# Patient Record
Sex: FEMALE | Race: WHITE | Employment: UNEMPLOYED | URBAN - METROPOLITAN AREA
[De-identification: names, ages, dates, MRNs, and addresses within clinical notes are randomized per-mention and may not be internally consistent; named-entity substitution may affect disease eponyms.]

---

## 2018-11-14 ENCOUNTER — APPOINTMENT (OUTPATIENT)
Dept: CT IMAGING | Age: 58
End: 2018-11-14

## 2018-11-14 ENCOUNTER — HOSPITAL ENCOUNTER (EMERGENCY)
Age: 58
Discharge: HOME | End: 2018-11-14
Attending: EMERGENCY MEDICINE

## 2018-11-14 ENCOUNTER — APPOINTMENT (OUTPATIENT)
Dept: GENERAL RADIOLOGY | Age: 58
End: 2018-11-14

## 2018-11-14 VITALS
RESPIRATION RATE: 16 BRPM | TEMPERATURE: 97.9 F | BODY MASS INDEX: 21.34 KG/M2 | SYSTOLIC BLOOD PRESSURE: 138 MMHG | DIASTOLIC BLOOD PRESSURE: 80 MMHG | HEIGHT: 64 IN | WEIGHT: 125 LBS | HEART RATE: 90 BPM | OXYGEN SATURATION: 96 %

## 2018-11-14 DIAGNOSIS — J44.1 COPD EXACERBATION (HCC): Primary | ICD-10-CM

## 2018-11-14 LAB
ABSOLUTE EOS #: 0.04 K/UL (ref 0–0.44)
ABSOLUTE IMMATURE GRANULOCYTE: 0.06 K/UL (ref 0–0.3)
ABSOLUTE LYMPH #: 1.93 K/UL (ref 1.1–3.7)
ABSOLUTE MONO #: 1.36 K/UL (ref 0.1–1.2)
ALBUMIN SERPL-MCNC: 4.6 G/DL (ref 3.5–5.2)
ALBUMIN/GLOBULIN RATIO: 1.4 (ref 1–2.5)
ALP BLD-CCNC: 71 U/L (ref 35–104)
ALT SERPL-CCNC: 22 U/L (ref 5–33)
ANION GAP SERPL CALCULATED.3IONS-SCNC: 12 MMOL/L (ref 9–17)
AST SERPL-CCNC: 26 U/L
BASOPHILS # BLD: 0 % (ref 0–2)
BASOPHILS ABSOLUTE: 0.05 K/UL (ref 0–0.2)
BILIRUB SERPL-MCNC: 0.43 MG/DL (ref 0.3–1.2)
BILIRUBIN DIRECT: 0.13 MG/DL
BILIRUBIN URINE: NEGATIVE
BILIRUBIN, INDIRECT: 0.3 MG/DL (ref 0–1)
BNP INTERPRETATION: NORMAL
BUN BLDV-MCNC: 4 MG/DL (ref 6–20)
BUN/CREAT BLD: ABNORMAL (ref 9–20)
CALCIUM SERPL-MCNC: 10.3 MG/DL (ref 8.6–10.4)
CHLORIDE BLD-SCNC: 87 MMOL/L (ref 98–107)
CO2: 26 MMOL/L (ref 20–31)
COLOR: YELLOW
COMMENT UA: NORMAL
CREAT SERPL-MCNC: 0.39 MG/DL (ref 0.5–0.9)
DIFFERENTIAL TYPE: ABNORMAL
EKG ATRIAL RATE: 100 BPM
EKG P AXIS: 83 DEGREES
EKG P-R INTERVAL: 158 MS
EKG Q-T INTERVAL: 362 MS
EKG QRS DURATION: 82 MS
EKG QTC CALCULATION (BAZETT): 466 MS
EKG R AXIS: 72 DEGREES
EKG T AXIS: 54 DEGREES
EKG VENTRICULAR RATE: 100 BPM
EOSINOPHILS RELATIVE PERCENT: 0 % (ref 1–4)
GFR AFRICAN AMERICAN: >60 ML/MIN
GFR NON-AFRICAN AMERICAN: >60 ML/MIN
GFR SERPL CREATININE-BSD FRML MDRD: ABNORMAL ML/MIN/{1.73_M2}
GFR SERPL CREATININE-BSD FRML MDRD: ABNORMAL ML/MIN/{1.73_M2}
GLOBULIN: NORMAL G/DL (ref 1.5–3.8)
GLUCOSE BLD-MCNC: 119 MG/DL (ref 70–99)
GLUCOSE URINE: NEGATIVE
HCT VFR BLD CALC: 46 % (ref 36.3–47.1)
HEMOGLOBIN: 15.7 G/DL (ref 11.9–15.1)
IMMATURE GRANULOCYTES: 0 %
INR BLD: 0.9
KETONES, URINE: NEGATIVE
LACTIC ACID, WHOLE BLOOD: 1.4 MMOL/L (ref 0.7–2.1)
LEUKOCYTE ESTERASE, URINE: NEGATIVE
LYMPHOCYTES # BLD: 13 % (ref 24–43)
MCH RBC QN AUTO: 32.4 PG (ref 25.2–33.5)
MCHC RBC AUTO-ENTMCNC: 34.1 G/DL (ref 28.4–34.8)
MCV RBC AUTO: 94.8 FL (ref 82.6–102.9)
MONOCYTES # BLD: 9 % (ref 3–12)
NITRITE, URINE: NEGATIVE
NRBC AUTOMATED: 0 PER 100 WBC
PARTIAL THROMBOPLASTIN TIME: 26.2 SEC (ref 20.5–30.5)
PDW BLD-RTO: 13.1 % (ref 11.8–14.4)
PH UA: 6.5 (ref 5–8)
PLATELET # BLD: 324 K/UL (ref 138–453)
PLATELET ESTIMATE: ABNORMAL
PMV BLD AUTO: 9.2 FL (ref 8.1–13.5)
POC TROPONIN I: 0 NG/ML (ref 0–0.1)
POC TROPONIN I: 0 NG/ML (ref 0–0.1)
POC TROPONIN INTERP: NORMAL
POC TROPONIN INTERP: NORMAL
POTASSIUM SERPL-SCNC: 3.7 MMOL/L (ref 3.7–5.3)
PRO-BNP: 208 PG/ML
PROTEIN UA: NEGATIVE
PROTHROMBIN TIME: 9.6 SEC (ref 9–12)
RBC # BLD: 4.85 M/UL (ref 3.95–5.11)
RBC # BLD: ABNORMAL 10*6/UL
SEG NEUTROPHILS: 78 % (ref 36–65)
SEGMENTED NEUTROPHILS ABSOLUTE COUNT: 11.09 K/UL (ref 1.5–8.1)
SODIUM BLD-SCNC: 125 MMOL/L (ref 135–144)
SPECIFIC GRAVITY UA: 1.01 (ref 1–1.03)
TOTAL PROTEIN: 7.9 G/DL (ref 6.4–8.3)
TURBIDITY: CLEAR
URINE HGB: NEGATIVE
UROBILINOGEN, URINE: NORMAL
WBC # BLD: 14.5 K/UL (ref 3.5–11.3)
WBC # BLD: ABNORMAL 10*3/UL

## 2018-11-14 PROCEDURE — 71046 X-RAY EXAM CHEST 2 VIEWS: CPT

## 2018-11-14 PROCEDURE — 84484 ASSAY OF TROPONIN QUANT: CPT

## 2018-11-14 PROCEDURE — 94640 AIRWAY INHALATION TREATMENT: CPT

## 2018-11-14 PROCEDURE — 99285 EMERGENCY DEPT VISIT HI MDM: CPT

## 2018-11-14 PROCEDURE — 80048 BASIC METABOLIC PNL TOTAL CA: CPT

## 2018-11-14 PROCEDURE — 80076 HEPATIC FUNCTION PANEL: CPT

## 2018-11-14 PROCEDURE — 85610 PROTHROMBIN TIME: CPT

## 2018-11-14 PROCEDURE — 94762 N-INVAS EAR/PLS OXIMTRY CONT: CPT

## 2018-11-14 PROCEDURE — 83880 ASSAY OF NATRIURETIC PEPTIDE: CPT

## 2018-11-14 PROCEDURE — 6360000002 HC RX W HCPCS: Performed by: EMERGENCY MEDICINE

## 2018-11-14 PROCEDURE — 83605 ASSAY OF LACTIC ACID: CPT

## 2018-11-14 PROCEDURE — 71260 CT THORAX DX C+: CPT

## 2018-11-14 PROCEDURE — 96374 THER/PROPH/DIAG INJ IV PUSH: CPT

## 2018-11-14 PROCEDURE — 6360000002 HC RX W HCPCS

## 2018-11-14 PROCEDURE — 87086 URINE CULTURE/COLONY COUNT: CPT

## 2018-11-14 PROCEDURE — 85730 THROMBOPLASTIN TIME PARTIAL: CPT

## 2018-11-14 PROCEDURE — 2580000003 HC RX 258: Performed by: EMERGENCY MEDICINE

## 2018-11-14 PROCEDURE — 94664 DEMO&/EVAL PT USE INHALER: CPT

## 2018-11-14 PROCEDURE — 93005 ELECTROCARDIOGRAM TRACING: CPT

## 2018-11-14 PROCEDURE — 6360000004 HC RX CONTRAST MEDICATION: Performed by: EMERGENCY MEDICINE

## 2018-11-14 PROCEDURE — 87040 BLOOD CULTURE FOR BACTERIA: CPT

## 2018-11-14 PROCEDURE — 85025 COMPLETE CBC W/AUTO DIFF WBC: CPT

## 2018-11-14 PROCEDURE — 96375 TX/PRO/DX INJ NEW DRUG ADDON: CPT

## 2018-11-14 PROCEDURE — 81003 URINALYSIS AUTO W/O SCOPE: CPT

## 2018-11-14 RX ORDER — FENTANYL CITRATE 50 UG/ML
INJECTION, SOLUTION INTRAMUSCULAR; INTRAVENOUS
Status: COMPLETED
Start: 2018-11-14 | End: 2018-11-14

## 2018-11-14 RX ORDER — ALBUTEROL SULFATE 1.25 MG/3ML
1 SOLUTION RESPIRATORY (INHALATION) EVERY 6 HOURS PRN
COMMUNITY
End: 2019-01-29 | Stop reason: SDUPTHER

## 2018-11-14 RX ORDER — AZITHROMYCIN 250 MG/1
250 TABLET, FILM COATED ORAL DAILY
Qty: 4 TABLET | Refills: 0 | Status: SHIPPED | OUTPATIENT
Start: 2018-11-14 | End: 2019-01-29 | Stop reason: ALTCHOICE

## 2018-11-14 RX ORDER — METHYLPREDNISOLONE SODIUM SUCCINATE 125 MG/2ML
125 INJECTION, POWDER, LYOPHILIZED, FOR SOLUTION INTRAMUSCULAR; INTRAVENOUS ONCE
Status: COMPLETED | OUTPATIENT
Start: 2018-11-14 | End: 2018-11-14

## 2018-11-14 RX ORDER — IPRATROPIUM BROMIDE AND ALBUTEROL SULFATE 2.5; .5 MG/3ML; MG/3ML
1 SOLUTION RESPIRATORY (INHALATION)
Status: DISCONTINUED | OUTPATIENT
Start: 2018-11-14 | End: 2018-11-14

## 2018-11-14 RX ORDER — ALBUTEROL SULFATE 2.5 MG/3ML
5 SOLUTION RESPIRATORY (INHALATION)
Status: DISCONTINUED | OUTPATIENT
Start: 2018-11-14 | End: 2018-11-14 | Stop reason: HOSPADM

## 2018-11-14 RX ORDER — FENTANYL CITRATE 50 UG/ML
50 INJECTION, SOLUTION INTRAMUSCULAR; INTRAVENOUS ONCE
Status: COMPLETED | OUTPATIENT
Start: 2018-11-14 | End: 2018-11-14

## 2018-11-14 RX ORDER — MORPHINE SULFATE 4 MG/ML
4 INJECTION, SOLUTION INTRAMUSCULAR; INTRAVENOUS ONCE
Status: DISCONTINUED | OUTPATIENT
Start: 2018-11-14 | End: 2018-11-14

## 2018-11-14 RX ORDER — ALBUTEROL SULFATE 90 UG/1
2 AEROSOL, METERED RESPIRATORY (INHALATION)
Status: DISCONTINUED | OUTPATIENT
Start: 2018-11-14 | End: 2018-11-14

## 2018-11-14 RX ORDER — AZITHROMYCIN 250 MG/1
500 TABLET, FILM COATED ORAL ONCE
Status: DISCONTINUED | OUTPATIENT
Start: 2018-11-14 | End: 2018-11-14 | Stop reason: HOSPADM

## 2018-11-14 RX ORDER — 0.9 % SODIUM CHLORIDE 0.9 %
1000 INTRAVENOUS SOLUTION INTRAVENOUS ONCE
Status: COMPLETED | OUTPATIENT
Start: 2018-11-14 | End: 2018-11-14

## 2018-11-14 RX ORDER — ALBUTEROL SULFATE 90 UG/1
2 AEROSOL, METERED RESPIRATORY (INHALATION)
Status: DISCONTINUED | OUTPATIENT
Start: 2018-11-14 | End: 2018-11-14 | Stop reason: HOSPADM

## 2018-11-14 RX ORDER — PREDNISONE 50 MG/1
50 TABLET ORAL DAILY
Qty: 4 TABLET | Refills: 0 | Status: SHIPPED | OUTPATIENT
Start: 2018-11-14 | End: 2019-01-29 | Stop reason: ALTCHOICE

## 2018-11-14 RX ADMIN — IPRATROPIUM BROMIDE 0.5 MG: 0.5 SOLUTION RESPIRATORY (INHALATION) at 08:50

## 2018-11-14 RX ADMIN — ALBUTEROL SULFATE 5 MG: 2.5 SOLUTION RESPIRATORY (INHALATION) at 08:50

## 2018-11-14 RX ADMIN — METHYLPREDNISOLONE SODIUM SUCCINATE 125 MG: 125 INJECTION, POWDER, FOR SOLUTION INTRAMUSCULAR; INTRAVENOUS at 08:48

## 2018-11-14 RX ADMIN — IOPAMIDOL 100 ML: 755 INJECTION, SOLUTION INTRAVENOUS at 11:15

## 2018-11-14 RX ADMIN — FENTANYL CITRATE 50 MCG: 50 INJECTION, SOLUTION INTRAMUSCULAR; INTRAVENOUS at 09:30

## 2018-11-14 RX ADMIN — SODIUM CHLORIDE 1000 ML: 9 INJECTION, SOLUTION INTRAVENOUS at 10:30

## 2018-11-14 ASSESSMENT — ENCOUNTER SYMPTOMS
ABDOMINAL PAIN: 0
EYE DISCHARGE: 0
VOMITING: 0
EYE PAIN: 0
SHORTNESS OF BREATH: 1
SORE THROAT: 0
COUGH: 0
RHINORRHEA: 0
BACK PAIN: 1
DIARRHEA: 0
NAUSEA: 0
COLOR CHANGE: 0

## 2018-11-14 ASSESSMENT — PAIN DESCRIPTION - PROGRESSION: CLINICAL_PROGRESSION: GRADUALLY IMPROVING

## 2018-11-14 ASSESSMENT — PAIN DESCRIPTION - ORIENTATION
ORIENTATION: RIGHT;LEFT;ANTERIOR
ORIENTATION: RIGHT;LEFT

## 2018-11-14 ASSESSMENT — PAIN DESCRIPTION - LOCATION
LOCATION: CHEST
LOCATION: CHEST

## 2018-11-14 ASSESSMENT — PAIN DESCRIPTION - PAIN TYPE
TYPE: ACUTE PAIN
TYPE: ACUTE PAIN

## 2018-11-14 ASSESSMENT — PAIN DESCRIPTION - DESCRIPTORS
DESCRIPTORS: ACHING
DESCRIPTORS: ACHING

## 2018-11-14 ASSESSMENT — PAIN DESCRIPTION - ONSET
ONSET: ON-GOING
ONSET: SUDDEN

## 2018-11-14 ASSESSMENT — PAIN SCALES - GENERAL
PAINLEVEL_OUTOF10: 5
PAINLEVEL_OUTOF10: 10
PAINLEVEL_OUTOF10: 10

## 2018-11-14 ASSESSMENT — PAIN DESCRIPTION - FREQUENCY
FREQUENCY: CONTINUOUS
FREQUENCY: CONTINUOUS

## 2018-11-14 NOTE — ED NOTES
Bed: 30  Expected date:   Expected time:   Means of arrival:   Comments:  Laura CHAVARRIA [address], RN  11/14/18 [id number]

## 2018-11-15 LAB
CULTURE: NORMAL
Lab: NORMAL
SPECIMEN DESCRIPTION: NORMAL
STATUS: NORMAL

## 2018-11-20 LAB
CULTURE: NORMAL
CULTURE: NORMAL
Lab: NORMAL
Lab: NORMAL
SPECIMEN DESCRIPTION: NORMAL
SPECIMEN DESCRIPTION: NORMAL
STATUS: NORMAL
STATUS: NORMAL

## 2019-01-29 ENCOUNTER — OFFICE VISIT (OUTPATIENT)
Dept: FAMILY MEDICINE CLINIC | Age: 59
End: 2019-01-29

## 2019-01-29 VITALS
BODY MASS INDEX: 20.8 KG/M2 | WEIGHT: 117.4 LBS | TEMPERATURE: 98.4 F | HEART RATE: 86 BPM | SYSTOLIC BLOOD PRESSURE: 138 MMHG | HEIGHT: 63 IN | OXYGEN SATURATION: 92 % | DIASTOLIC BLOOD PRESSURE: 98 MMHG

## 2019-01-29 DIAGNOSIS — R73.01 ELEVATED FASTING BLOOD SUGAR: ICD-10-CM

## 2019-01-29 DIAGNOSIS — J44.1 CHRONIC OBSTRUCTIVE PULMONARY DISEASE WITH ACUTE EXACERBATION (HCC): Primary | ICD-10-CM

## 2019-01-29 DIAGNOSIS — I10 ESSENTIAL HYPERTENSION: ICD-10-CM

## 2019-01-29 DIAGNOSIS — Z13.0 SCREENING FOR DEFICIENCY ANEMIA: ICD-10-CM

## 2019-01-29 DIAGNOSIS — Z85.3 HISTORY OF RIGHT BREAST CANCER: ICD-10-CM

## 2019-01-29 PROBLEM — K52.9 CHRONIC DIARRHEA: Status: ACTIVE | Noted: 2019-01-29

## 2019-01-29 PROBLEM — J44.9 CHRONIC OBSTRUCTIVE LUNG DISEASE (HCC): Status: ACTIVE | Noted: 2019-01-29

## 2019-01-29 PROCEDURE — 99203 OFFICE O/P NEW LOW 30 MIN: CPT | Performed by: INTERNAL MEDICINE

## 2019-01-29 RX ORDER — ALBUTEROL SULFATE 1.25 MG/3ML
1 SOLUTION RESPIRATORY (INHALATION) EVERY 6 HOURS PRN
Qty: 360 ML | Refills: 3 | Status: ON HOLD | OUTPATIENT
Start: 2019-01-29 | End: 2019-12-27 | Stop reason: SDUPTHER

## 2019-01-29 RX ORDER — PREDNISONE 10 MG/1
TABLET ORAL
Qty: 30 TABLET | Refills: 0 | Status: SHIPPED | OUTPATIENT
Start: 2019-01-29 | End: 2019-03-06 | Stop reason: ALTCHOICE

## 2019-01-29 RX ORDER — AMLODIPINE BESYLATE 5 MG/1
5 TABLET ORAL DAILY
Qty: 30 TABLET | Refills: 3 | Status: SHIPPED | OUTPATIENT
Start: 2019-01-29 | End: 2019-06-03 | Stop reason: SDUPTHER

## 2019-01-29 RX ORDER — IPRATROPIUM BROMIDE AND ALBUTEROL SULFATE 2.5; .5 MG/3ML; MG/3ML
1 SOLUTION RESPIRATORY (INHALATION) EVERY 6 HOURS
Qty: 360 ML | Refills: 3 | Status: ON HOLD | OUTPATIENT
Start: 2019-01-29 | End: 2019-12-27 | Stop reason: SDUPTHER

## 2019-01-29 ASSESSMENT — PATIENT HEALTH QUESTIONNAIRE - PHQ9
SUM OF ALL RESPONSES TO PHQ9 QUESTIONS 1 & 2: 0
2. FEELING DOWN, DEPRESSED OR HOPELESS: 0
SUM OF ALL RESPONSES TO PHQ QUESTIONS 1-9: 0
1. LITTLE INTEREST OR PLEASURE IN DOING THINGS: 0
SUM OF ALL RESPONSES TO PHQ QUESTIONS 1-9: 0

## 2019-03-06 ENCOUNTER — OFFICE VISIT (OUTPATIENT)
Dept: FAMILY MEDICINE CLINIC | Age: 59
End: 2019-03-06

## 2019-03-06 VITALS
BODY MASS INDEX: 20.83 KG/M2 | HEART RATE: 99 BPM | SYSTOLIC BLOOD PRESSURE: 116 MMHG | TEMPERATURE: 98.7 F | WEIGHT: 117.6 LBS | DIASTOLIC BLOOD PRESSURE: 82 MMHG | OXYGEN SATURATION: 94 % | RESPIRATION RATE: 16 BRPM

## 2019-03-06 DIAGNOSIS — J44.1 CHRONIC OBSTRUCTIVE PULMONARY DISEASE WITH ACUTE EXACERBATION (HCC): Primary | ICD-10-CM
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
## 2019-03-06 DIAGNOSIS — I10 ESSENTIAL HYPERTENSION: ICD-10-CM

## 2019-03-06 DIAGNOSIS — Z85.3 HISTORY OF RIGHT BREAST CANCER: ICD-10-CM

## 2019-03-06 PROCEDURE — 99214 OFFICE O/P EST MOD 30 MIN: CPT | Performed by: INTERNAL MEDICINE

## 2019-06-03 ENCOUNTER — TELEPHONE (OUTPATIENT)
Dept: FAMILY MEDICINE CLINIC | Age: 59
End: 2019-06-03

## 2019-06-03 DIAGNOSIS — I10 ESSENTIAL HYPERTENSION: ICD-10-CM

## 2019-06-04 RX ORDER — AMLODIPINE BESYLATE 5 MG/1
5 TABLET ORAL DAILY
Qty: 90 TABLET | Refills: 1 | Status: SHIPPED | OUTPATIENT
Start: 2019-06-04 | End: 2019-12-23 | Stop reason: SDUPTHER

## 2019-06-04 RX ORDER — AMLODIPINE BESYLATE 5 MG/1
5 TABLET ORAL DAILY
Qty: 90 TABLET | Refills: 1 | Status: SHIPPED | OUTPATIENT
Start: 2019-06-04 | End: 2019-06-04 | Stop reason: SDUPTHER

## 2019-12-18 ENCOUNTER — HOSPITAL ENCOUNTER (INPATIENT)
Age: 59
LOS: 2 days | Discharge: HOME | DRG: 180 | End: 2019-12-20
Attending: EMERGENCY MEDICINE | Admitting: INTERNAL MEDICINE

## 2019-12-18 ENCOUNTER — APPOINTMENT (OUTPATIENT)
Dept: GENERAL RADIOLOGY | Age: 59
DRG: 180 | End: 2019-12-18

## 2019-12-18 ENCOUNTER — APPOINTMENT (OUTPATIENT)
Dept: CT IMAGING | Age: 59
DRG: 180 | End: 2019-12-18

## 2019-12-18 DIAGNOSIS — R06.02 SHORTNESS OF BREATH: Primary | ICD-10-CM

## 2019-12-18 PROBLEM — J90 PLEURAL EFFUSION: Status: ACTIVE | Noted: 2019-12-18

## 2019-12-18 LAB
ANION GAP SERPL CALCULATED.3IONS-SCNC: 13 MMOL/L (ref 9–17)
BNP INTERPRETATION: NORMAL
BUN BLDV-MCNC: 6 MG/DL (ref 6–20)
BUN/CREAT BLD: ABNORMAL (ref 9–20)
CALCIUM SERPL-MCNC: 9.8 MG/DL (ref 8.6–10.4)
CHLORIDE BLD-SCNC: 91 MMOL/L (ref 98–107)
CO2: 27 MMOL/L (ref 20–31)
CREAT SERPL-MCNC: 0.44 MG/DL (ref 0.5–0.9)
GFR AFRICAN AMERICAN: >60 ML/MIN
GFR NON-AFRICAN AMERICAN: >60 ML/MIN
GFR SERPL CREATININE-BSD FRML MDRD: ABNORMAL ML/MIN/{1.73_M2}
GFR SERPL CREATININE-BSD FRML MDRD: ABNORMAL ML/MIN/{1.73_M2}
GLUCOSE BLD-MCNC: 117 MG/DL (ref 70–99)
HCT VFR BLD CALC: 41.3 % (ref 36.3–47.1)
HEMOGLOBIN: 14.1 G/DL (ref 11.9–15.1)
MCH RBC QN AUTO: 33.1 PG (ref 25.2–33.5)
MCHC RBC AUTO-ENTMCNC: 34.1 G/DL (ref 28.4–34.8)
MCV RBC AUTO: 96.9 FL (ref 82.6–102.9)
NRBC AUTOMATED: 0 PER 100 WBC
PDW BLD-RTO: 13.6 % (ref 11.8–14.4)
PLATELET # BLD: 499 K/UL (ref 138–453)
PMV BLD AUTO: 8.8 FL (ref 8.1–13.5)
POTASSIUM SERPL-SCNC: 4.1 MMOL/L (ref 3.7–5.3)
PRO-BNP: 243 PG/ML
RBC # BLD: 4.26 M/UL (ref 3.95–5.11)
SODIUM BLD-SCNC: 131 MMOL/L (ref 135–144)
WBC # BLD: 12.1 K/UL (ref 3.5–11.3)

## 2019-12-18 PROCEDURE — 85027 COMPLETE CBC AUTOMATED: CPT

## 2019-12-18 PROCEDURE — 6360000004 HC RX CONTRAST MEDICATION: Performed by: STUDENT IN AN ORGANIZED HEALTH CARE EDUCATION/TRAINING PROGRAM

## 2019-12-18 PROCEDURE — 99285 EMERGENCY DEPT VISIT HI MDM: CPT

## 2019-12-18 PROCEDURE — 1200000000 HC SEMI PRIVATE

## 2019-12-18 PROCEDURE — 71046 X-RAY EXAM CHEST 2 VIEWS: CPT

## 2019-12-18 PROCEDURE — 71260 CT THORAX DX C+: CPT

## 2019-12-18 PROCEDURE — 99222 1ST HOSP IP/OBS MODERATE 55: CPT | Performed by: NURSE PRACTITIONER

## 2019-12-18 PROCEDURE — 80048 BASIC METABOLIC PNL TOTAL CA: CPT

## 2019-12-18 PROCEDURE — 93005 ELECTROCARDIOGRAM TRACING: CPT | Performed by: INTERNAL MEDICINE

## 2019-12-18 PROCEDURE — 83880 ASSAY OF NATRIURETIC PEPTIDE: CPT

## 2019-12-18 RX ADMIN — IOHEXOL 75 ML: 350 INJECTION, SOLUTION INTRAVENOUS at 18:50

## 2019-12-19 ENCOUNTER — APPOINTMENT (OUTPATIENT)
Dept: ULTRASOUND IMAGING | Age: 59
DRG: 180 | End: 2019-12-19

## 2019-12-19 ENCOUNTER — APPOINTMENT (OUTPATIENT)
Dept: GENERAL RADIOLOGY | Age: 59
DRG: 180 | End: 2019-12-19

## 2019-12-19 PROBLEM — R73.9 HYPERGLYCEMIA: Status: ACTIVE | Noted: 2019-12-19

## 2019-12-19 PROBLEM — Z72.0 TOBACCO ABUSE DISORDER: Status: ACTIVE | Noted: 2019-12-19

## 2019-12-19 PROBLEM — F10.10 ALCOHOL ABUSE: Status: ACTIVE | Noted: 2019-12-19

## 2019-12-19 PROBLEM — I25.10 CORONARY ARTERY DISEASE INVOLVING NATIVE CORONARY ARTERY OF NATIVE HEART WITHOUT ANGINA PECTORIS: Status: ACTIVE | Noted: 2019-12-19

## 2019-12-19 PROBLEM — D72.829 LEUKOCYTOSIS: Status: ACTIVE | Noted: 2019-12-19

## 2019-12-19 PROBLEM — J44.0 CHRONIC OBSTRUCTIVE PULMONARY DISEASE WITH ACUTE LOWER RESPIRATORY INFECTION (HCC): Status: ACTIVE | Noted: 2019-01-29

## 2019-12-19 PROBLEM — E87.1 HYPONATREMIA: Status: ACTIVE | Noted: 2019-12-19

## 2019-12-19 PROBLEM — K52.9 CHRONIC DIARRHEA: Status: RESOLVED | Noted: 2019-01-29 | Resolved: 2019-12-19

## 2019-12-19 LAB
ALBUMIN SERPL-MCNC: 3.8 G/DL (ref 3.5–5.2)
ALBUMIN/GLOBULIN RATIO: 1 (ref 1–2.5)
ALP BLD-CCNC: 93 U/L (ref 35–104)
ALT SERPL-CCNC: 16 U/L (ref 5–33)
ANION GAP SERPL CALCULATED.3IONS-SCNC: 14 MMOL/L (ref 9–17)
AST SERPL-CCNC: 21 U/L
BILIRUB SERPL-MCNC: 0.43 MG/DL (ref 0.3–1.2)
BILIRUBIN DIRECT: 0.12 MG/DL
BILIRUBIN, INDIRECT: 0.31 MG/DL (ref 0–1)
BNP INTERPRETATION: NORMAL
BUN BLDV-MCNC: 4 MG/DL (ref 6–20)
BUN/CREAT BLD: ABNORMAL (ref 9–20)
CALCIUM SERPL-MCNC: 9.7 MG/DL (ref 8.6–10.4)
CASE NUMBER:: NORMAL
CHLORIDE BLD-SCNC: 99 MMOL/L (ref 98–107)
CO2: 22 MMOL/L (ref 20–31)
CREAT SERPL-MCNC: 0.27 MG/DL (ref 0.5–0.9)
DIRECT EXAM: NORMAL
EKG ATRIAL RATE: 91 BPM
EKG P AXIS: 66 DEGREES
EKG P-R INTERVAL: 156 MS
EKG Q-T INTERVAL: 350 MS
EKG QRS DURATION: 74 MS
EKG QTC CALCULATION (BAZETT): 430 MS
EKG R AXIS: 65 DEGREES
EKG T AXIS: 42 DEGREES
EKG VENTRICULAR RATE: 91 BPM
ESTIMATED AVERAGE GLUCOSE: 114 MG/DL
GFR AFRICAN AMERICAN: >60 ML/MIN
GFR NON-AFRICAN AMERICAN: >60 ML/MIN
GFR SERPL CREATININE-BSD FRML MDRD: ABNORMAL ML/MIN/{1.73_M2}
GFR SERPL CREATININE-BSD FRML MDRD: ABNORMAL ML/MIN/{1.73_M2}
GLOBULIN: NORMAL G/DL (ref 1.5–3.8)
GLUCOSE BLD-MCNC: 131 MG/DL (ref 70–99)
GLUCOSE, FLUID: 48 MG/DL
HBA1C MFR BLD: 5.6 % (ref 4–6)
HCT VFR BLD CALC: 41.2 % (ref 36.3–47.1)
HCT VFR BLD CALC: 42.4 % (ref 36.3–47.1)
HEMOGLOBIN: 13.8 G/DL (ref 11.9–15.1)
HEMOGLOBIN: 14.4 G/DL (ref 11.9–15.1)
INR BLD: 0.9
INR BLD: 0.9
LACTATE DEHYDROGENASE, FLUID: 464 U/L
LACTATE DEHYDROGENASE: 180 U/L (ref 135–214)
LACTIC ACID, SEPSIS WHOLE BLOOD: 1.1 MMOL/L (ref 0.5–1.9)
LACTIC ACID, SEPSIS: NORMAL MMOL/L (ref 0.5–1.9)
Lab: NORMAL
MCH RBC QN AUTO: 33.1 PG (ref 25.2–33.5)
MCH RBC QN AUTO: 33.4 PG (ref 25.2–33.5)
MCHC RBC AUTO-ENTMCNC: 33.5 G/DL (ref 28.4–34.8)
MCHC RBC AUTO-ENTMCNC: 34 G/DL (ref 28.4–34.8)
MCV RBC AUTO: 97.5 FL (ref 82.6–102.9)
MCV RBC AUTO: 99.8 FL (ref 82.6–102.9)
MYOGLOBIN: 25 NG/ML (ref 25–58)
MYOGLOBIN: 35 NG/ML (ref 25–58)
MYOGLOBIN: <21 NG/ML (ref 25–58)
NRBC AUTOMATED: 0 PER 100 WBC
NRBC AUTOMATED: 0 PER 100 WBC
PARTIAL THROMBOPLASTIN TIME: 25.9 SEC (ref 20.5–30.5)
PDW BLD-RTO: 13.3 % (ref 11.8–14.4)
PDW BLD-RTO: 13.3 % (ref 11.8–14.4)
PH FLUID: 7.5
PLATELET # BLD: 438 K/UL (ref 138–453)
PLATELET # BLD: 459 K/UL (ref 138–453)
PMV BLD AUTO: 8.7 FL (ref 8.1–13.5)
PMV BLD AUTO: 9 FL (ref 8.1–13.5)
POTASSIUM SERPL-SCNC: 4 MMOL/L (ref 3.7–5.3)
PRO-BNP: 219 PG/ML
PROTHROMBIN TIME: 10 SEC (ref 9–12)
PROTHROMBIN TIME: 9.8 SEC (ref 9–12)
RBC # BLD: 4.13 M/UL (ref 3.95–5.11)
RBC # BLD: 4.35 M/UL (ref 3.95–5.11)
SODIUM BLD-SCNC: 135 MMOL/L (ref 135–144)
SPECIMEN DESCRIPTION: NORMAL
SPECIMEN TYPE: NORMAL
TOTAL PROTEIN, BODY FLUID: 5.2 G/DL
TOTAL PROTEIN: 7.4 G/DL (ref 6.4–8.3)
TOTAL PROTEIN: 7.6 G/DL (ref 6.4–8.3)
TROPONIN INTERP: ABNORMAL
TROPONIN INTERP: NORMAL
TROPONIN INTERP: NORMAL
TROPONIN T: ABNORMAL NG/ML
TROPONIN T: NORMAL NG/ML
TROPONIN T: NORMAL NG/ML
TROPONIN, HIGH SENSITIVITY: 7 NG/L (ref 0–14)
TROPONIN, HIGH SENSITIVITY: <6 NG/L (ref 0–14)
TROPONIN, HIGH SENSITIVITY: <6 NG/L (ref 0–14)
WBC # BLD: 10.1 K/UL (ref 3.5–11.3)
WBC # BLD: 11.8 K/UL (ref 3.5–11.3)

## 2019-12-19 PROCEDURE — 87206 SMEAR FLUORESCENT/ACID STAI: CPT

## 2019-12-19 PROCEDURE — 1200000000 HC SEMI PRIVATE

## 2019-12-19 PROCEDURE — 84484 ASSAY OF TROPONIN QUANT: CPT

## 2019-12-19 PROCEDURE — 99233 SBSQ HOSP IP/OBS HIGH 50: CPT | Performed by: INTERNAL MEDICINE

## 2019-12-19 PROCEDURE — 85610 PROTHROMBIN TIME: CPT

## 2019-12-19 PROCEDURE — 87040 BLOOD CULTURE FOR BACTERIA: CPT

## 2019-12-19 PROCEDURE — 88341 IMHCHEM/IMCYTCHM EA ADD ANTB: CPT

## 2019-12-19 PROCEDURE — 87449 NOS EACH ORGANISM AG IA: CPT

## 2019-12-19 PROCEDURE — 2580000003 HC RX 258: Performed by: STUDENT IN AN ORGANIZED HEALTH CARE EDUCATION/TRAINING PROGRAM

## 2019-12-19 PROCEDURE — 87075 CULTR BACTERIA EXCEPT BLOOD: CPT

## 2019-12-19 PROCEDURE — 83874 ASSAY OF MYOGLOBIN: CPT

## 2019-12-19 PROCEDURE — 94761 N-INVAS EAR/PLS OXIMETRY MLT: CPT

## 2019-12-19 PROCEDURE — 87116 MYCOBACTERIA CULTURE: CPT

## 2019-12-19 PROCEDURE — 86738 MYCOPLASMA ANTIBODY: CPT

## 2019-12-19 PROCEDURE — 87070 CULTURE OTHR SPECIMN AEROBIC: CPT

## 2019-12-19 PROCEDURE — 83036 HEMOGLOBIN GLYCOSYLATED A1C: CPT

## 2019-12-19 PROCEDURE — 6370000000 HC RX 637 (ALT 250 FOR IP): Performed by: NURSE PRACTITIONER

## 2019-12-19 PROCEDURE — 86038 ANTINUCLEAR ANTIBODIES: CPT

## 2019-12-19 PROCEDURE — 83605 ASSAY OF LACTIC ACID: CPT

## 2019-12-19 PROCEDURE — 6360000002 HC RX W HCPCS: Performed by: NURSE PRACTITIONER

## 2019-12-19 PROCEDURE — 83615 LACTATE (LD) (LDH) ENZYME: CPT

## 2019-12-19 PROCEDURE — 85730 THROMBOPLASTIN TIME PARTIAL: CPT

## 2019-12-19 PROCEDURE — 84157 ASSAY OF PROTEIN OTHER: CPT

## 2019-12-19 PROCEDURE — 88342 IMHCHEM/IMCYTCHM 1ST ANTB: CPT

## 2019-12-19 PROCEDURE — 87899 AGENT NOS ASSAY W/OPTIC: CPT

## 2019-12-19 PROCEDURE — 94664 DEMO&/EVAL PT USE INHALER: CPT

## 2019-12-19 PROCEDURE — 80048 BASIC METABOLIC PNL TOTAL CA: CPT

## 2019-12-19 PROCEDURE — 80076 HEPATIC FUNCTION PANEL: CPT

## 2019-12-19 PROCEDURE — 71045 X-RAY EXAM CHEST 1 VIEW: CPT

## 2019-12-19 PROCEDURE — 93010 ELECTROCARDIOGRAM REPORT: CPT | Performed by: INTERNAL MEDICINE

## 2019-12-19 PROCEDURE — 36415 COLL VENOUS BLD VENIPUNCTURE: CPT

## 2019-12-19 PROCEDURE — 83986 ASSAY PH BODY FLUID NOS: CPT

## 2019-12-19 PROCEDURE — 6370000000 HC RX 637 (ALT 250 FOR IP): Performed by: INTERNAL MEDICINE

## 2019-12-19 PROCEDURE — 87102 FUNGUS ISOLATION CULTURE: CPT

## 2019-12-19 PROCEDURE — 2580000003 HC RX 258: Performed by: INTERNAL MEDICINE

## 2019-12-19 PROCEDURE — 2700000000 HC OXYGEN THERAPY PER DAY

## 2019-12-19 PROCEDURE — 85027 COMPLETE CBC AUTOMATED: CPT

## 2019-12-19 PROCEDURE — 2709999900 US THORACENTESIS

## 2019-12-19 PROCEDURE — 88112 CYTOPATH CELL ENHANCE TECH: CPT

## 2019-12-19 PROCEDURE — 82945 GLUCOSE OTHER FLUID: CPT

## 2019-12-19 PROCEDURE — 84155 ASSAY OF PROTEIN SERUM: CPT

## 2019-12-19 PROCEDURE — 88305 TISSUE EXAM BY PATHOLOGIST: CPT

## 2019-12-19 PROCEDURE — 89051 BODY FLUID CELL COUNT: CPT

## 2019-12-19 PROCEDURE — 87205 SMEAR GRAM STAIN: CPT

## 2019-12-19 PROCEDURE — 94640 AIRWAY INHALATION TREATMENT: CPT

## 2019-12-19 PROCEDURE — 87015 SPECIMEN INFECT AGNT CONCNTJ: CPT

## 2019-12-19 PROCEDURE — 93005 ELECTROCARDIOGRAM TRACING: CPT | Performed by: INTERNAL MEDICINE

## 2019-12-19 PROCEDURE — 83880 ASSAY OF NATRIURETIC PEPTIDE: CPT

## 2019-12-19 PROCEDURE — 6360000002 HC RX W HCPCS: Performed by: STUDENT IN AN ORGANIZED HEALTH CARE EDUCATION/TRAINING PROGRAM

## 2019-12-19 PROCEDURE — 94669 MECHANICAL CHEST WALL OSCILL: CPT

## 2019-12-19 PROCEDURE — 0W9B3ZZ DRAINAGE OF LEFT PLEURAL CAVITY, PERCUTANEOUS APPROACH: ICD-10-PCS | Performed by: INTERNAL MEDICINE

## 2019-12-19 RX ORDER — LORAZEPAM 2 MG/ML
2 INJECTION INTRAMUSCULAR
Status: DISCONTINUED | OUTPATIENT
Start: 2019-12-19 | End: 2019-12-21 | Stop reason: HOSPADM

## 2019-12-19 RX ORDER — PREDNISONE 20 MG/1
40 TABLET ORAL DAILY
Status: DISCONTINUED | OUTPATIENT
Start: 2019-12-21 | End: 2019-12-21 | Stop reason: HOSPADM

## 2019-12-19 RX ORDER — 0.9 % SODIUM CHLORIDE 0.9 %
1000 INTRAVENOUS SOLUTION INTRAVENOUS ONCE
Status: DISCONTINUED | OUTPATIENT
Start: 2019-12-19 | End: 2019-12-21 | Stop reason: HOSPADM

## 2019-12-19 RX ORDER — METHYLPREDNISOLONE SODIUM SUCCINATE 40 MG/ML
40 INJECTION, POWDER, LYOPHILIZED, FOR SOLUTION INTRAMUSCULAR; INTRAVENOUS EVERY 6 HOURS
Status: DISCONTINUED | OUTPATIENT
Start: 2019-12-19 | End: 2019-12-21 | Stop reason: HOSPADM

## 2019-12-19 RX ORDER — SODIUM CHLORIDE 0.9 % (FLUSH) 0.9 %
10 SYRINGE (ML) INJECTION EVERY 12 HOURS SCHEDULED
Status: DISCONTINUED | OUTPATIENT
Start: 2019-12-19 | End: 2019-12-21 | Stop reason: HOSPADM

## 2019-12-19 RX ORDER — IPRATROPIUM BROMIDE AND ALBUTEROL SULFATE 2.5; .5 MG/3ML; MG/3ML
1 SOLUTION RESPIRATORY (INHALATION)
Status: DISCONTINUED | OUTPATIENT
Start: 2019-12-19 | End: 2019-12-19

## 2019-12-19 RX ORDER — SODIUM CHLORIDE 0.9 % (FLUSH) 0.9 %
10 SYRINGE (ML) INJECTION PRN
Status: DISCONTINUED | OUTPATIENT
Start: 2019-12-19 | End: 2019-12-21 | Stop reason: HOSPADM

## 2019-12-19 RX ORDER — FAMOTIDINE 20 MG/1
20 TABLET, FILM COATED ORAL 2 TIMES DAILY
Status: DISCONTINUED | OUTPATIENT
Start: 2019-12-19 | End: 2019-12-21 | Stop reason: HOSPADM

## 2019-12-19 RX ORDER — LORAZEPAM 1 MG/1
1 TABLET ORAL
Status: DISCONTINUED | OUTPATIENT
Start: 2019-12-19 | End: 2019-12-21 | Stop reason: HOSPADM

## 2019-12-19 RX ORDER — ONDANSETRON 2 MG/ML
4 INJECTION INTRAMUSCULAR; INTRAVENOUS EVERY 6 HOURS PRN
Status: DISCONTINUED | OUTPATIENT
Start: 2019-12-19 | End: 2019-12-21 | Stop reason: HOSPADM

## 2019-12-19 RX ORDER — LORAZEPAM 2 MG/1
2 TABLET ORAL
Status: DISCONTINUED | OUTPATIENT
Start: 2019-12-19 | End: 2019-12-21 | Stop reason: HOSPADM

## 2019-12-19 RX ORDER — IPRATROPIUM BROMIDE AND ALBUTEROL SULFATE 2.5; .5 MG/3ML; MG/3ML
1 SOLUTION RESPIRATORY (INHALATION) EVERY 4 HOURS PRN
Status: DISCONTINUED | OUTPATIENT
Start: 2019-12-19 | End: 2019-12-21 | Stop reason: HOSPADM

## 2019-12-19 RX ORDER — LORAZEPAM 2 MG/1
4 TABLET ORAL
Status: DISCONTINUED | OUTPATIENT
Start: 2019-12-19 | End: 2019-12-21 | Stop reason: HOSPADM

## 2019-12-19 RX ORDER — LORAZEPAM 2 MG/ML
3 INJECTION INTRAMUSCULAR
Status: DISCONTINUED | OUTPATIENT
Start: 2019-12-19 | End: 2019-12-21 | Stop reason: HOSPADM

## 2019-12-19 RX ORDER — LORAZEPAM 2 MG/ML
1 INJECTION INTRAMUSCULAR
Status: DISCONTINUED | OUTPATIENT
Start: 2019-12-19 | End: 2019-12-21 | Stop reason: HOSPADM

## 2019-12-19 RX ORDER — NICOTINE 21 MG/24HR
1 PATCH, TRANSDERMAL 24 HOURS TRANSDERMAL DAILY PRN
Status: DISCONTINUED | OUTPATIENT
Start: 2019-12-19 | End: 2019-12-21 | Stop reason: HOSPADM

## 2019-12-19 RX ORDER — FUROSEMIDE 10 MG/ML
40 INJECTION INTRAMUSCULAR; INTRAVENOUS ONCE
Status: COMPLETED | OUTPATIENT
Start: 2019-12-19 | End: 2019-12-19

## 2019-12-19 RX ORDER — ALBUTEROL SULFATE 90 UG/1
2 AEROSOL, METERED RESPIRATORY (INHALATION) EVERY 6 HOURS PRN
Status: DISCONTINUED | OUTPATIENT
Start: 2019-12-19 | End: 2019-12-21 | Stop reason: HOSPADM

## 2019-12-19 RX ORDER — ACETAMINOPHEN 325 MG/1
650 TABLET ORAL EVERY 4 HOURS PRN
Status: DISCONTINUED | OUTPATIENT
Start: 2019-12-19 | End: 2019-12-21 | Stop reason: HOSPADM

## 2019-12-19 RX ORDER — ALBUTEROL SULFATE 2.5 MG/3ML
2.5 SOLUTION RESPIRATORY (INHALATION)
Status: DISCONTINUED | OUTPATIENT
Start: 2019-12-19 | End: 2019-12-19

## 2019-12-19 RX ORDER — LORAZEPAM 2 MG/ML
4 INJECTION INTRAMUSCULAR
Status: DISCONTINUED | OUTPATIENT
Start: 2019-12-19 | End: 2019-12-21 | Stop reason: HOSPADM

## 2019-12-19 RX ORDER — AMLODIPINE BESYLATE 5 MG/1
5 TABLET ORAL DAILY
Status: DISCONTINUED | OUTPATIENT
Start: 2019-12-19 | End: 2019-12-21 | Stop reason: HOSPADM

## 2019-12-19 RX ORDER — UREA 10 %
3 LOTION (ML) TOPICAL NIGHTLY PRN
Status: DISCONTINUED | OUTPATIENT
Start: 2019-12-19 | End: 2019-12-21 | Stop reason: HOSPADM

## 2019-12-19 RX ADMIN — METHYLPREDNISOLONE SODIUM SUCCINATE 40 MG: 40 INJECTION, POWDER, FOR SOLUTION INTRAMUSCULAR; INTRAVENOUS at 20:43

## 2019-12-19 RX ADMIN — MEROPENEM 1 G: 1 INJECTION, POWDER, FOR SOLUTION INTRAVENOUS at 06:40

## 2019-12-19 RX ADMIN — IPRATROPIUM BROMIDE AND ALBUTEROL SULFATE 1 AMPULE: .5; 3 SOLUTION RESPIRATORY (INHALATION) at 08:53

## 2019-12-19 RX ADMIN — METHYLPREDNISOLONE SODIUM SUCCINATE 40 MG: 40 INJECTION, POWDER, FOR SOLUTION INTRAMUSCULAR; INTRAVENOUS at 14:26

## 2019-12-19 RX ADMIN — Medication 10 ML: at 09:49

## 2019-12-19 RX ADMIN — METHYLPREDNISOLONE SODIUM SUCCINATE 40 MG: 40 INJECTION, POWDER, FOR SOLUTION INTRAMUSCULAR; INTRAVENOUS at 09:40

## 2019-12-19 RX ADMIN — FAMOTIDINE 20 MG: 20 TABLET, FILM COATED ORAL at 20:43

## 2019-12-19 RX ADMIN — FUROSEMIDE 40 MG: 10 INJECTION, SOLUTION INTRAMUSCULAR; INTRAVENOUS at 05:09

## 2019-12-19 RX ADMIN — Medication 10 ML: at 13:00

## 2019-12-19 RX ADMIN — FAMOTIDINE 20 MG: 20 TABLET, FILM COATED ORAL at 02:41

## 2019-12-19 RX ADMIN — VANCOMYCIN HYDROCHLORIDE 750 MG: 10 INJECTION, POWDER, LYOPHILIZED, FOR SOLUTION INTRAVENOUS at 02:43

## 2019-12-19 RX ADMIN — METHYLPREDNISOLONE SODIUM SUCCINATE 40 MG: 40 INJECTION, POWDER, FOR SOLUTION INTRAMUSCULAR; INTRAVENOUS at 02:42

## 2019-12-19 SDOH — HEALTH STABILITY: MENTAL HEALTH: HOW OFTEN DO YOU HAVE A DRINK CONTAINING ALCOHOL?: 4 OR MORE TIMES A WEEK

## 2019-12-19 SDOH — HEALTH STABILITY: MENTAL HEALTH: HOW MANY STANDARD DRINKS CONTAINING ALCOHOL DO YOU HAVE ON A TYPICAL DAY?: 5 OR 6

## 2019-12-19 ASSESSMENT — ENCOUNTER SYMPTOMS
DIARRHEA: 0
VOMITING: 0
WHEEZING: 0
CONSTIPATION: 0
STRIDOR: 0
NAUSEA: 0
SHORTNESS OF BREATH: 1
BLOOD IN STOOL: 0
COUGH: 1
GASTROINTESTINAL NEGATIVE: 1
ABDOMINAL PAIN: 0

## 2019-12-20 ENCOUNTER — TELEPHONE (OUTPATIENT)
Dept: FAMILY MEDICINE CLINIC | Age: 59
End: 2019-12-20

## 2019-12-20 VITALS
HEART RATE: 91 BPM | SYSTOLIC BLOOD PRESSURE: 128 MMHG | OXYGEN SATURATION: 94 % | WEIGHT: 117.9 LBS | BODY MASS INDEX: 21.7 KG/M2 | HEIGHT: 62 IN | DIASTOLIC BLOOD PRESSURE: 83 MMHG | RESPIRATION RATE: 23 BRPM | TEMPERATURE: 98 F

## 2019-12-20 PROBLEM — C34.92 ADENOCARCINOMA, LUNG, LEFT (HCC): Status: ACTIVE | Noted: 2019-12-20

## 2019-12-20 PROBLEM — R73.9 HYPERGLYCEMIA: Status: RESOLVED | Noted: 2019-12-19 | Resolved: 2019-12-20

## 2019-12-20 PROBLEM — E87.1 HYPONATREMIA: Status: RESOLVED | Noted: 2019-12-19 | Resolved: 2019-12-20

## 2019-12-20 LAB
ABSOLUTE EOS #: <0.03 K/UL (ref 0–0.44)
ABSOLUTE IMMATURE GRANULOCYTE: 0.15 K/UL (ref 0–0.3)
ABSOLUTE LYMPH #: 0.8 K/UL (ref 1.1–3.7)
ABSOLUTE MONO #: 0.66 K/UL (ref 0.1–1.2)
ALBUMIN SERPL-MCNC: 3.3 G/DL (ref 3.5–5.2)
ALBUMIN/GLOBULIN RATIO: 1 (ref 1–2.5)
ALP BLD-CCNC: 75 U/L (ref 35–104)
ALT SERPL-CCNC: 14 U/L (ref 5–33)
ANION GAP SERPL CALCULATED.3IONS-SCNC: 11 MMOL/L (ref 9–17)
ANTI-NUCLEAR ANTIBODY (ANA): NEGATIVE
APPEARANCE FLUID: NORMAL
AST SERPL-CCNC: 16 U/L
BASO FLUID: NORMAL %
BASOPHILS # BLD: 0 % (ref 0–2)
BASOPHILS ABSOLUTE: <0.03 K/UL (ref 0–0.2)
BILIRUB SERPL-MCNC: 0.17 MG/DL (ref 0.3–1.2)
BUN BLDV-MCNC: 8 MG/DL (ref 6–20)
BUN/CREAT BLD: ABNORMAL (ref 9–20)
CALCIUM SERPL-MCNC: 9.8 MG/DL (ref 8.6–10.4)
CHLORIDE BLD-SCNC: 99 MMOL/L (ref 98–107)
CO2: 26 MMOL/L (ref 20–31)
COLOR FLUID: NORMAL
CREAT SERPL-MCNC: 0.31 MG/DL (ref 0.5–0.9)
DIFFERENTIAL TYPE: ABNORMAL
DIRECT EXAM: NORMAL
EOSINOPHIL FLUID: NORMAL %
EOSINOPHILS RELATIVE PERCENT: 0 % (ref 1–4)
FLUID DIFF COMMENT: NORMAL
GFR AFRICAN AMERICAN: >60 ML/MIN
GFR NON-AFRICAN AMERICAN: >60 ML/MIN
GFR SERPL CREATININE-BSD FRML MDRD: ABNORMAL ML/MIN/{1.73_M2}
GFR SERPL CREATININE-BSD FRML MDRD: ABNORMAL ML/MIN/{1.73_M2}
GLUCOSE BLD-MCNC: 179 MG/DL (ref 70–99)
HCT VFR BLD CALC: 40.6 % (ref 36.3–47.1)
HEMOGLOBIN: 13.7 G/DL (ref 11.9–15.1)
IMMATURE GRANULOCYTES: 1 %
LYMPHOCYTES # BLD: 4 % (ref 24–43)
LYMPHOCYTES, BODY FLUID: 47 %
Lab: NORMAL
MCH RBC QN AUTO: 32.6 PG (ref 25.2–33.5)
MCHC RBC AUTO-ENTMCNC: 33.7 G/DL (ref 28.4–34.8)
MCV RBC AUTO: 96.7 FL (ref 82.6–102.9)
MONOCYTE, FLUID: NORMAL %
MONOCYTES # BLD: 4 % (ref 3–12)
MYCOPLASMA PNEUMONIAE IGM: 0.39
NEUTROPHIL, FLUID: 21 %
NRBC AUTOMATED: 0 PER 100 WBC
OTHER CELLS FLUID: NORMAL %
PDW BLD-RTO: 13.2 % (ref 11.8–14.4)
PLATELET # BLD: 457 K/UL (ref 138–453)
PLATELET ESTIMATE: ABNORMAL
PMV BLD AUTO: 8.7 FL (ref 8.1–13.5)
POTASSIUM SERPL-SCNC: 3.8 MMOL/L (ref 3.7–5.3)
RBC # BLD: 4.2 M/UL (ref 3.95–5.11)
RBC # BLD: ABNORMAL 10*6/UL
RBC FLUID: NORMAL /MM3
SEG NEUTROPHILS: 91 % (ref 36–65)
SEGMENTED NEUTROPHILS ABSOLUTE COUNT: 17.22 K/UL (ref 1.5–8.1)
SODIUM BLD-SCNC: 136 MMOL/L (ref 135–144)
SPECIMEN DESCRIPTION: NORMAL
SPECIMEN TYPE: NORMAL
SURGICAL PATHOLOGY REPORT: NORMAL
TOTAL PROTEIN: 6.5 G/DL (ref 6.4–8.3)
WBC # BLD: 18.9 K/UL (ref 3.5–11.3)
WBC # BLD: ABNORMAL 10*3/UL
WBC FLUID: 3004 /MM3

## 2019-12-20 PROCEDURE — 2580000003 HC RX 258: Performed by: INTERNAL MEDICINE

## 2019-12-20 PROCEDURE — 80053 COMPREHEN METABOLIC PANEL: CPT

## 2019-12-20 PROCEDURE — 99254 IP/OBS CNSLTJ NEW/EST MOD 60: CPT | Performed by: INTERNAL MEDICINE

## 2019-12-20 PROCEDURE — 6370000000 HC RX 637 (ALT 250 FOR IP): Performed by: NURSE PRACTITIONER

## 2019-12-20 PROCEDURE — 99232 SBSQ HOSP IP/OBS MODERATE 35: CPT | Performed by: INTERNAL MEDICINE

## 2019-12-20 PROCEDURE — 36415 COLL VENOUS BLD VENIPUNCTURE: CPT

## 2019-12-20 PROCEDURE — 6360000002 HC RX W HCPCS: Performed by: NURSE PRACTITIONER

## 2019-12-20 PROCEDURE — 85025 COMPLETE CBC W/AUTO DIFF WBC: CPT

## 2019-12-20 PROCEDURE — 99255 IP/OBS CONSLTJ NEW/EST HI 80: CPT | Performed by: INTERNAL MEDICINE

## 2019-12-20 RX ADMIN — Medication 10 ML: at 09:38

## 2019-12-20 RX ADMIN — AMLODIPINE BESYLATE 5 MG: 5 TABLET ORAL at 09:37

## 2019-12-20 RX ADMIN — METHYLPREDNISOLONE SODIUM SUCCINATE 40 MG: 40 INJECTION, POWDER, FOR SOLUTION INTRAMUSCULAR; INTRAVENOUS at 09:38

## 2019-12-20 RX ADMIN — Medication 10 ML: at 15:02

## 2019-12-20 RX ADMIN — METHYLPREDNISOLONE SODIUM SUCCINATE 40 MG: 40 INJECTION, POWDER, FOR SOLUTION INTRAMUSCULAR; INTRAVENOUS at 01:07

## 2019-12-20 RX ADMIN — FAMOTIDINE 20 MG: 20 TABLET, FILM COATED ORAL at 09:37

## 2019-12-20 RX ADMIN — ACETAMINOPHEN 650 MG: 325 TABLET ORAL at 09:36

## 2019-12-20 RX ADMIN — METHYLPREDNISOLONE SODIUM SUCCINATE 40 MG: 40 INJECTION, POWDER, FOR SOLUTION INTRAMUSCULAR; INTRAVENOUS at 15:01

## 2019-12-20 RX ADMIN — ACETAMINOPHEN 650 MG: 325 TABLET ORAL at 19:02

## 2019-12-20 RX ADMIN — Medication 3 MG: at 00:01

## 2019-12-20 ASSESSMENT — PAIN DESCRIPTION - LOCATION: LOCATION: HEAD

## 2019-12-20 ASSESSMENT — PAIN SCALES - GENERAL
PAINLEVEL_OUTOF10: 6
PAINLEVEL_OUTOF10: 4

## 2019-12-21 DIAGNOSIS — C34.92 ADENOCARCINOMA OF LEFT LUNG (HCC): Primary | ICD-10-CM

## 2019-12-21 LAB
EKG ATRIAL RATE: 96 BPM
EKG P AXIS: 61 DEGREES
EKG P-R INTERVAL: 152 MS
EKG Q-T INTERVAL: 346 MS
EKG QRS DURATION: 82 MS
EKG QTC CALCULATION (BAZETT): 437 MS
EKG R AXIS: 61 DEGREES
EKG T AXIS: 41 DEGREES
EKG VENTRICULAR RATE: 96 BPM

## 2019-12-21 PROCEDURE — 93010 ELECTROCARDIOGRAM REPORT: CPT | Performed by: INTERNAL MEDICINE

## 2019-12-23 ENCOUNTER — TELEPHONE (OUTPATIENT)
Dept: ONCOLOGY | Age: 59
End: 2019-12-23

## 2019-12-23 ENCOUNTER — TELEPHONE (OUTPATIENT)
Dept: CASE MANAGEMENT | Age: 59
End: 2019-12-23

## 2019-12-23 ENCOUNTER — OFFICE VISIT (OUTPATIENT)
Dept: FAMILY MEDICINE CLINIC | Age: 59
End: 2019-12-23

## 2019-12-23 VITALS
WEIGHT: 116.6 LBS | HEIGHT: 62 IN | DIASTOLIC BLOOD PRESSURE: 63 MMHG | SYSTOLIC BLOOD PRESSURE: 102 MMHG | BODY MASS INDEX: 21.46 KG/M2 | HEART RATE: 97 BPM | OXYGEN SATURATION: 96 %

## 2019-12-23 DIAGNOSIS — J44.0 CHRONIC OBSTRUCTIVE PULMONARY DISEASE WITH ACUTE LOWER RESPIRATORY INFECTION (HCC): ICD-10-CM

## 2019-12-23 DIAGNOSIS — I10 ESSENTIAL HYPERTENSION: ICD-10-CM

## 2019-12-23 DIAGNOSIS — C34.92 ADENOCARCINOMA, LUNG, LEFT (HCC): Primary | ICD-10-CM

## 2019-12-23 DIAGNOSIS — Z72.0 TOBACCO ABUSE DISORDER: ICD-10-CM

## 2019-12-23 PROCEDURE — 99496 TRANSJ CARE MGMT HIGH F2F 7D: CPT | Performed by: INTERNAL MEDICINE

## 2019-12-23 PROCEDURE — 1111F DSCHRG MED/CURRENT MED MERGE: CPT | Performed by: INTERNAL MEDICINE

## 2019-12-23 RX ORDER — AMLODIPINE BESYLATE 5 MG/1
5 TABLET ORAL DAILY
Qty: 90 TABLET | Refills: 1 | Status: SHIPPED | OUTPATIENT
Start: 2019-12-23 | End: 2020-05-04 | Stop reason: ALTCHOICE

## 2019-12-25 LAB
CULTURE: ABNORMAL
CULTURE: NORMAL
DIRECT EXAM: ABNORMAL
Lab: ABNORMAL
Lab: NORMAL
SPECIMEN DESCRIPTION: ABNORMAL
SPECIMEN DESCRIPTION: NORMAL

## 2019-12-26 ENCOUNTER — TELEPHONE (OUTPATIENT)
Dept: ONCOLOGY | Age: 59
End: 2019-12-26

## 2019-12-26 ENCOUNTER — APPOINTMENT (OUTPATIENT)
Dept: CT IMAGING | Age: 59
End: 2019-12-26

## 2019-12-26 ENCOUNTER — APPOINTMENT (OUTPATIENT)
Dept: INTERVENTIONAL RADIOLOGY/VASCULAR | Age: 59
End: 2019-12-26

## 2019-12-26 ENCOUNTER — APPOINTMENT (OUTPATIENT)
Dept: GENERAL RADIOLOGY | Age: 59
End: 2019-12-26

## 2019-12-26 ENCOUNTER — TELEPHONE (OUTPATIENT)
Dept: INFUSION THERAPY | Facility: MEDICAL CENTER | Age: 59
End: 2019-12-26

## 2019-12-26 ENCOUNTER — HOSPITAL ENCOUNTER (OUTPATIENT)
Age: 59
Setting detail: OBSERVATION
Discharge: HOME | End: 2019-12-27
Attending: EMERGENCY MEDICINE | Admitting: INTERNAL MEDICINE

## 2019-12-26 DIAGNOSIS — C34.92 ADENOCARCINOMA, LUNG, LEFT (HCC): Primary | ICD-10-CM

## 2019-12-26 DIAGNOSIS — I10 ESSENTIAL HYPERTENSION: Chronic | ICD-10-CM

## 2019-12-26 DIAGNOSIS — I25.10 CORONARY ARTERY DISEASE INVOLVING NATIVE CORONARY ARTERY OF NATIVE HEART WITHOUT ANGINA PECTORIS: ICD-10-CM

## 2019-12-26 DIAGNOSIS — R06.02 SHORTNESS OF BREATH: ICD-10-CM

## 2019-12-26 DIAGNOSIS — C34.92 ADENOCARCINOMA, LUNG, LEFT (HCC): ICD-10-CM

## 2019-12-26 DIAGNOSIS — J90 PLEURAL EFFUSION DUE TO ANOTHER DISORDER: ICD-10-CM

## 2019-12-26 DIAGNOSIS — J44.1 CHRONIC OBSTRUCTIVE PULMONARY DISEASE WITH ACUTE EXACERBATION (HCC): ICD-10-CM

## 2019-12-26 DIAGNOSIS — E87.1 HYPONATREMIA: Primary | ICD-10-CM

## 2019-12-26 DIAGNOSIS — J90 PLEURAL EFFUSION: ICD-10-CM

## 2019-12-26 PROBLEM — Z72.0 TOBACCO ABUSE DISORDER: Chronic | Status: ACTIVE | Noted: 2019-12-19

## 2019-12-26 PROBLEM — F10.10 ALCOHOL ABUSE: Chronic | Status: ACTIVE | Noted: 2019-12-19

## 2019-12-26 LAB
ABSOLUTE EOS #: 0.05 K/UL (ref 0–0.44)
ABSOLUTE IMMATURE GRANULOCYTE: 0.08 K/UL (ref 0–0.3)
ABSOLUTE LYMPH #: 1.35 K/UL (ref 1.1–3.7)
ABSOLUTE MONO #: 0.64 K/UL (ref 0.1–1.2)
ANION GAP SERPL CALCULATED.3IONS-SCNC: 16 MMOL/L (ref 9–17)
BASOPHILS # BLD: 0 % (ref 0–2)
BASOPHILS ABSOLUTE: 0.03 K/UL (ref 0–0.2)
BUN BLDV-MCNC: 3 MG/DL (ref 6–20)
BUN/CREAT BLD: ABNORMAL (ref 9–20)
CALCIUM SERPL-MCNC: 8.9 MG/DL (ref 8.6–10.4)
CHLORIDE BLD-SCNC: 86 MMOL/L (ref 98–107)
CO2: 23 MMOL/L (ref 20–31)
CREAT SERPL-MCNC: <0.4 MG/DL (ref 0.5–0.9)
DIFFERENTIAL TYPE: ABNORMAL
EKG ATRIAL RATE: 89 BPM
EKG P AXIS: 77 DEGREES
EKG P-R INTERVAL: 148 MS
EKG Q-T INTERVAL: 356 MS
EKG QRS DURATION: 76 MS
EKG QTC CALCULATION (BAZETT): 433 MS
EKG R AXIS: 61 DEGREES
EKG T AXIS: 58 DEGREES
EKG VENTRICULAR RATE: 89 BPM
EOSINOPHILS RELATIVE PERCENT: 1 % (ref 1–4)
GFR AFRICAN AMERICAN: ABNORMAL ML/MIN
GFR NON-AFRICAN AMERICAN: ABNORMAL ML/MIN
GFR SERPL CREATININE-BSD FRML MDRD: ABNORMAL ML/MIN/{1.73_M2}
GFR SERPL CREATININE-BSD FRML MDRD: ABNORMAL ML/MIN/{1.73_M2}
GLUCOSE BLD-MCNC: 96 MG/DL (ref 70–99)
HCT VFR BLD CALC: 40 % (ref 36.3–47.1)
HEMOGLOBIN: 13.4 G/DL (ref 11.9–15.1)
IMMATURE GRANULOCYTES: 1 %
INR BLD: 0.9
LV EF: 65 %
LVEF MODALITY: NORMAL
LYMPHOCYTES # BLD: 20 % (ref 24–43)
MCH RBC QN AUTO: 32.1 PG (ref 25.2–33.5)
MCHC RBC AUTO-ENTMCNC: 33.5 G/DL (ref 28.4–34.8)
MCV RBC AUTO: 95.7 FL (ref 82.6–102.9)
MONOCYTES # BLD: 9 % (ref 3–12)
NRBC AUTOMATED: 0 PER 100 WBC
PARTIAL THROMBOPLASTIN TIME: 29.9 SEC (ref 23–31)
PDW BLD-RTO: 13.2 % (ref 11.8–14.4)
PLATELET # BLD: 404 K/UL (ref 138–453)
PLATELET ESTIMATE: ABNORMAL
PMV BLD AUTO: 8.4 FL (ref 8.1–13.5)
POTASSIUM SERPL-SCNC: 3.6 MMOL/L (ref 3.7–5.3)
PROTHROMBIN TIME: 9.7 SEC (ref 9.7–11.6)
RBC # BLD: 4.18 M/UL (ref 3.95–5.11)
RBC # BLD: ABNORMAL 10*6/UL
SEG NEUTROPHILS: 69 % (ref 36–65)
SEGMENTED NEUTROPHILS ABSOLUTE COUNT: 4.73 K/UL (ref 1.5–8.1)
SODIUM BLD-SCNC: 125 MMOL/L (ref 135–144)
TROPONIN INTERP: NORMAL
TROPONIN INTERP: NORMAL
TROPONIN T: NORMAL NG/ML
TROPONIN T: NORMAL NG/ML
TROPONIN, HIGH SENSITIVITY: 8 NG/L (ref 0–14)
TROPONIN, HIGH SENSITIVITY: 8 NG/L (ref 0–14)
WBC # BLD: 6.9 K/UL (ref 3.5–11.3)
WBC # BLD: ABNORMAL 10*3/UL

## 2019-12-26 PROCEDURE — 96374 THER/PROPH/DIAG INJ IV PUSH: CPT

## 2019-12-26 PROCEDURE — 94761 N-INVAS EAR/PLS OXIMETRY MLT: CPT

## 2019-12-26 PROCEDURE — 36415 COLL VENOUS BLD VENIPUNCTURE: CPT

## 2019-12-26 PROCEDURE — 96372 THER/PROPH/DIAG INJ SC/IM: CPT

## 2019-12-26 PROCEDURE — 93010 ELECTROCARDIOGRAM REPORT: CPT | Performed by: INTERNAL MEDICINE

## 2019-12-26 PROCEDURE — 71045 X-RAY EXAM CHEST 1 VIEW: CPT

## 2019-12-26 PROCEDURE — 6370000000 HC RX 637 (ALT 250 FOR IP): Performed by: INTERNAL MEDICINE

## 2019-12-26 PROCEDURE — 93005 ELECTROCARDIOGRAM TRACING: CPT | Performed by: EMERGENCY MEDICINE

## 2019-12-26 PROCEDURE — 85025 COMPLETE CBC W/AUTO DIFF WBC: CPT

## 2019-12-26 PROCEDURE — 2700000000 HC OXYGEN THERAPY PER DAY

## 2019-12-26 PROCEDURE — G0378 HOSPITAL OBSERVATION PER HR: HCPCS

## 2019-12-26 PROCEDURE — 93306 TTE W/DOPPLER COMPLETE: CPT

## 2019-12-26 PROCEDURE — C1729 CATH, DRAINAGE: HCPCS

## 2019-12-26 PROCEDURE — 99285 EMERGENCY DEPT VISIT HI MDM: CPT

## 2019-12-26 PROCEDURE — 6360000004 HC RX CONTRAST MEDICATION: Performed by: INTERNAL MEDICINE

## 2019-12-26 PROCEDURE — 6360000002 HC RX W HCPCS: Performed by: EMERGENCY MEDICINE

## 2019-12-26 PROCEDURE — 99244 OFF/OP CNSLTJ NEW/EST MOD 40: CPT | Performed by: INTERNAL MEDICINE

## 2019-12-26 PROCEDURE — 2580000003 HC RX 258: Performed by: NURSE PRACTITIONER

## 2019-12-26 PROCEDURE — 94640 AIRWAY INHALATION TREATMENT: CPT

## 2019-12-26 PROCEDURE — 32555 ASPIRATE PLEURA W/ IMAGING: CPT | Performed by: RADIOLOGY

## 2019-12-26 PROCEDURE — 6370000000 HC RX 637 (ALT 250 FOR IP): Performed by: NURSE PRACTITIONER

## 2019-12-26 PROCEDURE — 85610 PROTHROMBIN TIME: CPT

## 2019-12-26 PROCEDURE — 87205 SMEAR GRAM STAIN: CPT

## 2019-12-26 PROCEDURE — 6360000002 HC RX W HCPCS: Performed by: NURSE PRACTITIONER

## 2019-12-26 PROCEDURE — 87070 CULTURE OTHR SPECIMN AEROBIC: CPT

## 2019-12-26 PROCEDURE — 99220 PR INITIAL OBSERVATION CARE/DAY 70 MINUTES: CPT | Performed by: INTERNAL MEDICINE

## 2019-12-26 PROCEDURE — 84484 ASSAY OF TROPONIN QUANT: CPT

## 2019-12-26 PROCEDURE — 96376 TX/PRO/DX INJ SAME DRUG ADON: CPT

## 2019-12-26 PROCEDURE — 71260 CT THORAX DX C+: CPT

## 2019-12-26 PROCEDURE — 85730 THROMBOPLASTIN TIME PARTIAL: CPT

## 2019-12-26 PROCEDURE — 6370000000 HC RX 637 (ALT 250 FOR IP): Performed by: EMERGENCY MEDICINE

## 2019-12-26 PROCEDURE — 96375 TX/PRO/DX INJ NEW DRUG ADDON: CPT

## 2019-12-26 PROCEDURE — 2580000003 HC RX 258: Performed by: INTERNAL MEDICINE

## 2019-12-26 PROCEDURE — 99255 IP/OBS CONSLTJ NEW/EST HI 80: CPT | Performed by: INTERNAL MEDICINE

## 2019-12-26 PROCEDURE — 80048 BASIC METABOLIC PNL TOTAL CA: CPT

## 2019-12-26 RX ORDER — ONDANSETRON 4 MG/1
4 TABLET, ORALLY DISINTEGRATING ORAL EVERY 6 HOURS PRN
Status: DISCONTINUED | OUTPATIENT
Start: 2019-12-26 | End: 2019-12-27 | Stop reason: HOSPADM

## 2019-12-26 RX ORDER — PREDNISONE 20 MG/1
40 TABLET ORAL DAILY
Status: DISCONTINUED | OUTPATIENT
Start: 2019-12-28 | End: 2019-12-26

## 2019-12-26 RX ORDER — SODIUM CHLORIDE 1000 MG
1 TABLET, SOLUBLE MISCELLANEOUS
Status: DISCONTINUED | OUTPATIENT
Start: 2019-12-26 | End: 2019-12-27 | Stop reason: HOSPADM

## 2019-12-26 RX ORDER — IPRATROPIUM BROMIDE AND ALBUTEROL SULFATE 2.5; .5 MG/3ML; MG/3ML
1 SOLUTION RESPIRATORY (INHALATION) 4 TIMES DAILY
Status: DISCONTINUED | OUTPATIENT
Start: 2019-12-26 | End: 2019-12-27 | Stop reason: HOSPADM

## 2019-12-26 RX ORDER — ALBUTEROL SULFATE 90 UG/1
2 AEROSOL, METERED RESPIRATORY (INHALATION)
Status: DISCONTINUED | OUTPATIENT
Start: 2019-12-26 | End: 2019-12-26

## 2019-12-26 RX ORDER — PREDNISONE 20 MG/1
20 TABLET ORAL DAILY
Status: DISCONTINUED | OUTPATIENT
Start: 2019-12-26 | End: 2019-12-27 | Stop reason: HOSPADM

## 2019-12-26 RX ORDER — ALBUTEROL SULFATE 2.5 MG/3ML
2.5 SOLUTION RESPIRATORY (INHALATION)
Status: DISCONTINUED | OUTPATIENT
Start: 2019-12-26 | End: 2019-12-26 | Stop reason: SDUPTHER

## 2019-12-26 RX ORDER — ONDANSETRON 2 MG/ML
4 INJECTION INTRAMUSCULAR; INTRAVENOUS EVERY 6 HOURS PRN
Status: DISCONTINUED | OUTPATIENT
Start: 2019-12-26 | End: 2019-12-26 | Stop reason: SDUPTHER

## 2019-12-26 RX ORDER — IPRATROPIUM BROMIDE AND ALBUTEROL SULFATE 2.5; .5 MG/3ML; MG/3ML
1 SOLUTION RESPIRATORY (INHALATION)
Status: DISCONTINUED | OUTPATIENT
Start: 2019-12-26 | End: 2019-12-26

## 2019-12-26 RX ORDER — ACETAMINOPHEN 325 MG/1
650 TABLET ORAL EVERY 4 HOURS PRN
Status: DISCONTINUED | OUTPATIENT
Start: 2019-12-26 | End: 2019-12-27 | Stop reason: HOSPADM

## 2019-12-26 RX ORDER — NICOTINE 21 MG/24HR
1 PATCH, TRANSDERMAL 24 HOURS TRANSDERMAL DAILY PRN
Status: DISCONTINUED | OUTPATIENT
Start: 2019-12-26 | End: 2019-12-27 | Stop reason: HOSPADM

## 2019-12-26 RX ORDER — SODIUM CHLORIDE 0.9 % (FLUSH) 0.9 %
10 SYRINGE (ML) INJECTION PRN
Status: DISCONTINUED | OUTPATIENT
Start: 2019-12-26 | End: 2019-12-27 | Stop reason: HOSPADM

## 2019-12-26 RX ORDER — 0.9 % SODIUM CHLORIDE 0.9 %
80 INTRAVENOUS SOLUTION INTRAVENOUS ONCE
Status: COMPLETED | OUTPATIENT
Start: 2019-12-26 | End: 2019-12-26

## 2019-12-26 RX ORDER — HYDROCODONE BITARTRATE AND ACETAMINOPHEN 5; 325 MG/1; MG/1
2 TABLET ORAL EVERY 4 HOURS PRN
Status: DISCONTINUED | OUTPATIENT
Start: 2019-12-26 | End: 2019-12-27 | Stop reason: HOSPADM

## 2019-12-26 RX ORDER — METHYLPREDNISOLONE SODIUM SUCCINATE 125 MG/2ML
125 INJECTION, POWDER, LYOPHILIZED, FOR SOLUTION INTRAMUSCULAR; INTRAVENOUS ONCE
Status: COMPLETED | OUTPATIENT
Start: 2019-12-26 | End: 2019-12-26

## 2019-12-26 RX ORDER — ALBUTEROL SULFATE 2.5 MG/3ML
2.5 SOLUTION RESPIRATORY (INHALATION)
Status: DISCONTINUED | OUTPATIENT
Start: 2019-12-26 | End: 2019-12-27 | Stop reason: HOSPADM

## 2019-12-26 RX ORDER — SODIUM CHLORIDE 0.9 % (FLUSH) 0.9 %
10 SYRINGE (ML) INJECTION EVERY 12 HOURS SCHEDULED
Status: DISCONTINUED | OUTPATIENT
Start: 2019-12-26 | End: 2019-12-27 | Stop reason: HOSPADM

## 2019-12-26 RX ORDER — HYDROCODONE BITARTRATE AND ACETAMINOPHEN 5; 325 MG/1; MG/1
1 TABLET ORAL EVERY 4 HOURS PRN
Status: DISCONTINUED | OUTPATIENT
Start: 2019-12-26 | End: 2019-12-27 | Stop reason: HOSPADM

## 2019-12-26 RX ORDER — ONDANSETRON 2 MG/ML
4 INJECTION INTRAMUSCULAR; INTRAVENOUS EVERY 6 HOURS PRN
Status: DISCONTINUED | OUTPATIENT
Start: 2019-12-26 | End: 2019-12-27 | Stop reason: HOSPADM

## 2019-12-26 RX ORDER — SODIUM CHLORIDE 9 MG/ML
INJECTION, SOLUTION INTRAVENOUS CONTINUOUS
Status: DISCONTINUED | OUTPATIENT
Start: 2019-12-26 | End: 2019-12-26

## 2019-12-26 RX ORDER — FAMOTIDINE 20 MG/1
20 TABLET, FILM COATED ORAL 2 TIMES DAILY
Status: DISCONTINUED | OUTPATIENT
Start: 2019-12-26 | End: 2019-12-27 | Stop reason: HOSPADM

## 2019-12-26 RX ORDER — HYDROMORPHONE HYDROCHLORIDE 1 MG/ML
1 INJECTION, SOLUTION INTRAMUSCULAR; INTRAVENOUS; SUBCUTANEOUS ONCE
Status: COMPLETED | OUTPATIENT
Start: 2019-12-26 | End: 2019-12-26

## 2019-12-26 RX ORDER — AMLODIPINE BESYLATE 5 MG/1
5 TABLET ORAL DAILY
Status: DISCONTINUED | OUTPATIENT
Start: 2019-12-26 | End: 2019-12-27 | Stop reason: HOSPADM

## 2019-12-26 RX ORDER — ALBUTEROL SULFATE 2.5 MG/3ML
5 SOLUTION RESPIRATORY (INHALATION)
Status: DISCONTINUED | OUTPATIENT
Start: 2019-12-26 | End: 2019-12-26

## 2019-12-26 RX ORDER — ALBUTEROL SULFATE 2.5 MG/3ML
2.5 SOLUTION RESPIRATORY (INHALATION)
Status: DISCONTINUED | OUTPATIENT
Start: 2019-12-26 | End: 2019-12-26

## 2019-12-26 RX ORDER — METHYLPREDNISOLONE SODIUM SUCCINATE 40 MG/ML
40 INJECTION, POWDER, LYOPHILIZED, FOR SOLUTION INTRAMUSCULAR; INTRAVENOUS EVERY 6 HOURS
Status: DISCONTINUED | OUTPATIENT
Start: 2019-12-26 | End: 2019-12-26

## 2019-12-26 RX ADMIN — Medication 1 G: at 16:20

## 2019-12-26 RX ADMIN — HYDROMORPHONE HYDROCHLORIDE 1 MG: 1 INJECTION, SOLUTION INTRAMUSCULAR; INTRAVENOUS; SUBCUTANEOUS at 04:34

## 2019-12-26 RX ADMIN — Medication 10 ML: at 20:05

## 2019-12-26 RX ADMIN — ENOXAPARIN SODIUM 40 MG: 40 INJECTION SUBCUTANEOUS at 09:38

## 2019-12-26 RX ADMIN — IPRATROPIUM BROMIDE AND ALBUTEROL SULFATE 1 AMPULE: .5; 3 SOLUTION RESPIRATORY (INHALATION) at 18:07

## 2019-12-26 RX ADMIN — SODIUM CHLORIDE: 9 INJECTION, SOLUTION INTRAVENOUS at 09:38

## 2019-12-26 RX ADMIN — IPRATROPIUM BROMIDE AND ALBUTEROL SULFATE 1 AMPULE: .5; 3 SOLUTION RESPIRATORY (INHALATION) at 06:15

## 2019-12-26 RX ADMIN — MOMETASONE FUROATE AND FORMOTEROL FUMARATE DIHYDRATE 2 PUFF: 200; 5 AEROSOL RESPIRATORY (INHALATION) at 18:07

## 2019-12-26 RX ADMIN — FAMOTIDINE 20 MG: 20 TABLET ORAL at 09:38

## 2019-12-26 RX ADMIN — HYDROCODONE BITARTRATE AND ACETAMINOPHEN 2 TABLET: 5; 325 TABLET ORAL at 17:57

## 2019-12-26 RX ADMIN — AMLODIPINE BESYLATE 5 MG: 5 TABLET ORAL at 09:38

## 2019-12-26 RX ADMIN — ALBUTEROL SULFATE 2.5 MG: 2.5 SOLUTION RESPIRATORY (INHALATION) at 23:31

## 2019-12-26 RX ADMIN — IPRATROPIUM BROMIDE AND ALBUTEROL SULFATE 1 AMPULE: .5; 3 SOLUTION RESPIRATORY (INHALATION) at 10:38

## 2019-12-26 RX ADMIN — IOPAMIDOL 75 ML: 755 INJECTION, SOLUTION INTRAVENOUS at 16:32

## 2019-12-26 RX ADMIN — SODIUM CHLORIDE 80 ML: 9 INJECTION, SOLUTION INTRAVENOUS at 16:33

## 2019-12-26 RX ADMIN — IPRATROPIUM BROMIDE AND ALBUTEROL SULFATE 1 AMPULE: .5; 3 SOLUTION RESPIRATORY (INHALATION) at 14:11

## 2019-12-26 RX ADMIN — SODIUM CHLORIDE: 9 INJECTION, SOLUTION INTRAVENOUS at 06:58

## 2019-12-26 RX ADMIN — METHYLPREDNISOLONE SODIUM SUCCINATE 125 MG: 125 INJECTION, POWDER, FOR SOLUTION INTRAMUSCULAR; INTRAVENOUS at 04:34

## 2019-12-26 RX ADMIN — ACETAMINOPHEN 650 MG: 325 TABLET ORAL at 09:38

## 2019-12-26 RX ADMIN — PREDNISONE 20 MG: 20 TABLET ORAL at 12:04

## 2019-12-26 RX ADMIN — METHYLPREDNISOLONE SODIUM SUCCINATE 40 MG: 40 INJECTION, POWDER, FOR SOLUTION INTRAMUSCULAR; INTRAVENOUS at 09:38

## 2019-12-26 RX ADMIN — Medication 1 G: at 12:04

## 2019-12-26 RX ADMIN — Medication 10 ML: at 16:33

## 2019-12-26 RX ADMIN — FAMOTIDINE 20 MG: 20 TABLET ORAL at 20:05

## 2019-12-26 RX ADMIN — HYDROCODONE BITARTRATE AND ACETAMINOPHEN 2 TABLET: 5; 325 TABLET ORAL at 12:04

## 2019-12-26 ASSESSMENT — PAIN DESCRIPTION - PAIN TYPE
TYPE: ACUTE PAIN
TYPE: ACUTE PAIN;OTHER (COMMENT)
TYPE: ACUTE PAIN
TYPE: ACUTE PAIN

## 2019-12-26 ASSESSMENT — ENCOUNTER SYMPTOMS
DIARRHEA: 0
CONSTIPATION: 0
EYE PAIN: 0
VOMITING: 0
CHEST TIGHTNESS: 1
COUGH: 1
RHINORRHEA: 0
SORE THROAT: 0
NAUSEA: 0
EYE REDNESS: 0
BACK PAIN: 0
TROUBLE SWALLOWING: 0
SHORTNESS OF BREATH: 1
EYE DISCHARGE: 0
COLOR CHANGE: 0
ABDOMINAL DISTENTION: 0
ABDOMINAL PAIN: 0
WHEEZING: 1

## 2019-12-26 ASSESSMENT — PAIN DESCRIPTION - DESCRIPTORS
DESCRIPTORS: PRESSURE
DESCRIPTORS: PRESSURE
DESCRIPTORS: CONSTANT;DISCOMFORT
DESCRIPTORS: PRESSURE

## 2019-12-26 ASSESSMENT — PAIN SCALES - GENERAL
PAINLEVEL_OUTOF10: 0
PAINLEVEL_OUTOF10: 8
PAINLEVEL_OUTOF10: 9
PAINLEVEL_OUTOF10: 10
PAINLEVEL_OUTOF10: 8
PAINLEVEL_OUTOF10: 10
PAINLEVEL_OUTOF10: 10

## 2019-12-26 ASSESSMENT — PAIN DESCRIPTION - ORIENTATION
ORIENTATION: RIGHT;LEFT

## 2019-12-26 ASSESSMENT — PAIN DESCRIPTION - PROGRESSION
CLINICAL_PROGRESSION: NOT CHANGED

## 2019-12-26 ASSESSMENT — PAIN DESCRIPTION - FREQUENCY
FREQUENCY: CONTINUOUS

## 2019-12-26 ASSESSMENT — PAIN DESCRIPTION - LOCATION
LOCATION: RIB CAGE

## 2019-12-26 ASSESSMENT — PAIN DESCRIPTION - ONSET
ONSET: PROGRESSIVE

## 2019-12-26 NOTE — TELEPHONE ENCOUNTER
PER ACOSTA, ZELDA IS A SELF PAY. PER NERI THE SOC[employer] WORKERS NOTE, PT HAS APPLIED FOR PATIENT ASSISTANCE. I WILL GIVE A COPY OF THIS NOTE AND THE ORDER TO LIBIA IN PATIENT ASSISTANCE TO SEE IF SHE CAN HELP WITH ANY OF THE DRUGS FOR THE PATIENT AND I WILL ALSO GIVE A COPY TO MY SUPERVISOR STEPH.

## 2019-12-27 ENCOUNTER — HOSPITAL ENCOUNTER (OUTPATIENT)
Dept: NUCLEAR MEDICINE | Age: 59
Discharge: HOME | End: 2019-12-29

## 2019-12-27 VITALS
BODY MASS INDEX: 21.04 KG/M2 | RESPIRATION RATE: 16 BRPM | DIASTOLIC BLOOD PRESSURE: 65 MMHG | TEMPERATURE: 98.8 F | SYSTOLIC BLOOD PRESSURE: 118 MMHG | HEART RATE: 107 BPM | WEIGHT: 114.3 LBS | HEIGHT: 62 IN | OXYGEN SATURATION: 91 %

## 2019-12-27 DIAGNOSIS — C34.92 ADENOCARCINOMA OF LEFT LUNG (HCC): ICD-10-CM

## 2019-12-27 PROBLEM — F41.9 ANXIETY: Status: ACTIVE | Noted: 2019-12-27

## 2019-12-27 PROBLEM — G47.00 INSOMNIA: Status: ACTIVE | Noted: 2019-12-27

## 2019-12-27 LAB
ANION GAP SERPL CALCULATED.3IONS-SCNC: 12 MMOL/L (ref 9–17)
BUN BLDV-MCNC: 6 MG/DL (ref 6–20)
BUN/CREAT BLD: ABNORMAL (ref 9–20)
CALCIUM SERPL-MCNC: 9.1 MG/DL (ref 8.6–10.4)
CHLORIDE BLD-SCNC: 91 MMOL/L (ref 98–107)
CO2: 28 MMOL/L (ref 20–31)
CREAT SERPL-MCNC: <0.4 MG/DL (ref 0.5–0.9)
CULTURE: NORMAL
DIRECT EXAM: NORMAL
GFR AFRICAN AMERICAN: ABNORMAL ML/MIN
GFR NON-AFRICAN AMERICAN: ABNORMAL ML/MIN
GFR SERPL CREATININE-BSD FRML MDRD: ABNORMAL ML/MIN/{1.73_M2}
GFR SERPL CREATININE-BSD FRML MDRD: ABNORMAL ML/MIN/{1.73_M2}
GLUCOSE BLD-MCNC: 148 MG/DL (ref 70–99)
Lab: NORMAL
MAGNESIUM: 1.6 MG/DL (ref 1.6–2.6)
POTASSIUM SERPL-SCNC: 3.1 MMOL/L (ref 3.7–5.3)
SODIUM BLD-SCNC: 131 MMOL/L (ref 135–144)
SPECIMEN DESCRIPTION: NORMAL

## 2019-12-27 PROCEDURE — 3430000000 HC RX DIAGNOSTIC RADIOPHARMACEUTICAL: Performed by: INTERNAL MEDICINE

## 2019-12-27 PROCEDURE — G0378 HOSPITAL OBSERVATION PER HR: HCPCS

## 2019-12-27 PROCEDURE — 36415 COLL VENOUS BLD VENIPUNCTURE: CPT

## 2019-12-27 PROCEDURE — 6370000000 HC RX 637 (ALT 250 FOR IP): Performed by: INTERNAL MEDICINE

## 2019-12-27 PROCEDURE — 6370000000 HC RX 637 (ALT 250 FOR IP): Performed by: NURSE PRACTITIONER

## 2019-12-27 PROCEDURE — 96375 TX/PRO/DX INJ NEW DRUG ADDON: CPT

## 2019-12-27 PROCEDURE — 83735 ASSAY OF MAGNESIUM: CPT

## 2019-12-27 PROCEDURE — 94640 AIRWAY INHALATION TREATMENT: CPT

## 2019-12-27 PROCEDURE — 99217 PR OBSERVATION CARE DISCHARGE MANAGEMENT: CPT | Performed by: INTERNAL MEDICINE

## 2019-12-27 PROCEDURE — 96372 THER/PROPH/DIAG INJ SC/IM: CPT

## 2019-12-27 PROCEDURE — 78815 PET IMAGE W/CT SKULL-THIGH: CPT

## 2019-12-27 PROCEDURE — 80048 BASIC METABOLIC PNL TOTAL CA: CPT

## 2019-12-27 PROCEDURE — 99232 SBSQ HOSP IP/OBS MODERATE 35: CPT | Performed by: INTERNAL MEDICINE

## 2019-12-27 PROCEDURE — 6360000002 HC RX W HCPCS: Performed by: NURSE PRACTITIONER

## 2019-12-27 PROCEDURE — A9552 F18 FDG: HCPCS | Performed by: INTERNAL MEDICINE

## 2019-12-27 RX ORDER — TRAZODONE HYDROCHLORIDE 50 MG/1
50 TABLET ORAL NIGHTLY
Qty: 30 TABLET | Refills: 2 | Status: SHIPPED | OUTPATIENT
Start: 2019-12-27 | End: 2020-02-10

## 2019-12-27 RX ORDER — PREDNISONE 20 MG/1
20 TABLET ORAL DAILY
Qty: 4 TABLET | Refills: 0 | Status: SHIPPED | OUTPATIENT
Start: 2019-12-28 | End: 2020-01-01

## 2019-12-27 RX ORDER — ALBUTEROL SULFATE 90 UG/1
2 AEROSOL, METERED RESPIRATORY (INHALATION) EVERY 4 HOURS PRN
Qty: 3 INHALER | Refills: 1 | Status: SHIPPED | OUTPATIENT
Start: 2019-12-27 | End: 2020-06-29 | Stop reason: SDUPTHER

## 2019-12-27 RX ORDER — HYDROCODONE BITARTRATE AND ACETAMINOPHEN 5; 325 MG/1; MG/1
1-2 TABLET ORAL EVERY 4 HOURS PRN
Qty: 20 TABLET | Refills: 0 | Status: SHIPPED | OUTPATIENT
Start: 2019-12-27 | End: 2020-01-20 | Stop reason: SDUPTHER

## 2019-12-27 RX ORDER — TRAZODONE HYDROCHLORIDE 50 MG/1
50 TABLET ORAL NIGHTLY PRN
Status: DISCONTINUED | OUTPATIENT
Start: 2019-12-27 | End: 2019-12-27 | Stop reason: HOSPADM

## 2019-12-27 RX ORDER — ALBUTEROL SULFATE 1.25 MG/3ML
1 SOLUTION RESPIRATORY (INHALATION)
Qty: 360 ML | Refills: 3 | Status: SHIPPED | OUTPATIENT
Start: 2019-12-27 | End: 2020-05-22 | Stop reason: SDUPTHER

## 2019-12-27 RX ORDER — ALPRAZOLAM 0.5 MG/1
0.5 TABLET ORAL 3 TIMES DAILY PRN
Qty: 10 TABLET | Refills: 0 | Status: SHIPPED | OUTPATIENT
Start: 2019-12-27 | End: 2020-01-01

## 2019-12-27 RX ORDER — IPRATROPIUM BROMIDE AND ALBUTEROL SULFATE 2.5; .5 MG/3ML; MG/3ML
1 SOLUTION RESPIRATORY (INHALATION) 4 TIMES DAILY
Qty: 360 ML | Refills: 3 | Status: SHIPPED | OUTPATIENT
Start: 2019-12-27 | End: 2021-02-15 | Stop reason: SDUPTHER

## 2019-12-27 RX ORDER — LORAZEPAM 2 MG/ML
1 INJECTION INTRAMUSCULAR ONCE
Status: COMPLETED | OUTPATIENT
Start: 2019-12-27 | End: 2019-12-27

## 2019-12-27 RX ORDER — TRAZODONE HYDROCHLORIDE 50 MG/1
50 TABLET ORAL NIGHTLY PRN
Status: DISCONTINUED | OUTPATIENT
Start: 2019-12-27 | End: 2019-12-27

## 2019-12-27 RX ORDER — SODIUM CHLORIDE 1000 MG
1 TABLET, SOLUBLE MISCELLANEOUS 2 TIMES DAILY
Qty: 6 TABLET | Refills: 0 | Status: SHIPPED | OUTPATIENT
Start: 2019-12-27 | End: 2020-06-01 | Stop reason: ALTCHOICE

## 2019-12-27 RX ORDER — BENZONATATE 200 MG/1
200 CAPSULE ORAL 3 TIMES DAILY PRN
Qty: 60 CAPSULE | Refills: 1 | Status: SHIPPED | OUTPATIENT
Start: 2019-12-27 | End: 2020-01-03

## 2019-12-27 RX ORDER — POTASSIUM CHLORIDE 20 MEQ/1
40 TABLET, EXTENDED RELEASE ORAL ONCE
Status: COMPLETED | OUTPATIENT
Start: 2019-12-27 | End: 2019-12-27

## 2019-12-27 RX ORDER — BENZONATATE 100 MG/1
200 CAPSULE ORAL 3 TIMES DAILY PRN
Status: DISCONTINUED | OUTPATIENT
Start: 2019-12-27 | End: 2019-12-27 | Stop reason: HOSPADM

## 2019-12-27 RX ORDER — NICOTINE 21 MG/24HR
1 PATCH, TRANSDERMAL 24 HOURS TRANSDERMAL DAILY PRN
Qty: 30 PATCH | Refills: 3 | Status: SHIPPED | OUTPATIENT
Start: 2019-12-27 | End: 2020-03-23

## 2019-12-27 RX ADMIN — IPRATROPIUM BROMIDE AND ALBUTEROL SULFATE 1 AMPULE: .5; 3 SOLUTION RESPIRATORY (INHALATION) at 06:22

## 2019-12-27 RX ADMIN — MOMETASONE FUROATE AND FORMOTEROL FUMARATE DIHYDRATE 2 PUFF: 200; 5 AEROSOL RESPIRATORY (INHALATION) at 06:23

## 2019-12-27 RX ADMIN — LORAZEPAM 1 MG: 2 INJECTION, SOLUTION INTRAMUSCULAR; INTRAVENOUS at 02:14

## 2019-12-27 RX ADMIN — HYDROCODONE BITARTRATE AND ACETAMINOPHEN 2 TABLET: 5; 325 TABLET ORAL at 01:50

## 2019-12-27 RX ADMIN — FAMOTIDINE 20 MG: 20 TABLET ORAL at 08:14

## 2019-12-27 RX ADMIN — FLUDEOXYGLUCOSE F 18 13.87 MILLICURIE: 200 INJECTION, SOLUTION INTRAVENOUS at 11:48

## 2019-12-27 RX ADMIN — TRAZODONE HYDROCHLORIDE 50 MG: 50 TABLET ORAL at 02:14

## 2019-12-27 RX ADMIN — POTASSIUM CHLORIDE 40 MEQ: 20 TABLET, EXTENDED RELEASE ORAL at 08:17

## 2019-12-27 RX ADMIN — PREDNISONE 20 MG: 20 TABLET ORAL at 08:14

## 2019-12-27 RX ADMIN — Medication 1 G: at 08:14

## 2019-12-27 RX ADMIN — AMLODIPINE BESYLATE 5 MG: 5 TABLET ORAL at 08:14

## 2019-12-27 RX ADMIN — ENOXAPARIN SODIUM 40 MG: 40 INJECTION SUBCUTANEOUS at 08:14

## 2019-12-27 RX ADMIN — MAGNESIUM GLUCONATE 500 MG ORAL TABLET 800 MG: 500 TABLET ORAL at 08:14

## 2019-12-27 ASSESSMENT — ENCOUNTER SYMPTOMS
EYE DISCHARGE: 0
COLOR CHANGE: 0
VOMITING: 0
NAUSEA: 0
DIARRHEA: 0
EYE REDNESS: 0
WHEEZING: 1
SORE THROAT: 0
SHORTNESS OF BREATH: 1
ABDOMINAL DISTENTION: 0
TROUBLE SWALLOWING: 0
EYE PAIN: 0
CHEST TIGHTNESS: 1
CONSTIPATION: 0
COUGH: 1
BACK PAIN: 0
ABDOMINAL PAIN: 0

## 2019-12-27 ASSESSMENT — PAIN SCALES - GENERAL
PAINLEVEL_OUTOF10: 0
PAINLEVEL_OUTOF10: 8

## 2019-12-28 RX ORDER — FLUDEOXYGLUCOSE F 18 200 MCI/ML
13.87 INJECTION, SOLUTION INTRAVENOUS
Status: COMPLETED | OUTPATIENT
Start: 2019-12-28 | End: 2019-12-27

## 2019-12-30 ENCOUNTER — HOSPITAL ENCOUNTER (OUTPATIENT)
Dept: INTERVENTIONAL RADIOLOGY/VASCULAR | Age: 59
Discharge: HOME | End: 2020-01-01

## 2019-12-30 ENCOUNTER — HOSPITAL ENCOUNTER (OUTPATIENT)
Dept: GENERAL RADIOLOGY | Age: 59
Discharge: HOME | End: 2020-01-01

## 2019-12-30 ENCOUNTER — TELEPHONE (OUTPATIENT)
Dept: ONCOLOGY | Age: 59
End: 2019-12-30

## 2019-12-30 PROCEDURE — 71046 X-RAY EXAM CHEST 2 VIEWS: CPT

## 2020-01-02 ENCOUNTER — TELEPHONE (OUTPATIENT)
Dept: ONCOLOGY | Age: 60
End: 2020-01-02

## 2020-01-03 ENCOUNTER — HOSPITAL ENCOUNTER (OUTPATIENT)
Facility: MEDICAL CENTER | Age: 60
End: 2020-01-03

## 2020-01-03 ENCOUNTER — HOSPITAL ENCOUNTER (OUTPATIENT)
Dept: MRI IMAGING | Age: 60
Discharge: HOME | End: 2020-01-05

## 2020-01-03 PROCEDURE — 6360000004 HC RX CONTRAST MEDICATION: Performed by: NEUROLOGICAL SURGERY

## 2020-01-03 PROCEDURE — A9579 GAD-BASE MR CONTRAST NOS,1ML: HCPCS | Performed by: NEUROLOGICAL SURGERY

## 2020-01-03 PROCEDURE — 70553 MRI BRAIN STEM W/O & W/DYE: CPT

## 2020-01-03 RX ADMIN — GADOTERIDOL 10 ML: 279.3 INJECTION, SOLUTION INTRAVENOUS at 14:35

## 2020-01-06 ENCOUNTER — HOSPITAL ENCOUNTER (OUTPATIENT)
Dept: INFUSION THERAPY | Facility: MEDICAL CENTER | Age: 60
Discharge: HOME | End: 2020-01-06

## 2020-01-06 ENCOUNTER — OFFICE VISIT (OUTPATIENT)
Dept: ONCOLOGY | Age: 60
End: 2020-01-06

## 2020-01-06 ENCOUNTER — TELEPHONE (OUTPATIENT)
Dept: ONCOLOGY | Age: 60
End: 2020-01-06

## 2020-01-06 VITALS
HEART RATE: 95 BPM | BODY MASS INDEX: 20.94 KG/M2 | WEIGHT: 114.5 LBS | DIASTOLIC BLOOD PRESSURE: 82 MMHG | SYSTOLIC BLOOD PRESSURE: 124 MMHG | TEMPERATURE: 97.9 F | RESPIRATION RATE: 18 BRPM

## 2020-01-06 PROCEDURE — 96372 THER/PROPH/DIAG INJ SC/IM: CPT

## 2020-01-06 PROCEDURE — 6360000002 HC RX W HCPCS: Performed by: INTERNAL MEDICINE

## 2020-01-06 PROCEDURE — 99215 OFFICE O/P EST HI 40 MIN: CPT | Performed by: INTERNAL MEDICINE

## 2020-01-06 PROCEDURE — 99212 OFFICE O/P EST SF 10 MIN: CPT | Performed by: INTERNAL MEDICINE

## 2020-01-06 RX ORDER — ONDANSETRON 8 MG/1
8 TABLET, ORALLY DISINTEGRATING ORAL EVERY 8 HOURS PRN
Qty: 30 TABLET | Refills: 3 | Status: SHIPPED | OUTPATIENT
Start: 2020-01-06 | End: 2020-06-01 | Stop reason: ALTCHOICE

## 2020-01-06 RX ORDER — DEXAMETHASONE 4 MG/1
TABLET ORAL
Qty: 20 TABLET | Refills: 0 | Status: SHIPPED | OUTPATIENT
Start: 2020-01-06 | End: 2020-01-06 | Stop reason: SDUPTHER

## 2020-01-06 RX ORDER — OMEPRAZOLE 20 MG/1
20 CAPSULE, DELAYED RELEASE ORAL DAILY
Qty: 30 CAPSULE | Refills: 3 | Status: SHIPPED | OUTPATIENT
Start: 2020-01-06 | End: 2020-01-06 | Stop reason: SDUPTHER

## 2020-01-06 RX ORDER — LIDOCAINE AND PRILOCAINE 25; 25 MG/G; MG/G
CREAM TOPICAL
Qty: 1 TUBE | Refills: 1 | Status: SHIPPED | OUTPATIENT
Start: 2020-01-06 | End: 2020-01-06 | Stop reason: SDUPTHER

## 2020-01-06 RX ORDER — LANOLIN ALCOHOL/MO/W.PET/CERES
400 CREAM (GRAM) TOPICAL DAILY
Qty: 100 TABLET | Refills: 3 | Status: SHIPPED | OUTPATIENT
Start: 2020-01-06 | End: 2020-01-06 | Stop reason: SDUPTHER

## 2020-01-06 RX ORDER — DEXAMETHASONE 4 MG/1
TABLET ORAL
Qty: 20 TABLET | Refills: 0 | Status: SHIPPED | OUTPATIENT
Start: 2020-01-06 | End: 2020-06-01 | Stop reason: ALTCHOICE

## 2020-01-06 RX ORDER — CYANOCOBALAMIN 1000 UG/ML
1000 INJECTION INTRAMUSCULAR; SUBCUTANEOUS ONCE
Status: DISCONTINUED | OUTPATIENT
Start: 2020-01-06 | End: 2020-01-06

## 2020-01-06 RX ORDER — LIDOCAINE AND PRILOCAINE 25; 25 MG/G; MG/G
CREAM TOPICAL
Qty: 1 TUBE | Refills: 1 | Status: SHIPPED | OUTPATIENT
Start: 2020-01-06 | End: 2020-06-01 | Stop reason: ALTCHOICE

## 2020-01-06 RX ORDER — SODIUM CHLORIDE 9 MG/ML
INJECTION, SOLUTION INTRAVENOUS CONTINUOUS
Status: CANCELLED | OUTPATIENT
Start: 2020-01-06

## 2020-01-06 RX ORDER — ONDANSETRON 8 MG/1
8 TABLET, ORALLY DISINTEGRATING ORAL EVERY 8 HOURS PRN
Qty: 30 TABLET | Refills: 3 | Status: SHIPPED | OUTPATIENT
Start: 2020-01-06 | End: 2020-01-06 | Stop reason: SDUPTHER

## 2020-01-06 RX ORDER — CYANOCOBALAMIN 1000 UG/ML
1000 INJECTION INTRAMUSCULAR; SUBCUTANEOUS ONCE
Status: COMPLETED | OUTPATIENT
Start: 2020-01-06 | End: 2020-01-06

## 2020-01-06 RX ORDER — OMEPRAZOLE 20 MG/1
20 CAPSULE, DELAYED RELEASE ORAL DAILY
Qty: 30 CAPSULE | Refills: 3 | Status: SHIPPED | OUTPATIENT
Start: 2020-01-06 | End: 2020-06-01 | Stop reason: ALTCHOICE

## 2020-01-06 RX ORDER — LANOLIN ALCOHOL/MO/W.PET/CERES
400 CREAM (GRAM) TOPICAL DAILY
Qty: 100 TABLET | Refills: 3 | Status: SHIPPED | OUTPATIENT
Start: 2020-01-06 | End: 2020-10-26 | Stop reason: SDUPTHER

## 2020-01-06 RX ADMIN — CYANOCOBALAMIN 1000 MCG: 1000 INJECTION, SOLUTION INTRAMUSCULAR at 12:55

## 2020-01-06 ASSESSMENT — ENCOUNTER SYMPTOMS
SHORTNESS OF BREATH: 1
SORE THROAT: 0
DIARRHEA: 0
EYE PAIN: 0
EYE REDNESS: 0
CONSTIPATION: 0
ABDOMINAL PAIN: 0
COLOR CHANGE: 0
WHEEZING: 1
NAUSEA: 0
CHEST TIGHTNESS: 1
BACK PAIN: 0
EYE DISCHARGE: 0
VOMITING: 0
ABDOMINAL DISTENTION: 0
TROUBLE SWALLOWING: 0
COUGH: 1

## 2020-01-06 NOTE — PROGRESS NOTES
Patient here following MD visit for B 12 injection. No complaints. B 12 given per [facility], band aid to site. Has a return visit scheduled. Discharged ambulatory with family.

## 2020-01-06 NOTE — TELEPHONE ENCOUNTER
ZELDA ARRIVES AMBULATORY FOR MD VISIT   DR Karsten Mcdonnell IN TO SEE PATIENT  ORDERS RECEIVED  VITAMIN B12 INJECTION TODAY  TEMPUS TEST 1/6/20 DONE  SCRIPTS SENT TO PATIENT'S PHARMACY  PORT PLACEMENT ON 1/7/20 ALREADY SCHEDULED  PLAN CHEMO THERAPY & RV IN 2 WEEKS  AVS PRINTED AND GIVEN TO PATIENT W/ INSTRUCTIONS  PATIENT DISCHARGED AMBULATORY

## 2020-01-07 ENCOUNTER — HOSPITAL ENCOUNTER (OUTPATIENT)
Dept: INTERVENTIONAL RADIOLOGY/VASCULAR | Age: 60
Discharge: HOME | End: 2020-01-09

## 2020-01-07 VITALS
HEIGHT: 62 IN | OXYGEN SATURATION: 92 % | WEIGHT: 112.21 LBS | HEART RATE: 88 BPM | DIASTOLIC BLOOD PRESSURE: 81 MMHG | RESPIRATION RATE: 23 BRPM | BODY MASS INDEX: 20.65 KG/M2 | TEMPERATURE: 97.3 F | SYSTOLIC BLOOD PRESSURE: 116 MMHG

## 2020-01-07 LAB
INR BLD: 0.9
PARTIAL THROMBOPLASTIN TIME: 27.7 SEC (ref 23–31)
PLATELET # BLD: 511 K/UL (ref 138–453)
PROTHROMBIN TIME: 9.7 SEC (ref 9.7–11.6)

## 2020-01-07 PROCEDURE — 2709999900 IR PORT PLACEMENT > 5 YEARS

## 2020-01-07 PROCEDURE — 6360000002 HC RX W HCPCS: Performed by: RADIOLOGY

## 2020-01-07 PROCEDURE — 7100000010 HC PHASE II RECOVERY - FIRST 15 MIN

## 2020-01-07 PROCEDURE — 77001 FLUOROGUIDE FOR VEIN DEVICE: CPT | Performed by: RADIOLOGY

## 2020-01-07 PROCEDURE — 85730 THROMBOPLASTIN TIME PARTIAL: CPT

## 2020-01-07 PROCEDURE — 85049 AUTOMATED PLATELET COUNT: CPT

## 2020-01-07 PROCEDURE — 99152 MOD SED SAME PHYS/QHP 5/>YRS: CPT | Performed by: RADIOLOGY

## 2020-01-07 PROCEDURE — 85610 PROTHROMBIN TIME: CPT

## 2020-01-07 PROCEDURE — 99153 MOD SED SAME PHYS/QHP EA: CPT | Performed by: RADIOLOGY

## 2020-01-07 PROCEDURE — 7100000011 HC PHASE II RECOVERY - ADDTL 15 MIN

## 2020-01-07 PROCEDURE — 76937 US GUIDE VASCULAR ACCESS: CPT | Performed by: RADIOLOGY

## 2020-01-07 PROCEDURE — 36561 INSERT TUNNELED CV CATH: CPT | Performed by: RADIOLOGY

## 2020-01-07 PROCEDURE — 2580000003 HC RX 258: Performed by: RADIOLOGY

## 2020-01-07 RX ORDER — MIDAZOLAM HYDROCHLORIDE 1 MG/ML
INJECTION INTRAMUSCULAR; INTRAVENOUS
Status: COMPLETED | OUTPATIENT
Start: 2020-01-07 | End: 2020-01-07

## 2020-01-07 RX ORDER — SODIUM CHLORIDE 9 MG/ML
INJECTION, SOLUTION INTRAVENOUS CONTINUOUS
Status: DISCONTINUED | OUTPATIENT
Start: 2020-01-07 | End: 2020-01-10 | Stop reason: HOSPADM

## 2020-01-07 RX ORDER — ACETAMINOPHEN 325 MG/1
650 TABLET ORAL EVERY 4 HOURS PRN
Status: DISCONTINUED | OUTPATIENT
Start: 2020-01-07 | End: 2020-01-10 | Stop reason: HOSPADM

## 2020-01-07 RX ORDER — FENTANYL CITRATE 50 UG/ML
INJECTION, SOLUTION INTRAMUSCULAR; INTRAVENOUS
Status: COMPLETED | OUTPATIENT
Start: 2020-01-07 | End: 2020-01-07

## 2020-01-07 RX ADMIN — SODIUM CHLORIDE: 9 INJECTION, SOLUTION INTRAVENOUS at 09:32

## 2020-01-07 RX ADMIN — Medication 500 UNITS: at 11:40

## 2020-01-07 RX ADMIN — Medication 50 MCG: at 11:24

## 2020-01-07 RX ADMIN — MIDAZOLAM 1 MG: 1 INJECTION INTRAMUSCULAR; INTRAVENOUS at 11:24

## 2020-01-07 RX ADMIN — CEFAZOLIN 1 G: 1 INJECTION, POWDER, FOR SOLUTION INTRAMUSCULAR; INTRAVENOUS at 11:14

## 2020-01-07 ASSESSMENT — PAIN SCALES - GENERAL
PAINLEVEL_OUTOF10: 0

## 2020-01-07 ASSESSMENT — PAIN - FUNCTIONAL ASSESSMENT: PAIN_FUNCTIONAL_ASSESSMENT: 0-10

## 2020-01-07 NOTE — PRE SEDATION
Sedation Pre-Procedure Note    Patient Name: Emi Julian   YOB: 1960  Room/Bed: Room/bed info not found  Medical Record Number: [id number]  Date: 1/7/2020   Time: 11:07 AM       Indication:  Stage 4 lung cancer    Consent: I have discussed with the patient and/or the patient representative the indication, alternatives, and the possible risks and/or complications of the planned procedure and the anesthesia methods. The patient and/or patient representative appear to understand and agree to proceed. Vital Signs:   Vitals:    01/07/20 0916   BP: 134/71   Pulse: 99   Resp: 20   Temp: 98.1 °F (36.7 °C)   SpO2: 92%       Past Medical History:   has a past medical history of Alcohol abuse, Breast cancer ([facility] [address] Chronic diarrhea, COPD (chronic obstructive pulmonary disease) ([facility] [address] Essential hypertension, Lung cancer ([facility] [address] and Tobacco abuse. Past Surgical History:   has a past surgical history that includes Appendectomy; Mastectomy (Right); and Cholecystectomy. Medications:   Scheduled Meds:    ceFAZolin  1 g Intravenous On Call to OR     Continuous Infusions:    sodium chloride 20 mL/hr at 01/07/20 0932     PRN Meds:   Home Meds:   Prior to Admission medications    Medication Sig Start Date End Date Taking?  Authorizing Provider   folic acid (FOLVITE) 090 MCG tablet Take 1 tablet by mouth daily 1/6/20  Yes Michael Toro MD   omeprazole (PRILOSEC) 20 MG delayed release capsule Take 1 capsule by mouth daily 1/6/20  Yes Michael Toro MD   albuterol (ACCUNEB) 1.25 MG/3ML nebulizer solution Inhale 3 mLs into the lungs every 2 hours as needed for Wheezing or Shortness of Breath 12/27/19  Yes Job Leyva DO   ipratropium-albuterol (DUONEB) 0.5-2.5 (3) MG/3ML SOLN nebulizer solution Inhale 3 mLs into the lungs 4 times daily 12/27/19  Yes Job Leyva DO   albuterol sulfate  (90 Base) MCG/ACT inhaler Inhale 2 puffs into the lungs every 4 hours as needed for Wheezing or Shortness plan of sedation: yes    Electronically signed by Viktoriya Rangel MD on 1/7/2020 at 11:07 AM

## 2020-01-07 NOTE — H&P
Breath 12/27/19   Loistine Nickel, DO   mometasone-formoterol [facility]) 200-5 MCG/ACT inhaler Inhale 2 puffs into the lungs 2 times daily 12/27/19   Loistine Nickel, DO   traZODone (DESYREL) 50 MG tablet Take 1 tablet by mouth nightly 12/27/19   Loistine Nickel, DO   nicotine (NICODERM CQ) 21 MG/24HR Place 1 patch onto the skin daily as needed (if patient smokes/requests nicotine replacement therapy) 12/27/19   Loistine Nickel, DO   sodium chloride 1 g tablet Take 1 tablet by mouth 2 times daily  Patient not taking: Reported on 1/6/2020 12/27/19   Loistine Nickel, DO   nicotine polacrilex (NICORETTE) 2 MG gum Take 1 each by mouth as needed for Smoking cessation 12/27/19   Loistine Nickel, DO   amLODIPine (NORVASC) 5 MG tablet Take 1 tablet by mouth daily 12/23/19 3/22/20  Kat Stark MD       This is a 63 y.o. female who is pleasant, cooperative, alert and oriented x3, in no acute distress. Heart: Heart sounds are normal.  HR regular rate and rhythm without murmur, gallop or rub. Lungs: Normal respiratory effort with good air exchange and clear to auscultation without wheezes or rales bilaterally   Abdomen: soft, nontender, nondistended with bowel sounds .        Labs:  Recent Labs     12/27/19  0416 12/26/19  0327 12/20/19  0442   HGB  --  13.4 13.7   HCT  --  40.0 40.6   WBC  --  6.9 18.9*   MCV  --  95.7 96.7   PLT  --  404 457*   * 125* 136   K 3.1* 3.6* 3.8   CL 91* 86* 99   CO2 28 23 26   BUN 6 3* 8   CREATININE <0.40* <0.40* 0.31*   GLUCOSE 148* 96 179*   INR  --  0.9  --    PROTIME  --  9.7  --    APTT  --  29.9  --    AST  --   --  16   ALT  --   --  14   LABALBU  --   --  3.3*       ANGLE HASSAN, ANP-BC  Electronically signed 1/7/2020 at 9:16 AM                  Bertha Cano MD   Physician   Hematology and Oncology   Progress Notes   Signed   Encounter Date:  1/6/2020          Related encounter: Office Visit from 1/6/2020 in [address] [place] [address] cancer, we plan palliative chemotherapy. INTERIM HISTORY  The patient comes in today for a follow-up, she underwent PET CT scan as discussed above. There was not another attempt to drain her pleural fluid or insert Pleurx catheter but the residual fluid was minimal.  No nausea or vomiting, continues to complain of cough , no hemoptysis   Past Medical History:   has a past medical history of Alcohol abuse, Breast cancer ([address] Chronic diarrhea, COPD (chronic obstructive pulmonary disease) ([address] Essential hypertension, Lung cancer ([address] and Tobacco abuse. Past Surgical History:   has a past surgical history that includes Appendectomy; Mastectomy (Right); and Cholecystectomy. Family History: family history includes Breast Cancer in her mother; Cancer in her brother; Deep Vein Thrombosis in her brother; Prostate Cancer in her brother and father; Stroke in her mother. Social History:   reports that she has been smoking. She has a 47.00 pack-year smoking history. She has never used smokeless tobacco. She reports current alcohol use. She reports that she does not use drugs. Medications:    Reviewed in EPIC      Allergies:  Morphine; Robitussin (alcohol free) [guaifenesin]; Aspirin; Penicillins; and Sulfa antibiotics     REVIEW OF SYSTEMS:       Review of Systems   Constitutional: Positive for appetite change and unexpected weight change. Negative for activity change, chills and fever. HENT: Negative for mouth sores, sore throat and trouble swallowing. Eyes: Negative for pain, discharge and redness. Respiratory: Positive for cough, chest tightness, shortness of breath and wheezing. Cardiovascular: Negative for chest pain, palpitations and leg swelling. Gastrointestinal: Negative for abdominal distention, abdominal pain, constipation, diarrhea, nausea and vomiting. Endocrine: Negative for cold intolerance and heat intolerance.    Genitourinary: Negative for difficulty urinating, dysuria and CT scan 12/18/2019  Impression   1. Large multiloculated left-sided pleural effusion with compressive   atelectasis of the lingula and majority of the left lower lobe and partial   compressive atelectasis of the left upper lobe. Moderate underlying   emphysema. Respiratory motion. Follow-up is recommended to document   resolution. 2. Prior cholecystectomy. 3. Stable 1.8 x 1.2 cm right adrenal mass, likely adrenal adenoma, unchanged   from 11/14/2018.   4. Fatty liver. 5. Coronary artery disease. Atherosclerotic calcification of the aorta and   branch vasculature. PET scan   Impression   1. Small mediastinal and right hilar lymph nodes with metabolic activity. This can be seen with underlying inflammatory process versus neoplastic   involvement. Minimal FDG activity within nodular opacity with adjacent ground-glass in the   posterior left upper lobe, which also can be inflammatory versus neoplastic. Short-term CT follow-up is recommended to ensure resolution of the left upper   lobe opacities. Attention to on follow-up for the lymph nodes is recommended. 2.  Loculated moderate size left pleural effusion in the inferior left   hemithorax without abnormal metabolic activity. 3.  Benign right adrenal adenoma. Brain MRI       Impression   Heterogeneous enhancing lesion in the right frontal extra-axial space along   the dura. Adjacent dark T1 signal in the skull is noted suggesting   sclerosis. This is nonspecific and may represent heterogeneous enhancement   of a meningioma, however a dural metastasis would be difficult to entirely   exclude. Comparison with prior imaging, CT or MRI, would be helpful, if   available. Patchy small vessel ischemic changes are noted bilaterally. IMPRESSION:    1. History of breast cancer in 2010, status post mastectomy and chemotherapy  2. New diagnosis with lung cancer  3.  Malignant pleural effusion stage IV disease  4. And for chemotherapy with carboplatin, Alimta and Keytruda  RECOMMENDATIONS:   I had very long discussion with the patient and her , explaining the diagnosis and stage, PET CT scan and MRI were reviewed. The dural based lesion at the superior aspect of her head is likely meningioma. We talked about palliative therapy as she has stage IV disease.   We explained carboplatin, Alimta and Keytruda  Chemotherapy schedule and side effects were explained in detail  Mutational analysis will be done  She will need a Mediport  I will start H39 and folic acid in preparation for chemo

## 2020-01-07 NOTE — PROGRESS NOTES
diagnosis and stage, PET CT scan and MRI were reviewed. The dural based lesion at the superior aspect of her head is likely meningioma. We talked about palliative therapy as she has stage IV disease.   We explained carboplatin, Alimta and Keytruda  Chemotherapy schedule and side effects were explained in detail  Mutational analysis will be done  She will need a Mediport  I will start N41 and folic acid in preparation for chemo

## 2020-01-08 ENCOUNTER — TELEPHONE (OUTPATIENT)
Dept: CASE MANAGEMENT | Age: 60
End: 2020-01-08

## 2020-01-15 ENCOUNTER — HOSPITAL ENCOUNTER (OUTPATIENT)
Facility: MEDICAL CENTER | Age: 60
End: 2020-01-15

## 2020-01-16 ENCOUNTER — HOSPITAL ENCOUNTER (OUTPATIENT)
Facility: MEDICAL CENTER | Age: 60
End: 2020-01-16

## 2020-01-16 ENCOUNTER — INITIAL CONSULT (OUTPATIENT)
Dept: ONCOLOGY | Age: 60
End: 2020-01-16

## 2020-01-16 PROCEDURE — 99214 OFFICE O/P EST MOD 30 MIN: CPT | Performed by: INTERNAL MEDICINE

## 2020-01-16 PROCEDURE — 99999 PR OFFICE/OUTPT VISIT,PROCEDURE ONLY: CPT | Performed by: INTERNAL MEDICINE

## 2020-01-20 ENCOUNTER — OFFICE VISIT (OUTPATIENT)
Dept: ONCOLOGY | Age: 60
End: 2020-01-20

## 2020-01-20 ENCOUNTER — TELEPHONE (OUTPATIENT)
Dept: CASE MANAGEMENT | Age: 60
End: 2020-01-20

## 2020-01-20 ENCOUNTER — HOSPITAL ENCOUNTER (OUTPATIENT)
Dept: INFUSION THERAPY | Facility: MEDICAL CENTER | Age: 60
Discharge: HOME | End: 2020-01-20

## 2020-01-20 ENCOUNTER — TELEPHONE (OUTPATIENT)
Dept: ONCOLOGY | Age: 60
End: 2020-01-20

## 2020-01-20 VITALS
SYSTOLIC BLOOD PRESSURE: 107 MMHG | HEART RATE: 108 BPM | DIASTOLIC BLOOD PRESSURE: 68 MMHG | WEIGHT: 115.5 LBS | BODY MASS INDEX: 21.13 KG/M2 | TEMPERATURE: 98.3 F | RESPIRATION RATE: 18 BRPM

## 2020-01-20 DIAGNOSIS — C34.92 ADENOCARCINOMA, LUNG, LEFT (HCC): Primary | ICD-10-CM

## 2020-01-20 DIAGNOSIS — J91.0 PLEURAL EFFUSION, MALIGNANT: ICD-10-CM

## 2020-01-20 LAB
ABSOLUTE EOS #: 0.16 K/UL (ref 0–0.44)
ABSOLUTE IMMATURE GRANULOCYTE: 0.07 K/UL (ref 0–0.3)
ABSOLUTE LYMPH #: 2.09 K/UL (ref 1.1–3.7)
ABSOLUTE MONO #: 0.93 K/UL (ref 0.1–1.2)
ALBUMIN SERPL-MCNC: 3.8 G/DL (ref 3.5–5.2)
ALBUMIN/GLOBULIN RATIO: ABNORMAL (ref 1–2.5)
ALP BLD-CCNC: 93 U/L (ref 35–104)
ALT SERPL-CCNC: 13 U/L (ref 5–33)
ANION GAP SERPL CALCULATED.3IONS-SCNC: 13 MMOL/L (ref 9–17)
AST SERPL-CCNC: 18 U/L
BASOPHILS # BLD: 1 % (ref 0–2)
BASOPHILS ABSOLUTE: 0.06 K/UL (ref 0–0.2)
BILIRUB SERPL-MCNC: 0.2 MG/DL (ref 0.3–1.2)
BUN BLDV-MCNC: 4 MG/DL (ref 6–20)
BUN/CREAT BLD: ABNORMAL (ref 9–20)
CALCIUM SERPL-MCNC: 10.1 MG/DL (ref 8.6–10.4)
CHLORIDE BLD-SCNC: 96 MMOL/L (ref 98–107)
CO2: 25 MMOL/L (ref 20–31)
CREAT SERPL-MCNC: <0.4 MG/DL (ref 0.5–0.9)
CULTURE: NORMAL
DIFFERENTIAL TYPE: ABNORMAL
EOSINOPHILS RELATIVE PERCENT: 1 % (ref 1–4)
GFR AFRICAN AMERICAN: ABNORMAL ML/MIN
GFR NON-AFRICAN AMERICAN: ABNORMAL ML/MIN
GFR SERPL CREATININE-BSD FRML MDRD: ABNORMAL ML/MIN/{1.73_M2}
GFR SERPL CREATININE-BSD FRML MDRD: ABNORMAL ML/MIN/{1.73_M2}
GLUCOSE BLD-MCNC: 114 MG/DL (ref 70–99)
HCT VFR BLD CALC: 39 % (ref 36.3–47.1)
HEMOGLOBIN: 12.8 G/DL (ref 11.9–15.1)
IMMATURE GRANULOCYTES: 1 %
LYMPHOCYTES # BLD: 19 % (ref 24–43)
Lab: NORMAL
MCH RBC QN AUTO: 31.8 PG (ref 25.2–33.5)
MCHC RBC AUTO-ENTMCNC: 32.8 G/DL (ref 28.4–34.8)
MCV RBC AUTO: 96.8 FL (ref 82.6–102.9)
MONOCYTES # BLD: 8 % (ref 3–12)
NRBC AUTOMATED: 0 PER 100 WBC
PDW BLD-RTO: 13 % (ref 11.8–14.4)
PLATELET # BLD: 393 K/UL (ref 138–453)
PLATELET ESTIMATE: ABNORMAL
PMV BLD AUTO: 8.3 FL (ref 8.1–13.5)
POTASSIUM SERPL-SCNC: 4 MMOL/L (ref 3.7–5.3)
RBC # BLD: 4.03 M/UL (ref 3.95–5.11)
RBC # BLD: ABNORMAL 10*6/UL
SEG NEUTROPHILS: 70 % (ref 36–65)
SEGMENTED NEUTROPHILS ABSOLUTE COUNT: 7.81 K/UL (ref 1.5–8.1)
SODIUM BLD-SCNC: 134 MMOL/L (ref 135–144)
SPECIMEN DESCRIPTION: NORMAL
TOTAL PROTEIN: 7.9 G/DL (ref 6.4–8.3)
TSH SERPL DL<=0.05 MIU/L-ACNC: 2.99 MIU/L (ref 0.3–5)
WBC # BLD: 11.1 K/UL (ref 3.5–11.3)
WBC # BLD: ABNORMAL 10*3/UL

## 2020-01-20 PROCEDURE — 2500000003 HC RX 250 WO HCPCS: Performed by: INTERNAL MEDICINE

## 2020-01-20 PROCEDURE — 99211 OFF/OP EST MAY X REQ PHY/QHP: CPT

## 2020-01-20 PROCEDURE — 2580000003 HC RX 258: Performed by: INTERNAL MEDICINE

## 2020-01-20 PROCEDURE — 36591 DRAW BLOOD OFF VENOUS DEVICE: CPT

## 2020-01-20 PROCEDURE — 96415 CHEMO IV INFUSION ADDL HR: CPT

## 2020-01-20 PROCEDURE — 99214 OFFICE O/P EST MOD 30 MIN: CPT | Performed by: INTERNAL MEDICINE

## 2020-01-20 PROCEDURE — 80053 COMPREHEN METABOLIC PANEL: CPT

## 2020-01-20 PROCEDURE — 96375 TX/PRO/DX INJ NEW DRUG ADDON: CPT

## 2020-01-20 PROCEDURE — 85025 COMPLETE CBC W/AUTO DIFF WBC: CPT

## 2020-01-20 PROCEDURE — 96413 CHEMO IV INFUSION 1 HR: CPT

## 2020-01-20 PROCEDURE — 84443 ASSAY THYROID STIM HORMONE: CPT

## 2020-01-20 PROCEDURE — 6360000002 HC RX W HCPCS: Performed by: INTERNAL MEDICINE

## 2020-01-20 PROCEDURE — 96367 TX/PROPH/DG ADDL SEQ IV INF: CPT

## 2020-01-20 PROCEDURE — 96417 CHEMO IV INFUS EACH ADDL SEQ: CPT

## 2020-01-20 RX ORDER — PALONOSETRON 0.05 MG/ML
0.25 INJECTION, SOLUTION INTRAVENOUS ONCE
Status: COMPLETED | OUTPATIENT
Start: 2020-01-20 | End: 2020-01-20

## 2020-01-20 RX ORDER — MEPERIDINE HYDROCHLORIDE 50 MG/ML
12.5 INJECTION INTRAMUSCULAR; INTRAVENOUS; SUBCUTANEOUS ONCE
Status: CANCELLED | OUTPATIENT
Start: 2020-02-10

## 2020-01-20 RX ORDER — DIPHENHYDRAMINE HYDROCHLORIDE 50 MG/ML
50 INJECTION INTRAMUSCULAR; INTRAVENOUS ONCE
Status: CANCELLED | OUTPATIENT
Start: 2020-03-23

## 2020-01-20 RX ORDER — PALONOSETRON 0.05 MG/ML
0.25 INJECTION, SOLUTION INTRAVENOUS ONCE
Status: CANCELLED | OUTPATIENT
Start: 2020-03-23

## 2020-01-20 RX ORDER — EPINEPHRINE 1 MG/ML
0.3 INJECTION, SOLUTION, CONCENTRATE INTRAVENOUS PRN
Status: CANCELLED | OUTPATIENT
Start: 2020-03-23

## 2020-01-20 RX ORDER — SODIUM CHLORIDE 0.9 % (FLUSH) 0.9 %
5 SYRINGE (ML) INJECTION PRN
Status: CANCELLED | OUTPATIENT
Start: 2020-01-20

## 2020-01-20 RX ORDER — MEPERIDINE HYDROCHLORIDE 50 MG/ML
12.5 INJECTION INTRAMUSCULAR; INTRAVENOUS; SUBCUTANEOUS ONCE
Status: CANCELLED | OUTPATIENT
Start: 2020-03-02

## 2020-01-20 RX ORDER — DIPHENHYDRAMINE HYDROCHLORIDE 50 MG/ML
50 INJECTION INTRAMUSCULAR; INTRAVENOUS ONCE
Status: COMPLETED | OUTPATIENT
Start: 2020-01-20 | End: 2020-01-20

## 2020-01-20 RX ORDER — PALONOSETRON 0.05 MG/ML
0.25 INJECTION, SOLUTION INTRAVENOUS ONCE
Status: CANCELLED | OUTPATIENT
Start: 2020-02-10

## 2020-01-20 RX ORDER — SODIUM CHLORIDE 0.9 % (FLUSH) 0.9 %
10 SYRINGE (ML) INJECTION PRN
Status: ACTIVE | OUTPATIENT
Start: 2020-01-20 | End: 2020-01-20

## 2020-01-20 RX ORDER — SODIUM CHLORIDE 0.9 % (FLUSH) 0.9 %
5 SYRINGE (ML) INJECTION PRN
Status: CANCELLED | OUTPATIENT
Start: 2020-03-02

## 2020-01-20 RX ORDER — PALONOSETRON 0.05 MG/ML
0.25 INJECTION, SOLUTION INTRAVENOUS ONCE
Status: CANCELLED | OUTPATIENT
Start: 2020-03-02

## 2020-01-20 RX ORDER — HEPARIN SODIUM (PORCINE) LOCK FLUSH IV SOLN 100 UNIT/ML 100 UNIT/ML
500 SOLUTION INTRAVENOUS PRN
Status: CANCELLED | OUTPATIENT
Start: 2020-03-02

## 2020-01-20 RX ORDER — SODIUM CHLORIDE 0.9 % (FLUSH) 0.9 %
5 SYRINGE (ML) INJECTION PRN
Status: CANCELLED | OUTPATIENT
Start: 2020-02-10

## 2020-01-20 RX ORDER — SODIUM CHLORIDE 0.9 % (FLUSH) 0.9 %
5 SYRINGE (ML) INJECTION PRN
Status: CANCELLED | OUTPATIENT
Start: 2020-03-23

## 2020-01-20 RX ORDER — MEPERIDINE HYDROCHLORIDE 50 MG/ML
12.5 INJECTION INTRAMUSCULAR; INTRAVENOUS; SUBCUTANEOUS ONCE
Status: CANCELLED | OUTPATIENT
Start: 2020-01-20

## 2020-01-20 RX ORDER — EPINEPHRINE 1 MG/ML
0.3 INJECTION, SOLUTION, CONCENTRATE INTRAVENOUS PRN
Status: CANCELLED | OUTPATIENT
Start: 2020-02-10

## 2020-01-20 RX ORDER — SODIUM CHLORIDE 9 MG/ML
INJECTION, SOLUTION INTRAVENOUS CONTINUOUS
Status: CANCELLED | OUTPATIENT
Start: 2020-03-23

## 2020-01-20 RX ORDER — DEXAMETHASONE SODIUM PHOSPHATE 10 MG/ML
10 INJECTION, SOLUTION INTRAMUSCULAR; INTRAVENOUS ONCE
Status: COMPLETED | OUTPATIENT
Start: 2020-01-20 | End: 2020-01-20

## 2020-01-20 RX ORDER — PALONOSETRON 0.05 MG/ML
0.25 INJECTION, SOLUTION INTRAVENOUS ONCE
Status: CANCELLED | OUTPATIENT
Start: 2020-01-20

## 2020-01-20 RX ORDER — SODIUM CHLORIDE 9 MG/ML
20 INJECTION, SOLUTION INTRAVENOUS ONCE
Status: CANCELLED | OUTPATIENT
Start: 2020-02-10

## 2020-01-20 RX ORDER — SODIUM CHLORIDE 9 MG/ML
20 INJECTION, SOLUTION INTRAVENOUS ONCE
Status: CANCELLED | OUTPATIENT
Start: 2020-03-02

## 2020-01-20 RX ORDER — EPINEPHRINE 1 MG/ML
0.3 INJECTION, SOLUTION, CONCENTRATE INTRAVENOUS PRN
Status: CANCELLED | OUTPATIENT
Start: 2020-01-20

## 2020-01-20 RX ORDER — HYDROCODONE BITARTRATE AND ACETAMINOPHEN 5; 325 MG/1; MG/1
1-2 TABLET ORAL EVERY 4 HOURS PRN
Qty: 20 TABLET | Refills: 0 | Status: SHIPPED | OUTPATIENT
Start: 2020-01-20 | End: 2020-01-22

## 2020-01-20 RX ORDER — MEPERIDINE HYDROCHLORIDE 50 MG/ML
12.5 INJECTION INTRAMUSCULAR; INTRAVENOUS; SUBCUTANEOUS ONCE
Status: CANCELLED | OUTPATIENT
Start: 2020-03-23

## 2020-01-20 RX ORDER — SODIUM CHLORIDE 0.9 % (FLUSH) 0.9 %
10 SYRINGE (ML) INJECTION PRN
Status: CANCELLED | OUTPATIENT
Start: 2020-03-23

## 2020-01-20 RX ORDER — SODIUM CHLORIDE 9 MG/ML
INJECTION, SOLUTION INTRAVENOUS CONTINUOUS
Status: CANCELLED | OUTPATIENT
Start: 2020-02-10

## 2020-01-20 RX ORDER — HEPARIN SODIUM (PORCINE) LOCK FLUSH IV SOLN 100 UNIT/ML 100 UNIT/ML
500 SOLUTION INTRAVENOUS PRN
Status: DISPENSED | OUTPATIENT
Start: 2020-01-20 | End: 2020-01-20

## 2020-01-20 RX ORDER — DIPHENHYDRAMINE HYDROCHLORIDE 50 MG/ML
50 INJECTION INTRAMUSCULAR; INTRAVENOUS ONCE
Status: CANCELLED | OUTPATIENT
Start: 2020-01-20

## 2020-01-20 RX ORDER — HEPARIN SODIUM (PORCINE) LOCK FLUSH IV SOLN 100 UNIT/ML 100 UNIT/ML
500 SOLUTION INTRAVENOUS PRN
Status: CANCELLED | OUTPATIENT
Start: 2020-02-10

## 2020-01-20 RX ORDER — SODIUM CHLORIDE 9 MG/ML
INJECTION, SOLUTION INTRAVENOUS CONTINUOUS
Status: CANCELLED | OUTPATIENT
Start: 2020-03-02

## 2020-01-20 RX ORDER — METHYLPREDNISOLONE SODIUM SUCCINATE 125 MG/2ML
125 INJECTION, POWDER, LYOPHILIZED, FOR SOLUTION INTRAMUSCULAR; INTRAVENOUS ONCE
Status: CANCELLED | OUTPATIENT
Start: 2020-01-20

## 2020-01-20 RX ORDER — HEPARIN SODIUM (PORCINE) LOCK FLUSH IV SOLN 100 UNIT/ML 100 UNIT/ML
500 SOLUTION INTRAVENOUS PRN
Status: CANCELLED | OUTPATIENT
Start: 2020-01-20

## 2020-01-20 RX ORDER — SODIUM CHLORIDE 9 MG/ML
20 INJECTION, SOLUTION INTRAVENOUS ONCE
Status: CANCELLED | OUTPATIENT
Start: 2020-03-23

## 2020-01-20 RX ORDER — DIPHENHYDRAMINE HYDROCHLORIDE 50 MG/ML
50 INJECTION INTRAMUSCULAR; INTRAVENOUS ONCE
Status: CANCELLED | OUTPATIENT
Start: 2020-03-02

## 2020-01-20 RX ORDER — DIPHENHYDRAMINE HYDROCHLORIDE 50 MG/ML
50 INJECTION INTRAMUSCULAR; INTRAVENOUS ONCE
Status: CANCELLED | OUTPATIENT
Start: 2020-02-10

## 2020-01-20 RX ORDER — METHYLPREDNISOLONE SODIUM SUCCINATE 125 MG/2ML
125 INJECTION, POWDER, LYOPHILIZED, FOR SOLUTION INTRAMUSCULAR; INTRAVENOUS ONCE
Status: CANCELLED | OUTPATIENT
Start: 2020-03-23

## 2020-01-20 RX ORDER — METHYLPREDNISOLONE SODIUM SUCCINATE 125 MG/2ML
125 INJECTION, POWDER, LYOPHILIZED, FOR SOLUTION INTRAMUSCULAR; INTRAVENOUS ONCE
Status: CANCELLED | OUTPATIENT
Start: 2020-03-02

## 2020-01-20 RX ORDER — SODIUM CHLORIDE 9 MG/ML
20 INJECTION, SOLUTION INTRAVENOUS ONCE
Status: COMPLETED | OUTPATIENT
Start: 2020-01-20 | End: 2020-01-20

## 2020-01-20 RX ORDER — HEPARIN SODIUM (PORCINE) LOCK FLUSH IV SOLN 100 UNIT/ML 100 UNIT/ML
500 SOLUTION INTRAVENOUS PRN
Status: CANCELLED | OUTPATIENT
Start: 2020-03-23

## 2020-01-20 RX ORDER — METHYLPREDNISOLONE SODIUM SUCCINATE 125 MG/2ML
125 INJECTION, POWDER, LYOPHILIZED, FOR SOLUTION INTRAMUSCULAR; INTRAVENOUS ONCE
Status: CANCELLED | OUTPATIENT
Start: 2020-02-10

## 2020-01-20 RX ORDER — SODIUM CHLORIDE 0.9 % (FLUSH) 0.9 %
10 SYRINGE (ML) INJECTION PRN
Status: CANCELLED | OUTPATIENT
Start: 2020-03-02

## 2020-01-20 RX ORDER — SODIUM CHLORIDE 9 MG/ML
INJECTION, SOLUTION INTRAVENOUS CONTINUOUS
Status: CANCELLED | OUTPATIENT
Start: 2020-01-20

## 2020-01-20 RX ORDER — SODIUM CHLORIDE 0.9 % (FLUSH) 0.9 %
10 SYRINGE (ML) INJECTION PRN
Status: CANCELLED | OUTPATIENT
Start: 2020-01-20

## 2020-01-20 RX ORDER — SODIUM CHLORIDE 0.9 % (FLUSH) 0.9 %
10 SYRINGE (ML) INJECTION PRN
Status: CANCELLED | OUTPATIENT
Start: 2020-02-10

## 2020-01-20 RX ORDER — EPINEPHRINE 1 MG/ML
0.3 INJECTION, SOLUTION, CONCENTRATE INTRAVENOUS PRN
Status: CANCELLED | OUTPATIENT
Start: 2020-03-02

## 2020-01-20 RX ORDER — SODIUM CHLORIDE 9 MG/ML
20 INJECTION, SOLUTION INTRAVENOUS ONCE
Status: CANCELLED | OUTPATIENT
Start: 2020-01-20

## 2020-01-20 RX ADMIN — SODIUM CHLORIDE 200 MG: 9 INJECTION, SOLUTION INTRAVENOUS at 11:38

## 2020-01-20 RX ADMIN — PACLITAXEL 300 MG: 6 INJECTION, SOLUTION INTRAVENOUS at 12:27

## 2020-01-20 RX ADMIN — DEXAMETHASONE SODIUM PHOSPHATE 10 MG: 10 INJECTION, SOLUTION INTRAMUSCULAR; INTRAVENOUS at 10:41

## 2020-01-20 RX ADMIN — CARBOPLATIN 440 MG: 10 INJECTION INTRAVENOUS at 16:01

## 2020-01-20 RX ADMIN — SODIUM CHLORIDE 20 ML/HR: 9 INJECTION, SOLUTION INTRAVENOUS at 09:10

## 2020-01-20 RX ADMIN — Medication 10 ML: at 16:47

## 2020-01-20 RX ADMIN — FAMOTIDINE 20 MG: 10 INJECTION, SOLUTION INTRAVENOUS at 10:40

## 2020-01-20 RX ADMIN — DIPHENHYDRAMINE HYDROCHLORIDE 50 MG: 50 INJECTION, SOLUTION INTRAMUSCULAR; INTRAVENOUS at 10:41

## 2020-01-20 RX ADMIN — FOSAPREPITANT 150 MG: 150 INJECTION, POWDER, LYOPHILIZED, FOR SOLUTION INTRAVENOUS at 10:45

## 2020-01-20 RX ADMIN — Medication 10 ML: at 10:41

## 2020-01-20 RX ADMIN — HEPARIN 500 UNITS: 100 SYRINGE at 16:47

## 2020-01-20 RX ADMIN — Medication 10 ML: at 09:10

## 2020-01-20 RX ADMIN — PALONOSETRON HYDROCHLORIDE 0.25 MG: 0.25 INJECTION, SOLUTION INTRAVENOUS at 10:41

## 2020-01-20 NOTE — PROGRESS NOTES
DIAGNOSIS:   1. Metastatic adenocarcinoma of the lung, stage IV  2. Malignant pleural effusion  3. Molecular testing negative for EGFR, ALK and ROS. Instead it shows Kras and CDK mutation,   4. History of breast cancer in 2010  CURRENT THERAPY:  1. Mastectomy and chemotherapy in 2010  2. Status post thoracocentesis x2  3. Pending study did not show any evidence of distant metastases other than the pleural effusion  4. Palliative chemotherapy with carboplatin, Alimta and Keytruda- started 01/20/2020  BRIEF CASE HISTORY:    The patient is a 63 y.o.  female who is admitted to the hospital for chief complaints of shortness of breath. Patient has history of breast cancer for which she underwent mastectomy in 2010 followed by chemotherapy. This was done in [place]. The  Patient recently moved to [facility] about a year ago. Patient had a significant history of tobacco dependence. Patient started noticing worsening of shortness of breath and presented to the ER. Work-up done shows right-sided pleural effusion. CT scan showed multiloculated left-sided pleural effusion with compressive atelectasis in the lingula and majority of the left lower lobe and partial compression atelectasis of the left upper lobe. There was also underlying emphysema. There was a stable 1.8 cm adrenal mass likely adrenal adenoma which had been stable since November 2018. Patient underwent ultrasound guided thoracentesis. Cytology of pleural fluid confirms adenocarcinoma consistent with lung primary  The patient was discharged home but she came back with worsening shortness of breath and was found to have significant pleural effusion that was tapped pathology showed malignant cells, adenocarcinoma of lung primary. .    We saw the patient in house, we arrange for staging PET CT scan and brain MRI which essentially showed no evidence of metastatic disease otherwise.   The patient was diagnosed with stage IV lung cancer, we adenopathy. PHYSICAL EXAM:      /68   Pulse 108   Temp 98.3 °F (36.8 °C) (Oral)   Resp 18   Wt 115 lb 8 oz (52.4 kg)   BMI 21.13 kg/m²    Temp (24hrs), Av.9 °F (36.6 °C), Min:97.9 °F (36.6 °C), Max:97.9 °F (36.6 °C)  Gen. Exam, showed a well-appearing patient without evidence of distress or pain  HEENT, normocephalic and atraumatic, PERRLA extraocular muscles are intact  Neck showed no JVD no carotid bruit, no cervical adenopathy  Chest is clear to auscultation bilaterally  Heart is regular without any murmur  Abdomen soft nontender no hepatosplenomegaly  Lower extremities showed no edema clubbing or cyanosis  Neurological examination was nonfocal, with intact cranial nerves  Skin  No rashes or bruising appreciated      REVIEW OF LABORATORY DATA:     Lab Results   Component Value Date    WBC 11.1 2020    HGB 12.8 2020    HCT 39.0 2020    MCV 96.8 2020     2020       Chemistry        Component Value Date/Time     (L) 2020 0910    K 4.0 2020 0910    CL 96 (L) 2020 0910    CO2 25 2020 0910    BUN 4 (L) 2020 0910    CREATININE <0.40 (L) 2020 0910        Component Value Date/Time    CALCIUM 10.1 2020 0910    ALKPHOS 93 2020 0910    AST 18 2020 0910    ALT 13 2020 0910    BILITOT 0.20 (L) 2020 0910          IMPRESSION:    1. History of breast cancer in , status post mastectomy and chemotherapy  2. New diagnosis with lung cancer  3. Malignant pleural effusion stage IV disease  4. Chemotherapy with carboplatin, Alimta and Keytruda - started 2020      PLAN:   1. We reviewed in detail treatment plan, dosing schedule and potential side effects. 2. I discussed plan for monitoring for tolerance and assessing response. 3. I explained goals of therapy and plan for maintenance Keytruda with controlled disease.    4. Baseline exam was completed, she has good air entry today, port in in place in upper left chest and well healed. 5. We will commence with treatment today as planned. 6. Return in 3 weeks.

## 2020-01-20 NOTE — PROGRESS NOTES
Patient arrive ambulatory with spouse for cycle 1 day 1 treatment and physician visit. Denies complaint or concern. Vitals as charted. Port accessed; specimen sent. Patient apprehensive today for cycle 1 but explained each step of processes/procedures throughout. Physician met with patient; labs reviewed. Patient premedicated. Keytruda infused with no sign of adverse reaction; line flushed. Taxol infusion begin slowly with no adverse reaction; then increased to infuse over 3 hours. Completed with no adverse reaction; line flushed. Carbo infused with no sign of adverse reaction; line flushed. Port flushed and heparinized with intact hernández needle removed per protocol. Patient ambulate off unit with spouse at discharge; AVS provided by front office staff.

## 2020-01-20 NOTE — FLOWSHEET NOTE
Situation:  Writer visited Patient and Spouse in the treatment cubicle. Today was Patient's first treatment. Assessment:  Ms. Earnestine Rosario was working on [facility], and her  was in the chair next to her. Ms. Earnestine Rosario shared that she enjoys her craft projects. Ms. Earnestine Rosario identified her family and friends as her support system.  agreed that Pt has good support. She was raised Judaism and believes in God which she said helps her. Pt went through treatment for breast cancer 10 years ago. She stated her desire to go through this and to keep a positive attitude. Ms. 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 They enjoy being back in [facility], where Pt grew up, sharing that they have three dogs and [place]. Ms. Earnestine Rosario appeared to be coping well and was positive. Intervention:  Writer provided supportive presence and explored Pt's coping and needs; inquired about Pt's sources of support and strength; offered words of encouragement and support; gave Pt her business card and told Pt she is available for support as she goes through treatment. Outcome:  Ms. Earnestine Rosario received writer's business card. She thanked writer. 01/20/20 1111   Encounter Summary   Services provided to: Patient and family together   Referral/Consult From: [facility] Family members;Spouse; Children   Continue Visiting   (1/20/20)   Complexity of Encounter Low   Length of Encounter 15 minutes   Spiritual Assessment Completed Yes   Spiritual/Pentecostal   Type Spiritual support   Assessment Calm; Approachable; Hopeful;Coping   Intervention Active listening;Explored feelings, thoughts, concerns;Explored coping resources;Sustaining presence/ Ministry of presence; Discussed relationship with God;Discussed meaning/purpose;Discussed illness/injury and it's impact; Discussed belief system/Roman Catholic practices/ynes   Outcome Receptive; Hopeful;Encouraged;Coping;Engaged in conversation;Expressed gratitude     Electronically signed by Donita Rg, Oncology Outpatient [facility], [address] [facility] Radiation Oncology  1/20/2020  11:13 AM

## 2020-01-20 NOTE — PROGRESS NOTES
Mirna Vega arrives to the [address] with her  Melly Munoz for a consultation on taxol, carboplatin and [place] Diagnosis; metastatic adenocarcinoma of the lung, malignant pleural effusion. Barriers; patient says she very particular with her food and she does not like to drink water. Writer discussed ways to make water more flavorful for her. Entries in eating hints discussed to increase protein and calories. Patient has support with her  Melly Munoz and children. Mirna Vega is asking if she can drink 2 to 3 beers at night. Writer informed her that alcohol can cause dehydration, make side effects worse, or make the chemo not work so well. The distress tool and functional assessment form completed. Chemotherapy Teaching     What is Chemotherapy   Drug action [x]   Method of Administration [x]   Handouts given [x]     Side Effects  Nausea/vomiting [x]   Diarrhea [x]   Fatigue [x]   Signs / Symptoms of infection [x]   Neutropenia [x]   Thrombocytopenia [x]   Alopecia [x]   neuropathy [x]   [place] diet &  the importance of fluids [x]       Micellaneous  Importance of nutrition [x]   Importance of oral hygiene [x]   When to call the MD [x]   Monitoring labs [x]   Use of supportive services [x]     Explanation of Drug Regimen / Frequency  5 planned cycles of pacltaxel, carboplatin, keytruda every 21 days. Home medication decadron take 4 mg 1 tab by mouth (with food) twice daily for two days, start the day after chemo. Comments  Verbalized understanding to drug,action,side effects and when to call MD. [facility] Patient Education Folder given to patient with [address] teaching sheets that were reviewed with patient. Handouts given eating hints, chemotherapy and you, mercy handbook, rehab pamphlet, let it soak in. Community resources provided from the [facility], [address] [facility] January calender, information provided for support groups. Offered tour of the treatment area, and Mirna Vega says she can wait. Questions answered to patient's satisfaction. C1D1 on 1/20/2020.

## 2020-01-22 ENCOUNTER — APPOINTMENT (OUTPATIENT)
Dept: GENERAL RADIOLOGY | Age: 60
End: 2020-01-22

## 2020-01-22 ENCOUNTER — APPOINTMENT (OUTPATIENT)
Dept: INTERVENTIONAL RADIOLOGY/VASCULAR | Age: 60
End: 2020-01-22

## 2020-01-22 ENCOUNTER — HOSPITAL ENCOUNTER (EMERGENCY)
Age: 60
Discharge: HOME | End: 2020-01-22
Attending: EMERGENCY MEDICINE

## 2020-01-22 ENCOUNTER — TELEPHONE (OUTPATIENT)
Dept: CASE MANAGEMENT | Age: 60
End: 2020-01-22

## 2020-01-22 ENCOUNTER — TELEPHONE (OUTPATIENT)
Dept: OTHER | Facility: CLINIC | Age: 60
End: 2020-01-22

## 2020-01-22 VITALS
DIASTOLIC BLOOD PRESSURE: 93 MMHG | HEART RATE: 96 BPM | RESPIRATION RATE: 15 BRPM | SYSTOLIC BLOOD PRESSURE: 106 MMHG | TEMPERATURE: 97.7 F | WEIGHT: 115 LBS | HEIGHT: 62 IN | OXYGEN SATURATION: 96 % | BODY MASS INDEX: 21.16 KG/M2

## 2020-01-22 LAB
ABSOLUTE EOS #: 0 K/UL (ref 0–0.44)
ABSOLUTE IMMATURE GRANULOCYTE: 0.14 K/UL (ref 0–0.3)
ABSOLUTE LYMPH #: 0.27 K/UL (ref 1.1–3.7)
ABSOLUTE MONO #: 0.55 K/UL (ref 0.1–1.2)
ANION GAP SERPL CALCULATED.3IONS-SCNC: 11 MMOL/L (ref 9–17)
BASOPHILS # BLD: 0 % (ref 0–2)
BASOPHILS ABSOLUTE: 0 K/UL (ref 0–0.2)
BILIRUBIN, POC: NEGATIVE
BLOOD URINE, POC: NEGATIVE
BUN BLDV-MCNC: 11 MG/DL (ref 6–20)
BUN/CREAT BLD: ABNORMAL (ref 9–20)
CALCIUM SERPL-MCNC: 10.6 MG/DL (ref 8.6–10.4)
CHLORIDE BLD-SCNC: 93 MMOL/L (ref 98–107)
CHP ED QC CHECK: NORMAL
CLARITY, POC: CLEAR
CO2: 29 MMOL/L (ref 20–31)
COLOR, POC: YELLOW
CREAT SERPL-MCNC: <0.4 MG/DL (ref 0.5–0.9)
DIFFERENTIAL TYPE: ABNORMAL
DIRECT EXAM: NORMAL
EOSINOPHILS RELATIVE PERCENT: 0 % (ref 1–4)
GFR AFRICAN AMERICAN: ABNORMAL ML/MIN
GFR NON-AFRICAN AMERICAN: ABNORMAL ML/MIN
GFR SERPL CREATININE-BSD FRML MDRD: ABNORMAL ML/MIN/{1.73_M2}
GFR SERPL CREATININE-BSD FRML MDRD: ABNORMAL ML/MIN/{1.73_M2}
GLUCOSE BLD-MCNC: 114 MG/DL (ref 70–99)
GLUCOSE BLD-MCNC: 165 MG/DL (ref 65–105)
GLUCOSE URINE, POC: NEGATIVE
HCT VFR BLD CALC: 38.3 % (ref 36.3–47.1)
HEMOGLOBIN: 12.8 G/DL (ref 11.9–15.1)
IMMATURE GRANULOCYTES: 1 %
INR BLD: 0.9
KETONES, POC: NEGATIVE
LEUKOCYTE EST, POC: NEGATIVE
LYMPHOCYTES # BLD: 2 % (ref 24–43)
Lab: NORMAL
MCH RBC QN AUTO: 32.2 PG (ref 25.2–33.5)
MCHC RBC AUTO-ENTMCNC: 33.4 G/DL (ref 28.4–34.8)
MCV RBC AUTO: 96.2 FL (ref 82.6–102.9)
MONOCYTES # BLD: 4 % (ref 3–12)
NITRITE, POC: NEGATIVE
NRBC AUTOMATED: 0 PER 100 WBC
PARTIAL THROMBOPLASTIN TIME: 26.1 SEC (ref 23–31)
PDW BLD-RTO: 13.2 % (ref 11.8–14.4)
PH, POC: 6.5
PLATELET # BLD: 395 K/UL (ref 138–453)
PLATELET ESTIMATE: ABNORMAL
PMV BLD AUTO: 8.9 FL (ref 8.1–13.5)
POTASSIUM SERPL-SCNC: 4.4 MMOL/L (ref 3.7–5.3)
PROTEIN, POC: NEGATIVE
PROTHROMBIN TIME: 9.7 SEC (ref 9.7–11.6)
RBC # BLD: 3.98 M/UL (ref 3.95–5.11)
RBC # BLD: ABNORMAL 10*6/UL
SEG NEUTROPHILS: 93 % (ref 36–65)
SEGMENTED NEUTROPHILS ABSOLUTE COUNT: 12.74 K/UL (ref 1.5–8.1)
SODIUM BLD-SCNC: 133 MMOL/L (ref 135–144)
SPECIFIC GRAVITY, POC: 1.02
SPECIMEN DESCRIPTION: NORMAL
UROBILINOGEN, POC: 0.2
WBC # BLD: 13.7 K/UL (ref 3.5–11.3)
WBC # BLD: ABNORMAL 10*3/UL

## 2020-01-22 PROCEDURE — 85025 COMPLETE CBC W/AUTO DIFF WBC: CPT

## 2020-01-22 PROCEDURE — 81003 URINALYSIS AUTO W/O SCOPE: CPT

## 2020-01-22 PROCEDURE — 87804 INFLUENZA ASSAY W/OPTIC: CPT

## 2020-01-22 PROCEDURE — 85730 THROMBOPLASTIN TIME PARTIAL: CPT

## 2020-01-22 PROCEDURE — 32550 INSERT PLEURAL CATH: CPT | Performed by: RADIOLOGY

## 2020-01-22 PROCEDURE — 99284 EMERGENCY DEPT VISIT MOD MDM: CPT

## 2020-01-22 PROCEDURE — 80048 BASIC METABOLIC PNL TOTAL CA: CPT

## 2020-01-22 PROCEDURE — C1729 CATH, DRAINAGE: HCPCS

## 2020-01-22 PROCEDURE — 93005 ELECTROCARDIOGRAM TRACING: CPT | Performed by: EMERGENCY MEDICINE

## 2020-01-22 PROCEDURE — 85610 PROTHROMBIN TIME: CPT

## 2020-01-22 PROCEDURE — 6360000002 HC RX W HCPCS: Performed by: RADIOLOGY

## 2020-01-22 PROCEDURE — 96374 THER/PROPH/DIAG INJ IV PUSH: CPT

## 2020-01-22 PROCEDURE — 82947 ASSAY GLUCOSE BLOOD QUANT: CPT

## 2020-01-22 PROCEDURE — 75989 ABSCESS DRAINAGE UNDER X-RAY: CPT | Performed by: RADIOLOGY

## 2020-01-22 PROCEDURE — 71046 X-RAY EXAM CHEST 2 VIEWS: CPT

## 2020-01-22 RX ORDER — HYDROCODONE BITARTRATE AND ACETAMINOPHEN 5; 325 MG/1; MG/1
1 TABLET ORAL EVERY 6 HOURS PRN
Qty: 20 TABLET | Refills: 0 | Status: SHIPPED | OUTPATIENT
Start: 2020-01-22 | End: 2020-01-27

## 2020-01-22 RX ORDER — FENTANYL CITRATE 50 UG/ML
INJECTION, SOLUTION INTRAMUSCULAR; INTRAVENOUS
Status: COMPLETED | OUTPATIENT
Start: 2020-01-22 | End: 2020-01-22

## 2020-01-22 RX ADMIN — Medication 50 MCG: at 13:50

## 2020-01-22 ASSESSMENT — ENCOUNTER SYMPTOMS
COUGH: 0
VOMITING: 0
FACIAL SWELLING: 0
COLOR CHANGE: 0
EYE DISCHARGE: 0
ABDOMINAL PAIN: 0
EYE REDNESS: 0
DIARRHEA: 0
CONSTIPATION: 0
SHORTNESS OF BREATH: 1

## 2020-01-22 NOTE — ED PROVIDER NOTES
Negative for chills and fever. HENT: Negative for congestion, ear discharge and facial swelling. Eyes: Negative for discharge and redness. Respiratory: Positive for shortness of breath. Negative for cough. Cardiovascular: Negative for chest pain. Gastrointestinal: Negative for abdominal pain, constipation, diarrhea and vomiting. Genitourinary: Negative for dysuria and hematuria. Musculoskeletal: Negative for arthralgias. Skin: Negative for color change and rash. Neurological: Positive for weakness. Negative for syncope, numbness and headaches. Hematological: Negative for adenopathy. Psychiatric/Behavioral: Negative for confusion. The patient is not nervous/anxious. Except as noted above the remainder of the review of systems was reviewed and negative. PHYSICAL EXAM    (up to 7 for level 4, 8 or more for level 5)     Vitals:    01/22/20 1352 01/22/20 1357 01/22/20 1402 01/22/20 1407   BP: 134/86 137/86 132/82 (!) 106/93   Pulse: 90 92 96 96   Resp: 18 20 19 15   Temp:       SpO2: 96% 94% 95% 96%   Weight:       Height:           Physical Exam  Vitals signs reviewed. Constitutional:       General: She is not in acute distress. Appearance: She is well-developed. She is not diaphoretic. HENT:      Head: Normocephalic and atraumatic. Eyes:      General: No scleral icterus. Right eye: No discharge. Left eye: No discharge. Neck:      Musculoskeletal: Neck supple. Cardiovascular:      Rate and Rhythm: Normal rate and regular rhythm. Pulmonary:      Effort: Pulmonary effort is normal. No respiratory distress. Breath sounds: No stridor. No wheezing or rales. Comments: Breath sounds are diminished at the left lung base. Abdominal:      General: There is no distension. Palpations: Abdomen is soft. Tenderness: There is no tenderness. Musculoskeletal: Normal range of motion. Lymphadenopathy:      Cervical: No cervical adenopathy.    Skin: General: Skin is warm and dry. Findings: No erythema or rash. Neurological:      Mental Status: She is alert and oriented to person, place, and time. Psychiatric:         Behavior: Behavior normal.             DIAGNOSTIC RESULTS     EKG: All EKG's are interpreted by the Emergency Department Physician who either signs or Co-signs this chart in the absence of a cardiologist.    EKG on my interpretation shows no acute findings    RADIOLOGY:   Non-plain film images such as CT, Ultrasound and MRI are read by the radiologist. [place] radiographic images are visualized and preliminarily interpreted by the emergency physician with the below findings:    Interpretation per the Radiologist below, if available at the time of this note:    Xr Chest Standard (2 Vw)    Result Date: 1/22/2020  EXAMINATION: TWO XRAY VIEWS OF THE CHEST 1/22/2020 10:07 am COMPARISON: Two-view chest from 12/30/2019 HISTORY: ORDERING SYSTEM PROVIDED HISTORY: Short of breath, history of effusion TECHNOLOGIST PROVIDED HISTORY: Short of breath, history of effusion Reason for Exam: pt has hx of lung CA (adenocarcinoma) getting chemo pt states leg weak since getting chemo yesterday Acuity: Unknown Type of Exam: Unknown Additional history of chronic obstructive pulmonary disease, coronary artery disease, hypertension, breast cancer, loculated left pleural effusion and tobacco abuse disorder. FINDINGS: Left-sided IJ chemo port now in place and appears satisfactory with tip in the lower SVC. Cardiac silhouette obscured on the left by loculated pleural effusion, the latter larger as compared to the previous examination, now obscuring lower 1/3-1/2 left hemithorax. Mediastinal structures midline unchanged. 11 mm nodular density left mid lung field; remainder visualized left upper lung, right lung and right costophrenic angle clear. Bones and soft tissues unchanged. Larger loculated appearing left pleural effusion.   Small nodular density left mid position of the catheter with no kinks. There were no complications. FINDINGS: Initial ultrasound images shows the pleural effusion. Subsequent images show the guidewire and then catheter in place in the loculated inferior pleural effusion. Subsequent images following drainage show improvement in the size of the pleural fluid. Chemo port catheter also noted, with its tip in the upper right atrium. LABS:  Labs Reviewed   CBC WITH AUTO DIFFERENTIAL - Abnormal; Notable for the following components:       Result Value    WBC 13.7 (*)     Seg Neutrophils 93 (*)     Lymphocytes 2 (*)     Eosinophils % 0 (*)     Immature Granulocytes 1 (*)     Segs Absolute 12.74 (*)     Absolute Lymph # 0.27 (*)     All other components within normal limits   BASIC METABOLIC PANEL - Abnormal; Notable for the following components:    Glucose 114 (*)     CREATININE <0.40 (*)     Calcium 10.6 (*)     Sodium 133 (*)     Chloride 93 (*)     All other components within normal limits   POC GLUCOSE FINGERSTICK - Abnormal; Notable for the following components:    POC Glucose 165 (*)     All other components within normal limits   POCT URINALYSIS DIPSTICK - Normal   RAPID INFLUENZA A/B ANTIGENS   PROTIME-INR   APTT       All other labs were within normal range or not returned as of this dictation. EMERGENCY DEPARTMENT COURSE and DIFFERENTIAL DIAGNOSIS/MDM:   Vitals:    Vitals:    01/22/20 1352 01/22/20 1357 01/22/20 1402 01/22/20 1407   BP: 134/86 137/86 132/82 (!) 106/93   Pulse: 90 92 96 96   Resp: 18 20 19 15   Temp:       SpO2: 96% 94% 95% 96%   Weight:       Height:           Orders Placed This Encounter   Medications    fentaNYL (SUBLIMAZE) injection    HYDROcodone-acetaminophen (NORCO) 5-325 MG per tablet     Sig: Take 1 tablet by mouth every 6 hours as needed for Pain for up to 5 days.      Dispense:  20 tablet     Refill:  0       Medical Decision Making: The pleural effusion has been drained and the patient has had a drain placed. She is able to be discharged home. Treatment diagnosis and follow-up were discussed with the patient and her . CONSULTS:  None    PROCEDURES:  None    FINAL IMPRESSION      1. Recurrent left pleural effusion          DISPOSITION/PLAN   DISPOSITION Decision To Discharge 01/22/2020 03:02:54 PM      PATIENT REFERRED TO:   Kat Jenkins Quaker, [address] [facility] [address]  [phone]      As needed    [facility] ED  [address] [facility]  [phone]    If symptoms worsen      DISCHARGE MEDICATIONS:     New Prescriptions    HYDROCODONE-ACETAMINOPHEN (NORCO) 5-325 MG PER TABLET    Take 1 tablet by mouth every 6 hours as needed for Pain for up to 5 days.          (Please note that portions of this note were completed with a voice recognition program.  Efforts were made to edit the dictations but occasionally words are mis-transcribed.)    Lali Slater MD  Attending Emergency Physician           Lali Slater MD  01/22/20 9711

## 2020-01-22 NOTE — PROGRESS NOTES
Patient tolerated aspira drain placement without distress. 700 ml fluid removed. Dressing intact. Patient to ER with dressing and drain bags.

## 2020-01-23 LAB
EKG ATRIAL RATE: 74 BPM
EKG P AXIS: -3 DEGREES
EKG P-R INTERVAL: 152 MS
EKG Q-T INTERVAL: 360 MS
EKG QRS DURATION: 88 MS
EKG QTC CALCULATION (BAZETT): 399 MS
EKG R AXIS: 44 DEGREES
EKG T AXIS: 26 DEGREES
EKG VENTRICULAR RATE: 74 BPM

## 2020-01-27 ENCOUNTER — OFFICE VISIT (OUTPATIENT)
Dept: FAMILY MEDICINE CLINIC | Age: 60
End: 2020-01-27

## 2020-01-27 VITALS
HEIGHT: 62 IN | BODY MASS INDEX: 21.09 KG/M2 | OXYGEN SATURATION: 99 % | SYSTOLIC BLOOD PRESSURE: 100 MMHG | WEIGHT: 114.6 LBS | HEART RATE: 90 BPM | DIASTOLIC BLOOD PRESSURE: 76 MMHG

## 2020-01-27 PROBLEM — J43.1 PANLOBULAR EMPHYSEMA (HCC): Status: ACTIVE | Noted: 2019-01-29

## 2020-01-27 PROCEDURE — 1111F DSCHRG MED/CURRENT MED MERGE: CPT | Performed by: INTERNAL MEDICINE

## 2020-01-27 PROCEDURE — 99215 OFFICE O/P EST HI 40 MIN: CPT | Performed by: INTERNAL MEDICINE

## 2020-01-27 ASSESSMENT — PATIENT HEALTH QUESTIONNAIRE - PHQ9
SUM OF ALL RESPONSES TO PHQ QUESTIONS 1-9: 0
SUM OF ALL RESPONSES TO PHQ9 QUESTIONS 1 & 2: 0
1. LITTLE INTEREST OR PLEASURE IN DOING THINGS: 0
SUM OF ALL RESPONSES TO PHQ QUESTIONS 1-9: 0
2. FEELING DOWN, DEPRESSED OR HOPELESS: 0

## 2020-01-27 NOTE — PROGRESS NOTES
Post-Discharge Transitional Care Management Services or Hospital Follow Up      Emi Julian   YOB: 1960    Date of Office Visit:  1/27/2020  Date of Hospital Admission: 1/22/20  Date of Hospital Discharge: 1/22/20  Risk of hospital readmission (high >=14%.  Medium >=10%) :Readmission Risk Score: 17      Care management risk score Rising risk (score 2-5) and Complex Care (Scores >=6): 2     Non face to face  following discharge, date last encounter closed (first attempt may have been earlier): *No documented post hospital discharge outreach found in the last 14 days    Call initiated 2 business days of discharge: *No response recorded in the last 14 days    Patient Active Problem List   Diagnosis    Chronic obstructive pulmonary disease with acute lower respiratory infection ([address]    Essential hypertension    History of right breast cancer    Loculated pleural effusion    Hyponatremia    Coronary artery disease involving native coronary artery of native heart without angina pectoris    Tobacco abuse disorder    Alcohol abuse    Leukocytosis    Adenocarcinoma, lung, left (HCC)    Shortness of breath    Pleural effusion    Insomnia    Anxiety       Allergies   Allergen Reactions    Morphine     Robitussin (Alcohol Free) [Guaifenesin] Hives    Aspirin Hives and Rash    Penicillins Hives and Rash    Sulfa Antibiotics Hives and Rash       Medications listed as ordered at the time of discharge from hospital   [facility] Medication Instructions MIGNON:    Printed on:01/27/20 1215   Medication Information                      albuterol (ACCUNEB) 1.25 MG/3ML nebulizer solution  Inhale 3 mLs into the lungs every 2 hours as needed for Wheezing or Shortness of Breath             albuterol sulfate  (90 Base) MCG/ACT inhaler  Inhale 2 puffs into the lungs every 4 hours as needed for Wheezing or Shortness of Breath             amLODIPine (NORVASC) 5 MG tablet  Take 1 tablet by yet.     A comprehensive review of systems was negative except for what was noted in the HPI. Vitals:    01/27/20 1158   BP: 100/76   Site: Left Upper Arm   Position: Sitting   Cuff Size: Medium Adult   Pulse: 90   SpO2: 99%   Weight: 114 lb 9.6 oz (52 kg)   Height: 5' 2.01\" (1.575 m)     Body mass index is 20.96 kg/m². Wt Readings from Last 3 Encounters:   01/27/20 114 lb 9.6 oz (52 kg)   01/22/20 115 lb (52.2 kg)   01/20/20 115 lb 8 oz (52.4 kg)     BP Readings from Last 3 Encounters:   01/27/20 100/76   01/22/20 (!) 106/93   01/20/20 107/68        Physical Exam:  General Appearance: alert and oriented to person, place and time, well developed and well- nourished, in no acute distress  Skin: warm and dry, no rash or erythema  Head: normocephalic and atraumatic  Eyes: pupils equal, round, and reactive to light, extraocular eye movements intact, conjunctivae normal  ENT: tympanic membrane, external ear and ear canal normal bilaterally, nose without deformity, nasal mucosa and turbinates normal without polyps  Neck: supple and non-tender without mass, no thyromegaly or thyroid nodules, no cervical lymphadenopathy  Pulmonary/Chest: clear to auscultation bilaterally- no wheezes, rales or rhonchi, normal air movement, no respiratory distress  Cardiovascular: normal rate, regular rhythm, normal S1 and S2, no murmurs, rubs, clicks, or gallops, distal pulses intact, no carotid bruits  Abdomen: soft, non-tender, non-distended, normal bowel sounds, no masses or organomegaly  Extremities: no cyanosis, clubbing or edema  Musculoskeletal: normal range of motion, no joint swelling, deformity or tenderness  Neurologic: reflexes normal and symmetric, no cranial nerve deficit, gait, coordination and speech normal    Assessment/Plan:  1. Adenocarcinoma, lung, left ([address]  - IN DISCHARGE MEDS RECONCILED W/ CURRENT OUTPATIENT MED LIST    2. Tobacco abuse disorder  - IN DISCHARGE MEDS RECONCILED W/ CURRENT OUTPATIENT MED LIST    3. Loculated pleural effusion  - [facility] DISCHARGE MEDS RECONCILED W/ CURRENT OUTPATIENT MED LIST    4. Panlobular emphysema (HCC)  Continue inhalers     5. Shortness of breath  Improving     6.  Pleural effusion  - [facility] DISCHARGE MEDS RECONCILED W/ CURRENT OUTPATIENT MED LIST        Medical Decision Making: high complexity

## 2020-01-29 ENCOUNTER — TELEPHONE (OUTPATIENT)
Dept: ONCOLOGY | Age: 60
End: 2020-01-29

## 2020-01-29 NOTE — TELEPHONE ENCOUNTER
SW reviewed patient's psychosocial form and her issues have been addressed by SW. Patient uninsured and is over income for Medicaid, per HELP. [address] Financial Assistance applications have been submitted, but cannot be processed until 's proof-of-income submitted. The patient states they are working on this. SW approved meds on the [facility] Med Assist program.  Patient also referred to [facility] for food/toiletries. Maryruth Eisenmenger.  Shraddha Ford

## 2020-02-03 LAB
CULTURE: NORMAL
DIRECT EXAM: NORMAL
Lab: NORMAL
SPECIMEN DESCRIPTION: NORMAL

## 2020-02-05 ENCOUNTER — TELEPHONE (OUTPATIENT)
Dept: ONCOLOGY | Age: 60
End: 2020-02-05

## 2020-02-06 ENCOUNTER — HOSPITAL ENCOUNTER (OUTPATIENT)
Dept: INTERVENTIONAL RADIOLOGY/VASCULAR | Age: 60
Discharge: HOME | End: 2020-02-08

## 2020-02-06 ENCOUNTER — HOSPITAL ENCOUNTER (OUTPATIENT)
Dept: GENERAL RADIOLOGY | Age: 60
Discharge: HOME | End: 2020-02-08

## 2020-02-06 ENCOUNTER — HOSPITAL ENCOUNTER (OUTPATIENT)
Facility: MEDICAL CENTER | Age: 60
End: 2020-02-06

## 2020-02-06 PROCEDURE — 71045 X-RAY EXAM CHEST 1 VIEW: CPT

## 2020-02-07 ENCOUNTER — TELEPHONE (OUTPATIENT)
Dept: CASE MANAGEMENT | Age: 60
End: 2020-02-07

## 2020-02-10 ENCOUNTER — TELEPHONE (OUTPATIENT)
Dept: ONCOLOGY | Age: 60
End: 2020-02-10

## 2020-02-10 ENCOUNTER — OFFICE VISIT (OUTPATIENT)
Dept: ONCOLOGY | Age: 60
End: 2020-02-10

## 2020-02-10 ENCOUNTER — HOSPITAL ENCOUNTER (OUTPATIENT)
Dept: INFUSION THERAPY | Facility: MEDICAL CENTER | Age: 60
Discharge: HOME | End: 2020-02-10

## 2020-02-10 ENCOUNTER — TELEPHONE (OUTPATIENT)
Dept: CASE MANAGEMENT | Age: 60
End: 2020-02-10

## 2020-02-10 VITALS
RESPIRATION RATE: 18 BRPM | DIASTOLIC BLOOD PRESSURE: 86 MMHG | TEMPERATURE: 98.3 F | SYSTOLIC BLOOD PRESSURE: 130 MMHG | WEIGHT: 114.5 LBS | BODY MASS INDEX: 20.94 KG/M2 | HEART RATE: 95 BPM

## 2020-02-10 VITALS
TEMPERATURE: 98.3 F | HEART RATE: 95 BPM | WEIGHT: 114.5 LBS | DIASTOLIC BLOOD PRESSURE: 86 MMHG | SYSTOLIC BLOOD PRESSURE: 130 MMHG | BODY MASS INDEX: 20.94 KG/M2 | RESPIRATION RATE: 18 BRPM

## 2020-02-10 DIAGNOSIS — I25.10 CORONARY ARTERY DISEASE INVOLVING NATIVE CORONARY ARTERY OF NATIVE HEART WITHOUT ANGINA PECTORIS: ICD-10-CM

## 2020-02-10 DIAGNOSIS — C34.92 ADENOCARCINOMA, LUNG, LEFT (HCC): Primary | ICD-10-CM

## 2020-02-10 DIAGNOSIS — I10 ESSENTIAL HYPERTENSION: Chronic | ICD-10-CM

## 2020-02-10 LAB
ABSOLUTE EOS #: 0.23 K/UL (ref 0–0.44)
ABSOLUTE IMMATURE GRANULOCYTE: 0.03 K/UL (ref 0–0.3)
ABSOLUTE LYMPH #: 1.86 K/UL (ref 1.1–3.7)
ABSOLUTE MONO #: 1 K/UL (ref 0.1–1.2)
ALBUMIN SERPL-MCNC: 4.1 G/DL (ref 3.5–5.2)
ALBUMIN/GLOBULIN RATIO: ABNORMAL (ref 1–2.5)
ALP BLD-CCNC: 81 U/L (ref 35–104)
ALT SERPL-CCNC: 17 U/L (ref 5–33)
AMYLASE: 163 U/L (ref 28–100)
ANION GAP SERPL CALCULATED.3IONS-SCNC: 11 MMOL/L (ref 9–17)
AST SERPL-CCNC: 23 U/L
BASOPHILS # BLD: 1 % (ref 0–2)
BASOPHILS ABSOLUTE: 0.07 K/UL (ref 0–0.2)
BILIRUB SERPL-MCNC: 0.13 MG/DL (ref 0.3–1.2)
BUN BLDV-MCNC: 4 MG/DL (ref 6–20)
BUN/CREAT BLD: ABNORMAL (ref 9–20)
CALCIUM SERPL-MCNC: 10.1 MG/DL (ref 8.6–10.4)
CHLORIDE BLD-SCNC: 92 MMOL/L (ref 98–107)
CHOLESTEROL/HDL RATIO: 3.8
CHOLESTEROL: 250 MG/DL
CO2: 26 MMOL/L (ref 20–31)
CORTISOL COLLECTION INFO: NORMAL
CORTISOL: 17.9 UG/DL (ref 2.7–18.4)
CREAT SERPL-MCNC: <0.4 MG/DL (ref 0.5–0.9)
DIFFERENTIAL TYPE: ABNORMAL
EOSINOPHILS RELATIVE PERCENT: 4 % (ref 1–4)
GFR AFRICAN AMERICAN: ABNORMAL ML/MIN
GFR NON-AFRICAN AMERICAN: ABNORMAL ML/MIN
GFR SERPL CREATININE-BSD FRML MDRD: ABNORMAL ML/MIN/{1.73_M2}
GFR SERPL CREATININE-BSD FRML MDRD: ABNORMAL ML/MIN/{1.73_M2}
GLUCOSE BLD-MCNC: 107 MG/DL (ref 70–99)
HCT VFR BLD CALC: 38.9 % (ref 36.3–47.1)
HDLC SERPL-MCNC: 66 MG/DL
HEMOGLOBIN: 13.1 G/DL (ref 11.9–15.1)
IMMATURE GRANULOCYTES: 1 %
LDL CHOLESTEROL: 150 MG/DL (ref 0–130)
LIPASE: 35 U/L (ref 13–60)
LYMPHOCYTES # BLD: 29 % (ref 24–43)
MCH RBC QN AUTO: 31.7 PG (ref 25.2–33.5)
MCHC RBC AUTO-ENTMCNC: 33.7 G/DL (ref 28.4–34.8)
MCV RBC AUTO: 94.2 FL (ref 82.6–102.9)
MONOCYTES # BLD: 15 % (ref 3–12)
NRBC AUTOMATED: 0 PER 100 WBC
PDW BLD-RTO: 13.1 % (ref 11.8–14.4)
PLATELET # BLD: 340 K/UL (ref 138–453)
PLATELET ESTIMATE: ABNORMAL
PMV BLD AUTO: 8 FL (ref 8.1–13.5)
POTASSIUM SERPL-SCNC: 4.3 MMOL/L (ref 3.7–5.3)
RBC # BLD: 4.13 M/UL (ref 3.95–5.11)
RBC # BLD: ABNORMAL 10*6/UL
SEG NEUTROPHILS: 50 % (ref 36–65)
SEGMENTED NEUTROPHILS ABSOLUTE COUNT: 3.3 K/UL (ref 1.5–8.1)
SODIUM BLD-SCNC: 129 MMOL/L (ref 135–144)
TOTAL PROTEIN: 8 G/DL (ref 6.4–8.3)
TRIGL SERPL-MCNC: 170 MG/DL
TSH SERPL DL<=0.05 MIU/L-ACNC: 3.96 MIU/L (ref 0.3–5)
VLDLC SERPL CALC-MCNC: ABNORMAL MG/DL (ref 1–30)
WBC # BLD: 6.5 K/UL (ref 3.5–11.3)
WBC # BLD: ABNORMAL 10*3/UL

## 2020-02-10 PROCEDURE — 36591 DRAW BLOOD OFF VENOUS DEVICE: CPT

## 2020-02-10 PROCEDURE — 83690 ASSAY OF LIPASE: CPT

## 2020-02-10 PROCEDURE — 6360000002 HC RX W HCPCS: Performed by: INTERNAL MEDICINE

## 2020-02-10 PROCEDURE — 96375 TX/PRO/DX INJ NEW DRUG ADDON: CPT

## 2020-02-10 PROCEDURE — 84443 ASSAY THYROID STIM HORMONE: CPT

## 2020-02-10 PROCEDURE — 99215 OFFICE O/P EST HI 40 MIN: CPT | Performed by: INTERNAL MEDICINE

## 2020-02-10 PROCEDURE — 96367 TX/PROPH/DG ADDL SEQ IV INF: CPT

## 2020-02-10 PROCEDURE — 96417 CHEMO IV INFUS EACH ADDL SEQ: CPT

## 2020-02-10 PROCEDURE — 82533 TOTAL CORTISOL: CPT

## 2020-02-10 PROCEDURE — 96413 CHEMO IV INFUSION 1 HR: CPT

## 2020-02-10 PROCEDURE — 99212 OFFICE O/P EST SF 10 MIN: CPT | Performed by: INTERNAL MEDICINE

## 2020-02-10 PROCEDURE — 85025 COMPLETE CBC W/AUTO DIFF WBC: CPT

## 2020-02-10 PROCEDURE — 80053 COMPREHEN METABOLIC PANEL: CPT

## 2020-02-10 PROCEDURE — 80061 LIPID PANEL: CPT

## 2020-02-10 PROCEDURE — 2500000003 HC RX 250 WO HCPCS: Performed by: INTERNAL MEDICINE

## 2020-02-10 PROCEDURE — 2580000003 HC RX 258: Performed by: INTERNAL MEDICINE

## 2020-02-10 PROCEDURE — 96415 CHEMO IV INFUSION ADDL HR: CPT

## 2020-02-10 PROCEDURE — 82150 ASSAY OF AMYLASE: CPT

## 2020-02-10 RX ORDER — DIPHENHYDRAMINE HYDROCHLORIDE 50 MG/ML
50 INJECTION INTRAMUSCULAR; INTRAVENOUS ONCE
Status: COMPLETED | OUTPATIENT
Start: 2020-02-10 | End: 2020-02-10

## 2020-02-10 RX ORDER — PALONOSETRON 0.05 MG/ML
0.25 INJECTION, SOLUTION INTRAVENOUS ONCE
Status: COMPLETED | OUTPATIENT
Start: 2020-02-10 | End: 2020-02-10

## 2020-02-10 RX ORDER — SODIUM CHLORIDE 0.9 % (FLUSH) 0.9 %
10 SYRINGE (ML) INJECTION PRN
Status: DISCONTINUED | OUTPATIENT
Start: 2020-02-10 | End: 2020-02-11 | Stop reason: HOSPADM

## 2020-02-10 RX ORDER — HEPARIN SODIUM (PORCINE) LOCK FLUSH IV SOLN 100 UNIT/ML 100 UNIT/ML
500 SOLUTION INTRAVENOUS PRN
Status: DISCONTINUED | OUTPATIENT
Start: 2020-02-10 | End: 2020-02-11 | Stop reason: HOSPADM

## 2020-02-10 RX ORDER — DEXAMETHASONE SODIUM PHOSPHATE 10 MG/ML
10 INJECTION, SOLUTION INTRAMUSCULAR; INTRAVENOUS ONCE
Status: COMPLETED | OUTPATIENT
Start: 2020-02-10 | End: 2020-02-10

## 2020-02-10 RX ORDER — SODIUM CHLORIDE 9 MG/ML
20 INJECTION, SOLUTION INTRAVENOUS ONCE
Status: COMPLETED | OUTPATIENT
Start: 2020-02-10 | End: 2020-02-10

## 2020-02-10 RX ADMIN — FOSAPREPITANT 150 MG: 150 INJECTION, POWDER, LYOPHILIZED, FOR SOLUTION INTRAVENOUS at 09:57

## 2020-02-10 RX ADMIN — DIPHENHYDRAMINE HYDROCHLORIDE 50 MG: 50 INJECTION, SOLUTION INTRAMUSCULAR; INTRAVENOUS at 09:39

## 2020-02-10 RX ADMIN — FAMOTIDINE 20 MG: 10 INJECTION, SOLUTION INTRAVENOUS at 09:32

## 2020-02-10 RX ADMIN — SODIUM CHLORIDE, PRESERVATIVE FREE 10 ML: 5 INJECTION INTRAVENOUS at 08:40

## 2020-02-10 RX ADMIN — SODIUM CHLORIDE, PRESERVATIVE FREE 10 ML: 5 INJECTION INTRAVENOUS at 15:03

## 2020-02-10 RX ADMIN — PALONOSETRON HYDROCHLORIDE 0.25 MG: 0.25 INJECTION, SOLUTION INTRAVENOUS at 09:29

## 2020-02-10 RX ADMIN — HEPARIN 500 UNITS: 100 SYRINGE at 15:03

## 2020-02-10 RX ADMIN — SODIUM CHLORIDE 20 ML/HR: 9 INJECTION, SOLUTION INTRAVENOUS at 08:40

## 2020-02-10 RX ADMIN — DEXAMETHASONE SODIUM PHOSPHATE 10 MG: 10 INJECTION, SOLUTION INTRAMUSCULAR; INTRAVENOUS at 09:35

## 2020-02-10 RX ADMIN — CARBOPLATIN 440 MG: 10 INJECTION INTRAVENOUS at 14:15

## 2020-02-10 RX ADMIN — PACLITAXEL 300 MG: 6 INJECTION, SOLUTION INTRAVENOUS at 11:08

## 2020-02-10 RX ADMIN — SODIUM CHLORIDE 200 MG: 9 INJECTION, SOLUTION INTRAVENOUS at 10:34

## 2020-02-10 NOTE — TELEPHONE ENCOUNTER
Name: Maia Jiménez  : 1960  MRN: [id number]    Oncology Navigation Follow-Up Note    Contact Type:  Telephone    Subjective:     Objective:     Assistance Needed:     Receptive to Advanced Care Planning / Palliative Care:      Referrals:     Education:     Notes: Navigator met with pt. Face to face and pt. Denies needing any assistance at this time. Pt. Arrives for labs and treatment today. Pt. To see MD today.     Electronically signed by Mateusz Reese RN on 2/10/2020 at 10:53 AM

## 2020-02-10 NOTE — PROGRESS NOTES
Pt arrives per ambulatory with  and labs and orders reviewed and pt seen per md and OK to proceed with treatment. Keytruda checklist completed prior and no changes to condition voiced per pt. NS flushing line before and between and after premeds and chemo. NO reactions or complaints and blood return present throughout infusions. Pt discharged per ambulatory with  and AVS from  with next appts.

## 2020-02-10 NOTE — PROGRESS NOTES
DIAGNOSIS:   1. Metastatic adenocarcinoma of the lung, stage IV  2. Malignant pleural effusion  3. Molecular testing negative for EGFR, ALK and ROS. Instead it shows Kras and CDK mutation,   4. History of breast cancer in 2010  CURRENT THERAPY:  1. Mastectomy and chemotherapy in 2010  2. Status post thoracocentesis x2  3. Pending study did not show any evidence of distant metastases other than the pleural effusion  4. Palliative chemotherapy with carboplatin, Alimta and Keytruda- started 01/20/2020  BRIEF CASE HISTORY:    The patient is a 63 y.o.  female who is admitted to the hospital for chief complaints of shortness of breath. Patient has history of breast cancer for which she underwent mastectomy in 2010 followed by chemotherapy. This was done in [place]. The  Patient recently moved to [place] area about a year ago. Patient had a significant history of tobacco dependence. Patient started noticing worsening of shortness of breath and presented to the ER. Work-up done shows right-sided pleural effusion. CT scan showed multiloculated left-sided pleural effusion with compressive atelectasis in the lingula and majority of the left lower lobe and partial compression atelectasis of the left upper lobe. There was also underlying emphysema. There was a stable 1.8 cm adrenal mass likely adrenal adenoma which had been stable since November 2018. Patient underwent ultrasound guided thoracentesis. Cytology of pleural fluid confirms adenocarcinoma consistent with lung primary  The patient was discharged home but she came back with worsening shortness of breath and was found to have significant pleural effusion that was tapped pathology showed malignant cells, adenocarcinoma of lung primary. .    We saw the patient in house, we arrange for staging PET CT scan and brain MRI which essentially showed no evidence of metastatic disease otherwise.   The patient was diagnosed with stage IV lung cancer, we plan palliative chemotherapy, started 01/20/2020. INTERIM HISTORY: The patient presents for follow up today for lung cancer and cycle #2. She reports the first 10 days following first treatment she felt tired with poor appetite but denies nausea, vomiting, diarrhea. Her hair is falling out. She has struggled with insomnia and reports Trazodone was not effective and has been drinking 5-6 beers prior to bed. Her drain remains in place, she had decrease in fluid and there was concern for blockage, imaging was negative. Her breathing is unchanged. She is working on smoking cessation and will be using patches she has script for. Past Medical History:   has a past medical history of Alcohol abuse, Breast [address] ([facility] [address] Chronic diarrhea, COPD (chronic obstructive pulmonary disease) ([facility] [address] Essential hypertension, Lung cancer ([facility] [address] and Tobacco abuse. Past Surgical History:   has a past surgical history that includes Appendectomy; Mastectomy (Right); and Cholecystectomy. Family History: family history includes Breast Cancer in her mother; Cancer in her brother; Deep Vein Thrombosis in her brother; Prostate Cancer in her brother and father; Stroke in her mother. Social History:   reports that she has been smoking. She has a 23.50 pack-year smoking history. She has never used smokeless tobacco. She reports current alcohol use of about 8.0 standard drinks of alcohol per week. She reports that she does not use drugs. Medications:    Reviewed in EPIC     Allergies:  Morphine; Robitussin (alcohol free) [guaifenesin]; Aspirin; Penicillins; and Sulfa antibiotics    REVIEW OF SYSTEMS:   General: No fever or night sweats. Weight is stable. +fatigue and poor appetite   ENT: No double or blurred vision, no tinnitus or hearing problem, no dysphagia or sore throat   Respiratory: No chest pain, no shortness of breath, no cough or hemoptysis. Cardiovascular: Denies chest pain, PND or orthopnea.  No L E swelling or lung cancer  3. Malignant pleural effusion stage IV disease  4. Chemotherapy with carboplatin, Alimta and Keytruda - started 01/20/2020      PLAN:   1. We reviewed and discussed her lab work, she is slightly dehydrated, her amylase is elevated but she has no symptoms and we will continue to monitor. 2. I completed toxicity check. 3. Her drainage has decreased via the pleur-ex cath and I am encouraged at possible response. 4. We will continue with treatment unchanged. 5. We had discussion regarding her alcohol intake (she drinks 5-6 beers / day and she is cutting down)  6. I asked her to try Xanax before bd and we will evaluate next appointment. 7. Plan for imaging prior to next visit. 8. I encouraged her to continue with efforts to cut down on smoking and alcohol and maintain nutrition. 9. Return in 3 weeks to review imaging and discuss further treatment plan.   If she is responding, plan to stop chemotherapy after 4 cycles and continue with maintenance immunotherapy

## 2020-02-13 ENCOUNTER — TELEPHONE (OUTPATIENT)
Dept: ONCOLOGY | Age: 60
End: 2020-02-13

## 2020-02-26 ENCOUNTER — HOSPITAL ENCOUNTER (OUTPATIENT)
Dept: INFUSION THERAPY | Facility: MEDICAL CENTER | Age: 60
Discharge: HOME | End: 2020-02-26

## 2020-02-26 ENCOUNTER — HOSPITAL ENCOUNTER (OUTPATIENT)
Dept: CT IMAGING | Age: 60
Discharge: HOME | End: 2020-02-28

## 2020-02-26 ENCOUNTER — TELEPHONE (OUTPATIENT)
Dept: INFUSION THERAPY | Facility: MEDICAL CENTER | Age: 60
End: 2020-02-26

## 2020-02-26 VITALS
DIASTOLIC BLOOD PRESSURE: 96 MMHG | SYSTOLIC BLOOD PRESSURE: 138 MMHG | TEMPERATURE: 98.4 F | HEART RATE: 103 BPM | RESPIRATION RATE: 18 BRPM

## 2020-02-26 PROCEDURE — 6360000004 HC RX CONTRAST MEDICATION: Performed by: INTERNAL MEDICINE

## 2020-02-26 PROCEDURE — 6360000002 HC RX W HCPCS: Performed by: INTERNAL MEDICINE

## 2020-02-26 PROCEDURE — 2580000003 HC RX 258: Performed by: INTERNAL MEDICINE

## 2020-02-26 PROCEDURE — 96523 IRRIG DRUG DELIVERY DEVICE: CPT

## 2020-02-26 PROCEDURE — 71260 CT THORAX DX C+: CPT

## 2020-02-26 RX ORDER — HEPARIN SODIUM (PORCINE) LOCK FLUSH IV SOLN 100 UNIT/ML 100 UNIT/ML
500 SOLUTION INTRAVENOUS PRN
Status: DISPENSED | OUTPATIENT
Start: 2020-02-26 | End: 2020-02-26

## 2020-02-26 RX ORDER — NEBULIZER ACCESSORIES
1 KIT MISCELLANEOUS 4 TIMES DAILY
Qty: 1 KIT | Refills: 2 | OUTPATIENT
Start: 2020-02-26 | End: 2020-02-26 | Stop reason: CLARIF

## 2020-02-26 RX ORDER — SODIUM CHLORIDE 0.9 % (FLUSH) 0.9 %
10 SYRINGE (ML) INJECTION PRN
Status: DISCONTINUED | OUTPATIENT
Start: 2020-02-26 | End: 2020-02-29 | Stop reason: HOSPADM

## 2020-02-26 RX ORDER — SODIUM CHLORIDE 0.9 % (FLUSH) 0.9 %
10 SYRINGE (ML) INJECTION PRN
Status: ACTIVE | OUTPATIENT
Start: 2020-02-26 | End: 2020-02-26

## 2020-02-26 RX ORDER — 0.9 % SODIUM CHLORIDE 0.9 %
80 INTRAVENOUS SOLUTION INTRAVENOUS ONCE
Status: COMPLETED | OUTPATIENT
Start: 2020-02-26 | End: 2020-02-26

## 2020-02-26 RX ADMIN — IOPAMIDOL 75 ML: 755 INJECTION, SOLUTION INTRAVENOUS at 11:48

## 2020-02-26 RX ADMIN — SODIUM CHLORIDE, PRESERVATIVE FREE 10 ML: 5 INJECTION INTRAVENOUS at 11:53

## 2020-02-26 RX ADMIN — SODIUM CHLORIDE, PRESERVATIVE FREE 10 ML: 5 INJECTION INTRAVENOUS at 11:48

## 2020-02-26 RX ADMIN — HEPARIN 500 UNITS: 100 SYRINGE at 11:53

## 2020-02-26 RX ADMIN — SODIUM CHLORIDE 80 ML: 0.9 INJECTION, SOLUTION INTRAVENOUS at 11:48

## 2020-02-26 NOTE — TELEPHONE ENCOUNTER
Name: Emi Julian  : 1960  MRN: [id number]    Oncology Navigation Follow-Up Note    Contact Type:  Telephone    Subjective:     Objective:     Assistance Needed:     Receptive to Advanced Care Planning / Palliative Care:      Referrals:    Education:     Notes: navigator received message from pts. Spouse and pt. Needing respiratory supplies, pt. Already has nubulizer, but needing tubing and mouth piece. Pt. Also needing collection bag for chest drain? Pt. Given extra supplies when getting most recent CT scan. Navigator not sure if bags are covered by insurance. Writer attempting to place order for nebulizer supplies, but unable to find order in Epic. Message left for Triage to assist pt. In obtaining script for supplies along with DME?     Electronically signed by Bambi Lomeli RN on 2020 at 12:54 PM

## 2020-02-26 NOTE — PROGRESS NOTES
Brian Giraldo arrives ambulatory alone  Order for port access for ct scan  Lt chest port accessed per policy with #28 G 3/4 L Gripper plus hernández needle   Good blood return noted   Occlusive dressing applied  To ct scan per ambulatory    Returned from ct scan  Port flushed per protocol and hernández needle removed  Band aid applied  Discharged per ambulatory

## 2020-02-27 ENCOUNTER — HOSPITAL ENCOUNTER (OUTPATIENT)
Facility: MEDICAL CENTER | Age: 60
End: 2020-02-27

## 2020-02-27 ENCOUNTER — TELEPHONE (OUTPATIENT)
Dept: INFUSION THERAPY | Facility: MEDICAL CENTER | Age: 60
End: 2020-02-27

## 2020-02-27 NOTE — TELEPHONE ENCOUNTER
Patient's  called. He stated that she needed new nebulizer mouthpiece and tubing. They also needed drain dressings. The patient had a permanent drain inserted in her lung for thoracentesis. I spoke to Russel Aleman several times and they will be self pay. I spoke to Gregor Vallejo at [facility] [phone]. I then faxed the orders over to her at [phone]. I called Russel Aleman back to give him directions on where to pick the supplies up. He verbalized understanding.

## 2020-03-02 ENCOUNTER — OFFICE VISIT (OUTPATIENT)
Dept: ONCOLOGY | Age: 60
End: 2020-03-02

## 2020-03-02 ENCOUNTER — HOSPITAL ENCOUNTER (OUTPATIENT)
Dept: INFUSION THERAPY | Facility: MEDICAL CENTER | Age: 60
Discharge: HOME | End: 2020-03-02

## 2020-03-02 ENCOUNTER — TELEPHONE (OUTPATIENT)
Dept: ONCOLOGY | Age: 60
End: 2020-03-02

## 2020-03-02 ENCOUNTER — TELEPHONE (OUTPATIENT)
Dept: CASE MANAGEMENT | Age: 60
End: 2020-03-02

## 2020-03-02 VITALS
SYSTOLIC BLOOD PRESSURE: 137 MMHG | WEIGHT: 117.06 LBS | TEMPERATURE: 98 F | BODY MASS INDEX: 21.41 KG/M2 | HEART RATE: 110 BPM | RESPIRATION RATE: 18 BRPM | DIASTOLIC BLOOD PRESSURE: 84 MMHG

## 2020-03-02 VITALS
WEIGHT: 117 LBS | DIASTOLIC BLOOD PRESSURE: 84 MMHG | HEART RATE: 110 BPM | SYSTOLIC BLOOD PRESSURE: 137 MMHG | BODY MASS INDEX: 21.39 KG/M2 | RESPIRATION RATE: 18 BRPM | TEMPERATURE: 98 F

## 2020-03-02 DIAGNOSIS — J91.0 PLEURAL EFFUSION, MALIGNANT: ICD-10-CM

## 2020-03-02 DIAGNOSIS — C34.92 ADENOCARCINOMA, LUNG, LEFT (HCC): Primary | ICD-10-CM

## 2020-03-02 LAB
ABSOLUTE EOS #: 0.08 K/UL (ref 0–0.44)
ABSOLUTE IMMATURE GRANULOCYTE: 0.03 K/UL (ref 0–0.3)
ABSOLUTE LYMPH #: 1.7 K/UL (ref 1.1–3.7)
ABSOLUTE MONO #: 0.91 K/UL (ref 0.1–1.2)
ALBUMIN SERPL-MCNC: 4.1 G/DL (ref 3.5–5.2)
ALBUMIN/GLOBULIN RATIO: ABNORMAL (ref 1–2.5)
ALP BLD-CCNC: 68 U/L (ref 35–104)
ALT SERPL-CCNC: 15 U/L (ref 5–33)
ANION GAP SERPL CALCULATED.3IONS-SCNC: 13 MMOL/L (ref 9–17)
AST SERPL-CCNC: 30 U/L
BASOPHILS # BLD: 1 % (ref 0–2)
BASOPHILS ABSOLUTE: 0.04 K/UL (ref 0–0.2)
BILIRUB SERPL-MCNC: 0.41 MG/DL (ref 0.3–1.2)
BUN BLDV-MCNC: 5 MG/DL (ref 6–20)
BUN/CREAT BLD: ABNORMAL (ref 9–20)
CALCIUM SERPL-MCNC: 9.8 MG/DL (ref 8.6–10.4)
CHLORIDE BLD-SCNC: 96 MMOL/L (ref 98–107)
CO2: 25 MMOL/L (ref 20–31)
CREAT SERPL-MCNC: <0.4 MG/DL (ref 0.5–0.9)
DIFFERENTIAL TYPE: ABNORMAL
EOSINOPHILS RELATIVE PERCENT: 1 % (ref 1–4)
GFR AFRICAN AMERICAN: ABNORMAL ML/MIN
GFR NON-AFRICAN AMERICAN: ABNORMAL ML/MIN
GFR SERPL CREATININE-BSD FRML MDRD: ABNORMAL ML/MIN/{1.73_M2}
GFR SERPL CREATININE-BSD FRML MDRD: ABNORMAL ML/MIN/{1.73_M2}
GLUCOSE BLD-MCNC: 161 MG/DL (ref 70–99)
HCT VFR BLD CALC: 35.2 % (ref 36.3–47.1)
HEMOGLOBIN: 11.9 G/DL (ref 11.9–15.1)
IMMATURE GRANULOCYTES: 0 %
LYMPHOCYTES # BLD: 19 % (ref 24–43)
MCH RBC QN AUTO: 32.2 PG (ref 25.2–33.5)
MCHC RBC AUTO-ENTMCNC: 33.8 G/DL (ref 28.4–34.8)
MCV RBC AUTO: 95.4 FL (ref 82.6–102.9)
MONOCYTES # BLD: 10 % (ref 3–12)
NRBC AUTOMATED: 0 PER 100 WBC
PDW BLD-RTO: 14.6 % (ref 11.8–14.4)
PLATELET # BLD: 231 K/UL (ref 138–453)
PLATELET ESTIMATE: ABNORMAL
PMV BLD AUTO: 8.1 FL (ref 8.1–13.5)
POTASSIUM SERPL-SCNC: 4.1 MMOL/L (ref 3.7–5.3)
RBC # BLD: 3.69 M/UL (ref 3.95–5.11)
RBC # BLD: ABNORMAL 10*6/UL
SEG NEUTROPHILS: 69 % (ref 36–65)
SEGMENTED NEUTROPHILS ABSOLUTE COUNT: 5.99 K/UL (ref 1.5–8.1)
SODIUM BLD-SCNC: 134 MMOL/L (ref 135–144)
TOTAL PROTEIN: 7.7 G/DL (ref 6.4–8.3)
WBC # BLD: 8.8 K/UL (ref 3.5–11.3)
WBC # BLD: ABNORMAL 10*3/UL

## 2020-03-02 PROCEDURE — 85025 COMPLETE CBC W/AUTO DIFF WBC: CPT

## 2020-03-02 PROCEDURE — 2580000003 HC RX 258: Performed by: INTERNAL MEDICINE

## 2020-03-02 PROCEDURE — 80053 COMPREHEN METABOLIC PANEL: CPT

## 2020-03-02 PROCEDURE — 99215 OFFICE O/P EST HI 40 MIN: CPT | Performed by: INTERNAL MEDICINE

## 2020-03-02 PROCEDURE — 96375 TX/PRO/DX INJ NEW DRUG ADDON: CPT

## 2020-03-02 PROCEDURE — 96374 THER/PROPH/DIAG INJ IV PUSH: CPT

## 2020-03-02 PROCEDURE — 2500000003 HC RX 250 WO HCPCS: Performed by: INTERNAL MEDICINE

## 2020-03-02 PROCEDURE — 99212 OFFICE O/P EST SF 10 MIN: CPT

## 2020-03-02 PROCEDURE — 96367 TX/PROPH/DG ADDL SEQ IV INF: CPT

## 2020-03-02 PROCEDURE — 96413 CHEMO IV INFUSION 1 HR: CPT

## 2020-03-02 PROCEDURE — 96417 CHEMO IV INFUS EACH ADDL SEQ: CPT

## 2020-03-02 PROCEDURE — 6360000002 HC RX W HCPCS: Performed by: INTERNAL MEDICINE

## 2020-03-02 PROCEDURE — 96415 CHEMO IV INFUSION ADDL HR: CPT

## 2020-03-02 PROCEDURE — 36591 DRAW BLOOD OFF VENOUS DEVICE: CPT

## 2020-03-02 RX ORDER — HEPARIN SODIUM (PORCINE) LOCK FLUSH IV SOLN 100 UNIT/ML 100 UNIT/ML
500 SOLUTION INTRAVENOUS PRN
Status: DISCONTINUED | OUTPATIENT
Start: 2020-03-02 | End: 2020-03-03 | Stop reason: HOSPADM

## 2020-03-02 RX ORDER — SODIUM CHLORIDE 9 MG/ML
20 INJECTION, SOLUTION INTRAVENOUS ONCE
Status: COMPLETED | OUTPATIENT
Start: 2020-03-02 | End: 2020-03-02

## 2020-03-02 RX ORDER — DEXAMETHASONE SODIUM PHOSPHATE 10 MG/ML
10 INJECTION, SOLUTION INTRAMUSCULAR; INTRAVENOUS ONCE
Status: COMPLETED | OUTPATIENT
Start: 2020-03-02 | End: 2020-03-02

## 2020-03-02 RX ORDER — SODIUM CHLORIDE 9 MG/ML
10 INJECTION INTRAVENOUS 2 TIMES DAILY
Status: DISCONTINUED | OUTPATIENT
Start: 2020-03-02 | End: 2020-03-03 | Stop reason: HOSPADM

## 2020-03-02 RX ORDER — PALONOSETRON 0.05 MG/ML
0.25 INJECTION, SOLUTION INTRAVENOUS ONCE
Status: COMPLETED | OUTPATIENT
Start: 2020-03-02 | End: 2020-03-02

## 2020-03-02 RX ORDER — DIPHENHYDRAMINE HYDROCHLORIDE 50 MG/ML
50 INJECTION INTRAMUSCULAR; INTRAVENOUS ONCE
Status: COMPLETED | OUTPATIENT
Start: 2020-03-02 | End: 2020-03-02

## 2020-03-02 RX ADMIN — HEPARIN 500 UNITS: 100 SYRINGE at 15:20

## 2020-03-02 RX ADMIN — SODIUM CHLORIDE 200 MG: 9 INJECTION, SOLUTION INTRAVENOUS at 10:47

## 2020-03-02 RX ADMIN — DIPHENHYDRAMINE HYDROCHLORIDE 50 MG: 50 INJECTION, SOLUTION INTRAMUSCULAR; INTRAVENOUS at 09:53

## 2020-03-02 RX ADMIN — FOSAPREPITANT 150 MG: 150 INJECTION, POWDER, LYOPHILIZED, FOR SOLUTION INTRAVENOUS at 09:58

## 2020-03-02 RX ADMIN — PACLITAXEL 300 MG: 6 INJECTION, SOLUTION INTRAVENOUS at 11:22

## 2020-03-02 RX ADMIN — CARBOPLATIN 440 MG: 10 INJECTION INTRAVENOUS at 14:33

## 2020-03-02 RX ADMIN — FAMOTIDINE 20 MG: 10 INJECTION, SOLUTION INTRAVENOUS at 09:49

## 2020-03-02 RX ADMIN — DEXAMETHASONE SODIUM PHOSPHATE 10 MG: 10 INJECTION, SOLUTION INTRAMUSCULAR; INTRAVENOUS at 09:53

## 2020-03-02 RX ADMIN — PALONOSETRON HYDROCHLORIDE 0.25 MG: 0.25 INJECTION, SOLUTION INTRAVENOUS at 09:48

## 2020-03-02 RX ADMIN — SODIUM CHLORIDE, PRESERVATIVE FREE 10 ML: 5 INJECTION INTRAVENOUS at 15:20

## 2020-03-02 RX ADMIN — SODIUM CHLORIDE 20 ML/HR: 9 INJECTION, SOLUTION INTRAVENOUS at 09:51

## 2020-03-02 ASSESSMENT — PAIN SCALES - GENERAL: PAINLEVEL_OUTOF10: 0

## 2020-03-02 NOTE — PATIENT INSTRUCTIONS
Continue chemo per orders, rv in 3 weeks with chemo and labs  IR to remove pleurx cath today if possible  ( or schedule)   Refer to PT

## 2020-03-02 NOTE — PROGRESS NOTES
plan palliative chemotherapy, started 01/20/2020. INTERIM HISTORY: The patient presents for follow up today for lung cancer and cycle #3 of chem o immunotherapy. She is handling chemotherapy fairly well but she is having more weakness in both lower extremities. I think a lot of that is deconditioning. She also complains of some tingling in the hands especially at the tips of the fingers. It is not enough to affect her quality of life. She is fairly active. She is draining very little fluid from her Pleurx catheter. CT scan showed great response with decrease in the tumor size and also improvement in the pleural effusion. Performance status is ECOG 1. Is having no nausea or vomiting, no diarrhea, no skin rash. Shortness of breath is at baseline. She continues to cut down on her drinking or smoking. Past Medical History:   has a past medical history of Alcohol abuse, Breast cancer ([address] Chronic diarrhea, COPD (chronic obstructive pulmonary disease) ([facility] [address] Essential hypertension, Lung cancer ([facility] [address] and Tobacco abuse. Past Surgical History:   has a past surgical history that includes Appendectomy; Mastectomy (Right); and Cholecystectomy. Family History: family history includes Breast Cancer in her mother; Cancer in her brother; Deep Vein Thrombosis in her brother; Prostate Cancer in her brother and father; Stroke in her mother. Social History:   reports that she has been smoking. She has a 23.50 pack-year smoking history. She has never used smokeless tobacco. She reports current alcohol use of about 8.0 standard drinks of alcohol per week. She reports that she does not use drugs. Medications:    Reviewed in EPIC     Allergies:  Morphine; Robitussin (alcohol free) [guaifenesin]; Aspirin; Penicillins; and Sulfa antibiotics    REVIEW OF SYSTEMS:   General: No fever or night sweats. Weight is stable.  +fatigue and poor appetite   ENT: No double or blurred vision, no tinnitus or hearing problem, no 03/02/2020 0839    BILITOT 0.41 03/02/2020 0839      CT scan 2/26/20   Impression   1. Decrease in size of a now small left pleural effusion following tunneled   pleural drainage catheter placement.  No evidence of complication. 2. Stable 6 mm posterior left upper lobe pulmonary nodule. 3. Decrease in size of a now borderline enlarged precarinal lymph node. 4. Stable findings dating back to 2018 of a large inferior atrial septal   defect, with masslike fatty thickening of the more superior interatrial   septum, which is most likely secondary to lipomatosis hypertrophy of the   interatrial septum versus an atrial myxoma. IMPRESSION:    1. History of breast cancer in 2010, status post mastectomy and chemotherapy  2. New diagnosis with lung cancer  3. Malignant pleural effusion stage IV disease  4. Chemotherapy with carboplatin, Alimta and Keytruda - started 01/20/2020  5. Weakness in both lower extremities      PLAN:   1. I had a long discussion with the patient, she is handling treatment well and CT scan is showing excellent response. 2. We will have IR remove the pleurx cath   3. Continue current chemo per orders. 4. rv in 3 weeks with chemo and labs  5. We will arrange for PT evaluation for lower extremity weakness.

## 2020-03-02 NOTE — PROGRESS NOTES
Pt ambulated to infusion area for scheduled chemotherapy. Pt denies nausea, vomiting or adverse effect of chemotherapy treatments. No pain. Pt admits to good appetite. VS stable. Port accessed with [place] needle, labs obtained and reviewed. Dr. Celis Factor in to evaluated.  ordered chemotherapy. Pt premedicated, followed by Cayla Cook, Taxol and Carboplatin. Pt tolerated without complaint. Port flushed with normal saline and Heparin, Santiago removed, site unremarkable. Site covered with gauze. Pt scheduled for return on 03/03/20 for removal of drain.

## 2020-03-03 ENCOUNTER — HOSPITAL ENCOUNTER (OUTPATIENT)
Dept: INTERVENTIONAL RADIOLOGY/VASCULAR | Age: 60
Discharge: HOME | End: 2020-03-05

## 2020-03-03 PROCEDURE — 32552 REMOVE LUNG CATHETER: CPT | Performed by: RADIOLOGY

## 2020-03-03 PROCEDURE — 2709999900 IR REMOVE TUNNELED INTRAPERITONEAL CATH

## 2020-03-09 ENCOUNTER — HOSPITAL ENCOUNTER (OUTPATIENT)
Dept: PHYSICAL THERAPY | Age: 60
Setting detail: THERAPIES SERIES
Discharge: HOME | End: 2020-03-09

## 2020-03-09 RX ORDER — HYDROCODONE BITARTRATE AND ACETAMINOPHEN 5; 325 MG/1; MG/1
1 TABLET ORAL EVERY 6 HOURS PRN
Qty: 30 TABLET | Refills: 0 | Status: SHIPPED | OUTPATIENT
Start: 2020-03-09 | End: 2020-05-04 | Stop reason: SDUPTHER

## 2020-03-09 NOTE — TELEPHONE ENCOUNTER
Received phone message from [facility] explaining Salty Lamb is having \"excruciating pain to her knees and joints, she had an old script of hydrocodone in medicine cabinet and it gave her relief however is now out. Could a script be sent to her pharmacy? \"    Reviewed with Dr. Lydia Zaragoza. She had received #20 tabs of hydrocodone in Jan 2020. Ok to [facility].

## 2020-03-17 ENCOUNTER — TELEPHONE (OUTPATIENT)
Dept: ONCOLOGY | Age: 60
End: 2020-03-17

## 2020-03-19 ENCOUNTER — HOSPITAL ENCOUNTER (OUTPATIENT)
Facility: MEDICAL CENTER | Age: 60
End: 2020-03-19

## 2020-03-23 ENCOUNTER — OFFICE VISIT (OUTPATIENT)
Dept: ONCOLOGY | Age: 60
End: 2020-03-23

## 2020-03-23 ENCOUNTER — TELEPHONE (OUTPATIENT)
Dept: ONCOLOGY | Age: 60
End: 2020-03-23

## 2020-03-23 ENCOUNTER — TELEPHONE (OUTPATIENT)
Dept: CASE MANAGEMENT | Age: 60
End: 2020-03-23

## 2020-03-23 ENCOUNTER — HOSPITAL ENCOUNTER (OUTPATIENT)
Dept: INFUSION THERAPY | Facility: MEDICAL CENTER | Age: 60
Discharge: HOME | End: 2020-03-23

## 2020-03-23 VITALS
SYSTOLIC BLOOD PRESSURE: 146 MMHG | TEMPERATURE: 98.1 F | HEART RATE: 95 BPM | BODY MASS INDEX: 22.33 KG/M2 | WEIGHT: 122.1 LBS | DIASTOLIC BLOOD PRESSURE: 96 MMHG

## 2020-03-23 VITALS
BODY MASS INDEX: 22.33 KG/M2 | HEART RATE: 95 BPM | TEMPERATURE: 98.1 F | SYSTOLIC BLOOD PRESSURE: 146 MMHG | DIASTOLIC BLOOD PRESSURE: 86 MMHG | WEIGHT: 122.1 LBS | RESPIRATION RATE: 18 BRPM

## 2020-03-23 DIAGNOSIS — C34.92 ADENOCARCINOMA, LUNG, LEFT (HCC): Primary | ICD-10-CM

## 2020-03-23 DIAGNOSIS — J91.0 PLEURAL EFFUSION, MALIGNANT: ICD-10-CM

## 2020-03-23 LAB
ABSOLUTE EOS #: 0.11 K/UL (ref 0–0.44)
ABSOLUTE IMMATURE GRANULOCYTE: 0.04 K/UL (ref 0–0.3)
ABSOLUTE LYMPH #: 2.24 K/UL (ref 1.1–3.7)
ABSOLUTE MONO #: 0.8 K/UL (ref 0.1–1.2)
ALBUMIN SERPL-MCNC: 4.4 G/DL (ref 3.5–5.2)
ALBUMIN/GLOBULIN RATIO: ABNORMAL (ref 1–2.5)
ALP BLD-CCNC: 68 U/L (ref 35–104)
ALT SERPL-CCNC: 14 U/L (ref 5–33)
ANION GAP SERPL CALCULATED.3IONS-SCNC: 14 MMOL/L (ref 9–17)
AST SERPL-CCNC: 23 U/L
BASOPHILS # BLD: 1 % (ref 0–2)
BASOPHILS ABSOLUTE: 0.06 K/UL (ref 0–0.2)
BILIRUB SERPL-MCNC: 0.14 MG/DL (ref 0.3–1.2)
BUN BLDV-MCNC: 8 MG/DL (ref 6–20)
BUN/CREAT BLD: ABNORMAL (ref 9–20)
CALCIUM SERPL-MCNC: 10.1 MG/DL (ref 8.6–10.4)
CHLORIDE BLD-SCNC: 93 MMOL/L (ref 98–107)
CO2: 26 MMOL/L (ref 20–31)
CREAT SERPL-MCNC: <0.4 MG/DL (ref 0.5–0.9)
DIFFERENTIAL TYPE: ABNORMAL
EOSINOPHILS RELATIVE PERCENT: 1 % (ref 1–4)
GFR AFRICAN AMERICAN: ABNORMAL ML/MIN
GFR NON-AFRICAN AMERICAN: ABNORMAL ML/MIN
GFR SERPL CREATININE-BSD FRML MDRD: ABNORMAL ML/MIN/{1.73_M2}
GFR SERPL CREATININE-BSD FRML MDRD: ABNORMAL ML/MIN/{1.73_M2}
GLUCOSE BLD-MCNC: 104 MG/DL (ref 70–99)
HCT VFR BLD CALC: 37.2 % (ref 36.3–47.1)
HEMOGLOBIN: 12.6 G/DL (ref 11.9–15.1)
IMMATURE GRANULOCYTES: 1 %
LYMPHOCYTES # BLD: 27 % (ref 24–43)
MCH RBC QN AUTO: 32.1 PG (ref 25.2–33.5)
MCHC RBC AUTO-ENTMCNC: 33.9 G/DL (ref 28.4–34.8)
MCV RBC AUTO: 94.7 FL (ref 82.6–102.9)
MONOCYTES # BLD: 10 % (ref 3–12)
NRBC AUTOMATED: 0 PER 100 WBC
PDW BLD-RTO: 15.1 % (ref 11.8–14.4)
PLATELET # BLD: 290 K/UL (ref 138–453)
PLATELET ESTIMATE: ABNORMAL
PMV BLD AUTO: 8.1 FL (ref 8.1–13.5)
POTASSIUM SERPL-SCNC: 4.2 MMOL/L (ref 3.7–5.3)
RBC # BLD: 3.93 M/UL (ref 3.95–5.11)
RBC # BLD: ABNORMAL 10*6/UL
SEG NEUTROPHILS: 60 % (ref 36–65)
SEGMENTED NEUTROPHILS ABSOLUTE COUNT: 5.02 K/UL (ref 1.5–8.1)
SODIUM BLD-SCNC: 133 MMOL/L (ref 135–144)
TOTAL PROTEIN: 7.9 G/DL (ref 6.4–8.3)
WBC # BLD: 8.3 K/UL (ref 3.5–11.3)
WBC # BLD: ABNORMAL 10*3/UL

## 2020-03-23 PROCEDURE — 2580000003 HC RX 258: Performed by: INTERNAL MEDICINE

## 2020-03-23 PROCEDURE — 6360000002 HC RX W HCPCS: Performed by: INTERNAL MEDICINE

## 2020-03-23 PROCEDURE — 80053 COMPREHEN METABOLIC PANEL: CPT

## 2020-03-23 PROCEDURE — 96375 TX/PRO/DX INJ NEW DRUG ADDON: CPT

## 2020-03-23 PROCEDURE — 96415 CHEMO IV INFUSION ADDL HR: CPT

## 2020-03-23 PROCEDURE — 99214 OFFICE O/P EST MOD 30 MIN: CPT | Performed by: INTERNAL MEDICINE

## 2020-03-23 PROCEDURE — 96365 THER/PROPH/DIAG IV INF INIT: CPT

## 2020-03-23 PROCEDURE — 96367 TX/PROPH/DG ADDL SEQ IV INF: CPT

## 2020-03-23 PROCEDURE — 2500000003 HC RX 250 WO HCPCS: Performed by: INTERNAL MEDICINE

## 2020-03-23 PROCEDURE — 99211 OFF/OP EST MAY X REQ PHY/QHP: CPT | Performed by: INTERNAL MEDICINE

## 2020-03-23 PROCEDURE — 96413 CHEMO IV INFUSION 1 HR: CPT

## 2020-03-23 PROCEDURE — 85025 COMPLETE CBC W/AUTO DIFF WBC: CPT

## 2020-03-23 PROCEDURE — 36591 DRAW BLOOD OFF VENOUS DEVICE: CPT

## 2020-03-23 PROCEDURE — 96417 CHEMO IV INFUS EACH ADDL SEQ: CPT

## 2020-03-23 RX ORDER — DEXAMETHASONE SODIUM PHOSPHATE 10 MG/ML
10 INJECTION, SOLUTION INTRAMUSCULAR; INTRAVENOUS ONCE
Status: COMPLETED | OUTPATIENT
Start: 2020-03-23 | End: 2020-03-23

## 2020-03-23 RX ORDER — SODIUM CHLORIDE 0.9 % (FLUSH) 0.9 %
10 SYRINGE (ML) INJECTION PRN
Status: DISCONTINUED | OUTPATIENT
Start: 2020-03-23 | End: 2020-03-24 | Stop reason: HOSPADM

## 2020-03-23 RX ORDER — DIPHENHYDRAMINE HYDROCHLORIDE 50 MG/ML
50 INJECTION INTRAMUSCULAR; INTRAVENOUS ONCE
Status: COMPLETED | OUTPATIENT
Start: 2020-03-23 | End: 2020-03-23

## 2020-03-23 RX ORDER — HEPARIN SODIUM (PORCINE) LOCK FLUSH IV SOLN 100 UNIT/ML 100 UNIT/ML
500 SOLUTION INTRAVENOUS PRN
Status: DISCONTINUED | OUTPATIENT
Start: 2020-03-23 | End: 2020-03-24 | Stop reason: HOSPADM

## 2020-03-23 RX ORDER — PALONOSETRON 0.05 MG/ML
0.25 INJECTION, SOLUTION INTRAVENOUS ONCE
Status: COMPLETED | OUTPATIENT
Start: 2020-03-23 | End: 2020-03-23

## 2020-03-23 RX ORDER — SODIUM CHLORIDE 9 MG/ML
20 INJECTION, SOLUTION INTRAVENOUS ONCE
Status: COMPLETED | OUTPATIENT
Start: 2020-03-23 | End: 2020-03-23

## 2020-03-23 RX ADMIN — SODIUM CHLORIDE, PRESERVATIVE FREE 10 ML: 5 INJECTION INTRAVENOUS at 09:32

## 2020-03-23 RX ADMIN — SODIUM CHLORIDE, PRESERVATIVE FREE 10 ML: 5 INJECTION INTRAVENOUS at 15:34

## 2020-03-23 RX ADMIN — FOSAPREPITANT 150 MG: 150 INJECTION, POWDER, LYOPHILIZED, FOR SOLUTION INTRAVENOUS at 09:32

## 2020-03-23 RX ADMIN — SODIUM CHLORIDE 20 ML/HR: 9 INJECTION, SOLUTION INTRAVENOUS at 09:32

## 2020-03-23 RX ADMIN — SODIUM CHLORIDE 200 MG: 9 INJECTION, SOLUTION INTRAVENOUS at 10:35

## 2020-03-23 RX ADMIN — DIPHENHYDRAMINE HYDROCHLORIDE 50 MG: 50 INJECTION, SOLUTION INTRAMUSCULAR; INTRAVENOUS at 09:32

## 2020-03-23 RX ADMIN — SODIUM CHLORIDE, PRESERVATIVE FREE 10 ML: 5 INJECTION INTRAVENOUS at 08:22

## 2020-03-23 RX ADMIN — FAMOTIDINE 20 MG: 10 INJECTION, SOLUTION INTRAVENOUS at 09:32

## 2020-03-23 RX ADMIN — DEXAMETHASONE SODIUM PHOSPHATE 10 MG: 10 INJECTION, SOLUTION INTRAMUSCULAR; INTRAVENOUS at 09:32

## 2020-03-23 RX ADMIN — CARBOPLATIN 440 MG: 10 INJECTION INTRAVENOUS at 14:36

## 2020-03-23 RX ADMIN — PACLITAXEL 300 MG: 6 INJECTION, SOLUTION INTRAVENOUS at 11:17

## 2020-03-23 RX ADMIN — PALONOSETRON HYDROCHLORIDE 0.25 MG: 0.25 INJECTION, SOLUTION INTRAVENOUS at 09:32

## 2020-03-23 RX ADMIN — HEPARIN 500 UNITS: 100 SYRINGE at 15:34

## 2020-03-23 NOTE — PROGRESS NOTES
DIAGNOSIS:   1. Metastatic adenocarcinoma of the lung, stage IV  2. Malignant pleural effusion  3. Molecular testing negative for EGFR, ALK and ROS. Instead it shows Kras and CDK mutation,   4. History of breast cancer in 2010  CURRENT THERAPY:  1. Mastectomy and chemotherapy in 2010  2. Status post thoracocentesis x2  3. Pending study did not show any evidence of distant metastases other than the pleural effusion  4. Palliative chemotherapy with carboplatin, Alimta and Keytruda- started 01/20/2020  BRIEF CASE HISTORY:    The patient is a 63 y.o.  female who is admitted to the hospital for chief complaints of shortness of breath. Patient has history of breast cancer for which she underwent mastectomy in 2010 followed by chemotherapy. This was done in [place]. The  Patient recently moved to [facility] about a year ago. Patient had a significant history of tobacco dependence. Patient started noticing worsening of shortness of breath and presented to the ER. Work-up done shows right-sided pleural effusion. CT scan showed multiloculated left-sided pleural effusion with compressive atelectasis in the lingula and majority of the left lower lobe and partial compression atelectasis of the left upper lobe. There was also underlying emphysema. There was a stable 1.8 cm adrenal mass likely adrenal adenoma which had been stable since November 2018. Patient underwent ultrasound guided thoracentesis. Cytology of pleural fluid confirms adenocarcinoma consistent with lung primary  The patient was discharged home but she came back with worsening shortness of breath and was found to have significant pleural effusion that was tapped pathology showed malignant cells, adenocarcinoma of lung primary. .    We saw the patient in house, we arrange for staging PET CT scan and brain MRI which essentially showed no evidence of metastatic disease otherwise.   The patient was diagnosed with stage IV lung cancer, we nausea or vomiting, abdominal pain, diarrhea or constipation. Genitourinary: Denies dysuria, hematuria, frequency, urgency or incontinence. Neurological: Denies headaches, decreased LOC, no sensory or motor focal deficits. Musculoskeletal: No arthralgia no back pain or joint swelling. Skin: There are no rashes or bleeding. Psychiatric: No anxiety, no depression. Endocrine: No diabetes or thyroid disease. Hematologic: No bleeding, no adenopathy. PHYSICAL EXAM:      BP (!) 146/96   Pulse 95   Temp 98.1 °F (36.7 °C) (Oral)   Wt 122 lb 1.6 oz (55.4 kg)   BMI 22.33 kg/m²    Temp (24hrs), Av.9 °F (36.6 °C), Min:97.9 °F (36.6 °C), Max:97.9 °F (36.6 °C)  Gen. Exam, showed a well-appearing patient without evidence of distress or pain  HEENT, normocephalic and atraumatic, PERRLA extraocular muscles are intact  Neck showed no JVD no carotid bruit, no cervical adenopathy  Chest is clear to auscultation bilaterally, wheezing appreciated  Heart is regular without any murmur  Abdomen soft nontender no hepatosplenomegaly  Lower extremities showed no edema clubbing or cyanosis  Neurological examination was nonfocal, with intact cranial nerves  Skin  No rashes or bruising appreciated      REVIEW OF LABORATORY DATA:     Lab Results   Component Value Date    WBC 8.3 2020    HGB 12.6 2020    HCT 37.2 2020    MCV 94.7 2020     2020       Chemistry        Component Value Date/Time     (L) 2020 0822    K 4.2 2020 0822    CL 93 (L) 2020 0822    CO2 26 2020 0822    BUN 8 2020 0822    CREATININE <0.40 (L) 2020 0822        Component Value Date/Time    CALCIUM 10.1 2020 0822    ALKPHOS 68 2020 0822    AST 23 2020 0822    ALT 14 2020 0822    BILITOT 0.14 (L) 2020 0822            IMPRESSION:    1. History of breast cancer in , status post mastectomy and chemotherapy  2.  New diagnosis with lung

## 2020-03-23 NOTE — TELEPHONE ENCOUNTER
Darcie Lee MD VISIT & TX  DR MOODY IN TO SEE PATIENT  ORDERS RECEIVED  RV 3 WEEKS W/DR Crow Moreland MD VISIT 04/13/20 @8:45AM  [email or web address]  AVS PRINTED AND GIVEN TO PATIENT WITH INSTRUCTIONS  PATIENT REMAINS IN [address] [place] [address]

## 2020-03-23 NOTE — PROGRESS NOTES
Patient arrived ambulatory with spouse for day 1 cycle 4 treatment and physician visit. Denies concerns or complaints. Port accessed;specimen sent. Labs reviewed. Physician met with patient. Okay to proceed with treatment. Patient premedicated. Keytruda infused with no sign of adverse reaction;line flushed. Taxol infusion started slowly with no sign adverse reaction. Infusion increased to run over 3 hours. Taxol infused over 3 hours with no sign adverse reaction;line flushed. Carboplantin infused with no sign adverse reaction;line flushed. Port flushed and heparinized with intact hernández needle removed per protocol. Patient ambulated off unit with spouse at discharge.

## 2020-03-23 NOTE — TELEPHONE ENCOUNTER
Name: Noman Pack  : 1960  MRN: [id number]    Oncology Navigation Follow-Up Note    Contact Type:  Telephone    Subjective:     Objective:     Assistance Needed:     Receptive to Advanced Care Planning / Palliative Care:      Referrals:     Education:     Notes: navigator met with pt. Face to face outside of treatment room and offering assistance.  Pt. Denies needing any assistance at this time    Electronically signed by Gillian Stewart RN on 3/23/2020 at 4:01 PM

## 2020-03-31 RX ORDER — FLUCONAZOLE 100 MG/1
100 TABLET ORAL DAILY
Qty: 7 TABLET | Refills: 0 | Status: SHIPPED | OUTPATIENT
Start: 2020-03-31 | End: 2020-04-07

## 2020-04-01 ENCOUNTER — TELEPHONE (OUTPATIENT)
Dept: CASE MANAGEMENT | Age: 60
End: 2020-04-01

## 2020-04-01 NOTE — TELEPHONE ENCOUNTER
Name: Tavia Osman  : 1960  MRN: [id number]    Oncology Navigation Follow-Up Note    Contact Type:  Telephone    Subjective:     Objective:     Assistance Needed:     Receptive to Advanced Care Planning / Palliative Care:      Referrals:     Education:     Notes: Navigator returning pts. spiuses call and already received assistance with medications.  Writer offering assistance if needed, plan to follow    Electronically signed by Lauren Restrepo RN on 2020 at 3:04 PM

## 2020-04-08 ENCOUNTER — TELEPHONE (OUTPATIENT)
Dept: ONCOLOGY | Age: 60
End: 2020-04-08

## 2020-04-08 RX ORDER — SODIUM CHLORIDE 9 MG/ML
INJECTION, SOLUTION INTRAVENOUS CONTINUOUS
Status: CANCELLED | OUTPATIENT
Start: 2020-04-13

## 2020-04-08 RX ORDER — SODIUM CHLORIDE 9 MG/ML
20 INJECTION, SOLUTION INTRAVENOUS ONCE
Status: CANCELLED | OUTPATIENT
Start: 2020-05-04

## 2020-04-08 RX ORDER — EPINEPHRINE 1 MG/ML
0.3 INJECTION, SOLUTION, CONCENTRATE INTRAVENOUS PRN
Status: CANCELLED | OUTPATIENT
Start: 2020-05-04

## 2020-04-08 RX ORDER — SODIUM CHLORIDE 0.9 % (FLUSH) 0.9 %
10 SYRINGE (ML) INJECTION PRN
Status: CANCELLED | OUTPATIENT
Start: 2020-05-04

## 2020-04-08 RX ORDER — HEPARIN SODIUM (PORCINE) LOCK FLUSH IV SOLN 100 UNIT/ML 100 UNIT/ML
500 SOLUTION INTRAVENOUS PRN
Status: CANCELLED | OUTPATIENT
Start: 2020-04-13

## 2020-04-08 RX ORDER — EPINEPHRINE 1 MG/ML
0.3 INJECTION, SOLUTION, CONCENTRATE INTRAVENOUS PRN
Status: CANCELLED | OUTPATIENT
Start: 2020-04-13

## 2020-04-08 RX ORDER — SODIUM CHLORIDE 0.9 % (FLUSH) 0.9 %
5 SYRINGE (ML) INJECTION PRN
Status: CANCELLED | OUTPATIENT
Start: 2020-05-04

## 2020-04-08 RX ORDER — SODIUM CHLORIDE 0.9 % (FLUSH) 0.9 %
10 SYRINGE (ML) INJECTION PRN
Status: CANCELLED | OUTPATIENT
Start: 2020-04-13

## 2020-04-08 RX ORDER — MEPERIDINE HYDROCHLORIDE 50 MG/ML
12.5 INJECTION INTRAMUSCULAR; INTRAVENOUS; SUBCUTANEOUS ONCE
Status: CANCELLED | OUTPATIENT
Start: 2020-04-13

## 2020-04-08 RX ORDER — METHYLPREDNISOLONE SODIUM SUCCINATE 125 MG/2ML
125 INJECTION, POWDER, LYOPHILIZED, FOR SOLUTION INTRAMUSCULAR; INTRAVENOUS ONCE
Status: CANCELLED | OUTPATIENT
Start: 2020-05-04

## 2020-04-08 RX ORDER — HEPARIN SODIUM (PORCINE) LOCK FLUSH IV SOLN 100 UNIT/ML 100 UNIT/ML
500 SOLUTION INTRAVENOUS PRN
Status: CANCELLED | OUTPATIENT
Start: 2020-05-04

## 2020-04-08 RX ORDER — METHYLPREDNISOLONE SODIUM SUCCINATE 125 MG/2ML
125 INJECTION, POWDER, LYOPHILIZED, FOR SOLUTION INTRAMUSCULAR; INTRAVENOUS ONCE
Status: CANCELLED | OUTPATIENT
Start: 2020-04-13

## 2020-04-08 RX ORDER — MEPERIDINE HYDROCHLORIDE 50 MG/ML
12.5 INJECTION INTRAMUSCULAR; INTRAVENOUS; SUBCUTANEOUS ONCE
Status: CANCELLED | OUTPATIENT
Start: 2020-05-04

## 2020-04-08 RX ORDER — DIPHENHYDRAMINE HYDROCHLORIDE 50 MG/ML
50 INJECTION INTRAMUSCULAR; INTRAVENOUS ONCE
Status: CANCELLED | OUTPATIENT
Start: 2020-04-13

## 2020-04-08 RX ORDER — DIPHENHYDRAMINE HYDROCHLORIDE 50 MG/ML
50 INJECTION INTRAMUSCULAR; INTRAVENOUS ONCE
Status: CANCELLED | OUTPATIENT
Start: 2020-05-04

## 2020-04-08 RX ORDER — SODIUM CHLORIDE 9 MG/ML
20 INJECTION, SOLUTION INTRAVENOUS ONCE
Status: CANCELLED | OUTPATIENT
Start: 2020-04-13

## 2020-04-08 RX ORDER — SODIUM CHLORIDE 0.9 % (FLUSH) 0.9 %
5 SYRINGE (ML) INJECTION PRN
Status: CANCELLED | OUTPATIENT
Start: 2020-04-13

## 2020-04-08 RX ORDER — SODIUM CHLORIDE 9 MG/ML
INJECTION, SOLUTION INTRAVENOUS CONTINUOUS
Status: CANCELLED | OUTPATIENT
Start: 2020-05-04

## 2020-04-08 NOTE — TELEPHONE ENCOUNTER
SPOKE WITH ZELDA AND SPOUSE LIVIA THIS AM.  UPON REVIEW C4 KEYTRUDA RECEIVED 03/23/2020. COMPLETED DIFLUCAN PO X 7 DAYS 04/03/2020. UPCOMING MD EXAM 04/13/2020. ZELDA DESCRIBES HER THROAT AS \"WORSE THAN BEFORE\"  DENIES FEVER, HAS COUGH THAT SOUNDS LOOSE BUT NON PRODUCTIVE AND HAS SOME SWELLING BILAT TO LOWER EXTREMITIES ( NOT PITTING BUT LEGGINS WERE TIGHTER THAN USUAL YESTERDAY)  WHEN ASKED IF ANY SORES TO MOUTH OR WHITE COLORING STATES, \"IT'S HARD TO SEE IN THERE AND IT'S NOT MY MOUTH IT FURTHER DOWN IN MY THROAT\". CONFIRMED SHE IS USING PRILOSEC DAILY. USING TYLENOL FOR PAIN.     ALLERGIES: PCN, SULFA, MORPHINE, ROBITUSSIN

## 2020-04-09 ENCOUNTER — HOSPITAL ENCOUNTER (OUTPATIENT)
Facility: MEDICAL CENTER | Age: 60
End: 2020-04-09

## 2020-04-13 ENCOUNTER — TELEPHONE (OUTPATIENT)
Dept: CASE MANAGEMENT | Age: 60
End: 2020-04-13

## 2020-04-13 ENCOUNTER — TELEPHONE (OUTPATIENT)
Dept: ONCOLOGY | Age: 60
End: 2020-04-13

## 2020-04-13 ENCOUNTER — HOSPITAL ENCOUNTER (OUTPATIENT)
Dept: INFUSION THERAPY | Facility: MEDICAL CENTER | Age: 60
Discharge: HOME | End: 2020-04-13

## 2020-04-13 ENCOUNTER — OFFICE VISIT (OUTPATIENT)
Dept: ONCOLOGY | Age: 60
End: 2020-04-13

## 2020-04-13 VITALS
DIASTOLIC BLOOD PRESSURE: 70 MMHG | HEART RATE: 90 BPM | WEIGHT: 129.6 LBS | TEMPERATURE: 98 F | SYSTOLIC BLOOD PRESSURE: 106 MMHG | BODY MASS INDEX: 23.7 KG/M2

## 2020-04-13 VITALS
DIASTOLIC BLOOD PRESSURE: 70 MMHG | SYSTOLIC BLOOD PRESSURE: 106 MMHG | RESPIRATION RATE: 18 BRPM | HEART RATE: 90 BPM | TEMPERATURE: 98 F

## 2020-04-13 DIAGNOSIS — C34.92 ADENOCARCINOMA, LUNG, LEFT (HCC): Primary | ICD-10-CM

## 2020-04-13 DIAGNOSIS — I10 ESSENTIAL HYPERTENSION: Chronic | ICD-10-CM

## 2020-04-13 DIAGNOSIS — J91.0 PLEURAL EFFUSION, MALIGNANT: ICD-10-CM

## 2020-04-13 DIAGNOSIS — I25.10 CORONARY ARTERY DISEASE INVOLVING NATIVE CORONARY ARTERY OF NATIVE HEART WITHOUT ANGINA PECTORIS: ICD-10-CM

## 2020-04-13 LAB
ABSOLUTE EOS #: 0.1 K/UL (ref 0–0.44)
ABSOLUTE IMMATURE GRANULOCYTE: 0.03 K/UL (ref 0–0.3)
ABSOLUTE LYMPH #: 1.6 K/UL (ref 1.1–3.7)
ABSOLUTE MONO #: 0.5 K/UL (ref 0.1–1.2)
ALBUMIN SERPL-MCNC: 4.6 G/DL (ref 3.5–5.2)
ALBUMIN/GLOBULIN RATIO: ABNORMAL (ref 1–2.5)
ALP BLD-CCNC: 62 U/L (ref 35–104)
ALT SERPL-CCNC: 18 U/L (ref 5–33)
AMYLASE: 75 U/L (ref 28–100)
ANION GAP SERPL CALCULATED.3IONS-SCNC: 17 MMOL/L (ref 9–17)
AST SERPL-CCNC: 33 U/L
BASOPHILS # BLD: 1 % (ref 0–2)
BASOPHILS ABSOLUTE: 0.04 K/UL (ref 0–0.2)
BILIRUB SERPL-MCNC: <0.1 MG/DL (ref 0.3–1.2)
BUN BLDV-MCNC: 6 MG/DL (ref 6–20)
BUN/CREAT BLD: 15 (ref 9–20)
CALCIUM SERPL-MCNC: 10.3 MG/DL (ref 8.6–10.4)
CHLORIDE BLD-SCNC: 89 MMOL/L (ref 98–107)
CHOLESTEROL/HDL RATIO: 4.4
CHOLESTEROL: 347 MG/DL
CO2: 23 MMOL/L (ref 20–31)
CORTISOL COLLECTION INFO: ABNORMAL
CORTISOL: 20.1 UG/DL (ref 2.7–18.4)
CREAT SERPL-MCNC: 0.41 MG/DL (ref 0.5–0.9)
DIFFERENTIAL TYPE: ABNORMAL
EOSINOPHILS RELATIVE PERCENT: 2 % (ref 1–4)
GFR AFRICAN AMERICAN: >60 ML/MIN
GFR NON-AFRICAN AMERICAN: >60 ML/MIN
GFR SERPL CREATININE-BSD FRML MDRD: ABNORMAL ML/MIN/{1.73_M2}
GFR SERPL CREATININE-BSD FRML MDRD: ABNORMAL ML/MIN/{1.73_M2}
GLUCOSE BLD-MCNC: 98 MG/DL (ref 70–99)
HCT VFR BLD CALC: 34.8 % (ref 36.3–47.1)
HDLC SERPL-MCNC: 79 MG/DL
HEMOGLOBIN: 12 G/DL (ref 11.9–15.1)
IMMATURE GRANULOCYTES: 1 %
LDL CHOLESTEROL DIRECT: 226 MG/DL
LDL CHOLESTEROL: ABNORMAL MG/DL (ref 0–130)
LIPASE: 48 U/L (ref 13–60)
LYMPHOCYTES # BLD: 27 % (ref 24–43)
MCH RBC QN AUTO: 32.6 PG (ref 25.2–33.5)
MCHC RBC AUTO-ENTMCNC: 34.5 G/DL (ref 28.4–34.8)
MCV RBC AUTO: 94.6 FL (ref 82.6–102.9)
MONOCYTES # BLD: 8 % (ref 3–12)
NRBC AUTOMATED: 0 PER 100 WBC
PDW BLD-RTO: 15.2 % (ref 11.8–14.4)
PLATELET # BLD: 229 K/UL (ref 138–453)
PLATELET ESTIMATE: ABNORMAL
PMV BLD AUTO: 8.2 FL (ref 8.1–13.5)
POTASSIUM SERPL-SCNC: 3.6 MMOL/L (ref 3.7–5.3)
RBC # BLD: 3.68 M/UL (ref 3.95–5.11)
RBC # BLD: ABNORMAL 10*6/UL
SEG NEUTROPHILS: 61 % (ref 36–65)
SEGMENTED NEUTROPHILS ABSOLUTE COUNT: 3.69 K/UL (ref 1.5–8.1)
SODIUM BLD-SCNC: 129 MMOL/L (ref 135–144)
TOTAL PROTEIN: 7.5 G/DL (ref 6.4–8.3)
TRIGL SERPL-MCNC: 480 MG/DL
TSH SERPL DL<=0.05 MIU/L-ACNC: 69.2 MIU/L (ref 0.3–5)
VLDLC SERPL CALC-MCNC: ABNORMAL MG/DL (ref 1–30)
WBC # BLD: 6 K/UL (ref 3.5–11.3)
WBC # BLD: ABNORMAL 10*3/UL

## 2020-04-13 PROCEDURE — 80053 COMPREHEN METABOLIC PANEL: CPT

## 2020-04-13 PROCEDURE — 80061 LIPID PANEL: CPT

## 2020-04-13 PROCEDURE — 84443 ASSAY THYROID STIM HORMONE: CPT

## 2020-04-13 PROCEDURE — 85025 COMPLETE CBC W/AUTO DIFF WBC: CPT

## 2020-04-13 PROCEDURE — 99215 OFFICE O/P EST HI 40 MIN: CPT | Performed by: INTERNAL MEDICINE

## 2020-04-13 PROCEDURE — 96413 CHEMO IV INFUSION 1 HR: CPT

## 2020-04-13 PROCEDURE — 83721 ASSAY OF BLOOD LIPOPROTEIN: CPT

## 2020-04-13 PROCEDURE — 83690 ASSAY OF LIPASE: CPT

## 2020-04-13 PROCEDURE — 2580000003 HC RX 258: Performed by: INTERNAL MEDICINE

## 2020-04-13 PROCEDURE — 36591 DRAW BLOOD OFF VENOUS DEVICE: CPT

## 2020-04-13 PROCEDURE — 82533 TOTAL CORTISOL: CPT

## 2020-04-13 PROCEDURE — 6360000002 HC RX W HCPCS: Performed by: INTERNAL MEDICINE

## 2020-04-13 PROCEDURE — 99211 OFF/OP EST MAY X REQ PHY/QHP: CPT

## 2020-04-13 PROCEDURE — 82150 ASSAY OF AMYLASE: CPT

## 2020-04-13 RX ORDER — CETIRIZINE HYDROCHLORIDE 10 MG/1
10 TABLET ORAL DAILY
Qty: 30 TABLET | Refills: 0 | Status: SHIPPED | OUTPATIENT
Start: 2020-04-13 | End: 2020-05-13

## 2020-04-13 RX ORDER — SODIUM CHLORIDE 9 MG/ML
20 INJECTION, SOLUTION INTRAVENOUS ONCE
Status: COMPLETED | OUTPATIENT
Start: 2020-04-13 | End: 2020-04-13

## 2020-04-13 RX ORDER — SODIUM CHLORIDE 0.9 % (FLUSH) 0.9 %
10 SYRINGE (ML) INJECTION PRN
Status: DISCONTINUED | OUTPATIENT
Start: 2020-04-13 | End: 2020-04-14 | Stop reason: HOSPADM

## 2020-04-13 RX ORDER — HEPARIN SODIUM (PORCINE) LOCK FLUSH IV SOLN 100 UNIT/ML 100 UNIT/ML
500 SOLUTION INTRAVENOUS PRN
Status: DISCONTINUED | OUTPATIENT
Start: 2020-04-13 | End: 2020-04-14 | Stop reason: HOSPADM

## 2020-04-13 RX ORDER — AMLODIPINE BESYLATE 5 MG/1
5 TABLET ORAL DAILY
COMMUNITY
End: 2020-06-23 | Stop reason: SDUPTHER

## 2020-04-13 RX ADMIN — SODIUM CHLORIDE 20 ML/HR: 9 INJECTION, SOLUTION INTRAVENOUS at 09:30

## 2020-04-13 RX ADMIN — HEPARIN 500 UNITS: 100 SYRINGE at 10:21

## 2020-04-13 RX ADMIN — SODIUM CHLORIDE, PRESERVATIVE FREE 10 ML: 5 INJECTION INTRAVENOUS at 09:30

## 2020-04-13 RX ADMIN — SODIUM CHLORIDE 200 MG: 9 INJECTION, SOLUTION INTRAVENOUS at 09:39

## 2020-04-13 RX ADMIN — SODIUM CHLORIDE, PRESERVATIVE FREE 10 ML: 5 INJECTION INTRAVENOUS at 10:21

## 2020-04-13 ASSESSMENT — PAIN SCALES - GENERAL: PAINLEVEL_OUTOF10: 5

## 2020-04-13 ASSESSMENT — PAIN DESCRIPTION - LOCATION: LOCATION: KNEE

## 2020-04-13 ASSESSMENT — PAIN DESCRIPTION - ORIENTATION: ORIENTATION: RIGHT;LEFT

## 2020-04-13 NOTE — TELEPHONE ENCOUNTER
Received message from spouse and pt. Needing Nebulizer replaced, but needing financial assistance from [address] [place] [address] if possible. Navigator spoke with Jonnathan for assistance.

## 2020-04-13 NOTE — PROGRESS NOTES
Component Value Date/Time    CALCIUM 10.3 04/13/2020 0839    ALKPHOS 62 04/13/2020 0839    AST 33 (H) 04/13/2020 0839    ALT 18 04/13/2020 0839    BILITOT <0.10 (L) 04/13/2020 0839              IMPRESSION:    1. History of breast cancer in 2010, status post mastectomy and chemotherapy  2. New diagnosis with lung cancer  3. Malignant pleural effusion stage IV disease  4. Chemotherapy with carboplatin, Alimta and Keytruda - started 01/20/2020  5. Weakness in both lower extremities  6. Oral thrush/mucositis  7. Intermittent lower extremity swelling  8. Watery eyes/visual changes      PLAN:   1. Her lab work was reviewed, counts are stable. 2. I completed toxicity check. 3. She continues to have some thrush, I recommend she rinse under her denture regularly, continues with Magic Mouthwash. I also suspect that there might be a component of autoimmune mucositis. I decided to treat the thrush and evaluate after 3 weeks, if she continues to have significant symptoms after we clear the thrush, we will probably have a steroid-containing mouthwash  4. We will continue to monitor her eyes, the timing of it does not suggest chemotherapy or immunotherapy side effect, she does not have clinical iritis or conjunctivitis, I think she might just have allergies and I am writing for Zyrtec to be taken daily. 5. I am writing for nebulizer. 6. We will commence with maintenance Keytruda today. 7. Plan for follow up CT after cycle #6.   8. She was advised to quit smoking  9. Return in 3 weeks. Addendum  We will replace oral potassium today.

## 2020-04-13 NOTE — PROGRESS NOTES
Pt arrives per ambulatory with  and labs and orders reviewed and pt seen per md. Keytruda checklist completed and pt complains of watery eyes. Pt states md has prescribed something for allergy for pt. NS infusing before and after [place] Keytruda tolerated well and no reactions or complaints and blood return present throughout infusion. Pt discharged per ambulatory with  with next appts per AVS from .

## 2020-04-13 NOTE — TELEPHONE ENCOUNTER
SW called and spoke with patient's , Melissa Arteaga, to confirm that SW has ordered a nebulizer for home delivery through [employer]. The expected delivery date it 4/17/20 - 4/22/20. Melissa Arteaga states that patient has some inhalers and a machine that is \"on it's last leg\", but patient should be good until the new one arrives. Anthony Ponce.  [employer]

## 2020-04-20 ENCOUNTER — TELEPHONE (OUTPATIENT)
Dept: CASE MANAGEMENT | Age: 60
End: 2020-04-20

## 2020-04-29 ENCOUNTER — HOSPITAL ENCOUNTER (OUTPATIENT)
Facility: MEDICAL CENTER | Age: 60
End: 2020-04-29

## 2020-05-04 ENCOUNTER — HOSPITAL ENCOUNTER (OUTPATIENT)
Dept: INFUSION THERAPY | Facility: MEDICAL CENTER | Age: 60
Discharge: HOME | End: 2020-05-04

## 2020-05-04 ENCOUNTER — TELEPHONE (OUTPATIENT)
Dept: ONCOLOGY | Age: 60
End: 2020-05-04

## 2020-05-04 ENCOUNTER — OFFICE VISIT (OUTPATIENT)
Dept: ONCOLOGY | Age: 60
End: 2020-05-04

## 2020-05-04 ENCOUNTER — TELEPHONE (OUTPATIENT)
Dept: CASE MANAGEMENT | Age: 60
End: 2020-05-04

## 2020-05-04 VITALS
HEART RATE: 77 BPM | TEMPERATURE: 98.5 F | WEIGHT: 136.7 LBS | RESPIRATION RATE: 20 BRPM | SYSTOLIC BLOOD PRESSURE: 124 MMHG | BODY MASS INDEX: 25 KG/M2 | DIASTOLIC BLOOD PRESSURE: 83 MMHG

## 2020-05-04 DIAGNOSIS — J91.0 PLEURAL EFFUSION, MALIGNANT: ICD-10-CM

## 2020-05-04 DIAGNOSIS — C34.92 ADENOCARCINOMA, LUNG, LEFT (HCC): ICD-10-CM

## 2020-05-04 LAB
ABSOLUTE EOS #: 0.04 K/UL (ref 0–0.44)
ABSOLUTE IMMATURE GRANULOCYTE: 0.05 K/UL (ref 0–0.3)
ABSOLUTE LYMPH #: 2.1 K/UL (ref 1.1–3.7)
ABSOLUTE MONO #: 0.58 K/UL (ref 0.1–1.2)
ALBUMIN SERPL-MCNC: 4.9 G/DL (ref 3.5–5.2)
ALBUMIN/GLOBULIN RATIO: ABNORMAL (ref 1–2.5)
ALP BLD-CCNC: 69 U/L (ref 35–104)
ALT SERPL-CCNC: 39 U/L (ref 5–33)
AMYLASE: 86 U/L (ref 28–100)
ANION GAP SERPL CALCULATED.3IONS-SCNC: 15 MMOL/L (ref 9–17)
AST SERPL-CCNC: 102 U/L
BASOPHILS # BLD: 1 % (ref 0–2)
BASOPHILS ABSOLUTE: 0.04 K/UL (ref 0–0.2)
BILIRUB SERPL-MCNC: 0.22 MG/DL (ref 0.3–1.2)
BUN BLDV-MCNC: 9 MG/DL (ref 6–20)
BUN/CREAT BLD: 18 (ref 9–20)
CALCIUM SERPL-MCNC: 10 MG/DL (ref 8.6–10.4)
CHLORIDE BLD-SCNC: 82 MMOL/L (ref 98–107)
CO2: 28 MMOL/L (ref 20–31)
CORTISOL COLLECTION INFO: NORMAL
CORTISOL: 11.2 UG/DL (ref 2.7–18.4)
CREAT SERPL-MCNC: 0.51 MG/DL (ref 0.5–0.9)
DIFFERENTIAL TYPE: ABNORMAL
EOSINOPHILS RELATIVE PERCENT: 1 % (ref 1–4)
GFR AFRICAN AMERICAN: >60 ML/MIN
GFR NON-AFRICAN AMERICAN: >60 ML/MIN
GFR SERPL CREATININE-BSD FRML MDRD: ABNORMAL ML/MIN/{1.73_M2}
GFR SERPL CREATININE-BSD FRML MDRD: ABNORMAL ML/MIN/{1.73_M2}
GLUCOSE BLD-MCNC: 104 MG/DL (ref 70–99)
HCT VFR BLD CALC: 34.4 % (ref 36.3–47.1)
HEMOGLOBIN: 12 G/DL (ref 11.9–15.1)
IMMATURE GRANULOCYTES: 1 %
LIPASE: 39 U/L (ref 13–60)
LYMPHOCYTES # BLD: 24 % (ref 24–43)
MCH RBC QN AUTO: 33 PG (ref 25.2–33.5)
MCHC RBC AUTO-ENTMCNC: 34.9 G/DL (ref 28.4–34.8)
MCV RBC AUTO: 94.5 FL (ref 82.6–102.9)
MONOCYTES # BLD: 7 % (ref 3–12)
NRBC AUTOMATED: 0 PER 100 WBC
PDW BLD-RTO: 15.6 % (ref 11.8–14.4)
PLATELET # BLD: 315 K/UL (ref 138–453)
PLATELET ESTIMATE: ABNORMAL
PMV BLD AUTO: 8.4 FL (ref 8.1–13.5)
POTASSIUM SERPL-SCNC: 3.7 MMOL/L (ref 3.7–5.3)
RBC # BLD: 3.64 M/UL (ref 3.95–5.11)
RBC # BLD: ABNORMAL 10*6/UL
SEG NEUTROPHILS: 66 % (ref 36–65)
SEGMENTED NEUTROPHILS ABSOLUTE COUNT: 5.93 K/UL (ref 1.5–8.1)
SODIUM BLD-SCNC: 125 MMOL/L (ref 135–144)
TOTAL PROTEIN: 7.9 G/DL (ref 6.4–8.3)
TSH SERPL DL<=0.05 MIU/L-ACNC: 64.45 MIU/L (ref 0.3–5)
WBC # BLD: 8.7 K/UL (ref 3.5–11.3)
WBC # BLD: ABNORMAL 10*3/UL

## 2020-05-04 PROCEDURE — 99214 OFFICE O/P EST MOD 30 MIN: CPT | Performed by: INTERNAL MEDICINE

## 2020-05-04 PROCEDURE — 6360000002 HC RX W HCPCS: Performed by: INTERNAL MEDICINE

## 2020-05-04 PROCEDURE — 84443 ASSAY THYROID STIM HORMONE: CPT

## 2020-05-04 PROCEDURE — 82533 TOTAL CORTISOL: CPT

## 2020-05-04 PROCEDURE — 36591 DRAW BLOOD OFF VENOUS DEVICE: CPT

## 2020-05-04 PROCEDURE — 80053 COMPREHEN METABOLIC PANEL: CPT

## 2020-05-04 PROCEDURE — 2580000003 HC RX 258: Performed by: INTERNAL MEDICINE

## 2020-05-04 PROCEDURE — 85025 COMPLETE CBC W/AUTO DIFF WBC: CPT

## 2020-05-04 PROCEDURE — 82150 ASSAY OF AMYLASE: CPT

## 2020-05-04 PROCEDURE — 83690 ASSAY OF LIPASE: CPT

## 2020-05-04 PROCEDURE — 99212 OFFICE O/P EST SF 10 MIN: CPT

## 2020-05-04 RX ORDER — HEPARIN SODIUM (PORCINE) LOCK FLUSH IV SOLN 100 UNIT/ML 100 UNIT/ML
500 SOLUTION INTRAVENOUS PRN
Status: DISCONTINUED | OUTPATIENT
Start: 2020-05-04 | End: 2020-05-05 | Stop reason: HOSPADM

## 2020-05-04 RX ORDER — LEVOTHYROXINE SODIUM 0.07 MG/1
75 TABLET ORAL DAILY
Qty: 30 TABLET | Refills: 3 | Status: SHIPPED | OUTPATIENT
Start: 2020-05-04 | End: 2020-06-29

## 2020-05-04 RX ORDER — HYDROCODONE BITARTRATE AND ACETAMINOPHEN 5; 325 MG/1; MG/1
1 TABLET ORAL EVERY 6 HOURS PRN
Qty: 30 TABLET | Refills: 0 | Status: SHIPPED | OUTPATIENT
Start: 2020-05-04 | End: 2020-05-18

## 2020-05-04 RX ORDER — SODIUM CHLORIDE 0.9 % (FLUSH) 0.9 %
10 SYRINGE (ML) INJECTION PRN
Status: DISCONTINUED | OUTPATIENT
Start: 2020-05-04 | End: 2020-05-05 | Stop reason: HOSPADM

## 2020-05-04 RX ADMIN — SODIUM CHLORIDE, PRESERVATIVE FREE 10 ML: 5 INJECTION INTRAVENOUS at 14:28

## 2020-05-04 RX ADMIN — HEPARIN SODIUM (PORCINE) LOCK FLUSH IV SOLN 100 UNIT/ML 500 UNITS: 100 SOLUTION at 14:28

## 2020-05-04 NOTE — PROGRESS NOTES
+shortness of breath  Cardiovascular: Denies chest pain, PND or orthopnea, palpitations. +intermittent LE edema and facial swelling  Gastrointestinal: No nausea or vomiting, abdominal pain, diarrhea or constipation. Genitourinary: Denies dysuria, hematuria, frequency, urgency or incontinence. Neurological: Denies headaches, decreased LOC, no sensory or motor focal deficits. Musculoskeletal: No arthralgia no back pain or joint swelling. +weakness and muscle fatigue   Skin: There are no rashes or bleeding. Psychiatric: No anxiety, no depression. Endocrine: No diabetes or thyroid disease. Hematologic: No bleeding, no adenopathy. PHYSICAL EXAM:      /83 (Site: Left Upper Arm, Position: Sitting, Cuff Size: Medium Adult)   Pulse 77   Temp 98.5 °F (36.9 °C) (Temporal)   Resp 20   Wt 136 lb 11.2 oz (62 kg)   BMI 25.00 kg/m²    Temp (24hrs), Av.9 °F (36.6 °C), Min:97.9 °F (36.6 °C), Max:97.9 °F (36.6 °C)  Gen.  Exam, showed a well-appearing patient without evidence of distress or pain  HEENT, normocephalic and atraumatic, PERRLA extraocular muscles are intact; +thrush present - resolved  Neck showed no JVD no carotid bruit, no cervical adenopathy  Chest is clear to auscultation bilaterally  Heart is regular without any murmur  Abdomen soft nontender no hepatosplenomegaly  Lower extremities showed no edema clubbing or cyanosis  Neurological examination was nonfocal, with intact cranial nerves  Skin  No rashes or bruising appreciated      REVIEW OF LABORATORY DATA:     Lab Results   Component Value Date    WBC 8.7 2020    HGB 12.0 2020    HCT 34.4 (L) 2020    MCV 94.5 2020     2020       Chemistry        Component Value Date/Time     (L) 2020 0839    K 3.6 (L) 2020 0839    CL 89 (L) 2020 0839    CO2 23 2020 0839    BUN 6 2020 0839    CREATININE 0.41 (L) 2020 0839        Component Value Date/Time    CALCIUM 10.3 2020

## 2020-05-04 NOTE — PROGRESS NOTES
Patient here for labs, MD visit and treatment. Does not feel well since starting immunotherapy. Vitals obtained. Port accessed per policy and labs drawn and sent. Labs reviewed. Seen by MD.  Treatment to be held today. Port flushed per policy and gripper removed intact, band aid to site. Has a return visit scheduled. Discharged ambulatory with family.

## 2020-05-04 NOTE — TELEPHONE ENCOUNTER
Name: Thornton Leyden  : 1960  MRN: [id number]    Oncology Navigation Follow-Up Note    Contact Type:  Telephone    Subjective:     Objective:     Assistance Needed:     Receptive to Advanced Care Planning / Palliative Care:      Referrals:     Education:     Notes: Navigator received voicemail from pts. Spouse over the weekend. Pt. Miserable , c/o joint pain, visual changes, and does not want to proceed with Keytruda. Navigator returning call and encouraged to keep appt.  Today and discuss further Eva Humphrey with MD.    Electronically signed by Carie Chiu RN on 2020 at 8:10 AM

## 2020-05-06 ENCOUNTER — TELEPHONE (OUTPATIENT)
Dept: ONCOLOGY | Age: 60
End: 2020-05-06

## 2020-05-06 ENCOUNTER — TELEPHONE (OUTPATIENT)
Dept: CASE MANAGEMENT | Age: 60
End: 2020-05-06

## 2020-05-15 ENCOUNTER — TELEPHONE (OUTPATIENT)
Dept: FAMILY MEDICINE CLINIC | Age: 60
End: 2020-05-15

## 2020-05-15 NOTE — TELEPHONE ENCOUNTER
Pt called-stated she has been having really bad pain in her legs. She thought it was due to her new treatment. Didn't get last treatment and still had pain. Patient said she was feeling some tingling numbness in feet and fingers. Patient stated that she is still having pain and now some swelling, no redness, but has a \"knot' in calf.  Advised patient to go to ER she agreed and stated she would go

## 2020-05-17 ENCOUNTER — HOSPITAL ENCOUNTER (EMERGENCY)
Age: 60
Discharge: HOME | End: 2020-05-17
Attending: EMERGENCY MEDICINE

## 2020-05-17 ENCOUNTER — APPOINTMENT (OUTPATIENT)
Dept: GENERAL RADIOLOGY | Age: 60
End: 2020-05-17

## 2020-05-17 VITALS
BODY MASS INDEX: 25.58 KG/M2 | WEIGHT: 139 LBS | OXYGEN SATURATION: 92 % | TEMPERATURE: 98 F | RESPIRATION RATE: 18 BRPM | HEIGHT: 62 IN | SYSTOLIC BLOOD PRESSURE: 135 MMHG | DIASTOLIC BLOOD PRESSURE: 75 MMHG | HEART RATE: 89 BPM

## 2020-05-17 LAB
-: ABNORMAL
ABSOLUTE EOS #: 0.04 K/UL (ref 0–0.44)
ABSOLUTE IMMATURE GRANULOCYTE: 0.02 K/UL (ref 0–0.3)
ABSOLUTE LYMPH #: 1.61 K/UL (ref 1.1–3.7)
ABSOLUTE MONO #: 0.6 K/UL (ref 0.1–1.2)
AMORPHOUS: ABNORMAL
ANION GAP SERPL CALCULATED.3IONS-SCNC: 13 MMOL/L (ref 9–17)
BACTERIA: ABNORMAL
BASOPHILS # BLD: 1 % (ref 0–2)
BASOPHILS ABSOLUTE: 0.04 K/UL (ref 0–0.2)
BILIRUBIN URINE: NEGATIVE
BUN BLDV-MCNC: 7 MG/DL (ref 6–20)
BUN/CREAT BLD: 13 (ref 9–20)
CALCIUM SERPL-MCNC: 10.4 MG/DL (ref 8.6–10.4)
CASTS UA: ABNORMAL /LPF
CHLORIDE BLD-SCNC: 90 MMOL/L (ref 98–107)
CO2: 27 MMOL/L (ref 20–31)
COLOR: YELLOW
COMMENT UA: ABNORMAL
CREAT SERPL-MCNC: 0.55 MG/DL (ref 0.5–0.9)
CRYSTALS, UA: ABNORMAL /HPF
D-DIMER QUANTITATIVE: 0.44 MG/L FEU (ref 0–0.59)
DIFFERENTIAL TYPE: ABNORMAL
EOSINOPHILS RELATIVE PERCENT: 1 % (ref 1–4)
EPITHELIAL CELLS UA: ABNORMAL /HPF (ref 0–5)
GFR AFRICAN AMERICAN: >60 ML/MIN
GFR NON-AFRICAN AMERICAN: >60 ML/MIN
GFR SERPL CREATININE-BSD FRML MDRD: ABNORMAL ML/MIN/{1.73_M2}
GFR SERPL CREATININE-BSD FRML MDRD: ABNORMAL ML/MIN/{1.73_M2}
GLUCOSE BLD-MCNC: 118 MG/DL (ref 70–99)
GLUCOSE URINE: NEGATIVE
HCT VFR BLD CALC: 33.7 % (ref 36.3–47.1)
HEMOGLOBIN: 11.5 G/DL (ref 11.9–15.1)
IMMATURE GRANULOCYTES: 0 %
KETONES, URINE: NEGATIVE
LEUKOCYTE ESTERASE, URINE: ABNORMAL
LYMPHOCYTES # BLD: 25 % (ref 24–43)
MAGNESIUM: 1.9 MG/DL (ref 1.6–2.6)
MCH RBC QN AUTO: 33.8 PG (ref 25.2–33.5)
MCHC RBC AUTO-ENTMCNC: 34.1 G/DL (ref 28.4–34.8)
MCV RBC AUTO: 99.1 FL (ref 82.6–102.9)
MONOCYTES # BLD: 10 % (ref 3–12)
MUCUS: ABNORMAL
NITRITE, URINE: NEGATIVE
NRBC AUTOMATED: 0 PER 100 WBC
OTHER OBSERVATIONS UA: ABNORMAL
PDW BLD-RTO: 16.5 % (ref 11.8–14.4)
PH UA: 6 (ref 5–8)
PLATELET # BLD: 253 K/UL (ref 138–453)
PLATELET ESTIMATE: ABNORMAL
PMV BLD AUTO: 8.3 FL (ref 8.1–13.5)
POTASSIUM SERPL-SCNC: 3.5 MMOL/L (ref 3.7–5.3)
PROTEIN UA: NEGATIVE
RBC # BLD: 3.4 M/UL (ref 3.95–5.11)
RBC # BLD: ABNORMAL 10*6/UL
RBC UA: ABNORMAL /HPF (ref 0–2)
RENAL EPITHELIAL, UA: ABNORMAL /HPF
SEG NEUTROPHILS: 63 % (ref 36–65)
SEGMENTED NEUTROPHILS ABSOLUTE COUNT: 4.03 K/UL (ref 1.5–8.1)
SODIUM BLD-SCNC: 130 MMOL/L (ref 135–144)
SPECIFIC GRAVITY UA: 1 (ref 1–1.03)
TRICHOMONAS: ABNORMAL
TURBIDITY: CLEAR
URINE HGB: NEGATIVE
UROBILINOGEN, URINE: NORMAL
WBC # BLD: 6.3 K/UL (ref 3.5–11.3)
WBC # BLD: ABNORMAL 10*3/UL
WBC UA: ABNORMAL /HPF (ref 0–5)
YEAST: ABNORMAL

## 2020-05-17 PROCEDURE — 81001 URINALYSIS AUTO W/SCOPE: CPT

## 2020-05-17 PROCEDURE — 6370000000 HC RX 637 (ALT 250 FOR IP): Performed by: EMERGENCY MEDICINE

## 2020-05-17 PROCEDURE — 85025 COMPLETE CBC W/AUTO DIFF WBC: CPT

## 2020-05-17 PROCEDURE — 99283 EMERGENCY DEPT VISIT LOW MDM: CPT

## 2020-05-17 PROCEDURE — 85379 FIBRIN DEGRADATION QUANT: CPT

## 2020-05-17 PROCEDURE — 80048 BASIC METABOLIC PNL TOTAL CA: CPT

## 2020-05-17 PROCEDURE — 71046 X-RAY EXAM CHEST 2 VIEWS: CPT

## 2020-05-17 PROCEDURE — 87086 URINE CULTURE/COLONY COUNT: CPT

## 2020-05-17 PROCEDURE — 83735 ASSAY OF MAGNESIUM: CPT

## 2020-05-17 RX ORDER — GABAPENTIN 300 MG/1
600 CAPSULE ORAL ONCE
Status: COMPLETED | OUTPATIENT
Start: 2020-05-17 | End: 2020-05-17

## 2020-05-17 RX ORDER — HYDROCODONE BITARTRATE AND ACETAMINOPHEN 5; 325 MG/1; MG/1
2 TABLET ORAL ONCE
Status: COMPLETED | OUTPATIENT
Start: 2020-05-17 | End: 2020-05-17

## 2020-05-17 RX ORDER — FENTANYL CITRATE 50 UG/ML
50 INJECTION, SOLUTION INTRAMUSCULAR; INTRAVENOUS ONCE
Status: DISCONTINUED | OUTPATIENT
Start: 2020-05-17 | End: 2020-05-17

## 2020-05-17 RX ORDER — GABAPENTIN 300 MG/1
300 CAPSULE ORAL 2 TIMES DAILY
Qty: 20 CAPSULE | Refills: 0 | Status: SHIPPED | OUTPATIENT
Start: 2020-05-17 | End: 2020-06-01 | Stop reason: ALTCHOICE

## 2020-05-17 RX ADMIN — HYDROCODONE BITARTRATE AND ACETAMINOPHEN 2 TABLET: 5; 325 TABLET ORAL at 15:24

## 2020-05-17 RX ADMIN — GABAPENTIN 600 MG: 300 CAPSULE ORAL at 14:15

## 2020-05-17 ASSESSMENT — PAIN SCALES - GENERAL
PAINLEVEL_OUTOF10: 5
PAINLEVEL_OUTOF10: 5

## 2020-05-17 NOTE — ED PROVIDER NOTES
EMERGENCY DEPARTMENT ENCOUNTER    Pt Name: Michel Fields  MRN: [id number]  [place] 1960  Date of evaluation: 5/17/20  CHIEF COMPLAINT       Chief Complaint   Patient presents with    Back Pain     onset over 5 weeks ago    Leg Pain     bilat     HISTORY OF PRESENT ILLNESS   HPI  59F hx of COPD, stage IV lung cancer who presents with worsening of her chronic low back and b/l leg pain over the past few days. Pt states that she's had burning and pain in her b/l legs since she started [place] months ago, states she hasn't taken it in 6wks due to these symptoms. Pt also has chronic low back pain, says it is flaring, worse with movement. Pt has norco at home but only takes sparingly because she is concerned about addiction. She spoke to oncology nurse and when she endorsed calf tenderness and \"feeling knots\" in her calves was told to go to ED. Pt also stating that she used to have a pleural catheter in her L chest to drain an effusion, has been removed for some time but she has chronic pain to this area, states this pain is bothering her as well. She denies trauma, fever/chills, CP, SOB, dysuria/hematuria, leg weakness, incontinence, saddle anesthesia or any other acute complaints. REVIEW OF SYSTEMS     Review of Systems   All other systems reviewed and are negative.     PASTMEDICAL HISTORY     Past Medical History:   Diagnosis Date    Alcohol abuse     For many years; 5-6 beers per night    Breast cancer ([facility] 2010    right radical mastectomy with positive sentinel dose, chemo, s/p tamoxifen x 7 years     Chronic diarrhea 1/29/2019    COPD (chronic obstructive pulmonary disease) ([facility]     Essential hypertension     Lung cancer ([facility] [facility]     Tobacco abuse      SURGICAL HISTORY       Past Surgical History:   Procedure Laterality Date    APPENDECTOMY      CHOLECYSTECTOMY      MASTECTOMY Right      CURRENT MEDICATIONS       Previous Medications    ALBUTEROL (ACCUNEB) 1.25 MG/3ML NEBULIZER SOLUTION    Inhale 3 mLs into the lungs every 2 hours as needed for Wheezing or Shortness of Breath    ALBUTEROL SULFATE  (90 BASE) MCG/ACT INHALER    Inhale 2 puffs into the lungs every 4 hours as needed for Wheezing or Shortness of Breath    AMLODIPINE (NORVASC) 5 MG TABLET    Take 5 mg by mouth daily    DEXAMETHASONE (DECADRON) 4 MG TABLET    Take 1 tab by mouth (with food) twice daily for two days , start the day after chemo    FOLIC ACID (FOLVITE) 869 MCG TABLET    Take 1 tablet by mouth daily    HYDROCODONE-ACETAMINOPHEN (NORCO) 5-325 MG PER TABLET    Take 1 tablet by mouth every 6 hours as needed for Pain for up to 14 days. Intended supply: 7 days. Take lowest dose possible to manage pain    IPRATROPIUM-ALBUTEROL (DUONEB) 0.5-2.5 (3) MG/3ML SOLN NEBULIZER SOLUTION    Inhale 3 mLs into the lungs 4 times daily    LEVOTHYROXINE (SYNTHROID) 75 MCG TABLET    Take 1 tablet by mouth daily    LIDOCAINE-PRILOCAINE (EMLA) 2.5-2.5 % CREAM    Apply topically as needed. MAGIC MOUTHWASH (MIRACLE MOUTHWASH)    Swish and spit 5 mLs as needed for Irritation (Q 2 TO 4 HOURS PRN RINSE AND SWALLOW)    MAGIC MOUTHWASH (MIRACLE MOUTHWASH)    Swish and swallow 10 mLs 4 times daily as needed for Irritation Mix equal amounts of nystatin 100,000 units, viscous lidocaine 2%, and mylanta. Add cherry flavor as needed. MOMETASONE-FORMOTEROL (DULERA) 200-5 MCG/ACT INHALER    Inhale 2 puffs into the lungs 2 times daily    OMEPRAZOLE (PRILOSEC) 20 MG DELAYED RELEASE CAPSULE    Take 1 capsule by mouth daily    ONDANSETRON (ZOFRAN ODT) 8 MG TBDP DISINTEGRATING TABLET    Place 1 tablet under the tongue every 8 hours as needed for Nausea or Vomiting    SODIUM CHLORIDE 1 G TABLET    Take 1 tablet by mouth 2 times daily     ALLERGIES     is allergic to morphine; robitussin (alcohol free) [guaifenesin]; aspirin; penicillins; and sulfa antibiotics. FAMILY HISTORY     She indicated that the status of her mother is unknown.  She indicated that the status of Potassium 3.5 (*)     Chloride 90 (*)     All other components within normal limits   D-DIMER, QUANTITATIVE   MAGNESIUM   URINE RT REFLEX TO CULTURE       EMERGENCY DEPARTMENTCOURSE:     UA not consistent with infection, CXR without acute findings, no reaccumulating effusion. Bloodwork without concerning abnormalities including negative d-dimer, pt feels better after gabapentin and norco. Spoke to oncologist on call for Dr. Deepa Ruiz, agrees with plan to try a course of gabapentin for likely peripheral neuropathy in her legs, continue norco for back pain, follow up with oncology as outpt. All of this was explained to pt and she was agreeable, given return precautions, discharged in stable condition. Vitals:    Vitals:    05/17/20 1312   BP: 135/75   Pulse: 89   Resp: 18   Temp: 98 °F (36.7 °C)   TempSrc: Oral   SpO2: 92%   Weight: 139 lb (63 kg)   Height: 5' 2\" (1.575 m)       The patient was given the following medications while in the emergency department:  Orders Placed This Encounter   Medications    DISCONTD: fentaNYL (SUBLIMAZE) injection 50 mcg    gabapentin (NEURONTIN) capsule 600 mg    HYDROcodone-acetaminophen (NORCO) 5-325 MG per tablet 2 tablet    gabapentin (NEURONTIN) 300 MG capsule     Sig: Take 1 capsule by mouth 2 times daily for 10 days. Intended supply: 30 days     Dispense:  20 capsule     Refill:  0     CONSULTS:  None    FINAL IMPRESSION      1. Chronic back pain, unspecified back location, unspecified back pain laterality    2. Peripheral polyneuropathy          DISPOSITION/PLAN   DISPOSITION Decision To Discharge 05/17/2020 03:24:08 PM      PATIENT REFERRED TO:  Trini Mello MD  [address] [place] [address]  [phone]    Call   tomorrow to schedule an appointment. DISCHARGE MEDICATIONS:  New Prescriptions    GABAPENTIN (NEURONTIN) 300 MG CAPSULE    Take 1 capsule by mouth 2 times daily for 10 days.  Intended supply: 30 days     Russel Ozuna MD  Attending Emergency Physician                    Mckayla Lazar MD  05/17/20 [id number]

## 2020-05-18 ENCOUNTER — CARE COORDINATION (OUTPATIENT)
Dept: CARE COORDINATION | Age: 60
End: 2020-05-18

## 2020-05-18 LAB
CULTURE: NORMAL
Lab: NORMAL
SPECIMEN DESCRIPTION: NORMAL

## 2020-05-20 ENCOUNTER — HOSPITAL ENCOUNTER (OUTPATIENT)
Facility: MEDICAL CENTER | Age: 60
End: 2020-05-20

## 2020-05-20 ENCOUNTER — TELEPHONE (OUTPATIENT)
Dept: CASE MANAGEMENT | Age: 60
End: 2020-05-20

## 2020-05-20 NOTE — TELEPHONE ENCOUNTER
Name: Rule Almanza  : 1960  MRN: [id number]    Oncology Navigation Follow-Up Note    Contact Type:  Telephone    Subjective:     Objective:     Assistance Needed:     Receptive to Advanced Care Planning / Palliative Care:      Referrals:     Education:     Notes: Navigator reviewing chart and recent ED visit. Writer also discussing case with Palliative care-Marnie JUNG Pt. Due to see MO  and plan to follow.  Pt. May be candidate for palliative care for pain management, plan to discuss with MO once it's determined cause of majority of her pain    Electronically signed by Drew Gordon RN on 2020 at 11:52 AM

## 2020-05-21 ENCOUNTER — TELEPHONE (OUTPATIENT)
Dept: CASE MANAGEMENT | Age: 60
End: 2020-05-21

## 2020-05-21 NOTE — TELEPHONE ENCOUNTER
Name: Jennie Fox  : 1960  MRN: [id number]    Oncology Navigation Follow-Up Note    Contact Type:  Telephone    Subjective:     Objective:     Assistance Needed:     Receptive to Advanced Care Planning / Palliative Care:      Referrals:     Education:     Notes: Navigator received call from pts. Spouse, and pt. Having increased pain (general and sinus).  describing it as \"Sinus pain\" ? Stressed to Spouse important they call triage for symptom management. Reviewing last progress notes and immunotherapy on hold to determine cause of pain and pt.  Given Neurontin to start when in ED recently(Dr. Mishra aware)    Electronically signed by Randall Poole RN on 2020 at 2:32 PM

## 2020-05-22 ENCOUNTER — TELEPHONE (OUTPATIENT)
Dept: ONCOLOGY | Age: 60
End: 2020-05-22

## 2020-05-22 ENCOUNTER — OFFICE VISIT (OUTPATIENT)
Dept: ONCOLOGY | Age: 60
End: 2020-05-22

## 2020-05-22 ENCOUNTER — HOSPITAL ENCOUNTER (OUTPATIENT)
Facility: MEDICAL CENTER | Age: 60
End: 2020-05-22

## 2020-05-22 VITALS
RESPIRATION RATE: 16 BRPM | SYSTOLIC BLOOD PRESSURE: 127 MMHG | HEART RATE: 85 BPM | BODY MASS INDEX: 24.38 KG/M2 | WEIGHT: 133.3 LBS | TEMPERATURE: 97.9 F | DIASTOLIC BLOOD PRESSURE: 82 MMHG

## 2020-05-22 PROCEDURE — 99211 OFF/OP EST MAY X REQ PHY/QHP: CPT | Performed by: INTERNAL MEDICINE

## 2020-05-22 PROCEDURE — 99214 OFFICE O/P EST MOD 30 MIN: CPT | Performed by: INTERNAL MEDICINE

## 2020-05-22 RX ORDER — ALBUTEROL SULFATE 1.25 MG/3ML
1 SOLUTION RESPIRATORY (INHALATION)
Qty: 360 ML | Refills: 3 | Status: SHIPPED | OUTPATIENT
Start: 2020-05-22 | End: 2021-02-15 | Stop reason: SDUPTHER

## 2020-05-22 RX ORDER — PREDNISONE 20 MG/1
40 TABLET ORAL DAILY
Qty: 20 TABLET | Refills: 0 | Status: SHIPPED | OUTPATIENT
Start: 2020-05-22 | End: 2020-06-01 | Stop reason: ALTCHOICE

## 2020-05-22 RX ORDER — AZITHROMYCIN 500 MG/1
500 TABLET, FILM COATED ORAL DAILY
Qty: 5 TABLET | Refills: 0 | Status: SHIPPED | OUTPATIENT
Start: 2020-05-22 | End: 2020-05-27

## 2020-05-22 NOTE — TELEPHONE ENCOUNTER
ZELDA ARRIVES AMBULATORY FOR ADD ON MD VISIT  DR Kuldeep Raza IN TO SEE PATIENT  ORDERS RECEIVED  SCRIPTS SENT TO PATIENT'S PHARMACY  KEEP ALREADY SCHEDULED APPOINTMENT ON 6/1/20  CHANGED PORT ACCESS & LABS ON 5/26 TO A LAB DRAW PER SANIA RN D/T CHAIR & STAFF AVAILABILITY  AVS PRINTED AND GIVEN TO PATIENT W/ INSTRUCTIONS  PATIENT DISCHARGED AMBULATORY

## 2020-05-22 NOTE — PROGRESS NOTES
essentially showed no evidence of metastatic disease otherwise. The patient was diagnosed with stage IV lung cancer, we plan palliative chemotherapy, started 01/20/2020. She completed 4 cycles of chemoimmunotherapy and we plan to continue with maintenance Keytruda. Unfortunately, the [facility] was very poorly tolerated - decided to hold. INTERIM HISTORY: This is unscheduled visit, she reports continued weakness and fatigue with pain in the legs and knee. She was in ER last week and Xray was negative for pleural effusion, she discharged. Her sore throat has worsened with sinus congestion and swelling, she is unable to clear phlegm. She does not have fever today. She is able to chew and swallow. Her eyes are occasionally watery, improved. The pain in her side from drain site has worsened over that past few weeks. She feels the Norco makes her too foggy and her pain is not controlled with anything OTC. She has tried some topical solutions on the knee but they don't control pain consistently. Her bowels are fluctuating and has lost weight. She has reduced her smoking. She has been using her nebulizer and the Magic Mouthwash. Past Medical History:   has a past medical history of Alcohol abuse, Breast cancer ([address] Chronic diarrhea, COPD (chronic obstructive pulmonary disease) ([facility] [address] Essential hypertension, Lung cancer ([facility] [address] and Tobacco abuse. Past Surgical History:   has a past surgical history that includes Appendectomy; Mastectomy (Right); and Cholecystectomy. Family History: family history includes Breast Cancer in her mother; Cancer in her brother; Deep Vein Thrombosis in her brother; Prostate Cancer in her brother and father; Stroke in her mother. Social History:   reports that she has been smoking. She has a 23.50 pack-year smoking history. She has never used smokeless tobacco. She reports current alcohol use of about 8.0 standard drinks of alcohol per week. She reports that she does not use drugs. examination was nonfocal, with intact cranial nerves  Skin  No rashes or bruising appreciated      REVIEW OF LABORATORY DATA:     Lab Results   Component Value Date    WBC 6.3 05/17/2020    HGB 11.5 (L) 05/17/2020    HCT 33.7 (L) 05/17/2020    MCV 99.1 05/17/2020     05/17/2020       Chemistry        Component Value Date/Time     (L) 05/17/2020 1340    K 3.5 (L) 05/17/2020 1340    CL 90 (L) 05/17/2020 1340    CO2 27 05/17/2020 1340    BUN 7 05/17/2020 1340    CREATININE 0.55 05/17/2020 1340        Component Value Date/Time    CALCIUM 10.4 05/17/2020 1340    ALKPHOS 69 05/04/2020 1340     (H) 05/04/2020 1340    ALT 39 (H) 05/04/2020 1340    BILITOT 0.22 (L) 05/04/2020 1340              IMPRESSION:    1. History of breast cancer in 2010, status post mastectomy and chemotherapy  2. New diagnosis with lung cancer  3. Malignant pleural effusion stage IV disease  4. Chemotherapy with carboplatin, Alimta and Keytruda - started 01/20/2020  5. Weakness in both lower extremities  6. Constellation of symptoms including sore throat, sinus drainage, diffuse arthralgias, worsening fatigue and aches and pains. Differential is very broad but I am concerned about immunologic effect of Keytruda. PLAN:   1. We reviewed and discussed her current symptoms. 2. I am ordering Z-pack and steroids course to help with her sore throat but also help with her arthralgias. 3. She has CT next week and we will review. 4. If her symptoms persist I will refer to ENT. 5. We discussed managing her ongoing pain, monitor with steroids. 6. Return for next scheduled visit to review CT and evaluate.

## 2020-05-26 ENCOUNTER — HOSPITAL ENCOUNTER (OUTPATIENT)
Dept: CT IMAGING | Age: 60
Discharge: HOME | End: 2020-05-28

## 2020-05-26 PROCEDURE — 71260 CT THORAX DX C+: CPT

## 2020-05-26 PROCEDURE — 6360000004 HC RX CONTRAST MEDICATION: Performed by: INTERNAL MEDICINE

## 2020-05-26 PROCEDURE — 2580000003 HC RX 258: Performed by: INTERNAL MEDICINE

## 2020-05-26 RX ORDER — 0.9 % SODIUM CHLORIDE 0.9 %
80 INTRAVENOUS SOLUTION INTRAVENOUS ONCE
Status: COMPLETED | OUTPATIENT
Start: 2020-05-26 | End: 2020-05-26

## 2020-05-26 RX ORDER — SODIUM CHLORIDE 0.9 % (FLUSH) 0.9 %
10 SYRINGE (ML) INJECTION PRN
Status: DISCONTINUED | OUTPATIENT
Start: 2020-05-26 | End: 2020-05-29 | Stop reason: HOSPADM

## 2020-05-26 RX ADMIN — Medication 10 ML: at 12:16

## 2020-05-26 RX ADMIN — IOPAMIDOL 75 ML: 755 INJECTION, SOLUTION INTRAVENOUS at 12:16

## 2020-05-26 RX ADMIN — SODIUM CHLORIDE 80 ML: 9 INJECTION, SOLUTION INTRAVENOUS at 12:16

## 2020-05-27 ENCOUNTER — HOSPITAL ENCOUNTER (OUTPATIENT)
Facility: MEDICAL CENTER | Age: 60
End: 2020-05-27

## 2020-05-28 ENCOUNTER — TELEPHONE (OUTPATIENT)
Dept: CASE MANAGEMENT | Age: 60
End: 2020-05-28

## 2020-05-28 NOTE — TELEPHONE ENCOUNTER
Name: Sophia Gabriel  : 1960  MRN: [id number]    Oncology Navigation Follow-Up Note    Contact Type:  Telephone    Subjective:     Objective:     Assistance Needed:     Receptive to Advanced Care Planning / Palliative Care:      Referrals:     Education:     Notes: Navigator discussing pt. POC with Salty Arriaga And Palak to speak with Dr. Gavin Silvestre and monitor POC in regards to requesting further Keytruda or other regimen due to pt. Uninsured, plan to follow.     Electronically signed by Jose Alberto Torrez RN on 2020 at 9:13 AM

## 2020-05-29 ENCOUNTER — CARE COORDINATION (OUTPATIENT)
Dept: CARE COORDINATION | Age: 60
End: 2020-05-29

## 2020-06-01 ENCOUNTER — TELEPHONE (OUTPATIENT)
Dept: ONCOLOGY | Age: 60
End: 2020-06-01

## 2020-06-01 ENCOUNTER — OFFICE VISIT (OUTPATIENT)
Dept: ONCOLOGY | Age: 60
End: 2020-06-01

## 2020-06-01 VITALS
SYSTOLIC BLOOD PRESSURE: 145 MMHG | TEMPERATURE: 98 F | DIASTOLIC BLOOD PRESSURE: 89 MMHG | WEIGHT: 133.1 LBS | HEART RATE: 89 BPM | BODY MASS INDEX: 24.34 KG/M2

## 2020-06-01 PROCEDURE — 99214 OFFICE O/P EST MOD 30 MIN: CPT | Performed by: INTERNAL MEDICINE

## 2020-06-01 PROCEDURE — 99212 OFFICE O/P EST SF 10 MIN: CPT

## 2020-06-01 RX ORDER — MELOXICAM 15 MG/1
15 TABLET ORAL DAILY
Qty: 30 TABLET | Refills: 3 | Status: SHIPPED | OUTPATIENT
Start: 2020-06-01 | End: 2020-06-23 | Stop reason: ALTCHOICE

## 2020-06-01 NOTE — PROGRESS NOTES
DIAGNOSIS:   1. Metastatic adenocarcinoma of the lung, stage IV  2. Malignant pleural effusion  3. Molecular testing negative for EGFR, ALK and ROS. Instead it shows Kras and CDK mutation,   4. History of breast cancer in 2010  CURRENT THERAPY:  1. Mastectomy and chemotherapy in 2010  2. Status post thoracocentesis x2  3. Pending study did not show any evidence of distant metastases other than the pleural effusion  4. Palliative chemotherapy with carboplatin, Alimta and Keytruda- started 01/20/2020  5. After 4 cycles of chemoimmunotherapy, chemotherapy will be dropped and she will be maintained on maintenance Keytruda   6. Due to side effects, the drug was held. BRIEF CASE HISTORY:    The patient is a 63 y.o.  female who is admitted to the hospital for chief complaints of shortness of breath. Patient has history of breast cancer for which she underwent mastectomy in 2010 followed by chemotherapy. This was done in [place]. The  Patient recently moved to [place] area about a year ago. Patient had a significant history of tobacco dependence. Patient started noticing worsening of shortness of breath and presented to the ER. Work-up done shows right-sided pleural effusion. CT scan showed multiloculated left-sided pleural effusion with compressive atelectasis in the lingula and majority of the left lower lobe and partial compression atelectasis of the left upper lobe. There was also underlying emphysema. There was a stable 1.8 cm adrenal mass likely adrenal adenoma which had been stable since November 2018. Patient underwent ultrasound guided thoracentesis. Cytology of pleural fluid confirms adenocarcinoma consistent with lung primary  The patient was discharged home but she came back with worsening shortness of breath and was found to have significant pleural effusion that was tapped pathology showed malignant cells, adenocarcinoma of lung primary. .    We saw the patient in house, we double, no tinnitus or hearing problem, no dysphagia; + sore throat and sinus - unchanged; +watery eyes and blurry vision - improved; hearing worse  Respiratory: No chest pain, no cough or hemoptysis. +shortness of breath - improved  Cardiovascular: Denies chest pain, PND or orthopnea, palpitations. +intermittent LE edema and facial swelling - resolved  Gastrointestinal: No nausea or vomiting, abdominal pain;  + diarrhea and constipation. Genitourinary: Denies dysuria, hematuria, frequency, urgency or incontinence. Neurological: Denies headaches, decreased LOC, no sensory or motor focal deficits. Musculoskeletal: No arthralgia no back pain or joint swelling. +weakness and muscle fatigue; bilateral leg and knee pain - unchanged  Skin: There are no rashes or bleeding. Psychiatric: No anxiety, no depression. Endocrine: No diabetes or thyroid disease. Hematologic: No bleeding, no adenopathy. PHYSICAL EXAM:      BP (!) 145/89   Pulse 89   Temp 98 °F (36.7 °C) (Oral)   Wt 133 lb 1.6 oz (60.4 kg)   BMI 24.34 kg/m²    Temp (24hrs), Av.9 °F (36.6 °C), Min:97.9 °F (36.6 °C), Max:97.9 °F (36.6 °C)  Gen.  Exam, showed a well-appearing patient without evidence of distress or pain  HEENT, normocephalic and atraumatic, PERRLA extraocular muscles are intact; +thrush present - resolved  Neck showed no JVD no carotid bruit, no cervical adenopathy  Chest is clear to auscultation bilaterally +wheezing  Heart is regular without any murmur  Abdomen soft nontender no hepatosplenomegaly  Lower extremities showed no edema clubbing or cyanosis; tenderness in lateral compartment of left knee   Neurological examination was nonfocal, with intact cranial nerves  Skin  No rashes or bruising appreciated      REVIEW OF LABORATORY DATA:     Lab Results   Component Value Date    WBC 6.3 2020    HGB 11.5 (L) 2020    HCT 33.7 (L) 2020    MCV 99.1 2020     2020       Chemistry        Component Value Date/Time     (L) 05/17/2020 1340    K 3.5 (L) 05/17/2020 1340    CL 90 (L) 05/17/2020 1340    CO2 27 05/17/2020 1340    BUN 7 05/17/2020 1340    CREATININE 0.55 05/17/2020 1340        Component Value Date/Time    CALCIUM 10.4 05/17/2020 1340    ALKPHOS 69 05/04/2020 1340     (H) 05/04/2020 1340    ALT 39 (H) 05/04/2020 1340    BILITOT 0.22 (L) 05/04/2020 1340        CT scan 5/26/2020  Impression   1. The previously described 7 mm left upper lobe lung nodule is less   conspicuous on today's study appears to represent area of focal scarring on   today's study.  No CT evidence of progression of disease. IMPRESSION:    1. History of breast cancer in 2010, status post mastectomy and chemotherapy  2. New diagnosis with lung cancer  3. Malignant pleural effusion stage IV disease  4. Chemotherapy with carboplatin, Alimta and Keytruda - started 01/20/2020  5. Weakness in both lower extremities  6. Constellation of symptoms including sore throat, sinus drainage, diffuse arthralgias, worsening fatigue and aches and pains. Differential is very broad but I am concerned about immunologic effect of Keytruda. PLAN:   1. We reviewed her CT in comparison to previous which shows well controlled disease and I assured her symptoms are not related to growing disease. 2. Her dyspnea is caused by COPD and recommend she manage with inhalers. 3. Unfortunately, some of her symptoms persist, we will follow up with ENT referral for the sore throat. 4. We continue to hold treatment and monitor closely to allow for continued healing. 5. We discussed ongoing management of her pain, she may continue to use Norco as needed and I am writing for Mobic. 6. Return in 4 weeks with lab work.

## 2020-06-02 ENCOUNTER — TELEPHONE (OUTPATIENT)
Dept: CASE MANAGEMENT | Age: 60
End: 2020-06-02

## 2020-06-03 ENCOUNTER — TELEPHONE (OUTPATIENT)
Dept: ONCOLOGY | Age: 60
End: 2020-06-03

## 2020-06-04 ENCOUNTER — TELEPHONE (OUTPATIENT)
Dept: CASE MANAGEMENT | Age: 60
End: 2020-06-04

## 2020-06-08 ENCOUNTER — TELEPHONE (OUTPATIENT)
Dept: CASE MANAGEMENT | Age: 60
End: 2020-06-08

## 2020-06-08 NOTE — TELEPHONE ENCOUNTER
Name: Belinda Montenegro  : 1960  MRN: [id number]    Oncology Navigation Follow-Up Note    Contact Type:      Subjective:     Objective:     Assistance Needed:     Receptive to Advanced Care Planning / Palliative Care:      Referrals:     Education:     Notes: Navigator sending text message to Dr. Yoni Greer him pt.  Unable to see Dr. Bull Castaneda due to lack of insurance    MD confirming received message, no additional orders at this time    Electronically signed by Mervat Ocampo RN on 2020 at 2:51 PM

## 2020-06-23 ENCOUNTER — OFFICE VISIT (OUTPATIENT)
Dept: FAMILY MEDICINE CLINIC | Age: 60
End: 2020-06-23

## 2020-06-23 VITALS
HEART RATE: 85 BPM | SYSTOLIC BLOOD PRESSURE: 130 MMHG | DIASTOLIC BLOOD PRESSURE: 80 MMHG | OXYGEN SATURATION: 98 % | WEIGHT: 128 LBS | BODY MASS INDEX: 23.41 KG/M2 | TEMPERATURE: 98.3 F

## 2020-06-23 PROCEDURE — 99214 OFFICE O/P EST MOD 30 MIN: CPT | Performed by: INTERNAL MEDICINE

## 2020-06-23 RX ORDER — AMLODIPINE BESYLATE 5 MG/1
5 TABLET ORAL DAILY
Qty: 90 TABLET | Refills: 1 | Status: SHIPPED | OUTPATIENT
Start: 2020-06-23 | End: 2020-12-14

## 2020-06-23 RX ORDER — GABAPENTIN 400 MG/1
400 CAPSULE ORAL 3 TIMES DAILY
Qty: 90 CAPSULE | Refills: 1 | Status: SHIPPED | OUTPATIENT
Start: 2020-06-23 | End: 2021-12-14

## 2020-06-23 NOTE — PROGRESS NOTES
Subjective:       Patient ID:     Valerie Bowles is a 63 y.o. female who presents for   Chief Complaint   Patient presents with    3 Month Follow-Up       HPI:  Nursing note reviewed and discussed with patient. zaki for f/u  Her lung cancer is somewhat in remission, she was able to get the drain in her chest removed. Did not tolerate the Keytruda at all. She is having burning pain in her legs and feet since the [facility], has tried gabapentin without relief. Having joint pains, would like to try diclofenac gel if she can safely use it  She and her  are about $08064 in debt, states she was told she is not disabled enough for SSI - they have not sought out a  to help. She has been having burning in her throat that has temporarily responded to Magic mouthwash, needs to see ENT but cannot afford the out of pocket price. Patient's medications, allergies, past medical, surgical, social and family histories were reviewed and updated as appropriate. Past Medical History:   Diagnosis Date    Alcohol abuse     For many years; 5-6 beers per night    Breast cancer ([facility] [address] 2010    right radical mastectomy with positive sentinel dose, chemo, s/p tamoxifen x 7 years     Chronic diarrhea 1/29/2019    COPD (chronic obstructive pulmonary disease) ([facility] [address]     Essential hypertension     Lung cancer ([facility] [address]     Tobacco abuse      Past Surgical History:   Procedure Laterality Date    APPENDECTOMY      CHOLECYSTECTOMY      MASTECTOMY Right        Social History     Tobacco Use    Smoking status: Current Every Day Smoker     Packs/day: 0.50     Years: 47.00     Pack years: 23.50    Smokeless tobacco: Never Used   Substance Use Topics    Alcohol use:  Yes     Alcohol/week: 8.0 standard drinks     Types: 8 Cans of beer per week     Frequency: 4 or more times a week     Drinks per session: 5 or 6     Comment: per night      Patient Active Problem List   Diagnosis    Panlobular emphysema ([address]    Essential hypertension    History of right breast cancer    Loculated pleural effusion    Hyponatremia    Coronary artery disease involving native coronary artery of native heart without angina pectoris    Tobacco abuse disorder    Alcohol abuse    Leukocytosis    Adenocarcinoma, lung, left (HCC)    Shortness of breath    Pleural effusion    Insomnia    Anxiety         Prior to Visit Medications    Medication Sig Taking? Authorizing Provider   albuterol (ACCUNEB) 1.25 MG/3ML nebulizer solution Inhale 3 mLs into the lungs every 2 hours as needed for Wheezing or Shortness of Breath Yes Oumar Beltran MD   levothyroxine (SYNTHROID) 75 MCG tablet Take 1 tablet by mouth daily Yes Laureano Ribera MD   amLODIPine (NORVASC) 5 MG tablet Take 5 mg by mouth daily Yes Historical Provider, MD   folic acid (FOLVITE) 214 MCG tablet Take 1 tablet by mouth daily Yes Laureano Ribera MD   ipratropium-albuterol (DUONEB) 0.5-2.5 (3) MG/3ML SOLN nebulizer solution Inhale 3 mLs into the lungs 4 times daily Yes Fahad Tinajero DO   albuterol sulfate  (90 Base) MCG/ACT inhaler Inhale 2 puffs into the lungs every 4 hours as needed for Wheezing or Shortness of Breath Yes Fahad Tinajero DO     Review of Systems  Review of Systems   Constitutional: Negative for fatigue, fever and unexpected weight change. Respiratory: Negative for cough, choking, chest tightness, shortness of breath and wheezing. Cardiovascular: Negative for chest pain, palpitations and leg swelling. Gastrointestinal: Negative for abdominal pain, anal bleeding, blood in stool, constipation, diarrhea, nausea and vomiting. Endocrine: Negative. Musculoskeletal: Negative for joint swelling and myalgias. Skin: Negative. Neurological: Negative for dizziness. Psychiatric/Behavioral: Negative for sleep disturbance. All other systems reviewed and are negative.          Objective:       Physical Exam:  /80 (Site: Left Upper Arm, Position: Sitting, Cuff Size: Medium Adult)   Pulse 85   Temp 98.3 °F (36.8 °C) (Temporal)   Wt 128 lb (58.1 kg)   SpO2 98%   BMI 23.41 kg/m²    Wt Readings from Last 3 Encounters:   06/23/20 128 lb (58.1 kg)   06/01/20 133 lb 1.6 oz (60.4 kg)   05/22/20 133 lb 4.8 oz (60.5 kg)       Physical Exam  Vitals signs and nursing note reviewed. Constitutional:       General: She is not in acute distress. Appearance: She is well-developed. She is not ill-appearing. Eyes:      General: Lids are normal. Vision grossly intact. Cardiovascular:      Rate and Rhythm: Normal rate and regular rhythm. Heart sounds: Normal heart sounds, S1 normal and S2 normal. No murmur. No friction rub. No gallop. Pulmonary:      Effort: Pulmonary effort is normal. No respiratory distress. Breath sounds: Normal breath sounds. No wheezing. Abdominal:      General: Bowel sounds are normal.      Palpations: Abdomen is soft. There is no mass. Tenderness: There is no abdominal tenderness. There is no guarding. Musculoskeletal: Normal range of motion. Skin:     General: Skin is warm and dry. Capillary Refill: Capillary refill takes less than 2 seconds. Neurological:      General: No focal deficit present. Mental Status: She is alert and oriented to person, place, and time. Data Review  not applicable  Oncology notes reviewed   Labs and imaging reviewed        Assessment/Plan:      1. Chemotherapy-induced peripheral neuropathy (HCC)  - gabapentin (NEURONTIN) 400 MG capsule; Take 1 capsule by mouth 3 times daily for 30 days. Dispense: 90 capsule; Refill: 1    2. Adenocarcinoma, lung, left [facility])  F/u oncology   Encouraged to get a  to appeal disability decision     3. Tobacco abuse disorder  Working on quitting     4. Panlobular emphysema (HCC)  Continue PRN albuterol     5.  Essential hypertension  Continue current regimen     Will reach out to ENT and call pt with options

## 2020-06-24 ENCOUNTER — HOSPITAL ENCOUNTER (OUTPATIENT)
Facility: MEDICAL CENTER | Age: 60
End: 2020-06-24

## 2020-06-24 ENCOUNTER — TELEPHONE (OUTPATIENT)
Dept: FAMILY MEDICINE CLINIC | Age: 60
End: 2020-06-24

## 2020-06-25 PROBLEM — J90 PLEURAL EFFUSION: Status: RESOLVED | Noted: 2019-12-26 | Resolved: 2020-06-25

## 2020-06-25 ASSESSMENT — ENCOUNTER SYMPTOMS
DIARRHEA: 0
NAUSEA: 0
SHORTNESS OF BREATH: 0
VOMITING: 0
BLOOD IN STOOL: 0
COUGH: 0
ANAL BLEEDING: 0
CONSTIPATION: 0
ABDOMINAL PAIN: 0
CHOKING: 0
WHEEZING: 0
CHEST TIGHTNESS: 0

## 2020-06-25 ASSESSMENT — VISUAL ACUITY: OU: 1

## 2020-06-29 ENCOUNTER — HOSPITAL ENCOUNTER (OUTPATIENT)
Dept: INFUSION THERAPY | Facility: MEDICAL CENTER | Age: 60
Discharge: HOME | End: 2020-06-29

## 2020-06-29 ENCOUNTER — TELEPHONE (OUTPATIENT)
Dept: ONCOLOGY | Age: 60
End: 2020-06-29

## 2020-06-29 ENCOUNTER — TELEPHONE (OUTPATIENT)
Dept: INFUSION THERAPY | Facility: MEDICAL CENTER | Age: 60
End: 2020-06-29

## 2020-06-29 ENCOUNTER — OFFICE VISIT (OUTPATIENT)
Dept: ONCOLOGY | Age: 60
End: 2020-06-29

## 2020-06-29 VITALS
HEART RATE: 90 BPM | WEIGHT: 130 LBS | RESPIRATION RATE: 18 BRPM | BODY MASS INDEX: 23.78 KG/M2 | DIASTOLIC BLOOD PRESSURE: 77 MMHG | TEMPERATURE: 98.8 F | SYSTOLIC BLOOD PRESSURE: 126 MMHG

## 2020-06-29 DIAGNOSIS — C34.92 ADENOCARCINOMA, LUNG, LEFT (HCC): Primary | ICD-10-CM

## 2020-06-29 DIAGNOSIS — J91.0 PLEURAL EFFUSION, MALIGNANT: ICD-10-CM

## 2020-06-29 LAB
ABSOLUTE EOS #: 0.1 K/UL (ref 0–0.44)
ABSOLUTE IMMATURE GRANULOCYTE: 0.02 K/UL (ref 0–0.3)
ABSOLUTE LYMPH #: 1.84 K/UL (ref 1.1–3.7)
ABSOLUTE MONO #: 0.84 K/UL (ref 0.1–1.2)
ALBUMIN SERPL-MCNC: 4.3 G/DL (ref 3.5–5.2)
ALBUMIN/GLOBULIN RATIO: ABNORMAL (ref 1–2.5)
ALP BLD-CCNC: 68 U/L (ref 35–104)
ALT SERPL-CCNC: 17 U/L (ref 5–33)
AMYLASE: 60 U/L (ref 28–100)
ANION GAP SERPL CALCULATED.3IONS-SCNC: 12 MMOL/L (ref 9–17)
AST SERPL-CCNC: 27 U/L
BASOPHILS # BLD: 0 % (ref 0–2)
BASOPHILS ABSOLUTE: <0.03 K/UL (ref 0–0.2)
BILIRUB SERPL-MCNC: 0.21 MG/DL (ref 0.3–1.2)
BUN BLDV-MCNC: 8 MG/DL (ref 6–20)
BUN/CREAT BLD: 17 (ref 9–20)
CALCIUM SERPL-MCNC: 10.3 MG/DL (ref 8.6–10.4)
CHLORIDE BLD-SCNC: 95 MMOL/L (ref 98–107)
CO2: 27 MMOL/L (ref 20–31)
CORTISOL COLLECTION INFO: NORMAL
CORTISOL: 10.3 UG/DL (ref 2.7–18.4)
CREAT SERPL-MCNC: 0.47 MG/DL (ref 0.5–0.9)
DIFFERENTIAL TYPE: ABNORMAL
EOSINOPHILS RELATIVE PERCENT: 1 % (ref 1–4)
GFR AFRICAN AMERICAN: >60 ML/MIN
GFR NON-AFRICAN AMERICAN: >60 ML/MIN
GFR SERPL CREATININE-BSD FRML MDRD: ABNORMAL ML/MIN/{1.73_M2}
GFR SERPL CREATININE-BSD FRML MDRD: ABNORMAL ML/MIN/{1.73_M2}
GLUCOSE BLD-MCNC: 124 MG/DL (ref 70–99)
HCT VFR BLD CALC: 36.5 % (ref 36.3–47.1)
HEMOGLOBIN: 12.2 G/DL (ref 11.9–15.1)
IMMATURE GRANULOCYTES: 0 %
LIPASE: 37 U/L (ref 13–60)
LYMPHOCYTES # BLD: 24 % (ref 24–43)
MCH RBC QN AUTO: 34.4 PG (ref 25.2–33.5)
MCHC RBC AUTO-ENTMCNC: 33.4 G/DL (ref 28.4–34.8)
MCV RBC AUTO: 102.8 FL (ref 82.6–102.9)
MONOCYTES # BLD: 11 % (ref 3–12)
NRBC AUTOMATED: ABNORMAL PER 100 WBC
PDW BLD-RTO: 13.2 % (ref 11.8–14.4)
PLATELET # BLD: 308 K/UL (ref 138–453)
PLATELET ESTIMATE: ABNORMAL
PMV BLD AUTO: 8.1 FL (ref 8.1–13.5)
POTASSIUM SERPL-SCNC: 4 MMOL/L (ref 3.7–5.3)
RBC # BLD: 3.55 M/UL (ref 3.95–5.11)
RBC # BLD: ABNORMAL 10*6/UL
SEG NEUTROPHILS: 64 % (ref 36–65)
SEGMENTED NEUTROPHILS ABSOLUTE COUNT: 4.99 K/UL (ref 1.5–8.1)
SODIUM BLD-SCNC: 134 MMOL/L (ref 135–144)
TOTAL PROTEIN: 7.3 G/DL (ref 6.4–8.3)
TSH SERPL DL<=0.05 MIU/L-ACNC: 13.34 MIU/L (ref 0.3–5)
WBC # BLD: 7.8 K/UL (ref 3.5–11.3)
WBC # BLD: ABNORMAL 10*3/UL

## 2020-06-29 PROCEDURE — 83690 ASSAY OF LIPASE: CPT

## 2020-06-29 PROCEDURE — 2580000003 HC RX 258: Performed by: INTERNAL MEDICINE

## 2020-06-29 PROCEDURE — 80053 COMPREHEN METABOLIC PANEL: CPT

## 2020-06-29 PROCEDURE — 82533 TOTAL CORTISOL: CPT

## 2020-06-29 PROCEDURE — 6360000002 HC RX W HCPCS: Performed by: INTERNAL MEDICINE

## 2020-06-29 PROCEDURE — 85025 COMPLETE CBC W/AUTO DIFF WBC: CPT

## 2020-06-29 PROCEDURE — 82150 ASSAY OF AMYLASE: CPT

## 2020-06-29 PROCEDURE — 84443 ASSAY THYROID STIM HORMONE: CPT

## 2020-06-29 PROCEDURE — 99215 OFFICE O/P EST HI 40 MIN: CPT | Performed by: INTERNAL MEDICINE

## 2020-06-29 PROCEDURE — 36591 DRAW BLOOD OFF VENOUS DEVICE: CPT

## 2020-06-29 RX ORDER — HEPARIN SODIUM (PORCINE) LOCK FLUSH IV SOLN 100 UNIT/ML 100 UNIT/ML
500 SOLUTION INTRAVENOUS PRN
Status: CANCELLED | OUTPATIENT
Start: 2020-06-29

## 2020-06-29 RX ORDER — SODIUM CHLORIDE 0.9 % (FLUSH) 0.9 %
5 SYRINGE (ML) INJECTION PRN
Status: CANCELLED | OUTPATIENT
Start: 2020-08-03

## 2020-06-29 RX ORDER — MEPERIDINE HYDROCHLORIDE 50 MG/ML
12.5 INJECTION INTRAMUSCULAR; INTRAVENOUS; SUBCUTANEOUS ONCE
Status: CANCELLED | OUTPATIENT
Start: 2020-08-03

## 2020-06-29 RX ORDER — SODIUM CHLORIDE 9 MG/ML
INJECTION, SOLUTION INTRAVENOUS CONTINUOUS
Status: CANCELLED | OUTPATIENT
Start: 2020-08-03

## 2020-06-29 RX ORDER — SODIUM CHLORIDE 0.9 % (FLUSH) 0.9 %
10 SYRINGE (ML) INJECTION PRN
Status: CANCELLED | OUTPATIENT
Start: 2020-08-03

## 2020-06-29 RX ORDER — SODIUM CHLORIDE 0.9 % (FLUSH) 0.9 %
20 SYRINGE (ML) INJECTION PRN
Status: CANCELLED | OUTPATIENT
Start: 2020-06-29

## 2020-06-29 RX ORDER — METHYLPREDNISOLONE SODIUM SUCCINATE 125 MG/2ML
125 INJECTION, POWDER, LYOPHILIZED, FOR SOLUTION INTRAMUSCULAR; INTRAVENOUS ONCE
Status: CANCELLED | OUTPATIENT
Start: 2020-08-03

## 2020-06-29 RX ORDER — SODIUM CHLORIDE 0.9 % (FLUSH) 0.9 %
10 SYRINGE (ML) INJECTION PRN
Status: CANCELLED | OUTPATIENT
Start: 2020-06-29

## 2020-06-29 RX ORDER — SODIUM CHLORIDE 9 MG/ML
20 INJECTION, SOLUTION INTRAVENOUS ONCE
Status: CANCELLED | OUTPATIENT
Start: 2020-08-03

## 2020-06-29 RX ORDER — LEVOTHYROXINE SODIUM 88 UG/1
88 TABLET ORAL DAILY
Qty: 30 TABLET | Refills: 5 | Status: SHIPPED | OUTPATIENT
Start: 2020-06-29 | End: 2020-08-31 | Stop reason: SDUPTHER

## 2020-06-29 RX ORDER — HEPARIN SODIUM (PORCINE) LOCK FLUSH IV SOLN 100 UNIT/ML 100 UNIT/ML
500 SOLUTION INTRAVENOUS PRN
Status: DISCONTINUED | OUTPATIENT
Start: 2020-06-29 | End: 2020-06-30 | Stop reason: HOSPADM

## 2020-06-29 RX ORDER — ALBUTEROL SULFATE 90 UG/1
2 AEROSOL, METERED RESPIRATORY (INHALATION) EVERY 4 HOURS PRN
Qty: 3 INHALER | Refills: 1 | Status: SHIPPED | OUTPATIENT
Start: 2020-06-29 | End: 2020-12-21 | Stop reason: SDUPTHER

## 2020-06-29 RX ORDER — DIPHENHYDRAMINE HYDROCHLORIDE 50 MG/ML
50 INJECTION INTRAMUSCULAR; INTRAVENOUS ONCE
Status: CANCELLED | OUTPATIENT
Start: 2020-08-03

## 2020-06-29 RX ORDER — SODIUM CHLORIDE 0.9 % (FLUSH) 0.9 %
20 SYRINGE (ML) INJECTION PRN
Status: DISCONTINUED | OUTPATIENT
Start: 2020-06-29 | End: 2020-06-30 | Stop reason: HOSPADM

## 2020-06-29 RX ORDER — HEPARIN SODIUM (PORCINE) LOCK FLUSH IV SOLN 100 UNIT/ML 100 UNIT/ML
500 SOLUTION INTRAVENOUS PRN
Status: CANCELLED | OUTPATIENT
Start: 2020-08-03

## 2020-06-29 RX ORDER — EPINEPHRINE 1 MG/ML
0.3 INJECTION, SOLUTION, CONCENTRATE INTRAVENOUS PRN
Status: CANCELLED | OUTPATIENT
Start: 2020-08-03

## 2020-06-29 RX ADMIN — Medication 20 ML: at 15:30

## 2020-06-29 RX ADMIN — HEPARIN 500 UNITS: 100 SYRINGE at 15:30

## 2020-06-29 NOTE — TELEPHONE ENCOUNTER
DR Pizano Friend WROTE FOR OPDIVO FOR ZELDA. ZELDA IS A SELF PAY. I REACHED OUT TO LIBIA TO SEE IF SHE HAS A PROGRAM  FOR IT. LIBIA STATES YES AND I GAVE HER ZELDA'S INFORMATION. LIBIA STATES THAT SHE WILL START WORKING ON IT FOR ZELDA.

## 2020-06-29 NOTE — PROGRESS NOTES
Pt arrives per ambulatory with visitor and labs shown to MD and orders reviewed and pt tolerated port draw/flush well and very good blood return. No reactions or complaints and pt discharged per ambulatory with visitor to  for AVS with next appts.

## 2020-06-29 NOTE — PROGRESS NOTES
malignant cells, adenocarcinoma of lung primary. .    We saw the patient in house, we arrange for staging PET CT scan and brain MRI which essentially showed no evidence of metastatic disease otherwise. The patient was diagnosed with stage IV lung cancer, we plan palliative chemotherapy, started 01/20/2020. She completed 4 cycles of chemoimmunotherapy and we plan to continue with maintenance Keytruda. Unfortunately, the Sherren Chol was very poorly tolerated - decided to hold. INTERIM HISTORY: Neil Tiwari comes back today for a follow-up for lung cancer. Due to insurance issues she is unable to be seen by ENT. She did follow up with PCP last week who increased her Gabapentin and notes improved pain. Her xerostomia persists and thrush has recurred. Overall, she feels slightly improved. She is working to improve physical stamina and strength. Past Medical History:   has a past medical history of Alcohol abuse, Breast cancer ([facility] [address] Chronic diarrhea, COPD (chronic obstructive pulmonary disease) ([facility] [address] Essential hypertension, Lung cancer ([facility] [address] Pleural effusion, and Tobacco abuse. Past Surgical History:   has a past surgical history that includes Appendectomy; Mastectomy (Right); and Cholecystectomy. Family History: family history includes Breast Cancer in her mother; Cancer in her brother; Deep Vein Thrombosis in her brother; Prostate Cancer in her brother and father; Stroke in her mother. Social History:   reports that she has been smoking. She has a 23.50 pack-year smoking history. She has never used smokeless tobacco. She reports current alcohol use of about 8.0 standard drinks of alcohol per week. She reports that she does not use drugs. Medications:    Reviewed in EPIC     Allergies:  Morphine; Robitussin (alcohol free) [guaifenesin]; Aspirin; Penicillins; and Sulfa antibiotics    REVIEW OF SYSTEMS:   General: No fever or night sweats. Weight loss.  +fatigue and poor appetite  - unchanged  ENT: No double,

## 2020-06-30 ENCOUNTER — TELEPHONE (OUTPATIENT)
Dept: INFUSION THERAPY | Facility: MEDICAL CENTER | Age: 60
End: 2020-06-30

## 2020-07-02 ENCOUNTER — TELEPHONE (OUTPATIENT)
Dept: INFUSION THERAPY | Facility: MEDICAL CENTER | Age: 60
End: 2020-07-02

## 2020-07-02 ENCOUNTER — TELEPHONE (OUTPATIENT)
Dept: FAMILY MEDICINE CLINIC | Age: 60
End: 2020-07-02

## 2020-07-02 NOTE — TELEPHONE ENCOUNTER
Steven Lomeli was referred to ENT (Dr Angela Leyden) by Oncology, Jazmine Leon MD.   She was told can not schedule with him due to no INS unless pays $300.00 up front. Her  is asking for a referral to [facility] because will set up a payment plan.   Dr Leobardo Kamara, Dr Billy Borrego, Dr Manuel Sagastume is in the group

## 2020-07-02 NOTE — TELEPHONE ENCOUNTER
PER PRECERT, ZELDA IS A SELF PAY. I TALKED TO LIBIA TO SEE IF SHE HAS A PROGRAM FOR OPDIVO AND SHE STATES YES. I TOLD HER ABOUT ZELDA AND LIBIA STATES SHE WILL WORK ON GETTING THE DRUG FOR ZELDA. ONCE DRUG ARRIVES, PT CAN BE SCHEDULED FOR TREATMENT.

## 2020-07-08 ENCOUNTER — TELEPHONE (OUTPATIENT)
Dept: CASE MANAGEMENT | Age: 60
End: 2020-07-08

## 2020-07-08 NOTE — TELEPHONE ENCOUNTER
Name: Rohit Coburn  : 1960  MRN: [id number]    Oncology Navigation Follow-Up Note    Navigator checking on Opdivo for pt. , message sent to [facility] for arrival?                          Electronically signed by Jalyn Guerrero RN on 2020 at 2:34 PM

## 2020-07-15 ENCOUNTER — TELEPHONE (OUTPATIENT)
Dept: ONCOLOGY | Age: 60
End: 2020-07-15

## 2020-07-29 ENCOUNTER — HOSPITAL ENCOUNTER (OUTPATIENT)
Facility: MEDICAL CENTER | Age: 60
End: 2020-07-29

## 2020-08-03 ENCOUNTER — TELEPHONE (OUTPATIENT)
Dept: ONCOLOGY | Age: 60
End: 2020-08-03

## 2020-08-03 ENCOUNTER — OFFICE VISIT (OUTPATIENT)
Dept: ONCOLOGY | Age: 60
End: 2020-08-03

## 2020-08-03 ENCOUNTER — HOSPITAL ENCOUNTER (OUTPATIENT)
Dept: INFUSION THERAPY | Facility: MEDICAL CENTER | Age: 60
Discharge: HOME | End: 2020-08-03

## 2020-08-03 VITALS
OXYGEN SATURATION: 94 % | BODY MASS INDEX: 22.79 KG/M2 | WEIGHT: 124.6 LBS | TEMPERATURE: 98.3 F | DIASTOLIC BLOOD PRESSURE: 84 MMHG | SYSTOLIC BLOOD PRESSURE: 130 MMHG | HEART RATE: 90 BPM

## 2020-08-03 DIAGNOSIS — C34.92 ADENOCARCINOMA, LUNG, LEFT (HCC): Primary | ICD-10-CM

## 2020-08-03 LAB
ABSOLUTE EOS #: 0.16 K/UL (ref 0–0.44)
ABSOLUTE IMMATURE GRANULOCYTE: 0.03 K/UL (ref 0–0.3)
ABSOLUTE LYMPH #: 2.74 K/UL (ref 1.1–3.7)
ABSOLUTE MONO #: 0.94 K/UL (ref 0.1–1.2)
ALBUMIN SERPL-MCNC: 4.6 G/DL (ref 3.5–5.2)
ALBUMIN/GLOBULIN RATIO: ABNORMAL (ref 1–2.5)
ALP BLD-CCNC: 78 U/L (ref 35–104)
ALT SERPL-CCNC: 17 U/L (ref 5–33)
AMYLASE: 50 U/L (ref 28–100)
ANION GAP SERPL CALCULATED.3IONS-SCNC: 13 MMOL/L (ref 9–17)
AST SERPL-CCNC: 25 U/L
BASOPHILS # BLD: 1 % (ref 0–2)
BASOPHILS ABSOLUTE: 0.07 K/UL (ref 0–0.2)
BILIRUB SERPL-MCNC: 0.22 MG/DL (ref 0.3–1.2)
BUN BLDV-MCNC: 5 MG/DL (ref 6–20)
BUN/CREAT BLD: 13 (ref 9–20)
CALCIUM SERPL-MCNC: 10.5 MG/DL (ref 8.6–10.4)
CHLORIDE BLD-SCNC: 92 MMOL/L (ref 98–107)
CO2: 25 MMOL/L (ref 20–31)
CORTISOL COLLECTION INFO: NORMAL
CORTISOL: 9.9 UG/DL (ref 2.7–18.4)
CREAT SERPL-MCNC: 0.4 MG/DL (ref 0.5–0.9)
DIFFERENTIAL TYPE: NORMAL
EOSINOPHILS RELATIVE PERCENT: 2 % (ref 1–4)
GFR AFRICAN AMERICAN: >60 ML/MIN
GFR NON-AFRICAN AMERICAN: >60 ML/MIN
GFR SERPL CREATININE-BSD FRML MDRD: ABNORMAL ML/MIN/{1.73_M2}
GFR SERPL CREATININE-BSD FRML MDRD: ABNORMAL ML/MIN/{1.73_M2}
GLUCOSE BLD-MCNC: 113 MG/DL (ref 70–99)
HCT VFR BLD CALC: 41.6 % (ref 36.3–47.1)
HEMOGLOBIN: 14.1 G/DL (ref 11.9–15.1)
IMMATURE GRANULOCYTES: 0 %
LIPASE: 29 U/L (ref 13–60)
LYMPHOCYTES # BLD: 30 % (ref 24–43)
MCH RBC QN AUTO: 32.9 PG (ref 25.2–33.5)
MCHC RBC AUTO-ENTMCNC: 33.9 G/DL (ref 28.4–34.8)
MCV RBC AUTO: 97.2 FL (ref 82.6–102.9)
MONOCYTES # BLD: 10 % (ref 3–12)
NRBC AUTOMATED: 0 PER 100 WBC
PDW BLD-RTO: 12.1 % (ref 11.8–14.4)
PLATELET # BLD: 331 K/UL (ref 138–453)
PLATELET ESTIMATE: NORMAL
PMV BLD AUTO: 8.3 FL (ref 8.1–13.5)
POTASSIUM SERPL-SCNC: 4.2 MMOL/L (ref 3.7–5.3)
RBC # BLD: 4.28 M/UL (ref 3.95–5.11)
RBC # BLD: NORMAL 10*6/UL
SEG NEUTROPHILS: 57 % (ref 36–65)
SEGMENTED NEUTROPHILS ABSOLUTE COUNT: 5.16 K/UL (ref 1.5–8.1)
SODIUM BLD-SCNC: 130 MMOL/L (ref 135–144)
TOTAL PROTEIN: 7.8 G/DL (ref 6.4–8.3)
TSH SERPL DL<=0.05 MIU/L-ACNC: 6.52 MIU/L (ref 0.3–5)
WBC # BLD: 9.1 K/UL (ref 3.5–11.3)
WBC # BLD: NORMAL 10*3/UL

## 2020-08-03 PROCEDURE — 96413 CHEMO IV INFUSION 1 HR: CPT

## 2020-08-03 PROCEDURE — 84443 ASSAY THYROID STIM HORMONE: CPT

## 2020-08-03 PROCEDURE — 82150 ASSAY OF AMYLASE: CPT

## 2020-08-03 PROCEDURE — 83690 ASSAY OF LIPASE: CPT

## 2020-08-03 PROCEDURE — 2580000003 HC RX 258: Performed by: INTERNAL MEDICINE

## 2020-08-03 PROCEDURE — 6360000002 HC RX W HCPCS: Performed by: INTERNAL MEDICINE

## 2020-08-03 PROCEDURE — 99211 OFF/OP EST MAY X REQ PHY/QHP: CPT

## 2020-08-03 PROCEDURE — 85025 COMPLETE CBC W/AUTO DIFF WBC: CPT

## 2020-08-03 PROCEDURE — 82533 TOTAL CORTISOL: CPT

## 2020-08-03 PROCEDURE — 36591 DRAW BLOOD OFF VENOUS DEVICE: CPT

## 2020-08-03 PROCEDURE — 99214 OFFICE O/P EST MOD 30 MIN: CPT | Performed by: INTERNAL MEDICINE

## 2020-08-03 PROCEDURE — 80053 COMPREHEN METABOLIC PANEL: CPT

## 2020-08-03 RX ORDER — HEPARIN SODIUM (PORCINE) LOCK FLUSH IV SOLN 100 UNIT/ML 100 UNIT/ML
500 SOLUTION INTRAVENOUS PRN
Status: CANCELLED | OUTPATIENT
Start: 2020-09-28

## 2020-08-03 RX ORDER — SODIUM CHLORIDE 9 MG/ML
20 INJECTION, SOLUTION INTRAVENOUS ONCE
Status: COMPLETED | OUTPATIENT
Start: 2020-08-03 | End: 2020-08-03

## 2020-08-03 RX ORDER — MEPERIDINE HYDROCHLORIDE 50 MG/ML
12.5 INJECTION INTRAMUSCULAR; INTRAVENOUS; SUBCUTANEOUS ONCE
Status: CANCELLED | OUTPATIENT
Start: 2020-09-28

## 2020-08-03 RX ORDER — DIPHENHYDRAMINE HYDROCHLORIDE 50 MG/ML
50 INJECTION INTRAMUSCULAR; INTRAVENOUS ONCE
Status: CANCELLED | OUTPATIENT
Start: 2020-08-31

## 2020-08-03 RX ORDER — EPINEPHRINE 1 MG/ML
0.3 INJECTION, SOLUTION, CONCENTRATE INTRAVENOUS PRN
Status: CANCELLED | OUTPATIENT
Start: 2020-08-31

## 2020-08-03 RX ORDER — DIPHENHYDRAMINE HYDROCHLORIDE 50 MG/ML
50 INJECTION INTRAMUSCULAR; INTRAVENOUS ONCE
Status: CANCELLED | OUTPATIENT
Start: 2020-09-28

## 2020-08-03 RX ORDER — HEPARIN SODIUM (PORCINE) LOCK FLUSH IV SOLN 100 UNIT/ML 100 UNIT/ML
500 SOLUTION INTRAVENOUS PRN
Status: DISCONTINUED | OUTPATIENT
Start: 2020-08-03 | End: 2020-08-04 | Stop reason: HOSPADM

## 2020-08-03 RX ORDER — EPINEPHRINE 1 MG/ML
0.3 INJECTION, SOLUTION, CONCENTRATE INTRAVENOUS PRN
Status: CANCELLED | OUTPATIENT
Start: 2020-09-28

## 2020-08-03 RX ORDER — MEPERIDINE HYDROCHLORIDE 50 MG/ML
12.5 INJECTION INTRAMUSCULAR; INTRAVENOUS; SUBCUTANEOUS ONCE
Status: CANCELLED | OUTPATIENT
Start: 2020-08-31

## 2020-08-03 RX ORDER — SODIUM CHLORIDE 0.9 % (FLUSH) 0.9 %
10 SYRINGE (ML) INJECTION PRN
Status: DISCONTINUED | OUTPATIENT
Start: 2020-08-03 | End: 2020-08-04 | Stop reason: HOSPADM

## 2020-08-03 RX ORDER — SODIUM CHLORIDE 9 MG/ML
INJECTION, SOLUTION INTRAVENOUS CONTINUOUS
Status: CANCELLED | OUTPATIENT
Start: 2020-09-28

## 2020-08-03 RX ORDER — SODIUM CHLORIDE 0.9 % (FLUSH) 0.9 %
10 SYRINGE (ML) INJECTION PRN
Status: CANCELLED | OUTPATIENT
Start: 2020-08-31

## 2020-08-03 RX ORDER — SODIUM CHLORIDE 0.9 % (FLUSH) 0.9 %
10 SYRINGE (ML) INJECTION PRN
Status: CANCELLED | OUTPATIENT
Start: 2020-09-28

## 2020-08-03 RX ORDER — SODIUM CHLORIDE 9 MG/ML
20 INJECTION, SOLUTION INTRAVENOUS ONCE
Status: CANCELLED | OUTPATIENT
Start: 2020-09-28

## 2020-08-03 RX ORDER — HEPARIN SODIUM (PORCINE) LOCK FLUSH IV SOLN 100 UNIT/ML 100 UNIT/ML
500 SOLUTION INTRAVENOUS PRN
Status: CANCELLED | OUTPATIENT
Start: 2020-08-31

## 2020-08-03 RX ORDER — METHYLPREDNISOLONE SODIUM SUCCINATE 125 MG/2ML
125 INJECTION, POWDER, LYOPHILIZED, FOR SOLUTION INTRAMUSCULAR; INTRAVENOUS ONCE
Status: CANCELLED | OUTPATIENT
Start: 2020-08-31

## 2020-08-03 RX ORDER — METHYLPREDNISOLONE SODIUM SUCCINATE 125 MG/2ML
125 INJECTION, POWDER, LYOPHILIZED, FOR SOLUTION INTRAMUSCULAR; INTRAVENOUS ONCE
Status: CANCELLED | OUTPATIENT
Start: 2020-09-28

## 2020-08-03 RX ORDER — SODIUM CHLORIDE 0.9 % (FLUSH) 0.9 %
5 SYRINGE (ML) INJECTION PRN
Status: CANCELLED | OUTPATIENT
Start: 2020-09-28

## 2020-08-03 RX ORDER — SODIUM CHLORIDE 0.9 % (FLUSH) 0.9 %
5 SYRINGE (ML) INJECTION PRN
Status: CANCELLED | OUTPATIENT
Start: 2020-08-31

## 2020-08-03 RX ORDER — SODIUM CHLORIDE 9 MG/ML
INJECTION, SOLUTION INTRAVENOUS CONTINUOUS
Status: CANCELLED | OUTPATIENT
Start: 2020-08-31

## 2020-08-03 RX ORDER — SODIUM CHLORIDE 9 MG/ML
20 INJECTION, SOLUTION INTRAVENOUS ONCE
Status: CANCELLED | OUTPATIENT
Start: 2020-08-31

## 2020-08-03 RX ADMIN — SODIUM CHLORIDE 20 ML/HR: 9 INJECTION, SOLUTION INTRAVENOUS at 11:25

## 2020-08-03 RX ADMIN — Medication 10 ML: at 13:25

## 2020-08-03 RX ADMIN — SODIUM CHLORIDE 480 MG: 9 INJECTION, SOLUTION INTRAVENOUS at 12:45

## 2020-08-03 RX ADMIN — Medication 10 ML: at 11:25

## 2020-08-03 RX ADMIN — HEPARIN 500 UNITS: 100 SYRINGE at 13:25

## 2020-08-03 NOTE — PROGRESS NOTES
cells, adenocarcinoma of lung primary. .    We saw the patient in house, we arrange for staging PET CT scan and brain MRI which essentially showed no evidence of metastatic disease otherwise. The patient was diagnosed with stage IV lung cancer, we plan palliative chemotherapy, started 01/20/2020. She completed 4 cycles of chemoimmunotherapy and we plan to continue with maintenance Keytruda. Unfortunately, the Herbie Shawl was very poorly tolerated - decided to hold. Nivolumab started 08/03/2020. INTERIM HISTORY: Larissa Scott comes back today for a follow-up for lung cancer and to commence with Nivolumab. Her lower extremity aches are unchanged. She is struggling to breath with mask on while walking, her oxygen levels drop and she experiences dizziness. Her appetite is stable and she denies dysgeusia. Past Medical History:   has a past medical history of Alcohol abuse, Breast cancer ([facility] [address] Chronic diarrhea, COPD (chronic obstructive pulmonary disease) ([facility] [address] Essential hypertension, Lung cancer ([facility] [address] Pleural effusion, and Tobacco abuse. Past Surgical History:   has a past surgical history that includes Appendectomy; Mastectomy (Right); and Cholecystectomy. Family History: family history includes Breast Cancer in her mother; Cancer in her brother; Deep Vein Thrombosis in her brother; Prostate Cancer in her brother and father; Stroke in her mother. Social History:   reports that she has been smoking. She has a 23.50 pack-year smoking history. She has never used smokeless tobacco. She reports current alcohol use of about 8.0 standard drinks of alcohol per week. She reports that she does not use drugs. Medications:    Reviewed in EPIC     Allergies:  Morphine; Robitussin (alcohol free) [guaifenesin]; Aspirin; Penicillins; and Sulfa antibiotics    REVIEW OF SYSTEMS:   General: No fever or night sweats. Weight loss.  +fatigue and poor appetite  - unchanged  ENT: No double, no tinnitus or hearing problem, no dysphagia; +xerostomia with thrush - resolved  Respiratory: No chest pain, no cough or hemoptysis. +shortness of breath - stable  Cardiovascular: Denies chest pain, PND or orthopnea, palpitations  Gastrointestinal: No nausea or vomiting, abdominal pain,  diarrhea and constipation. Genitourinary: Denies dysuria, hematuria, frequency, urgency or incontinence. Neurological: Denies headaches, decreased LOC, no sensory or motor focal deficits. Musculoskeletal: No arthralgia no back pain or joint swelling. +weakness and muscle fatigue; bilateral leg and knee pain - unchanged +pain at previous drain site  Skin: There are no rashes or bleeding. Psychiatric: No anxiety, no depression. Endocrine: No diabetes or thyroid disease. Hematologic: No bleeding, no adenopathy. PHYSICAL EXAM:      /84   Pulse 90   Temp 98.3 °F (36.8 °C) (Temporal)   Wt 124 lb 9.6 oz (56.5 kg)   SpO2 94% Comment: 98% with 3L oxygen  BMI 22.79 kg/m²    Temp (24hrs), Av.9 °F (36.6 °C), Min:97.9 °F (36.6 °C), Max:97.9 °F (36.6 °C)  Gen.  Exam, showed a well-appearing patient without evidence of distress or pain  HEENT, normocephalic and atraumatic, PERRLA extraocular muscles are intact; +thrush present - resolved  Neck showed no JVD no carotid bruit, no cervical adenopathy  Chest is clear to auscultation bilaterally +wheezing  Heart is regular without any murmur  Abdomen soft nontender no hepatosplenomegaly  Lower extremities showed no edema clubbing or cyanosis; tenderness in lateral compartment of left knee   Neurological examination was nonfocal, with intact cranial nerves  Skin  No rashes or bruising appreciated      REVIEW OF LABORATORY DATA:     Lab Results   Component Value Date    WBC 9.1 2020    HGB 14.1 2020    HCT 41.6 2020    MCV 97.2 2020     2020       Chemistry        Component Value Date/Time     (L) 2020 1125    K 4.2 2020 1125    CL 92 (L) 2020 1125

## 2020-08-03 NOTE — LETTER
[facility] [id number]  Phone: [phone]            August 3, 2020     Patient: Jeimy Lala   YOB: 1960   Date of Visit: 8/3/2020       To Whom it May Concern:    Rowena Ulloa was seen at the [facility] on 8/3/2020. She was accompanied by her  . If you have any questions or concerns, please don't hesitate to call.     Sincerely,         Prisca Patel RN

## 2020-08-03 NOTE — TELEPHONE ENCOUNTER
Verenice Champion MD VISIT & TX  DR Nia Fisher IN TO SEE PATIENT  ORDERS RECEIVED  CONTINUE CHEMOTHERAPY PER ORDERS  RV 4 WEEKS W/CHEMO & LABS  MD VISIT 08/31/20 @11:30AM  [email or web address]  AVS PRINTED AND GIVEN TO PATIENT WITH INSTRUCTIONS  PATIENT REMAINS IN [address] [place] [address]

## 2020-08-03 NOTE — PROGRESS NOTES
Patient arrive ambulatory with spouse for cycle 1st Opdivo treatment and physician visit. Denies complaint or concern. Vitals as charted. Port accessed; specimen sent. Physician met with patient; labs reviewed; ok to proceed with treatment. Patient completes Opdivo checklist.  Patient educated on Opdivo medication both verbal and with [facility]. Opdivo infused with no sign of adverse reaction; line flushed. Port flushed and heparinized with intact hernández needle removed per protocol.   Patient ambulate off unit with spouse at discharge; instructed to stop at  for AVS.

## 2020-08-05 ENCOUNTER — TELEPHONE (OUTPATIENT)
Dept: CASE MANAGEMENT | Age: 60
End: 2020-08-05

## 2020-08-05 NOTE — TELEPHONE ENCOUNTER
Name: Richard Rendon  : 1960  MRN: [id number]    Oncology Navigation Follow-Up Note    Navigation complete, left message for pt.  Removing self from care team  Electronically signed by Everlina Habermann, RN on 2020 at 9:37 AM

## 2020-08-10 ENCOUNTER — TELEPHONE (OUTPATIENT)
Dept: CASE MANAGEMENT | Age: 60
End: 2020-08-10

## 2020-08-10 NOTE — TELEPHONE ENCOUNTER
Name: Sulema Herzog  : 1960  MRN: [id number]    Oncology Navigation Follow-Up Note  Navigator received call from pts. Spouse and asking about oxygen(portable/concentrator)? Spouse wanting to know if pt. Could get assistance financially for this? Pt. Does not have insurance coverage and has been assisted through [facility] in the past. However when pts oxygen status was measured before spouse thought it was 80? Writer explaining to spouse pt. would have to fit criteria (oxygen status), then an order placed by physician for medical neccessity. Could then ask for [facility] assistance keeping in mind home oxygen most definitely may be a neccessity however concentrators may be convenience and not sure if insurance /assistance  Would cover? Directed spouse to Triage line if pt. Needing assistance with oxygen reevaluation.  Navigator encouraging F/U with pulmonary physician, but  states, \"She hasn't followed up with her lung doctor\"  Electronically signed by Annika Seth RN on 8/10/2020 at 1:19 PM

## 2020-08-25 ENCOUNTER — HOSPITAL ENCOUNTER (OUTPATIENT)
Facility: MEDICAL CENTER | Age: 60
End: 2020-08-25

## 2020-08-31 ENCOUNTER — HOSPITAL ENCOUNTER (OUTPATIENT)
Dept: INFUSION THERAPY | Facility: MEDICAL CENTER | Age: 60
Discharge: HOME | End: 2020-08-31

## 2020-08-31 ENCOUNTER — TELEPHONE (OUTPATIENT)
Dept: ONCOLOGY | Age: 60
End: 2020-08-31

## 2020-08-31 ENCOUNTER — OFFICE VISIT (OUTPATIENT)
Dept: ONCOLOGY | Age: 60
End: 2020-08-31

## 2020-08-31 VITALS
SYSTOLIC BLOOD PRESSURE: 127 MMHG | HEART RATE: 89 BPM | DIASTOLIC BLOOD PRESSURE: 80 MMHG | RESPIRATION RATE: 18 BRPM | TEMPERATURE: 98.8 F | WEIGHT: 123.8 LBS | BODY MASS INDEX: 22.64 KG/M2

## 2020-08-31 DIAGNOSIS — I10 ESSENTIAL HYPERTENSION: Chronic | ICD-10-CM

## 2020-08-31 DIAGNOSIS — C34.92 ADENOCARCINOMA, LUNG, LEFT (HCC): Primary | ICD-10-CM

## 2020-08-31 DIAGNOSIS — I25.10 CORONARY ARTERY DISEASE INVOLVING NATIVE CORONARY ARTERY OF NATIVE HEART WITHOUT ANGINA PECTORIS: ICD-10-CM

## 2020-08-31 DIAGNOSIS — J91.0 PLEURAL EFFUSION, MALIGNANT: ICD-10-CM

## 2020-08-31 LAB
ABSOLUTE EOS #: 0.18 K/UL (ref 0–0.44)
ABSOLUTE IMMATURE GRANULOCYTE: 0.03 K/UL (ref 0–0.3)
ABSOLUTE LYMPH #: 2.25 K/UL (ref 1.1–3.7)
ABSOLUTE MONO #: 0.61 K/UL (ref 0.1–1.2)
ALBUMIN SERPL-MCNC: 4.7 G/DL (ref 3.5–5.2)
ALBUMIN/GLOBULIN RATIO: ABNORMAL (ref 1–2.5)
ALP BLD-CCNC: 68 U/L (ref 35–104)
ALT SERPL-CCNC: 20 U/L (ref 5–33)
AMYLASE: 47 U/L (ref 28–100)
ANION GAP SERPL CALCULATED.3IONS-SCNC: 12 MMOL/L (ref 9–17)
AST SERPL-CCNC: 24 U/L
BASOPHILS # BLD: 1 % (ref 0–2)
BASOPHILS ABSOLUTE: 0.05 K/UL (ref 0–0.2)
BILIRUB SERPL-MCNC: 0.39 MG/DL (ref 0.3–1.2)
BUN BLDV-MCNC: 6 MG/DL (ref 6–20)
BUN/CREAT BLD: 14 (ref 9–20)
CALCIUM SERPL-MCNC: 10.1 MG/DL (ref 8.6–10.4)
CHLORIDE BLD-SCNC: 92 MMOL/L (ref 98–107)
CO2: 27 MMOL/L (ref 20–31)
CORTISOL COLLECTION INFO: NORMAL
CORTISOL: 11.4 UG/DL (ref 2.7–18.4)
CREAT SERPL-MCNC: 0.42 MG/DL (ref 0.5–0.9)
DIFFERENTIAL TYPE: NORMAL
EOSINOPHILS RELATIVE PERCENT: 2 % (ref 1–4)
GFR AFRICAN AMERICAN: >60 ML/MIN
GFR NON-AFRICAN AMERICAN: >60 ML/MIN
GFR SERPL CREATININE-BSD FRML MDRD: ABNORMAL ML/MIN/{1.73_M2}
GFR SERPL CREATININE-BSD FRML MDRD: ABNORMAL ML/MIN/{1.73_M2}
GLUCOSE BLD-MCNC: 125 MG/DL (ref 70–99)
HCT VFR BLD CALC: 39.4 % (ref 36.3–47.1)
HEMOGLOBIN: 13.6 G/DL (ref 11.9–15.1)
IMMATURE GRANULOCYTES: 0 %
LIPASE: 33 U/L (ref 13–60)
LYMPHOCYTES # BLD: 30 % (ref 24–43)
MCH RBC QN AUTO: 31.7 PG (ref 25.2–33.5)
MCHC RBC AUTO-ENTMCNC: 34.5 G/DL (ref 28.4–34.8)
MCV RBC AUTO: 91.8 FL (ref 82.6–102.9)
MONOCYTES # BLD: 8 % (ref 3–12)
NRBC AUTOMATED: 0 PER 100 WBC
PDW BLD-RTO: 12.1 % (ref 11.8–14.4)
PLATELET # BLD: 294 K/UL (ref 138–453)
PLATELET ESTIMATE: NORMAL
PMV BLD AUTO: 8.4 FL (ref 8.1–13.5)
POTASSIUM SERPL-SCNC: 3.8 MMOL/L (ref 3.7–5.3)
RBC # BLD: 4.29 M/UL (ref 3.95–5.11)
RBC # BLD: NORMAL 10*6/UL
SEG NEUTROPHILS: 59 % (ref 36–65)
SEGMENTED NEUTROPHILS ABSOLUTE COUNT: 4.51 K/UL (ref 1.5–8.1)
SODIUM BLD-SCNC: 131 MMOL/L (ref 135–144)
TOTAL PROTEIN: 7.4 G/DL (ref 6.4–8.3)
TSH SERPL DL<=0.05 MIU/L-ACNC: 3.24 MIU/L (ref 0.3–5)
WBC # BLD: 7.6 K/UL (ref 3.5–11.3)
WBC # BLD: NORMAL 10*3/UL

## 2020-08-31 PROCEDURE — 96413 CHEMO IV INFUSION 1 HR: CPT

## 2020-08-31 PROCEDURE — 80053 COMPREHEN METABOLIC PANEL: CPT

## 2020-08-31 PROCEDURE — 99211 OFF/OP EST MAY X REQ PHY/QHP: CPT

## 2020-08-31 PROCEDURE — 6360000002 HC RX W HCPCS: Performed by: INTERNAL MEDICINE

## 2020-08-31 PROCEDURE — 99214 OFFICE O/P EST MOD 30 MIN: CPT | Performed by: INTERNAL MEDICINE

## 2020-08-31 PROCEDURE — 36591 DRAW BLOOD OFF VENOUS DEVICE: CPT

## 2020-08-31 PROCEDURE — 83690 ASSAY OF LIPASE: CPT

## 2020-08-31 PROCEDURE — 2580000003 HC RX 258: Performed by: INTERNAL MEDICINE

## 2020-08-31 PROCEDURE — 82533 TOTAL CORTISOL: CPT

## 2020-08-31 PROCEDURE — 85025 COMPLETE CBC W/AUTO DIFF WBC: CPT

## 2020-08-31 PROCEDURE — 84443 ASSAY THYROID STIM HORMONE: CPT

## 2020-08-31 PROCEDURE — 82150 ASSAY OF AMYLASE: CPT

## 2020-08-31 RX ORDER — LEVOTHYROXINE SODIUM 88 UG/1
88 TABLET ORAL DAILY
Qty: 30 TABLET | Refills: 5 | Status: SHIPPED | OUTPATIENT
Start: 2020-08-31 | End: 2021-03-01

## 2020-08-31 RX ORDER — SODIUM CHLORIDE 0.9 % (FLUSH) 0.9 %
10 SYRINGE (ML) INJECTION PRN
Status: DISCONTINUED | OUTPATIENT
Start: 2020-08-31 | End: 2020-09-01 | Stop reason: HOSPADM

## 2020-08-31 RX ORDER — HEPARIN SODIUM (PORCINE) LOCK FLUSH IV SOLN 100 UNIT/ML 100 UNIT/ML
500 SOLUTION INTRAVENOUS PRN
Status: DISCONTINUED | OUTPATIENT
Start: 2020-08-31 | End: 2020-09-01 | Stop reason: HOSPADM

## 2020-08-31 RX ORDER — HYDROCODONE BITARTRATE AND ACETAMINOPHEN 5; 325 MG/1; MG/1
1 TABLET ORAL EVERY 6 HOURS PRN
COMMUNITY
End: 2021-01-18 | Stop reason: SDUPTHER

## 2020-08-31 RX ORDER — SODIUM CHLORIDE 9 MG/ML
20 INJECTION, SOLUTION INTRAVENOUS ONCE
Status: COMPLETED | OUTPATIENT
Start: 2020-08-31 | End: 2020-08-31

## 2020-08-31 RX ADMIN — Medication 10 ML: at 13:50

## 2020-08-31 RX ADMIN — SODIUM CHLORIDE 20 ML/HR: 9 INJECTION, SOLUTION INTRAVENOUS at 12:45

## 2020-08-31 RX ADMIN — SODIUM CHLORIDE 480 MG: 9 INJECTION, SOLUTION INTRAVENOUS at 13:18

## 2020-08-31 RX ADMIN — HEPARIN 500 UNITS: 100 SYRINGE at 13:50

## 2020-08-31 NOTE — PROGRESS NOTES
Rui Paez arrives ambulatory with   Order for C2 D1 reviewed  Rt chest port accessed per protocol with #20 G 3/4 L Hernández needle   Good blood return noted and blood work obtained and sent to lab  Lab results reviewed  Dr Omayra Infante vs and examined  Order to proceed with chemotherapy  opdivo initiated and completed   No reaction noted  Iv line flushed and hernández needle flushed   Hernández needle removed with needle intact  Band aid applied  Discharged with  to home

## 2020-08-31 NOTE — PROGRESS NOTES
DIAGNOSIS:   1. Metastatic adenocarcinoma of the lung, stage IV  2. Malignant pleural effusion  3. Molecular testing negative for EGFR, ALK and ROS. Instead it shows Kras and CDK mutation,   4. History of breast cancer in 2010  CURRENT THERAPY:  1. Mastectomy and chemotherapy in 2010  2. Status post thoracocentesis x2  3. Pending study did not show any evidence of distant metastases other than the pleural effusion  4. Palliative chemotherapy with carboplatin, Alimta and Keytruda- started 01/20/2020  5. After 4 cycles of chemoimmunotherapy, chemotherapy will be dropped and she will be maintained on maintenance Keytruda   6. Due to side effects, the drug was held. 7. Improvement of side effects, the plan is to switch to Opdivo started 08/2020  BRIEF CASE HISTORY:    The patient is a 63 y.o.  female who is admitted to the hospital for chief complaints of shortness of breath. Patient has history of breast cancer for which she underwent mastectomy in 2010 followed by chemotherapy. This was done in [place]. The  Patient recently moved to [facility] about a year ago. Patient had a significant history of tobacco dependence. Patient started noticing worsening of shortness of breath and presented to the ER. Work-up done shows right-sided pleural effusion. CT scan showed multiloculated left-sided pleural effusion with compressive atelectasis in the lingula and majority of the left lower lobe and partial compression atelectasis of the left upper lobe. There was also underlying emphysema. There was a stable 1.8 cm adrenal mass likely adrenal adenoma which had been stable since November 2018. Patient underwent ultrasound guided thoracentesis.   Cytology of pleural fluid confirms adenocarcinoma consistent with lung primary  The patient was discharged home but she came back with worsening shortness of breath and was found to have significant pleural effusion that was tapped pathology showed malignant cells, adenocarcinoma of lung primary. .    We saw the patient in house, we arrange for staging PET CT scan and brain MRI which essentially showed no evidence of metastatic disease otherwise. The patient was diagnosed with stage IV lung cancer, we plan palliative chemotherapy, started 01/20/2020. She completed 4 cycles of chemoimmunotherapy and we plan to continue with maintenance Keytruda. Unfortunately, the Mary [place] was very poorly tolerated - decided to hold. Nivolumab started 08/03/2020. INTERIM HISTORY: Kate Deng comes back today for a follow-up for lung cancer and cycle #2 Nivolumab. She reports she is tolerating current treatment very well. She does have persistent throat pain and has ENT consult scheduled. Her left knee pain and pain at pervious drain site are unchanged - she treats with rotation of topical creams. The neuropathy in her fingers has improved. She denies mouth sores, nausea, vomiting, weight loss. Past Medical History:   has a past medical history of Alcohol abuse, Breast cancer ([facility] [address] Chronic diarrhea, COPD (chronic obstructive pulmonary disease) ([facility] [address] Essential hypertension, Lung cancer ([facility] [address] Pleural effusion, and Tobacco abuse. Past Surgical History:   has a past surgical history that includes Appendectomy; Mastectomy (Right); and Cholecystectomy. Family History: family history includes Breast Cancer in her mother; Cancer in her brother; Deep Vein Thrombosis in her brother; Prostate Cancer in her brother and father; Stroke in her mother. Social History:   reports that she has been smoking. She has a 23.50 pack-year smoking history. She has never used smokeless tobacco. She reports current alcohol use of about 8.0 standard drinks of alcohol per week. She reports that she does not use drugs. Medications:    Reviewed in EPIC     Allergies:  Morphine; Robitussin (alcohol free) [guaifenesin];  Aspirin; Penicillins; and Sulfa antibiotics    REVIEW OF SYSTEMS:   General: No fever or night sweats. Weight loss. +fatigue and poor appetite  -resolvedcc  ENT: No double, no tinnitus or hearing problem, no dysphagia; Respiratory: No chest pain, no cough or hemoptysis. +shortness of breath - stable  Cardiovascular: Denies chest pain, PND or orthopnea, palpitations  Gastrointestinal: No nausea or vomiting, abdominal pain,  diarrhea and constipation. Genitourinary: Denies dysuria, hematuria, frequency, urgency or incontinence. Neurological: Denies headaches, decreased LOC, no sensory or motor focal deficits. Musculoskeletal: No arthralgia no back pain or joint swelling. +weakness and muscle fatigue; bilateral leg and knee pain - unchanged +pain at previous drain site - unchanged  Skin: There are no rashes or bleeding. Psychiatric: No anxiety, no depression. Endocrine: No diabetes or thyroid disease. Hematologic: No bleeding, no adenopathy. PHYSICAL EXAM:      /80   Pulse 89   Temp 98.8 °F (37.1 °C) (Oral)   Resp 18   Wt 123 lb 12.8 oz (56.2 kg)   BMI 22.64 kg/m²    Temp (24hrs), Av.9 °F (36.6 °C), Min:97.9 °F (36.6 °C), Max:97.9 °F (36.6 °C)  Gen.  Exam, showed a well-appearing patient without evidence of distress or pain  HEENT, normocephalic and atraumatic, PERRLA extraocular muscles are intact; +thrush present - resolved  Neck showed no JVD no carotid bruit, no cervical adenopathy  Chest is clear to auscultation bilaterally +wheezing  Heart is regular without any murmur  Abdomen soft nontender no hepatosplenomegaly  Lower extremities showed no edema clubbing or cyanosis; tenderness in lateral compartment of left knee   Neurological examination was nonfocal, with intact cranial nerves  Skin  No rashes or bruising appreciated      REVIEW OF LABORATORY DATA:     Lab Results   Component Value Date    WBC 7.6 2020    HGB 13.6 2020    HCT 39.4 2020    MCV 91.8 2020     2020       Chemistry        Component Value Date/Time     (L) 08/31/2020 1120    K 3.8 08/31/2020 1120    CL 92 (L) 08/31/2020 1120    CO2 27 08/31/2020 1120    BUN 6 08/31/2020 1120    CREATININE 0.42 (L) 08/31/2020 1120        Component Value Date/Time    CALCIUM 10.1 08/31/2020 1120    ALKPHOS 68 08/31/2020 1120    AST 24 08/31/2020 1120    ALT 20 08/31/2020 1120    BILITOT 0.39 08/31/2020 1120              IMPRESSION:    1. History of breast cancer in 2010, status post mastectomy and chemotherapy  2. New diagnosis with lung cancer  3. Malignant pleural effusion stage IV disease  4. Chemotherapy with carboplatin, Alimta and Keytruda - started 01/20/2020  5. Weakness in both lower extremities  6. Constellation of symptoms including sore throat, sinus drainage, diffuse arthralgias, worsening fatigue and aches and pains. Differential is very broad but I am concerned about immunologic effect of Keytruda. 7. Opdivo started 08/2020      PLAN:   1. Her lab work was reviewed, counts are stable. 2. I completed toxicity check. 3. She is tolerating treatment well and we will plan to treat to progression. 4. Return in 4 weeks.

## 2020-09-22 ENCOUNTER — HOSPITAL ENCOUNTER (OUTPATIENT)
Facility: MEDICAL CENTER | Age: 60
End: 2020-09-22

## 2020-09-28 ENCOUNTER — TELEPHONE (OUTPATIENT)
Dept: ONCOLOGY | Age: 60
End: 2020-09-28

## 2020-09-28 ENCOUNTER — HOSPITAL ENCOUNTER (OUTPATIENT)
Dept: INFUSION THERAPY | Facility: MEDICAL CENTER | Age: 60
Discharge: HOME | End: 2020-09-28

## 2020-09-28 ENCOUNTER — OFFICE VISIT (OUTPATIENT)
Dept: ONCOLOGY | Age: 60
End: 2020-09-28

## 2020-09-28 VITALS
BODY MASS INDEX: 22.72 KG/M2 | DIASTOLIC BLOOD PRESSURE: 89 MMHG | HEART RATE: 82 BPM | WEIGHT: 124.2 LBS | TEMPERATURE: 97.2 F | RESPIRATION RATE: 20 BRPM | SYSTOLIC BLOOD PRESSURE: 146 MMHG

## 2020-09-28 DIAGNOSIS — J91.0 PLEURAL EFFUSION, MALIGNANT: ICD-10-CM

## 2020-09-28 DIAGNOSIS — I25.10 CORONARY ARTERY DISEASE INVOLVING NATIVE CORONARY ARTERY OF NATIVE HEART WITHOUT ANGINA PECTORIS: ICD-10-CM

## 2020-09-28 DIAGNOSIS — C34.92 ADENOCARCINOMA, LUNG, LEFT (HCC): Primary | ICD-10-CM

## 2020-09-28 DIAGNOSIS — I10 ESSENTIAL HYPERTENSION: Chronic | ICD-10-CM

## 2020-09-28 LAB
ABSOLUTE EOS #: 0.28 K/UL (ref 0–0.44)
ABSOLUTE IMMATURE GRANULOCYTE: 0.04 K/UL (ref 0–0.3)
ABSOLUTE LYMPH #: 2.22 K/UL (ref 1.1–3.7)
ABSOLUTE MONO #: 0.81 K/UL (ref 0.1–1.2)
ALBUMIN SERPL-MCNC: 4.7 G/DL (ref 3.5–5.2)
ALBUMIN/GLOBULIN RATIO: ABNORMAL (ref 1–2.5)
ALP BLD-CCNC: 68 U/L (ref 35–104)
ALT SERPL-CCNC: 23 U/L (ref 5–33)
AMYLASE: 61 U/L (ref 28–100)
ANION GAP SERPL CALCULATED.3IONS-SCNC: 12 MMOL/L (ref 9–17)
AST SERPL-CCNC: 28 U/L
BASOPHILS # BLD: 1 % (ref 0–2)
BASOPHILS ABSOLUTE: 0.07 K/UL (ref 0–0.2)
BILIRUB SERPL-MCNC: 0.29 MG/DL (ref 0.3–1.2)
BUN BLDV-MCNC: 6 MG/DL (ref 6–20)
BUN/CREAT BLD: 15 (ref 9–20)
CALCIUM SERPL-MCNC: 10.4 MG/DL (ref 8.6–10.4)
CHLORIDE BLD-SCNC: 93 MMOL/L (ref 98–107)
CO2: 27 MMOL/L (ref 20–31)
CORTISOL COLLECTION INFO: NORMAL
CORTISOL: 10.3 UG/DL (ref 2.7–18.4)
CREAT SERPL-MCNC: 0.41 MG/DL (ref 0.5–0.9)
DIFFERENTIAL TYPE: ABNORMAL
EOSINOPHILS RELATIVE PERCENT: 3 % (ref 1–4)
GFR AFRICAN AMERICAN: >60 ML/MIN
GFR NON-AFRICAN AMERICAN: >60 ML/MIN
GFR SERPL CREATININE-BSD FRML MDRD: ABNORMAL ML/MIN/{1.73_M2}
GFR SERPL CREATININE-BSD FRML MDRD: ABNORMAL ML/MIN/{1.73_M2}
GLUCOSE BLD-MCNC: 98 MG/DL (ref 70–99)
HCT VFR BLD CALC: 38.9 % (ref 36.3–47.1)
HEMOGLOBIN: 13.4 G/DL (ref 11.9–15.1)
IMMATURE GRANULOCYTES: 1 %
LIPASE: 47 U/L (ref 13–60)
LYMPHOCYTES # BLD: 26 % (ref 24–43)
MCH RBC QN AUTO: 31.6 PG (ref 25.2–33.5)
MCHC RBC AUTO-ENTMCNC: 34.4 G/DL (ref 28.4–34.8)
MCV RBC AUTO: 91.7 FL (ref 82.6–102.9)
MONOCYTES # BLD: 9 % (ref 3–12)
NRBC AUTOMATED: 0 PER 100 WBC
PDW BLD-RTO: 13.3 % (ref 11.8–14.4)
PLATELET # BLD: 333 K/UL (ref 138–453)
PLATELET ESTIMATE: ABNORMAL
PMV BLD AUTO: 8.8 FL (ref 8.1–13.5)
POTASSIUM SERPL-SCNC: 3.9 MMOL/L (ref 3.7–5.3)
RBC # BLD: 4.24 M/UL (ref 3.95–5.11)
RBC # BLD: ABNORMAL 10*6/UL
SEG NEUTROPHILS: 60 % (ref 36–65)
SEGMENTED NEUTROPHILS ABSOLUTE COUNT: 5.25 K/UL (ref 1.5–8.1)
SODIUM BLD-SCNC: 132 MMOL/L (ref 135–144)
TOTAL PROTEIN: 7.6 G/DL (ref 6.4–8.3)
TSH SERPL DL<=0.05 MIU/L-ACNC: 5.43 MIU/L (ref 0.3–5)
WBC # BLD: 8.7 K/UL (ref 3.5–11.3)
WBC # BLD: ABNORMAL 10*3/UL

## 2020-09-28 PROCEDURE — 82150 ASSAY OF AMYLASE: CPT

## 2020-09-28 PROCEDURE — 6360000002 HC RX W HCPCS: Performed by: INTERNAL MEDICINE

## 2020-09-28 PROCEDURE — 83690 ASSAY OF LIPASE: CPT

## 2020-09-28 PROCEDURE — 99212 OFFICE O/P EST SF 10 MIN: CPT

## 2020-09-28 PROCEDURE — 85025 COMPLETE CBC W/AUTO DIFF WBC: CPT

## 2020-09-28 PROCEDURE — 99214 OFFICE O/P EST MOD 30 MIN: CPT | Performed by: INTERNAL MEDICINE

## 2020-09-28 PROCEDURE — 2580000003 HC RX 258: Performed by: INTERNAL MEDICINE

## 2020-09-28 PROCEDURE — 80053 COMPREHEN METABOLIC PANEL: CPT

## 2020-09-28 PROCEDURE — 99211 OFF/OP EST MAY X REQ PHY/QHP: CPT | Performed by: INTERNAL MEDICINE

## 2020-09-28 PROCEDURE — 84443 ASSAY THYROID STIM HORMONE: CPT

## 2020-09-28 PROCEDURE — 82533 TOTAL CORTISOL: CPT

## 2020-09-28 RX ORDER — MEPERIDINE HYDROCHLORIDE 50 MG/ML
12.5 INJECTION INTRAMUSCULAR; INTRAVENOUS; SUBCUTANEOUS ONCE
Status: CANCELLED | OUTPATIENT
Start: 2020-11-23

## 2020-09-28 RX ORDER — MEPERIDINE HYDROCHLORIDE 50 MG/ML
12.5 INJECTION INTRAMUSCULAR; INTRAVENOUS; SUBCUTANEOUS ONCE
Status: CANCELLED | OUTPATIENT
Start: 2020-10-26

## 2020-09-28 RX ORDER — DIPHENHYDRAMINE HYDROCHLORIDE 50 MG/ML
50 INJECTION INTRAMUSCULAR; INTRAVENOUS ONCE
Status: CANCELLED | OUTPATIENT
Start: 2020-11-23

## 2020-09-28 RX ORDER — EPINEPHRINE 1 MG/ML
0.3 INJECTION, SOLUTION, CONCENTRATE INTRAVENOUS PRN
Status: CANCELLED | OUTPATIENT
Start: 2020-10-26

## 2020-09-28 RX ORDER — SODIUM CHLORIDE 9 MG/ML
20 INJECTION, SOLUTION INTRAVENOUS ONCE
Status: COMPLETED | OUTPATIENT
Start: 2020-09-28 | End: 2020-09-28

## 2020-09-28 RX ORDER — SODIUM CHLORIDE 0.9 % (FLUSH) 0.9 %
5 SYRINGE (ML) INJECTION PRN
Status: CANCELLED | OUTPATIENT
Start: 2020-10-26

## 2020-09-28 RX ORDER — SODIUM CHLORIDE 0.9 % (FLUSH) 0.9 %
10 SYRINGE (ML) INJECTION PRN
Status: CANCELLED | OUTPATIENT
Start: 2020-10-26

## 2020-09-28 RX ORDER — METHYLPREDNISOLONE 4 MG/1
TABLET ORAL
Qty: 1 KIT | Refills: 0 | Status: SHIPPED | OUTPATIENT
Start: 2020-09-28 | End: 2020-12-21 | Stop reason: SDUPTHER

## 2020-09-28 RX ORDER — SODIUM CHLORIDE 0.9 % (FLUSH) 0.9 %
10 SYRINGE (ML) INJECTION PRN
Status: DISCONTINUED | OUTPATIENT
Start: 2020-09-28 | End: 2020-09-29 | Stop reason: HOSPADM

## 2020-09-28 RX ORDER — DIPHENHYDRAMINE HYDROCHLORIDE 50 MG/ML
50 INJECTION INTRAMUSCULAR; INTRAVENOUS ONCE
Status: CANCELLED | OUTPATIENT
Start: 2020-10-26

## 2020-09-28 RX ORDER — SODIUM CHLORIDE 9 MG/ML
20 INJECTION, SOLUTION INTRAVENOUS ONCE
Status: CANCELLED | OUTPATIENT
Start: 2020-11-23

## 2020-09-28 RX ORDER — EPINEPHRINE 1 MG/ML
0.3 INJECTION, SOLUTION, CONCENTRATE INTRAVENOUS PRN
Status: CANCELLED | OUTPATIENT
Start: 2020-11-23

## 2020-09-28 RX ORDER — SODIUM CHLORIDE 9 MG/ML
INJECTION, SOLUTION INTRAVENOUS CONTINUOUS
Status: CANCELLED | OUTPATIENT
Start: 2020-11-23

## 2020-09-28 RX ORDER — HEPARIN SODIUM (PORCINE) LOCK FLUSH IV SOLN 100 UNIT/ML 100 UNIT/ML
500 SOLUTION INTRAVENOUS PRN
Status: CANCELLED | OUTPATIENT
Start: 2020-10-26

## 2020-09-28 RX ORDER — METHYLPREDNISOLONE SODIUM SUCCINATE 125 MG/2ML
125 INJECTION, POWDER, LYOPHILIZED, FOR SOLUTION INTRAMUSCULAR; INTRAVENOUS ONCE
Status: CANCELLED | OUTPATIENT
Start: 2020-11-23

## 2020-09-28 RX ORDER — HEPARIN SODIUM (PORCINE) LOCK FLUSH IV SOLN 100 UNIT/ML 100 UNIT/ML
500 SOLUTION INTRAVENOUS PRN
Status: DISCONTINUED | OUTPATIENT
Start: 2020-09-28 | End: 2020-09-29 | Stop reason: HOSPADM

## 2020-09-28 RX ORDER — AZITHROMYCIN 500 MG/1
500 TABLET, FILM COATED ORAL DAILY
Qty: 5 TABLET | Refills: 0 | Status: SHIPPED | OUTPATIENT
Start: 2020-09-28 | End: 2020-12-21 | Stop reason: SDUPTHER

## 2020-09-28 RX ORDER — SODIUM CHLORIDE 9 MG/ML
20 INJECTION, SOLUTION INTRAVENOUS ONCE
Status: CANCELLED | OUTPATIENT
Start: 2020-10-26

## 2020-09-28 RX ORDER — SODIUM CHLORIDE 0.9 % (FLUSH) 0.9 %
10 SYRINGE (ML) INJECTION PRN
Status: CANCELLED | OUTPATIENT
Start: 2020-11-23

## 2020-09-28 RX ORDER — SODIUM CHLORIDE 0.9 % (FLUSH) 0.9 %
5 SYRINGE (ML) INJECTION PRN
Status: CANCELLED | OUTPATIENT
Start: 2020-11-23

## 2020-09-28 RX ORDER — SODIUM CHLORIDE 9 MG/ML
INJECTION, SOLUTION INTRAVENOUS CONTINUOUS
Status: CANCELLED | OUTPATIENT
Start: 2020-10-26

## 2020-09-28 RX ORDER — HEPARIN SODIUM (PORCINE) LOCK FLUSH IV SOLN 100 UNIT/ML 100 UNIT/ML
500 SOLUTION INTRAVENOUS PRN
Status: CANCELLED | OUTPATIENT
Start: 2020-11-23

## 2020-09-28 RX ORDER — METHYLPREDNISOLONE SODIUM SUCCINATE 125 MG/2ML
125 INJECTION, POWDER, LYOPHILIZED, FOR SOLUTION INTRAMUSCULAR; INTRAVENOUS ONCE
Status: CANCELLED | OUTPATIENT
Start: 2020-10-26

## 2020-09-28 RX ADMIN — Medication 10 ML: at 17:01

## 2020-09-28 RX ADMIN — SODIUM CHLORIDE 480 MG: 9 INJECTION, SOLUTION INTRAVENOUS at 16:27

## 2020-09-28 RX ADMIN — HEPARIN 500 UNITS: 100 SYRINGE at 17:01

## 2020-09-28 RX ADMIN — SODIUM CHLORIDE 20 ML/HR: 9 INJECTION, SOLUTION INTRAVENOUS at 16:00

## 2020-09-28 NOTE — PROGRESS NOTES
DIAGNOSIS:   1. Metastatic adenocarcinoma of the lung, stage IV  2. Malignant pleural effusion  3. Molecular testing negative for EGFR, ALK and ROS. Instead it shows Kras and CDK mutation,   4. History of breast cancer in 2010  CURRENT THERAPY:  1. Mastectomy and chemotherapy in 2010  2. Status post thoracocentesis x2  3. Pending study did not show any evidence of distant metastases other than the pleural effusion  4. Palliative chemotherapy with carboplatin, Alimta and Keytruda- started 01/20/2020  5. After 4 cycles of chemoimmunotherapy, chemotherapy will be dropped and she will be maintained on maintenance Keytruda   6. Due to side effects, the drug was held. 7. Improvement of side effects, the plan is to switch to Opdivo started 08/2020  BRIEF CASE HISTORY:    The patient is a 63 y.o.  female who is admitted to the hospital for chief complaints of shortness of breath. Patient has history of breast cancer for which she underwent mastectomy in 2010 followed by chemotherapy. This was done in [place]. The  Patient recently moved to [facility] area about a year ago. Patient had a significant history of tobacco dependence. Patient started noticing worsening of shortness of breath and presented to the ER. Work-up done shows right-sided pleural effusion. CT scan showed multiloculated left-sided pleural effusion with compressive atelectasis in the lingula and majority of the left lower lobe and partial compression atelectasis of the left upper lobe. There was also underlying emphysema. There was a stable 1.8 cm adrenal mass likely adrenal adenoma which had been stable since November 2018. Patient underwent ultrasound guided thoracentesis.   Cytology of pleural fluid confirms adenocarcinoma consistent with lung primary  The patient was discharged home but she came back with worsening shortness of breath and was found to have significant pleural effusion that was tapped pathology showed malignant cells, adenocarcinoma of lung primary. .    We saw the patient in house, we arrange for staging PET CT scan and brain MRI which essentially showed no evidence of metastatic disease otherwise. The patient was diagnosed with stage IV lung cancer, we plan palliative chemotherapy, started 01/20/2020. She completed 4 cycles of chemoimmunotherapy and we plan to continue with maintenance Keytruda. Unfortunately, the [place] was very poorly tolerated - decided to hold. Nivolumab started 08/03/2020. INTERIM HISTORY: Zain Purvis comes back today for a follow-up for lung cancer and cycle #3 Nivolumab. She continues to tolerate treatment well. She has xerostomia and sinus dryness and has been using nasal sprays to keep sinuses clear and moist. Her pain has improved but her energy remains low. She continues to smoke. Past Medical History:   has a past medical history of Alcohol abuse, Breast cancer ([address] Chronic diarrhea, COPD (chronic obstructive pulmonary disease) ([facility] [address] Essential hypertension, Lung cancer ([facility] [address] Pleural effusion, and Tobacco abuse. Past Surgical History:   has a past surgical history that includes Appendectomy; Mastectomy (Right); and Cholecystectomy. Family History: family history includes Breast Cancer in her mother; Cancer in her brother; Deep Vein Thrombosis in her brother; Prostate Cancer in her brother and father; Stroke in her mother. Social History:   reports that she has been smoking. She has a 23.50 pack-year smoking history. She has never used smokeless tobacco. She reports current alcohol use of about 8.0 standard drinks of alcohol per week. She reports that she does not use drugs. Medications:    Reviewed in EPIC     Allergies:  Morphine; Robitussin (alcohol free) [guaifenesin]; Aspirin; Penicillins; and Sulfa antibiotics    REVIEW OF SYSTEMS:   General: No fever or night sweats. Weight stable.  +fatigue and poor appetite - improved  ENT: No double, no tinnitus or hearing problem, no dysphagia; dry sinuses and xerostomia  Respiratory: No chest pain, no cough or hemoptysis. +shortness of breath - stable  Cardiovascular: Denies chest pain, PND or orthopnea, palpitations  Gastrointestinal: No nausea or vomiting, abdominal pain,  diarrhea and constipation. Genitourinary: Denies dysuria, hematuria, frequency, urgency or incontinence. Neurological: Denies headaches, decreased LOC, no sensory or motor focal deficits. Musculoskeletal: No arthralgia no back pain or joint swelling. +weakness and muscle fatigue; bilateral leg and knee pain - improved +pain at previous drain site - unchanged  Skin: There are no rashes or bleeding. Psychiatric: No anxiety, no depression. Endocrine: No diabetes or thyroid disease. Hematologic: No bleeding, no adenopathy. PHYSICAL EXAM:      BP (!) 146/89   Pulse 82   Temp 97.2 °F (36.2 °C)   Resp 20   Wt 124 lb 3.2 oz (56.3 kg)   BMI 22.72 kg/m²    Temp (24hrs), Av.9 °F (36.6 °C), Min:97.9 °F (36.6 °C), Max:97.9 °F (36.6 °C)  Gen.  Exam, showed a well-appearing patient without evidence of distress or pain  HEENT, normocephalic and atraumatic, PERRLA extraocular muscles are intact  Neck showed no JVD no carotid bruit, no cervical adenopathy  Chest decreased air entry +wheezing with some fluid  Heart is regular without any murmur  Abdomen soft nontender no hepatosplenomegaly  Lower extremities showed no edema clubbing or cyanosis; tenderness in lateral compartment of left knee   Neurological examination was nonfocal, with intact cranial nerves  Skin  No rashes or bruising appreciated      REVIEW OF LABORATORY DATA:     Lab Results   Component Value Date    WBC 7.6 2020    HGB 13.6 2020    HCT 39.4 2020    MCV 91.8 2020     2020       Chemistry        Component Value Date/Time     (L) 2020 1120    K 3.8 2020 1120    CL 92 (L) 2020 1120    CO2 27 2020 1120    BUN 6 2020 1120

## 2020-09-28 NOTE — TELEPHONE ENCOUNTER
Azithromycin and steroids- SENT TO PT'S PHARMACY  rv in 4 weeks, need CT scan in 2 months  (prior to the treatment in 2 months)- RV ON 10/26/20 WITH TX @ 1PM,  CT ON 11/17/20 AT 2PM WITH PORT ACCESS AT 1:30PM

## 2020-09-28 NOTE — PROGRESS NOTES
Pt arrives ambulatory  for C3 D1 and MD visit . Port accessed per protocol , specimen collected  Lab results reviewed, Physician meet with pt ok to proceeded with treatment .   Pt denies any concerns or complaints   Opdivo infused with no adverse reactions , line flushed per protocol   Port flushed and hernández needle removed with needle intact  Band aid applied  Discharged per ambulatory, labs given pt , pt stopped at  for AVS

## 2020-09-28 NOTE — PATIENT INSTRUCTIONS
Azithromycin and steroids  rv in 4 weeks, need CT scan in 2 months  (prior to the treatment in 2 months)

## 2020-10-16 ENCOUNTER — TELEPHONE (OUTPATIENT)
Dept: ONCOLOGY | Age: 60
End: 2020-10-16

## 2020-10-16 NOTE — TELEPHONE ENCOUNTER
SPOKE WITH PT'S SPOUSE LIVIA WHO REPORTS ZELDA HAS A \"RED, DRY SOMETIMES ITCHY RASH AROUND HER NECK\"    THIS HAS BEEN PRESENT X 2 WEEKS AND SHE HAD AN OLD SCRIPT OF TRIAMCINOLONE ACETONIDE CREAM USP 0.1% THAT SHE BEGAN TO USE WITH IMPROVEMENT HOWEVER RAN OUT OF CREAM.    INQUIRES IF A NEW TUBE OF CREAM COULD BE CALLED INTO [facility] ON [address] OR IF HAVE IDEAS FOR SOMETHING ELSE? CURRENTLY ON OPDIVO AND MD EXAM DUE 10/26/2020.

## 2020-10-19 RX ORDER — TRIAMCINOLONE ACETONIDE 1 MG/G
CREAM TOPICAL
Qty: 1 TUBE | Refills: 2 | OUTPATIENT
Start: 2020-10-19 | End: 2021-09-02

## 2020-10-19 NOTE — TELEPHONE ENCOUNTER
DISCUSSED WITH DR Ada Sierra.   OK TO CALL IN SCRIPT FOR RASH TO NECK  80 GRAM TUBE IS CALLED IN  SPOKE WITH LAUREANO GOMEZ

## 2020-10-20 ENCOUNTER — HOSPITAL ENCOUNTER (OUTPATIENT)
Facility: MEDICAL CENTER | Age: 60
End: 2020-10-20

## 2020-10-26 ENCOUNTER — HOSPITAL ENCOUNTER (OUTPATIENT)
Dept: INFUSION THERAPY | Facility: MEDICAL CENTER | Age: 60
Discharge: HOME | End: 2020-10-26

## 2020-10-26 ENCOUNTER — OFFICE VISIT (OUTPATIENT)
Dept: ONCOLOGY | Age: 60
End: 2020-10-26

## 2020-10-26 ENCOUNTER — TELEPHONE (OUTPATIENT)
Dept: ONCOLOGY | Age: 60
End: 2020-10-26

## 2020-10-26 VITALS
TEMPERATURE: 98.3 F | HEART RATE: 91 BPM | SYSTOLIC BLOOD PRESSURE: 129 MMHG | WEIGHT: 123.1 LBS | RESPIRATION RATE: 16 BRPM | DIASTOLIC BLOOD PRESSURE: 74 MMHG | BODY MASS INDEX: 22.52 KG/M2

## 2020-10-26 DIAGNOSIS — I25.10 CORONARY ARTERY DISEASE INVOLVING NATIVE CORONARY ARTERY OF NATIVE HEART WITHOUT ANGINA PECTORIS: ICD-10-CM

## 2020-10-26 DIAGNOSIS — C34.92 ADENOCARCINOMA, LUNG, LEFT (HCC): Primary | ICD-10-CM

## 2020-10-26 DIAGNOSIS — I10 ESSENTIAL HYPERTENSION: Chronic | ICD-10-CM

## 2020-10-26 LAB
ABSOLUTE EOS #: 0.17 K/UL (ref 0–0.44)
ABSOLUTE IMMATURE GRANULOCYTE: 0.05 K/UL (ref 0–0.3)
ABSOLUTE LYMPH #: 2.2 K/UL (ref 1.1–3.7)
ABSOLUTE MONO #: 0.87 K/UL (ref 0.1–1.2)
ALBUMIN SERPL-MCNC: 4.5 G/DL (ref 3.5–5.2)
ALBUMIN/GLOBULIN RATIO: ABNORMAL (ref 1–2.5)
ALP BLD-CCNC: 71 U/L (ref 35–104)
ALT SERPL-CCNC: 18 U/L (ref 5–33)
AMYLASE: 59 U/L (ref 28–100)
ANION GAP SERPL CALCULATED.3IONS-SCNC: 10 MMOL/L (ref 9–17)
AST SERPL-CCNC: 26 U/L
BASOPHILS # BLD: 1 % (ref 0–2)
BASOPHILS ABSOLUTE: 0.08 K/UL (ref 0–0.2)
BILIRUB SERPL-MCNC: 0.31 MG/DL (ref 0.3–1.2)
BUN BLDV-MCNC: 11 MG/DL (ref 6–20)
BUN/CREAT BLD: 23 (ref 9–20)
CALCIUM SERPL-MCNC: 10.4 MG/DL (ref 8.6–10.4)
CHLORIDE BLD-SCNC: 96 MMOL/L (ref 98–107)
CO2: 26 MMOL/L (ref 20–31)
CORTISOL COLLECTION INFO: NORMAL
CORTISOL: 13.7 UG/DL (ref 2.7–18.4)
CREAT SERPL-MCNC: 0.47 MG/DL (ref 0.5–0.9)
DIFFERENTIAL TYPE: ABNORMAL
EOSINOPHILS RELATIVE PERCENT: 2 % (ref 1–4)
GFR AFRICAN AMERICAN: >60 ML/MIN
GFR NON-AFRICAN AMERICAN: >60 ML/MIN
GFR SERPL CREATININE-BSD FRML MDRD: ABNORMAL ML/MIN/{1.73_M2}
GFR SERPL CREATININE-BSD FRML MDRD: ABNORMAL ML/MIN/{1.73_M2}
GLUCOSE BLD-MCNC: 131 MG/DL (ref 70–99)
HCT VFR BLD CALC: 38.4 % (ref 36.3–47.1)
HEMOGLOBIN: 13 G/DL (ref 11.9–15.1)
IMMATURE GRANULOCYTES: 1 %
LIPASE: 41 U/L (ref 13–60)
LYMPHOCYTES # BLD: 21 % (ref 24–43)
MCH RBC QN AUTO: 31.6 PG (ref 25.2–33.5)
MCHC RBC AUTO-ENTMCNC: 33.9 G/DL (ref 28.4–34.8)
MCV RBC AUTO: 93.2 FL (ref 82.6–102.9)
MONOCYTES # BLD: 8 % (ref 3–12)
NRBC AUTOMATED: 0 PER 100 WBC
PDW BLD-RTO: 13.5 % (ref 11.8–14.4)
PLATELET # BLD: 302 K/UL (ref 138–453)
PLATELET ESTIMATE: ABNORMAL
PMV BLD AUTO: 8.4 FL (ref 8.1–13.5)
POTASSIUM SERPL-SCNC: 4 MMOL/L (ref 3.7–5.3)
RBC # BLD: 4.12 M/UL (ref 3.95–5.11)
RBC # BLD: ABNORMAL 10*6/UL
SEG NEUTROPHILS: 67 % (ref 36–65)
SEGMENTED NEUTROPHILS ABSOLUTE COUNT: 7.11 K/UL (ref 1.5–8.1)
SODIUM BLD-SCNC: 132 MMOL/L (ref 135–144)
TOTAL PROTEIN: 7.3 G/DL (ref 6.4–8.3)
TSH SERPL DL<=0.05 MIU/L-ACNC: 1.92 MIU/L (ref 0.3–5)
WBC # BLD: 10.5 K/UL (ref 3.5–11.3)
WBC # BLD: ABNORMAL 10*3/UL

## 2020-10-26 PROCEDURE — 99214 OFFICE O/P EST MOD 30 MIN: CPT | Performed by: INTERNAL MEDICINE

## 2020-10-26 PROCEDURE — 2580000003 HC RX 258: Performed by: INTERNAL MEDICINE

## 2020-10-26 PROCEDURE — 96413 CHEMO IV INFUSION 1 HR: CPT

## 2020-10-26 PROCEDURE — 6360000002 HC RX W HCPCS: Performed by: INTERNAL MEDICINE

## 2020-10-26 PROCEDURE — 84443 ASSAY THYROID STIM HORMONE: CPT

## 2020-10-26 PROCEDURE — 36591 DRAW BLOOD OFF VENOUS DEVICE: CPT

## 2020-10-26 PROCEDURE — 85025 COMPLETE CBC W/AUTO DIFF WBC: CPT

## 2020-10-26 PROCEDURE — 82150 ASSAY OF AMYLASE: CPT

## 2020-10-26 PROCEDURE — 82533 TOTAL CORTISOL: CPT

## 2020-10-26 PROCEDURE — 83690 ASSAY OF LIPASE: CPT

## 2020-10-26 PROCEDURE — 99212 OFFICE O/P EST SF 10 MIN: CPT

## 2020-10-26 PROCEDURE — 80053 COMPREHEN METABOLIC PANEL: CPT

## 2020-10-26 RX ORDER — LANOLIN ALCOHOL/MO/W.PET/CERES
400 CREAM (GRAM) TOPICAL DAILY
Qty: 100 TABLET | Refills: 3 | Status: ON HOLD | OUTPATIENT
Start: 2020-10-26 | End: 2022-04-21

## 2020-10-26 RX ORDER — SODIUM CHLORIDE 9 MG/ML
20 INJECTION, SOLUTION INTRAVENOUS ONCE
Status: COMPLETED | OUTPATIENT
Start: 2020-10-26 | End: 2020-10-26

## 2020-10-26 RX ORDER — HEPARIN SODIUM (PORCINE) LOCK FLUSH IV SOLN 100 UNIT/ML 100 UNIT/ML
500 SOLUTION INTRAVENOUS PRN
Status: DISCONTINUED | OUTPATIENT
Start: 2020-10-26 | End: 2020-10-27 | Stop reason: HOSPADM

## 2020-10-26 RX ORDER — SODIUM CHLORIDE 0.9 % (FLUSH) 0.9 %
10 SYRINGE (ML) INJECTION PRN
Status: DISCONTINUED | OUTPATIENT
Start: 2020-10-26 | End: 2020-10-27 | Stop reason: HOSPADM

## 2020-10-26 RX ADMIN — Medication 10 ML: at 15:40

## 2020-10-26 RX ADMIN — HEPARIN 500 UNITS: 100 SYRINGE at 15:40

## 2020-10-26 RX ADMIN — SODIUM CHLORIDE 20 ML/HR: 9 INJECTION, SOLUTION INTRAVENOUS at 13:40

## 2020-10-26 RX ADMIN — Medication 10 ML: at 13:40

## 2020-10-26 RX ADMIN — SODIUM CHLORIDE 480 MG: 9 INJECTION, SOLUTION INTRAVENOUS at 14:59

## 2020-10-26 NOTE — PROGRESS NOTES
DIAGNOSIS:   1. Metastatic adenocarcinoma of the lung, stage IV  2. Malignant pleural effusion  3. Molecular testing negative for EGFR, ALK and ROS. Instead it shows Kras and CDK mutation,   4. History of breast cancer in 2010  CURRENT THERAPY:  1. Mastectomy and chemotherapy in 2010  2. Status post thoracocentesis x2  3. Pending study did not show any evidence of distant metastases other than the pleural effusion  4. Palliative chemotherapy with carboplatin, Alimta and Keytruda- started 01/20/2020  5. After 4 cycles of chemoimmunotherapy, chemotherapy will be dropped and she will be maintained on maintenance Keytruda   6. Due to side effects, the drug was held. 7. Improvement of side effects, the plan is to switch to Opdivo started 08/2020  BRIEF CASE HISTORY:    The patient is a 63 y.o.  female who is admitted to the hospital for chief complaints of shortness of breath. Patient has history of breast cancer for which she underwent mastectomy in 2010 followed by chemotherapy. This was done in [place]. The  Patient recently moved to [facility] about a year ago. Patient had a significant history of tobacco dependence. Patient started noticing worsening of shortness of breath and presented to the ER. Work-up done shows right-sided pleural effusion. CT scan showed multiloculated left-sided pleural effusion with compressive atelectasis in the lingula and majority of the left lower lobe and partial compression atelectasis of the left upper lobe. There was also underlying emphysema. There was a stable 1.8 cm adrenal mass likely adrenal adenoma which had been stable since November 2018. Patient underwent ultrasound guided thoracentesis.   Cytology of pleural fluid confirms adenocarcinoma consistent with lung primary  The patient was discharged home but she came back with worsening shortness of breath and was found to have significant pleural effusion that was tapped pathology showed malignant cells, adenocarcinoma of lung primary. .    We saw the patient in house, we arrange for staging PET CT scan and brain MRI which essentially showed no evidence of metastatic disease otherwise. The patient was diagnosed with stage IV lung cancer, we plan palliative chemotherapy, started 01/20/2020. She completed 4 cycles of chemoimmunotherapy and we plan to continue with maintenance Keytruda. Unfortunately, the Dre Mering was very poorly tolerated - decided to hold. Nivolumab started 08/03/2020. INTERIM HISTORY: Chan Acharya comes back today for a follow-up for lung cancer and cycle #4 Nivolumab. She reports she is doing very well with treatment and denies any mouth sores and diarrhea. She developed rash on face, neck, and torso that resolved with steroid cream.       Past Medical History:   has a past medical history of Alcohol abuse, Breast cancer ([address] Chronic diarrhea, COPD (chronic obstructive pulmonary disease) ([facility] [address] Essential hypertension, Lung cancer ([facility] [address] Pleural effusion, and Tobacco abuse. Past Surgical History:   has a past surgical history that includes Appendectomy; Mastectomy (Right); and Cholecystectomy. Family History: family history includes Breast Cancer in her mother; Cancer in her brother; Deep Vein Thrombosis in her brother; Prostate Cancer in her brother and father; Stroke in her mother. Social History:   reports that she has been smoking. She has a 23.50 pack-year smoking history. She has never used smokeless tobacco. She reports current alcohol use of about 8.0 standard drinks of alcohol per week. She reports that she does not use drugs. Medications:    Reviewed in EPIC     Allergies:  Morphine; Robitussin (alcohol free) [guaifenesin]; Aspirin; Penicillins; and Sulfa antibiotics    REVIEW OF SYSTEMS:   General: No fever or night sweats. Weight stable.  +fatigue and poor appetite - improved  ENT: No double, no tinnitus or hearing problem, no dysphagia; dry sinuses and xerostomia  Respiratory: No chest pain, no cough or hemoptysis. +shortness of breath - stable  Cardiovascular: Denies chest pain, PND or orthopnea, palpitations  Gastrointestinal: No nausea or vomiting, abdominal pain,  diarrhea and constipation. Genitourinary: Denies dysuria, hematuria, frequency, urgency or incontinence. Neurological: Denies headaches, decreased LOC, no sensory or motor focal deficits. Musculoskeletal: No arthralgia no back pain or joint swelling. +weakness and muscle fatigue; bilateral leg and knee pain - improved +pain at previous drain site - unchanged  Skin: No bleeding. +rash resolved  Psychiatric: No anxiety, no depression. Endocrine: No diabetes or thyroid disease. Hematologic: No bleeding, no adenopathy. PHYSICAL EXAM:      /74 (Site: Left Upper Arm, Position: Sitting)   Pulse 91   Temp 98.3 °F (36.8 °C) (Oral)   Resp 16   Wt 123 lb 1.6 oz (55.8 kg)   BMI 22.52 kg/m²    Temp (24hrs), Av.9 °F (36.6 °C), Min:97.9 °F (36.6 °C), Max:97.9 °F (36.6 °C)  Gen.  Exam, showed a well-appearing patient without evidence of distress or pain  HEENT, normocephalic and atraumatic, PERRLA extraocular muscles are intact  Neck showed no JVD no carotid bruit, no cervical adenopathy  Chest decreased air entry +wheezing with some fluid  Heart is regular without any murmur  Abdomen soft nontender no hepatosplenomegaly  Lower extremities showed no edema clubbing or cyanosis; tenderness in lateral compartment of left knee   Neurological examination was nonfocal, with intact cranial nerves  Skin  No rashes or bruising appreciated      REVIEW OF LABORATORY DATA:     Lab Results   Component Value Date    WBC 10.5 10/26/2020    HGB 13.0 10/26/2020    HCT 38.4 10/26/2020    MCV 93.2 10/26/2020     10/26/2020       Chemistry        Component Value Date/Time     (L) 10/26/2020 1340    K 4.0 10/26/2020 1340    CL 96 (L) 10/26/2020 1340    CO2 26 10/26/2020 1340    BUN 11 10/26/2020 1340    CREATININE 0.47 (L) 10/26/2020 1340        Component Value Date/Time    CALCIUM 10.4 10/26/2020 1340    ALKPHOS 71 10/26/2020 1340    AST 26 10/26/2020 1340    ALT 18 10/26/2020 1340    BILITOT 0.31 10/26/2020 1340              IMPRESSION:    1. History of breast cancer in 2010, status post mastectomy and chemotherapy  2. New diagnosis with lung cancer  3. Malignant pleural effusion stage IV disease  4. Chemotherapy with carboplatin, Alimta and Keytruda - started 01/20/2020  5. Weakness in both lower extremities  6. Constellation of symptoms including sore throat, sinus drainage, diffuse arthralgias, worsening fatigue and aches and pains. Differential is very broad but I am concerned about immunologic effect of Keytruda. 7. Opdivo started 08/2020      PLAN:   1. Her lab work was reviewed, counts in range and electrolytes are stable. 2. I completed toxicity check - rash, resolved with steroid cream.   3. She has additional cream to manage any recurrent rash. 4. She is doing well with treatment and we will treat today as per orders. 5. I discussed plan for imaging after cycle #4 and I am putting in orders. 6. Return in 4 weeks to review imaging and discus further recommendations.

## 2020-10-26 NOTE — PROGRESS NOTES
Patient arrive ambulatory with spouse for cycle 4 day 1 treatment and physician visit. Denies complaint or concern. Vitals as charted. Port accessed; specimen sent. Physician met with patient; labs reviewed; ok to proceed with treatment. Opdivo infused with no sign of adverse reaction; line flushed. Port flushed and heparinized with intact  Santiago needle removed per protocol. Patient ambulate off unit with spouse at discharge; AVS to be provided by front office staff.

## 2020-10-26 NOTE — TELEPHONE ENCOUNTER
ZELDA ARRIVES AMBULATORY FOR MD VISIT & TX  DR. Genaro Patel IN TO SEE PT   ORDERS RECEIVED   CONTINUE CHEMO PER ORDERS  RV IN 4 WEEKS   WITH CHEMO AND LABS   NEEDS CT SCAN PRIOR TO RV  CT SCAN SCHEDULED 11/17/20   @ 2PM AT Mariely Fowler MD VISIT 11/23/20 @ 11:30AM  SCRIPTS SENT TO PT PHARMACY  AVS PRINTED WITH INSTRUCTIONS  GIVEN TO PT  PT REMAINS IN [address] [place] [address]

## 2020-11-16 ENCOUNTER — HOSPITAL ENCOUNTER (OUTPATIENT)
Facility: MEDICAL CENTER | Age: 60
End: 2020-11-16

## 2020-11-17 ENCOUNTER — HOSPITAL ENCOUNTER (OUTPATIENT)
Dept: CT IMAGING | Age: 60
Discharge: HOME | End: 2020-11-19

## 2020-11-17 ENCOUNTER — HOSPITAL ENCOUNTER (OUTPATIENT)
Dept: INFUSION THERAPY | Facility: MEDICAL CENTER | Age: 60
Discharge: HOME | End: 2020-11-17

## 2020-11-17 VITALS
TEMPERATURE: 98.3 F | SYSTOLIC BLOOD PRESSURE: 124 MMHG | DIASTOLIC BLOOD PRESSURE: 72 MMHG | RESPIRATION RATE: 18 BRPM | HEART RATE: 83 BPM

## 2020-11-17 DIAGNOSIS — C34.92 ADENOCARCINOMA, LUNG, LEFT (HCC): Primary | ICD-10-CM

## 2020-11-17 PROCEDURE — 2580000003 HC RX 258: Performed by: INTERNAL MEDICINE

## 2020-11-17 PROCEDURE — 6360000002 HC RX W HCPCS: Performed by: INTERNAL MEDICINE

## 2020-11-17 PROCEDURE — 71260 CT THORAX DX C+: CPT

## 2020-11-17 PROCEDURE — 6360000004 HC RX CONTRAST MEDICATION: Performed by: INTERNAL MEDICINE

## 2020-11-17 RX ORDER — SODIUM CHLORIDE 0.9 % (FLUSH) 0.9 %
20 SYRINGE (ML) INJECTION PRN
Status: CANCELLED | OUTPATIENT
Start: 2020-11-17

## 2020-11-17 RX ORDER — 0.9 % SODIUM CHLORIDE 0.9 %
80 INTRAVENOUS SOLUTION INTRAVENOUS ONCE
Status: COMPLETED | OUTPATIENT
Start: 2020-11-17 | End: 2020-11-17

## 2020-11-17 RX ORDER — SODIUM CHLORIDE 0.9 % (FLUSH) 0.9 %
10 SYRINGE (ML) INJECTION PRN
Status: DISCONTINUED | OUTPATIENT
Start: 2020-11-17 | End: 2020-11-20 | Stop reason: HOSPADM

## 2020-11-17 RX ORDER — SODIUM CHLORIDE 0.9 % (FLUSH) 0.9 %
20 SYRINGE (ML) INJECTION PRN
Status: DISCONTINUED | OUTPATIENT
Start: 2020-11-17 | End: 2020-11-18 | Stop reason: HOSPADM

## 2020-11-17 RX ORDER — SODIUM CHLORIDE 0.9 % (FLUSH) 0.9 %
10 SYRINGE (ML) INJECTION PRN
Status: CANCELLED | OUTPATIENT
Start: 2020-11-17

## 2020-11-17 RX ORDER — HEPARIN SODIUM (PORCINE) LOCK FLUSH IV SOLN 100 UNIT/ML 100 UNIT/ML
500 SOLUTION INTRAVENOUS PRN
Status: DISCONTINUED | OUTPATIENT
Start: 2020-11-17 | End: 2020-11-18 | Stop reason: HOSPADM

## 2020-11-17 RX ORDER — HEPARIN SODIUM (PORCINE) LOCK FLUSH IV SOLN 100 UNIT/ML 100 UNIT/ML
500 SOLUTION INTRAVENOUS PRN
Status: CANCELLED | OUTPATIENT
Start: 2020-11-17

## 2020-11-17 RX ADMIN — Medication 10 ML: at 14:14

## 2020-11-17 RX ADMIN — HEPARIN 500 UNITS: 100 SYRINGE at 14:28

## 2020-11-17 RX ADMIN — Medication 20 ML: at 14:28

## 2020-11-17 RX ADMIN — SODIUM CHLORIDE 80 ML: 9 INJECTION, SOLUTION INTRAVENOUS at 14:14

## 2020-11-17 RX ADMIN — IOPAMIDOL 75 ML: 755 INJECTION, SOLUTION INTRAVENOUS at 14:14

## 2020-11-17 RX ADMIN — Medication 20 ML: at 13:39

## 2020-11-17 NOTE — PROGRESS NOTES
Pt arrives per ambulatory with  and no labs needed prior to CT and pt has power port per epic records and pt tolerated port access and flush well and blood return very good and flushes easily and pt to CT , then to return to [facility] for de-access and pt acknowledges understanding. Pt to registration per ambulatory with . Pt tolerated CT well and then pt flushed and heparin instilled and pt de-accessed and then discharged per ambulatory with .

## 2020-11-23 ENCOUNTER — HOSPITAL ENCOUNTER (OUTPATIENT)
Dept: INFUSION THERAPY | Facility: MEDICAL CENTER | Age: 60
Discharge: HOME | End: 2020-11-23

## 2020-11-23 ENCOUNTER — OFFICE VISIT (OUTPATIENT)
Dept: ONCOLOGY | Age: 60
End: 2020-11-23

## 2020-11-23 ENCOUNTER — TELEPHONE (OUTPATIENT)
Dept: ONCOLOGY | Age: 60
End: 2020-11-23

## 2020-11-23 VITALS
RESPIRATION RATE: 18 BRPM | SYSTOLIC BLOOD PRESSURE: 136 MMHG | BODY MASS INDEX: 22.94 KG/M2 | DIASTOLIC BLOOD PRESSURE: 81 MMHG | WEIGHT: 125.4 LBS | TEMPERATURE: 98.4 F | HEART RATE: 74 BPM

## 2020-11-23 DIAGNOSIS — C34.92 ADENOCARCINOMA, LUNG, LEFT (HCC): Primary | ICD-10-CM

## 2020-11-23 DIAGNOSIS — I25.10 CORONARY ARTERY DISEASE INVOLVING NATIVE CORONARY ARTERY OF NATIVE HEART WITHOUT ANGINA PECTORIS: ICD-10-CM

## 2020-11-23 DIAGNOSIS — I10 ESSENTIAL HYPERTENSION: Chronic | ICD-10-CM

## 2020-11-23 LAB
ABSOLUTE EOS #: 0.23 K/UL (ref 0–0.44)
ABSOLUTE IMMATURE GRANULOCYTE: 0.04 K/UL (ref 0–0.3)
ABSOLUTE LYMPH #: 2.49 K/UL (ref 1.1–3.7)
ABSOLUTE MONO #: 0.79 K/UL (ref 0.1–1.2)
ALBUMIN SERPL-MCNC: 5.1 G/DL (ref 3.5–5.2)
ALBUMIN/GLOBULIN RATIO: ABNORMAL (ref 1–2.5)
ALP BLD-CCNC: 82 U/L (ref 35–104)
ALT SERPL-CCNC: 16 U/L (ref 5–33)
AMYLASE: 55 U/L (ref 28–100)
ANION GAP SERPL CALCULATED.3IONS-SCNC: 11 MMOL/L (ref 9–17)
AST SERPL-CCNC: 25 U/L
BASOPHILS # BLD: 1 % (ref 0–2)
BASOPHILS ABSOLUTE: 0.05 K/UL (ref 0–0.2)
BILIRUB SERPL-MCNC: 0.31 MG/DL (ref 0.3–1.2)
BUN BLDV-MCNC: 7 MG/DL (ref 6–20)
BUN/CREAT BLD: 18 (ref 9–20)
CALCIUM SERPL-MCNC: 10.5 MG/DL (ref 8.6–10.4)
CHLORIDE BLD-SCNC: 93 MMOL/L (ref 98–107)
CO2: 27 MMOL/L (ref 20–31)
CORTISOL COLLECTION INFO: NORMAL
CORTISOL: 15 UG/DL (ref 2.7–18.4)
CREAT SERPL-MCNC: 0.4 MG/DL (ref 0.5–0.9)
DIFFERENTIAL TYPE: NORMAL
EOSINOPHILS RELATIVE PERCENT: 2 % (ref 1–4)
GFR AFRICAN AMERICAN: >60 ML/MIN
GFR NON-AFRICAN AMERICAN: >60 ML/MIN
GFR SERPL CREATININE-BSD FRML MDRD: ABNORMAL ML/MIN/{1.73_M2}
GFR SERPL CREATININE-BSD FRML MDRD: ABNORMAL ML/MIN/{1.73_M2}
GLUCOSE BLD-MCNC: 97 MG/DL (ref 70–99)
HCT VFR BLD CALC: 38 % (ref 36.3–47.1)
HEMOGLOBIN: 13.1 G/DL (ref 11.9–15.1)
IMMATURE GRANULOCYTES: 0 %
LIPASE: 35 U/L (ref 13–60)
LYMPHOCYTES # BLD: 26 % (ref 24–43)
MCH RBC QN AUTO: 31.6 PG (ref 25.2–33.5)
MCHC RBC AUTO-ENTMCNC: 34.5 G/DL (ref 28.4–34.8)
MCV RBC AUTO: 91.8 FL (ref 82.6–102.9)
MONOCYTES # BLD: 8 % (ref 3–12)
NRBC AUTOMATED: 0 PER 100 WBC
PDW BLD-RTO: 13.2 % (ref 11.8–14.4)
PLATELET # BLD: 317 K/UL (ref 138–453)
PLATELET ESTIMATE: NORMAL
PMV BLD AUTO: 8.4 FL (ref 8.1–13.5)
POTASSIUM SERPL-SCNC: 4 MMOL/L (ref 3.7–5.3)
RBC # BLD: 4.14 M/UL (ref 3.95–5.11)
RBC # BLD: NORMAL 10*6/UL
SEG NEUTROPHILS: 63 % (ref 36–65)
SEGMENTED NEUTROPHILS ABSOLUTE COUNT: 5.82 K/UL (ref 1.5–8.1)
SODIUM BLD-SCNC: 131 MMOL/L (ref 135–144)
TOTAL PROTEIN: 8 G/DL (ref 6.4–8.3)
TSH SERPL DL<=0.05 MIU/L-ACNC: 4.86 MIU/L (ref 0.3–5)
WBC # BLD: 9.4 K/UL (ref 3.5–11.3)
WBC # BLD: NORMAL 10*3/UL

## 2020-11-23 PROCEDURE — 36591 DRAW BLOOD OFF VENOUS DEVICE: CPT

## 2020-11-23 PROCEDURE — 82150 ASSAY OF AMYLASE: CPT

## 2020-11-23 PROCEDURE — 80053 COMPREHEN METABOLIC PANEL: CPT

## 2020-11-23 PROCEDURE — 96413 CHEMO IV INFUSION 1 HR: CPT

## 2020-11-23 PROCEDURE — 85025 COMPLETE CBC W/AUTO DIFF WBC: CPT

## 2020-11-23 PROCEDURE — 99214 OFFICE O/P EST MOD 30 MIN: CPT | Performed by: INTERNAL MEDICINE

## 2020-11-23 PROCEDURE — 6360000002 HC RX W HCPCS: Performed by: INTERNAL MEDICINE

## 2020-11-23 PROCEDURE — 82533 TOTAL CORTISOL: CPT

## 2020-11-23 PROCEDURE — 83690 ASSAY OF LIPASE: CPT

## 2020-11-23 PROCEDURE — 84443 ASSAY THYROID STIM HORMONE: CPT

## 2020-11-23 PROCEDURE — 2580000003 HC RX 258: Performed by: INTERNAL MEDICINE

## 2020-11-23 PROCEDURE — 99211 OFF/OP EST MAY X REQ PHY/QHP: CPT | Performed by: INTERNAL MEDICINE

## 2020-11-23 RX ORDER — SODIUM CHLORIDE 0.9 % (FLUSH) 0.9 %
10 SYRINGE (ML) INJECTION PRN
Status: DISCONTINUED | OUTPATIENT
Start: 2020-11-23 | End: 2020-11-24 | Stop reason: HOSPADM

## 2020-11-23 RX ORDER — HEPARIN SODIUM (PORCINE) LOCK FLUSH IV SOLN 100 UNIT/ML 100 UNIT/ML
500 SOLUTION INTRAVENOUS PRN
Status: DISCONTINUED | OUTPATIENT
Start: 2020-11-23 | End: 2020-11-24 | Stop reason: HOSPADM

## 2020-11-23 RX ADMIN — HEPARIN 500 UNITS: 100 SYRINGE at 13:20

## 2020-11-23 RX ADMIN — SODIUM CHLORIDE 480 MG: 9 INJECTION, SOLUTION INTRAVENOUS at 12:36

## 2020-11-23 NOTE — TELEPHONE ENCOUNTER
ZELDA ARRIVES AMBULATORY FOR MD VISIT & TX  DR Naman Alba IN TO SEE PATIENT  ORDERS RECEIVED  CONTINUE CHEMOTHERAPY PER ORDERS  RV 4 WEEKS W/CHEMO & LABS  MD VISIT 12/21/20 @1:30PM  [email or web address]  AVS PRINTED AND GIVEN TO PATIENT WITH INSTRUCTIONS  PATIENT REMAINS IN [address] [place] [address]

## 2020-11-30 NOTE — PROGRESS NOTES
DIAGNOSIS:   1. Metastatic adenocarcinoma of the lung, stage IV  2. Malignant pleural effusion  3. Molecular testing negative for EGFR, ALK and ROS. Instead it shows Kras and CDK mutation,   4. History of breast cancer in 2010  CURRENT THERAPY:  1. Mastectomy and chemotherapy in 2010  2. Status post thoracocentesis x2  3. Pending study did not show any evidence of distant metastases other than the pleural effusion  4. Palliative chemotherapy with carboplatin, Alimta and Keytruda- started 01/20/2020  5. After 4 cycles of chemoimmunotherapy, chemotherapy will be dropped and she will be maintained on maintenance Keytruda   6. Due to side effects, the drug was held. 7. Improvement of side effects, the plan is to switch to Opdivo started 08/2020  BRIEF CASE HISTORY:    The patient is a 63 y.o.  female who is admitted to the hospital for chief complaints of shortness of breath. Patient has history of breast cancer for which she underwent mastectomy in 2010 followed by chemotherapy. This was done in [place]. The  Patient recently moved to [facility] about a year ago. Patient had a significant history of tobacco dependence. Patient started noticing worsening of shortness of breath and presented to the ER. Work-up done shows right-sided pleural effusion. CT scan showed multiloculated left-sided pleural effusion with compressive atelectasis in the lingula and majority of the left lower lobe and partial compression atelectasis of the left upper lobe. There was also underlying emphysema. There was a stable 1.8 cm adrenal mass likely adrenal adenoma which had been stable since November 2018. Patient underwent ultrasound guided thoracentesis.   Cytology of pleural fluid confirms adenocarcinoma consistent with lung primary  The patient was discharged home but she came back with worsening shortness of breath and was found to have significant pleural effusion that was tapped pathology showed malignant cells, adenocarcinoma of lung primary. .    We saw the patient in house, we arrange for staging PET CT scan and brain MRI which essentially showed no evidence of metastatic disease otherwise. The patient was diagnosed with stage IV lung cancer, we plan palliative chemotherapy, started 01/20/2020. She completed 4 cycles of chemoimmunotherapy and we plan to continue with maintenance Keytruda. Unfortunately, the Dinah Grumbles was very poorly tolerated - decided to hold. Nivolumab started 08/03/2020. INTERIM HISTORY: Serena Horne comes back today for a follow-up for lung cancer and cycle #5 Nivolumab. She reports she is doing very well with treatment and denies any mouth sores and diarrhea. She developed rash on face, neck, and torso that resolved with steroid cream.       Past Medical History:   has a past medical history of Alcohol abuse, Breast cancer ([facility] [address] Chronic diarrhea, COPD (chronic obstructive pulmonary disease) ([facility] [address] Essential hypertension, Lung cancer ([facility] [address] Pleural effusion, and Tobacco abuse. Past Surgical History:   has a past surgical history that includes Appendectomy; Mastectomy (Right); and Cholecystectomy. Family History: family history includes Breast Cancer in her mother; Cancer in her brother; Deep Vein Thrombosis in her brother; Prostate Cancer in her brother and father; Stroke in her mother. Social History:   reports that she has been smoking. She has a 23.50 pack-year smoking history. She has never used smokeless tobacco. She reports current alcohol use of about 8.0 standard drinks of alcohol per week. She reports that she does not use drugs. Medications:    Reviewed in EPIC     Allergies:  Morphine; Robitussin (alcohol free) [guaifenesin]; Aspirin; Penicillins; and Sulfa antibiotics    REVIEW OF SYSTEMS:   General: No fever or night sweats. Weight stable.  +fatigue and poor appetite - improved  ENT: No double, no tinnitus or hearing problem, no dysphagia; dry sinuses and xerostomia  Respiratory: No chest pain, no cough or hemoptysis. +shortness of breath - stable  Cardiovascular: Denies chest pain, PND or orthopnea, palpitations  Gastrointestinal: No nausea or vomiting, abdominal pain,  diarrhea and constipation. Genitourinary: Denies dysuria, hematuria, frequency, urgency or incontinence. Neurological: Denies headaches, decreased LOC, no sensory or motor focal deficits. Musculoskeletal: No arthralgia no back pain or joint swelling. +weakness and muscle fatigue; bilateral leg and knee pain - improved +pain at previous drain site - unchanged  Skin: No bleeding. +rash resolved  Psychiatric: No anxiety, no depression. Endocrine: No diabetes or thyroid disease. Hematologic: No bleeding, no adenopathy. PHYSICAL EXAM:      /81   Pulse 74   Temp 98.4 °F (36.9 °C) (Oral)   Resp 18   Wt 125 lb 6.4 oz (56.9 kg)   BMI 22.94 kg/m²    Temp (24hrs), Av.9 °F (36.6 °C), Min:97.9 °F (36.6 °C), Max:97.9 °F (36.6 °C)  Gen.  Exam, showed a well-appearing patient without evidence of distress or pain  HEENT, normocephalic and atraumatic, PERRLA extraocular muscles are intact  Neck showed no JVD no carotid bruit, no cervical adenopathy  Chest decreased air entry +wheezing with some fluid  Heart is regular without any murmur  Abdomen soft nontender no hepatosplenomegaly  Lower extremities showed no edema clubbing or cyanosis; tenderness in lateral compartment of left knee   Neurological examination was nonfocal, with intact cranial nerves  Skin  No rashes or bruising appreciated      REVIEW OF LABORATORY DATA:     Lab Results   Component Value Date    WBC 9.4 2020    HGB 13.1 2020    HCT 38.0 2020    MCV 91.8 2020     2020       Chemistry        Component Value Date/Time     (L) 2020 1145    K 4.0 2020 1145    CL 93 (L) 2020 1145    CO2 27 2020 1145    BUN 7 2020 1145    CREATININE 0.40 (L) 2020 1145 Component Value Date/Time    CALCIUM 10.5 (H) 11/23/2020 1145    ALKPHOS 82 11/23/2020 1145    AST 25 11/23/2020 1145    ALT 16 11/23/2020 1145    BILITOT 0.31 11/23/2020 1145      Ct scan   Impression    No acute abnormality.  Stable appearance of lungs compared to the prior study            IMPRESSION:    1. History of breast cancer in 2010, status post mastectomy and chemotherapy  2. New diagnosis with lung cancer  3. Malignant pleural effusion stage IV disease  4. Chemotherapy with carboplatin, Alimta and Keytruda - started 01/20/2020  5. Weakness in both lower extremities  6. Constellation of symptoms including sore throat, sinus drainage, diffuse arthralgias, worsening fatigue and aches and pains. Differential is very broad but I am concerned about immunologic effect of Keytruda. 7. Opdivo started 08/2020      PLAN:   1. Her lab work was reviewed, counts in range and electrolytes are stable. 2. I completed toxicity check - rash, resolved with steroid cream.   3. Imaging reviewed with the patient, she has stable disease. Been encouraged by her excellent response to therapy. We plan to continue current treatment until progression. 4. Patient is very encouraged by this. We will continues with local steroids to control the rash. Otherwise no adjustment to treatment needed.   We will see her back in 4 weeks for follow-up

## 2020-12-11 ENCOUNTER — HOSPITAL ENCOUNTER (OUTPATIENT)
Facility: MEDICAL CENTER | Age: 60
End: 2020-12-11

## 2020-12-14 NOTE — TELEPHONE ENCOUNTER
Last visit: 6/23/20  Last Med refill: 6/23/20  Does patient have enough medication for 72 hours: Yes    PATIENT NEEDS APPT    Next Visit Date:  Future Appointments   Date Time Provider Nash Fontenot   12/21/2020  1:00 PM [facility] STA CHAIR [facility]   12/21/2020  1:30 PM Yusef Beltran MD [id number] [facility] Maintenance   Topic Date Due    Hepatitis C screen  1960    Pneumococcal 0-64 years Vaccine (1 of 1 - PPSV23) 08/12/1967    HIV screen  08/12/1976    DTaP/Tdap/Td vaccine (1 - Tdap) 08/12/1980    Cervical cancer screen  08/12/1982    Breast cancer screen  08/12/2001    Shingles Vaccine (1 of 2) 08/12/2011    Colon cancer screen colonoscopy  08/12/2011    Flu vaccine (1) 09/01/2020    Potassium monitoring  11/23/2021    Creatinine monitoring  11/23/2021    Lipid screen  04/13/2025    Hepatitis A vaccine  Aged Out    Hepatitis B vaccine  Aged Out    Hib vaccine  Aged Out    Meningococcal (ACWY) vaccine  Aged Out       Hemoglobin A1C (%)   Date Value   12/19/2019 5.6             ( goal A1C is < 7)   No results found for: LABMICR  LDL Cholesterol (mg/dL)   Date Value   04/13/2020        02/10/2020 150 (H)       (goal LDL is <100)   AST (U/L)   Date Value   11/23/2020 25     ALT (U/L)   Date Value   11/23/2020 16     BUN (mg/dL)   Date Value   11/23/2020 7     BP Readings from Last 3 Encounters:   11/23/20 136/81   11/17/20 124/72   10/26/20 129/74          (goal 120/80)    All Future Testing planned in [facility]  Lab Frequency Next Occurrence   Miscellaneous Sendout 1 Once 01/06/2020   CT CHEST W CONTRAST Once 11/02/2020   US THORACENTESIS Once a week    CBC Auto Differential     Comprehensive Metabolic Panel     TSH without Reflex     Cortisol Total     Amylase     Lipase     Lipid Panel     Amylase     Cortisol Total     Lipase     TSH without Reflex     CBC Auto Differential     Comprehensive Metabolic Panel                 Patient Active Problem List: Panlobular emphysema ([facility] [address]     Essential hypertension     History of right breast cancer     Loculated pleural effusion     Hyponatremia     Coronary artery disease involving native coronary artery of native heart without angina pectoris     Tobacco abuse disorder     Alcohol abuse     Leukocytosis     Adenocarcinoma, lung, left (HCC)     Shortness of breath     Insomnia     Anxiety

## 2020-12-15 RX ORDER — AMLODIPINE BESYLATE 5 MG/1
TABLET ORAL
Qty: 30 TABLET | Refills: 0 | Status: SHIPPED | OUTPATIENT
Start: 2020-12-15 | End: 2021-01-18 | Stop reason: SDUPTHER

## 2020-12-21 ENCOUNTER — TELEPHONE (OUTPATIENT)
Dept: ONCOLOGY | Age: 60
End: 2020-12-21

## 2020-12-21 ENCOUNTER — HOSPITAL ENCOUNTER (OUTPATIENT)
Dept: INFUSION THERAPY | Facility: MEDICAL CENTER | Age: 60
Discharge: HOME | End: 2020-12-21

## 2020-12-21 ENCOUNTER — OFFICE VISIT (OUTPATIENT)
Dept: ONCOLOGY | Age: 60
End: 2020-12-21

## 2020-12-21 VITALS
SYSTOLIC BLOOD PRESSURE: 145 MMHG | BODY MASS INDEX: 23.32 KG/M2 | DIASTOLIC BLOOD PRESSURE: 84 MMHG | TEMPERATURE: 97.3 F | RESPIRATION RATE: 16 BRPM | WEIGHT: 127.5 LBS

## 2020-12-21 DIAGNOSIS — J91.0 PLEURAL EFFUSION, MALIGNANT: ICD-10-CM

## 2020-12-21 DIAGNOSIS — C34.92 ADENOCARCINOMA, LUNG, LEFT (HCC): Primary | ICD-10-CM

## 2020-12-21 LAB
ABSOLUTE EOS #: 0.24 K/UL (ref 0–0.44)
ABSOLUTE IMMATURE GRANULOCYTE: 0.04 K/UL (ref 0–0.3)
ABSOLUTE LYMPH #: 2.69 K/UL (ref 1.1–3.7)
ABSOLUTE MONO #: 0.85 K/UL (ref 0.1–1.2)
ALBUMIN SERPL-MCNC: 4.5 G/DL (ref 3.5–5.2)
ALBUMIN/GLOBULIN RATIO: ABNORMAL (ref 1–2.5)
ALP BLD-CCNC: 79 U/L (ref 35–104)
ALT SERPL-CCNC: 16 U/L (ref 5–33)
AMYLASE: 195 U/L (ref 28–100)
ANION GAP SERPL CALCULATED.3IONS-SCNC: 12 MMOL/L (ref 9–17)
AST SERPL-CCNC: 22 U/L
BASOPHILS # BLD: 1 % (ref 0–2)
BASOPHILS ABSOLUTE: 0.05 K/UL (ref 0–0.2)
BILIRUB SERPL-MCNC: 0.19 MG/DL (ref 0.3–1.2)
BUN BLDV-MCNC: 9 MG/DL (ref 6–20)
BUN/CREAT BLD: 21 (ref 9–20)
CALCIUM SERPL-MCNC: 10 MG/DL (ref 8.6–10.4)
CHLORIDE BLD-SCNC: 95 MMOL/L (ref 98–107)
CO2: 25 MMOL/L (ref 20–31)
CORTISOL COLLECTION INFO: NORMAL
CORTISOL: 9.2 UG/DL (ref 2.7–18.4)
CREAT SERPL-MCNC: 0.42 MG/DL (ref 0.5–0.9)
DIFFERENTIAL TYPE: NORMAL
EOSINOPHILS RELATIVE PERCENT: 2 % (ref 1–4)
GFR AFRICAN AMERICAN: >60 ML/MIN
GFR NON-AFRICAN AMERICAN: >60 ML/MIN
GFR SERPL CREATININE-BSD FRML MDRD: ABNORMAL ML/MIN/{1.73_M2}
GFR SERPL CREATININE-BSD FRML MDRD: ABNORMAL ML/MIN/{1.73_M2}
GLUCOSE BLD-MCNC: 120 MG/DL (ref 70–99)
HCT VFR BLD CALC: 38.3 % (ref 36.3–47.1)
HEMOGLOBIN: 12.9 G/DL (ref 11.9–15.1)
IMMATURE GRANULOCYTES: 0 %
LIPASE: 58 U/L (ref 13–60)
LYMPHOCYTES # BLD: 26 % (ref 24–43)
MCH RBC QN AUTO: 31.3 PG (ref 25.2–33.5)
MCHC RBC AUTO-ENTMCNC: 33.7 G/DL (ref 28.4–34.8)
MCV RBC AUTO: 93 FL (ref 82.6–102.9)
MONOCYTES # BLD: 8 % (ref 3–12)
NRBC AUTOMATED: 0 PER 100 WBC
PDW BLD-RTO: 13.3 % (ref 11.8–14.4)
PLATELET # BLD: 298 K/UL (ref 138–453)
PLATELET ESTIMATE: NORMAL
PMV BLD AUTO: 8.1 FL (ref 8.1–13.5)
POTASSIUM SERPL-SCNC: 4.1 MMOL/L (ref 3.7–5.3)
RBC # BLD: 4.12 M/UL (ref 3.95–5.11)
RBC # BLD: NORMAL 10*6/UL
SEG NEUTROPHILS: 63 % (ref 36–65)
SEGMENTED NEUTROPHILS ABSOLUTE COUNT: 6.48 K/UL (ref 1.5–8.1)
SODIUM BLD-SCNC: 132 MMOL/L (ref 135–144)
TOTAL PROTEIN: 7.6 G/DL (ref 6.4–8.3)
TSH SERPL DL<=0.05 MIU/L-ACNC: 8.49 MIU/L (ref 0.3–5)
WBC # BLD: 10.4 K/UL (ref 3.5–11.3)
WBC # BLD: NORMAL 10*3/UL

## 2020-12-21 PROCEDURE — 99211 OFF/OP EST MAY X REQ PHY/QHP: CPT | Performed by: INTERNAL MEDICINE

## 2020-12-21 PROCEDURE — 85025 COMPLETE CBC W/AUTO DIFF WBC: CPT

## 2020-12-21 PROCEDURE — 96413 CHEMO IV INFUSION 1 HR: CPT

## 2020-12-21 PROCEDURE — 99214 OFFICE O/P EST MOD 30 MIN: CPT | Performed by: INTERNAL MEDICINE

## 2020-12-21 PROCEDURE — 84443 ASSAY THYROID STIM HORMONE: CPT

## 2020-12-21 PROCEDURE — 83690 ASSAY OF LIPASE: CPT

## 2020-12-21 PROCEDURE — 80053 COMPREHEN METABOLIC PANEL: CPT

## 2020-12-21 PROCEDURE — 82150 ASSAY OF AMYLASE: CPT

## 2020-12-21 PROCEDURE — 6360000002 HC RX W HCPCS: Performed by: INTERNAL MEDICINE

## 2020-12-21 PROCEDURE — 82533 TOTAL CORTISOL: CPT

## 2020-12-21 PROCEDURE — 36591 DRAW BLOOD OFF VENOUS DEVICE: CPT

## 2020-12-21 PROCEDURE — 2580000003 HC RX 258: Performed by: INTERNAL MEDICINE

## 2020-12-21 RX ORDER — EPINEPHRINE 1 MG/ML
0.3 INJECTION, SOLUTION, CONCENTRATE INTRAVENOUS PRN
Status: CANCELLED | OUTPATIENT
Start: 2021-02-15

## 2020-12-21 RX ORDER — SODIUM CHLORIDE 9 MG/ML
INJECTION, SOLUTION INTRAVENOUS CONTINUOUS
Status: CANCELLED | OUTPATIENT
Start: 2020-12-21

## 2020-12-21 RX ORDER — SODIUM CHLORIDE 9 MG/ML
20 INJECTION, SOLUTION INTRAVENOUS ONCE
Status: CANCELLED | OUTPATIENT
Start: 2021-02-15 | End: 2021-02-15

## 2020-12-21 RX ORDER — DIPHENHYDRAMINE HYDROCHLORIDE 50 MG/ML
50 INJECTION INTRAMUSCULAR; INTRAVENOUS ONCE
Status: CANCELLED | OUTPATIENT
Start: 2021-02-15 | End: 2021-02-15

## 2020-12-21 RX ORDER — HEPARIN SODIUM (PORCINE) LOCK FLUSH IV SOLN 100 UNIT/ML 100 UNIT/ML
500 SOLUTION INTRAVENOUS PRN
Status: CANCELLED | OUTPATIENT
Start: 2021-02-15

## 2020-12-21 RX ORDER — SODIUM CHLORIDE 0.9 % (FLUSH) 0.9 %
5 SYRINGE (ML) INJECTION PRN
Status: CANCELLED | OUTPATIENT
Start: 2020-12-21

## 2020-12-21 RX ORDER — SODIUM CHLORIDE 0.9 % (FLUSH) 0.9 %
10 SYRINGE (ML) INJECTION PRN
Status: CANCELLED | OUTPATIENT
Start: 2021-01-18

## 2020-12-21 RX ORDER — SODIUM CHLORIDE 0.9 % (FLUSH) 0.9 %
5 SYRINGE (ML) INJECTION PRN
Status: CANCELLED | OUTPATIENT
Start: 2021-01-18

## 2020-12-21 RX ORDER — METHYLPREDNISOLONE SODIUM SUCCINATE 125 MG/2ML
125 INJECTION, POWDER, LYOPHILIZED, FOR SOLUTION INTRAMUSCULAR; INTRAVENOUS ONCE
Status: CANCELLED | OUTPATIENT
Start: 2021-02-15 | End: 2021-02-15

## 2020-12-21 RX ORDER — DIPHENHYDRAMINE HYDROCHLORIDE 50 MG/ML
50 INJECTION INTRAMUSCULAR; INTRAVENOUS ONCE
Status: CANCELLED | OUTPATIENT
Start: 2021-01-18 | End: 2021-01-18

## 2020-12-21 RX ORDER — SODIUM CHLORIDE 0.9 % (FLUSH) 0.9 %
5 SYRINGE (ML) INJECTION PRN
Status: CANCELLED | OUTPATIENT
Start: 2021-02-15

## 2020-12-21 RX ORDER — ALBUTEROL SULFATE 90 UG/1
2 AEROSOL, METERED RESPIRATORY (INHALATION) EVERY 4 HOURS PRN
Qty: 3 INHALER | Refills: 1 | Status: SHIPPED | OUTPATIENT
Start: 2020-12-21 | End: 2021-02-15 | Stop reason: SDUPTHER

## 2020-12-21 RX ORDER — SODIUM CHLORIDE 9 MG/ML
20 INJECTION, SOLUTION INTRAVENOUS ONCE
Status: CANCELLED | OUTPATIENT
Start: 2021-01-18 | End: 2021-01-18

## 2020-12-21 RX ORDER — SODIUM CHLORIDE 9 MG/ML
20 INJECTION, SOLUTION INTRAVENOUS ONCE
Status: COMPLETED | OUTPATIENT
Start: 2020-12-21 | End: 2020-12-21

## 2020-12-21 RX ORDER — SODIUM CHLORIDE 0.9 % (FLUSH) 0.9 %
10 SYRINGE (ML) INJECTION PRN
Status: CANCELLED | OUTPATIENT
Start: 2021-02-15

## 2020-12-21 RX ORDER — SODIUM CHLORIDE 9 MG/ML
INJECTION, SOLUTION INTRAVENOUS CONTINUOUS
Status: CANCELLED | OUTPATIENT
Start: 2021-02-15

## 2020-12-21 RX ORDER — METHYLPREDNISOLONE SODIUM SUCCINATE 125 MG/2ML
125 INJECTION, POWDER, LYOPHILIZED, FOR SOLUTION INTRAMUSCULAR; INTRAVENOUS ONCE
Status: CANCELLED | OUTPATIENT
Start: 2021-01-18 | End: 2021-01-18

## 2020-12-21 RX ORDER — MEPERIDINE HYDROCHLORIDE 50 MG/ML
12.5 INJECTION INTRAMUSCULAR; INTRAVENOUS; SUBCUTANEOUS ONCE
Status: CANCELLED | OUTPATIENT
Start: 2021-02-15 | End: 2021-02-15

## 2020-12-21 RX ORDER — AZITHROMYCIN 500 MG/1
500 TABLET, FILM COATED ORAL DAILY
Qty: 5 TABLET | Refills: 0 | Status: SHIPPED | OUTPATIENT
Start: 2020-12-21 | End: 2020-12-26

## 2020-12-21 RX ORDER — SODIUM CHLORIDE 0.9 % (FLUSH) 0.9 %
10 SYRINGE (ML) INJECTION PRN
Status: DISCONTINUED | OUTPATIENT
Start: 2020-12-21 | End: 2020-12-22 | Stop reason: HOSPADM

## 2020-12-21 RX ORDER — DIPHENHYDRAMINE HYDROCHLORIDE 50 MG/ML
50 INJECTION INTRAMUSCULAR; INTRAVENOUS ONCE
Status: CANCELLED | OUTPATIENT
Start: 2020-12-21 | End: 2020-12-21

## 2020-12-21 RX ORDER — MEPERIDINE HYDROCHLORIDE 50 MG/ML
12.5 INJECTION INTRAMUSCULAR; INTRAVENOUS; SUBCUTANEOUS ONCE
Status: CANCELLED | OUTPATIENT
Start: 2021-01-18 | End: 2021-01-18

## 2020-12-21 RX ORDER — HEPARIN SODIUM (PORCINE) LOCK FLUSH IV SOLN 100 UNIT/ML 100 UNIT/ML
500 SOLUTION INTRAVENOUS PRN
Status: DISCONTINUED | OUTPATIENT
Start: 2020-12-21 | End: 2020-12-22 | Stop reason: HOSPADM

## 2020-12-21 RX ORDER — EPINEPHRINE 1 MG/ML
0.3 INJECTION, SOLUTION, CONCENTRATE INTRAVENOUS PRN
Status: CANCELLED | OUTPATIENT
Start: 2021-01-18

## 2020-12-21 RX ORDER — SODIUM CHLORIDE 9 MG/ML
INJECTION, SOLUTION INTRAVENOUS CONTINUOUS
Status: CANCELLED | OUTPATIENT
Start: 2021-01-18

## 2020-12-21 RX ORDER — HEPARIN SODIUM (PORCINE) LOCK FLUSH IV SOLN 100 UNIT/ML 100 UNIT/ML
500 SOLUTION INTRAVENOUS PRN
Status: CANCELLED | OUTPATIENT
Start: 2021-01-18

## 2020-12-21 RX ORDER — MEPERIDINE HYDROCHLORIDE 50 MG/ML
12.5 INJECTION INTRAMUSCULAR; INTRAVENOUS; SUBCUTANEOUS ONCE
Status: CANCELLED | OUTPATIENT
Start: 2020-12-21 | End: 2020-12-21

## 2020-12-21 RX ORDER — METHYLPREDNISOLONE SODIUM SUCCINATE 125 MG/2ML
125 INJECTION, POWDER, LYOPHILIZED, FOR SOLUTION INTRAMUSCULAR; INTRAVENOUS ONCE
Status: CANCELLED | OUTPATIENT
Start: 2020-12-21 | End: 2020-12-21

## 2020-12-21 RX ORDER — METHYLPREDNISOLONE 4 MG/1
TABLET ORAL
Qty: 1 KIT | Refills: 0 | Status: SHIPPED | OUTPATIENT
Start: 2020-12-21 | End: 2020-12-27

## 2020-12-21 RX ADMIN — SODIUM CHLORIDE 480 MG: 9 INJECTION, SOLUTION INTRAVENOUS at 14:21

## 2020-12-21 RX ADMIN — SODIUM CHLORIDE 20 ML/HR: 9 INJECTION, SOLUTION INTRAVENOUS at 13:20

## 2020-12-21 RX ADMIN — Medication 10 ML: at 14:56

## 2020-12-21 RX ADMIN — HEPARIN 500 UNITS: 100 SYRINGE at 14:56

## 2020-12-21 RX ADMIN — Medication 10 ML: at 13:20

## 2020-12-21 NOTE — TELEPHONE ENCOUNTER
Theron Bloom MD VISIT & TX  DR Tavo Mcnulty IN TO SEE PATIENT  ORDERS RECEIVED  CONTINUE CHEMOTHERAPY PER ORDERS  RV 4 WEEKS W/CHEMO & LABS  MD VISIT 1/18/21 @1:45PM  [email or web address]  SCRIPTS SENT TO PATIENTS PHARMACY  AVS PRINTED AND GIVEN TO PATIENT WITH INSTRUCTIONS  PATIENT REMAINS IN [address] [place] [address]

## 2020-12-21 NOTE — PROGRESS NOTES
DIAGNOSIS:   1. Metastatic adenocarcinoma of the lung, stage IV  2. Malignant pleural effusion  3. Molecular testing negative for EGFR, ALK and ROS. Instead it shows Kras and CDK mutation,   4. History of breast cancer in 2010  CURRENT THERAPY:  1. Mastectomy and chemotherapy in 2010  2. Status post thoracocentesis x2  3. Pending study did not show any evidence of distant metastases other than the pleural effusion  4. Palliative chemotherapy with carboplatin, Alimta and Keytruda- started 01/20/2020  5. After 4 cycles of chemoimmunotherapy, chemotherapy will be dropped and she will be maintained on maintenance Keytruda   6. Due to side effects, the drug was held. 7. Improvement of side effects, the plan is to switch to Opdivo started 08/2020  BRIEF CASE HISTORY:    The patient is a 63 y.o.  female who is admitted to the hospital for chief complaints of shortness of breath. Patient has history of breast cancer for which she underwent mastectomy in 2010 followed by chemotherapy. This was done in [place]. The  Patient recently moved to [facility] about a year ago. Patient had a significant history of tobacco dependence. Patient started noticing worsening of shortness of breath and presented to the ER. Work-up done shows right-sided pleural effusion. CT scan showed multiloculated left-sided pleural effusion with compressive atelectasis in the lingula and majority of the left lower lobe and partial compression atelectasis of the left upper lobe. There was also underlying emphysema. There was a stable 1.8 cm adrenal mass likely adrenal adenoma which had been stable since November 2018. Patient underwent ultrasound guided thoracentesis.   Cytology of pleural fluid confirms adenocarcinoma consistent with lung primary  The patient was discharged home but she came back with worsening shortness of breath and was found to have significant pleural effusion that was tapped pathology showed malignant cells, adenocarcinoma of lung primary. .    We saw the patient in house, we arrange for staging PET CT scan and brain MRI which essentially showed no evidence of metastatic disease otherwise. The patient was diagnosed with stage IV lung cancer, we plan palliative chemotherapy, started 01/20/2020. She completed 4 cycles of chemoimmunotherapy and we plan to continue with maintenance Keytruda. Unfortunately, the Nhi Phlegm was very poorly tolerated - decided to hold. Nivolumab started 08/03/2020. INTERIM HISTORY: Lois Ferreira comes back today for a follow-up for lung cancer and cycle #7 Nivolumab. She reports she is doing very well with treatment and denies any mouth sores and diarrhea. No skin rash with steroid treatment      Past Medical History:   has a past medical history of Alcohol abuse, Breast cancer ([address] Chronic diarrhea, COPD (chronic obstructive pulmonary disease) ([facility] [address] Essential hypertension, Lung cancer ([facility] [address] Pleural effusion, and Tobacco abuse. Past Surgical History:   has a past surgical history that includes Appendectomy; Mastectomy (Right); and Cholecystectomy. Family History: family history includes Breast Cancer in her mother; Cancer in her brother; Deep Vein Thrombosis in her brother; Prostate Cancer in her brother and father; Stroke in her mother. Social History:   reports that she has been smoking. She has a 23.50 pack-year smoking history. She has never used smokeless tobacco. She reports current alcohol use of about 8.0 standard drinks of alcohol per week. She reports that she does not use drugs. Medications:    Reviewed in EPIC     Allergies:  Morphine, Robitussin (alcohol free) [guaifenesin], Aspirin, Penicillins, and Sulfa antibiotics    REVIEW OF SYSTEMS:   General: No fever or night sweats. Weight stable.  +fatigue and poor appetite - improved  ENT: No double, no tinnitus or hearing problem, no dysphagia; dry sinuses and xerostomia  Respiratory: No chest pain, no cough or hemoptysis. +shortness of breath - stable  Cardiovascular: Denies chest pain, PND or orthopnea, palpitations  Gastrointestinal: No nausea or vomiting, abdominal pain,  diarrhea and constipation. Genitourinary: Denies dysuria, hematuria, frequency, urgency or incontinence. Neurological: Denies headaches, decreased LOC, no sensory or motor focal deficits. Musculoskeletal: No arthralgia no back pain or joint swelling. +weakness and muscle fatigue; bilateral leg and knee pain - improved +pain at previous drain site - unchanged  Skin: No bleeding. +rash resolved  Psychiatric: No anxiety, no depression. Endocrine: No diabetes or thyroid disease. Hematologic: No bleeding, no adenopathy. PHYSICAL EXAM:      BP (!) 145/84   Temp 97.3 °F (36.3 °C) (Temporal)   Resp 16   Wt 127 lb 8 oz (57.8 kg)   BMI 23.32 kg/m²    Temp (24hrs), Av.9 °F (36.6 °C), Min:97.9 °F (36.6 °C), Max:97.9 °F (36.6 °C)  Gen.  Exam, showed a well-appearing patient without evidence of distress or pain  HEENT, normocephalic and atraumatic, PERRLA extraocular muscles are intact  Neck showed no JVD no carotid bruit, no cervical adenopathy  Chest decreased air entry +wheezing with some rhonchi   Heart is regular without any murmur  Abdomen soft nontender no hepatosplenomegaly  Lower extremities showed no edema clubbing or cyanosis; tenderness in lateral compartment of left knee   Neurological examination was nonfocal, with intact cranial nerves  Skin  No rashes or bruising appreciated      REVIEW OF LABORATORY DATA:     Lab Results   Component Value Date    WBC 10.4 2020    HGB 12.9 2020    HCT 38.3 2020    MCV 93.0 2020     2020       Chemistry        Component Value Date/Time     (L) 2020 1320    K 4.1 2020 1320    CL 95 (L) 2020 1320    CO2 25 2020 1320    BUN 9 2020 1320    CREATININE 0.42 (L) 2020 1320        Component Value Date/Time    CALCIUM 10.0 12/21/2020 1320    ALKPHOS 79 12/21/2020 1320    AST 22 12/21/2020 1320    ALT 16 12/21/2020 1320    BILITOT 0.19 (L) 12/21/2020 1320        Lab Results   Component Value Date    TSH 8.49 (H) 12/21/2020       Ct scan   Impression    No acute abnormality.  Stable appearance of lungs compared to the prior study            IMPRESSION:    1. History of breast cancer in 2010, status post mastectomy and chemotherapy  2. New diagnosis with lung cancer  3. Malignant pleural effusion stage IV disease  4. Chemotherapy with carboplatin, Alimta and Keytruda - started 01/20/2020  5. Weakness in both lower extremities  6. Constellation of symptoms including sore throat, sinus drainage, diffuse arthralgias, worsening fatigue and aches and pains. Differential is very broad but I am concerned about immunologic effect of Keytruda. 7. Opdivo started 08/2020      PLAN:   1. Her lab work was reviewed, counts in range and electrolytes are stable. 2. I completed toxicity check - rash, resolved with steroid cream.   3. Imaging reviewed with the patient, she has stable disease. Been encouraged by her excellent response to therapy. We plan to continue current treatment until progression. Plan to continue treatment until progression. I noticed her TSH slightly up. We will watch that closely over the next few weeks so we might have to adjust her Synthroid.

## 2020-12-21 NOTE — PROGRESS NOTES
Patient arrive ambulatory for cycle 6 day 1 treatment and physician visit -  was with patient in [facility]; informed him he may not come in to treatment area due to number of COVID cases in community.  left while patient being treated. Denies complaint or concern. Vitals as charted. Port accessed without difficulty. Physician met with patient; labs reviewed; ok to proceed with treatment. Will continue to monitor thyroid function with no change to med at this present time. Opdivo infused with no sign of adverse reaction; line flushed. Port flushed and de-accessed without difficulty. Patient ambulate off unit per self at discharge; AVS to be provided by front office staff.

## 2021-01-11 DIAGNOSIS — C34.92 ADENOCARCINOMA, LUNG, LEFT (HCC): Primary | ICD-10-CM

## 2021-01-12 ENCOUNTER — HOSPITAL ENCOUNTER (OUTPATIENT)
Facility: MEDICAL CENTER | Age: 61
End: 2021-01-12

## 2021-01-18 ENCOUNTER — TELEPHONE (OUTPATIENT)
Dept: ONCOLOGY | Age: 61
End: 2021-01-18

## 2021-01-18 ENCOUNTER — HOSPITAL ENCOUNTER (OUTPATIENT)
Dept: INFUSION THERAPY | Facility: MEDICAL CENTER | Age: 61
Discharge: HOME | End: 2021-01-18

## 2021-01-18 ENCOUNTER — OFFICE VISIT (OUTPATIENT)
Dept: ONCOLOGY | Age: 61
End: 2021-01-18

## 2021-01-18 VITALS
WEIGHT: 129.5 LBS | RESPIRATION RATE: 18 BRPM | DIASTOLIC BLOOD PRESSURE: 71 MMHG | HEART RATE: 83 BPM | BODY MASS INDEX: 23.69 KG/M2 | SYSTOLIC BLOOD PRESSURE: 129 MMHG | TEMPERATURE: 98 F

## 2021-01-18 DIAGNOSIS — C34.92 ADENOCARCINOMA, LUNG, LEFT (HCC): Primary | ICD-10-CM

## 2021-01-18 DIAGNOSIS — J44.1 CHRONIC OBSTRUCTIVE PULMONARY DISEASE WITH ACUTE EXACERBATION (HCC): ICD-10-CM

## 2021-01-18 DIAGNOSIS — E03.2 HYPOTHYROIDISM DUE TO MEDICATION: ICD-10-CM

## 2021-01-18 LAB
ABSOLUTE EOS #: 0.18 K/UL (ref 0–0.44)
ABSOLUTE IMMATURE GRANULOCYTE: 0.05 K/UL (ref 0–0.3)
ABSOLUTE LYMPH #: 3.02 K/UL (ref 1.1–3.7)
ABSOLUTE MONO #: 0.73 K/UL (ref 0.1–1.2)
ALBUMIN SERPL-MCNC: 4.3 G/DL (ref 3.5–5.2)
ALBUMIN/GLOBULIN RATIO: ABNORMAL (ref 1–2.5)
ALP BLD-CCNC: 82 U/L (ref 35–104)
ALT SERPL-CCNC: 14 U/L (ref 5–33)
AMYLASE: 61 U/L (ref 28–100)
ANION GAP SERPL CALCULATED.3IONS-SCNC: 11 MMOL/L (ref 9–17)
AST SERPL-CCNC: 23 U/L
BASOPHILS # BLD: 1 % (ref 0–2)
BASOPHILS ABSOLUTE: 0.06 K/UL (ref 0–0.2)
BILIRUB SERPL-MCNC: 0.28 MG/DL (ref 0.3–1.2)
BUN BLDV-MCNC: 9 MG/DL (ref 6–20)
BUN/CREAT BLD: 19 (ref 9–20)
CALCIUM SERPL-MCNC: 10.2 MG/DL (ref 8.6–10.4)
CHLORIDE BLD-SCNC: 96 MMOL/L (ref 98–107)
CO2: 26 MMOL/L (ref 20–31)
CORTISOL COLLECTION INFO: NORMAL
CORTISOL: 14.5 UG/DL (ref 2.7–18.4)
CREAT SERPL-MCNC: 0.47 MG/DL (ref 0.5–0.9)
DIFFERENTIAL TYPE: ABNORMAL
EOSINOPHILS RELATIVE PERCENT: 2 % (ref 1–4)
GFR AFRICAN AMERICAN: >60 ML/MIN
GFR NON-AFRICAN AMERICAN: >60 ML/MIN
GFR SERPL CREATININE-BSD FRML MDRD: ABNORMAL ML/MIN/{1.73_M2}
GFR SERPL CREATININE-BSD FRML MDRD: ABNORMAL ML/MIN/{1.73_M2}
GLUCOSE BLD-MCNC: 129 MG/DL (ref 70–99)
HCT VFR BLD CALC: 38.1 % (ref 36.3–47.1)
HEMOGLOBIN: 12.8 G/DL (ref 11.9–15.1)
IMMATURE GRANULOCYTES: 1 %
LIPASE: 50 U/L (ref 13–60)
LYMPHOCYTES # BLD: 30 % (ref 24–43)
MCH RBC QN AUTO: 31.1 PG (ref 25.2–33.5)
MCHC RBC AUTO-ENTMCNC: 33.6 G/DL (ref 28.4–34.8)
MCV RBC AUTO: 92.5 FL (ref 82.6–102.9)
MONOCYTES # BLD: 7 % (ref 3–12)
NRBC AUTOMATED: 0 PER 100 WBC
PDW BLD-RTO: 13.2 % (ref 11.8–14.4)
PLATELET # BLD: 320 K/UL (ref 138–453)
PLATELET ESTIMATE: ABNORMAL
PMV BLD AUTO: 8.5 FL (ref 8.1–13.5)
POTASSIUM SERPL-SCNC: 3.9 MMOL/L (ref 3.7–5.3)
RBC # BLD: 4.12 M/UL (ref 3.95–5.11)
RBC # BLD: ABNORMAL 10*6/UL
SEG NEUTROPHILS: 59 % (ref 36–65)
SEGMENTED NEUTROPHILS ABSOLUTE COUNT: 6.14 K/UL (ref 1.5–8.1)
SODIUM BLD-SCNC: 133 MMOL/L (ref 135–144)
TOTAL PROTEIN: 7.5 G/DL (ref 6.4–8.3)
TSH SERPL DL<=0.05 MIU/L-ACNC: 3.66 MIU/L (ref 0.3–5)
WBC # BLD: 10.2 K/UL (ref 3.5–11.3)
WBC # BLD: ABNORMAL 10*3/UL

## 2021-01-18 PROCEDURE — 85025 COMPLETE CBC W/AUTO DIFF WBC: CPT

## 2021-01-18 PROCEDURE — 2580000003 HC RX 258: Performed by: INTERNAL MEDICINE

## 2021-01-18 PROCEDURE — 96413 CHEMO IV INFUSION 1 HR: CPT

## 2021-01-18 PROCEDURE — 82533 TOTAL CORTISOL: CPT

## 2021-01-18 PROCEDURE — 84443 ASSAY THYROID STIM HORMONE: CPT

## 2021-01-18 PROCEDURE — 99214 OFFICE O/P EST MOD 30 MIN: CPT | Performed by: INTERNAL MEDICINE

## 2021-01-18 PROCEDURE — 36591 DRAW BLOOD OFF VENOUS DEVICE: CPT

## 2021-01-18 PROCEDURE — 80053 COMPREHEN METABOLIC PANEL: CPT

## 2021-01-18 PROCEDURE — 99211 OFF/OP EST MAY X REQ PHY/QHP: CPT

## 2021-01-18 PROCEDURE — 6360000002 HC RX W HCPCS: Performed by: INTERNAL MEDICINE

## 2021-01-18 PROCEDURE — 82150 ASSAY OF AMYLASE: CPT

## 2021-01-18 PROCEDURE — 83690 ASSAY OF LIPASE: CPT

## 2021-01-18 RX ORDER — HYDROCODONE BITARTRATE AND ACETAMINOPHEN 5; 325 MG/1; MG/1
1 TABLET ORAL EVERY 6 HOURS PRN
Qty: 60 TABLET | Refills: 0 | Status: SHIPPED | OUTPATIENT
Start: 2021-01-18 | End: 2021-02-17

## 2021-01-18 RX ORDER — PREGABALIN 75 MG/1
75 CAPSULE ORAL 3 TIMES DAILY
Qty: 90 CAPSULE | Refills: 3 | Status: SHIPPED | OUTPATIENT
Start: 2021-01-18 | End: 2021-07-21 | Stop reason: SDUPTHER

## 2021-01-18 RX ORDER — HEPARIN SODIUM (PORCINE) LOCK FLUSH IV SOLN 100 UNIT/ML 100 UNIT/ML
500 SOLUTION INTRAVENOUS PRN
Status: DISCONTINUED | OUTPATIENT
Start: 2021-01-18 | End: 2021-01-19 | Stop reason: HOSPADM

## 2021-01-18 RX ORDER — SODIUM CHLORIDE 9 MG/ML
20 INJECTION, SOLUTION INTRAVENOUS ONCE
Status: COMPLETED | OUTPATIENT
Start: 2021-01-18 | End: 2021-01-18

## 2021-01-18 RX ORDER — AMLODIPINE BESYLATE 5 MG/1
TABLET ORAL
Qty: 30 TABLET | Refills: 5 | Status: SHIPPED | OUTPATIENT
Start: 2021-01-18 | End: 2021-07-19

## 2021-01-18 RX ORDER — SODIUM CHLORIDE 0.9 % (FLUSH) 0.9 %
10 SYRINGE (ML) INJECTION PRN
Status: DISCONTINUED | OUTPATIENT
Start: 2021-01-18 | End: 2021-01-19 | Stop reason: HOSPADM

## 2021-01-18 RX ADMIN — Medication 10 ML: at 14:48

## 2021-01-18 RX ADMIN — SODIUM CHLORIDE 480 MG: 9 INJECTION, SOLUTION INTRAVENOUS at 14:12

## 2021-01-18 RX ADMIN — SODIUM CHLORIDE 20 ML/HR: 9 INJECTION, SOLUTION INTRAVENOUS at 14:11

## 2021-01-18 RX ADMIN — HEPARIN 500 UNITS: 100 SYRINGE at 14:48

## 2021-01-18 NOTE — TELEPHONE ENCOUNTER
Jose Rasheed MD VISIT & TX  DR Licha Marti IN TO SEE PATIENT  ORDERS RECEIVED  PROCEED W/TX PER ORDERS  RV Yum[address] Arm[address]  MD VISIT 2/15/21 @1:30PM  [email or web address]  SCRIPTS SENT TO PATIENTS PHARMACY  AVS PRINTED AND GIVEN TO PATIENT WITH INSTRUCTIONS  PATIENT REMAINS IN [address] [place] [address]

## 2021-01-18 NOTE — FLOWSHEET NOTE
Writer greeted and visited briefly with Patient when rounding the infusion clinic. Pt noted that her hair is growing back, but \"not as fast as I would like it to. \" Pt declined any needs at this time. Writer offered words of support and encouragement. Pt expressed thanks. 01/18/21 1706   Encounter Summary   Services provided to: Patient and family together   Referral/Consult From: [facility] Family members   Continue Visiting   (1/18/21)   Complexity of Encounter Low   Length of Encounter 15 minutes   Routine   Type Follow up   Assessment Calm; Approachable   Intervention Sustaining presence/ Ministry of presence   Outcome Expressed gratitude     Electronically signed by Joellen Franco, Oncology Outpatient [facility], [address] [facility] Radiation Oncology  1/18/2021  5:07 PM

## 2021-01-18 NOTE — PROGRESS NOTES
DIAGNOSIS:   1. Metastatic adenocarcinoma of the lung, stage IV  2. Malignant pleural effusion  3. Molecular testing negative for EGFR, ALK and ROS. Instead it shows Kras and CDK mutation,   4. History of breast cancer in 2010  CURRENT THERAPY:  1. Mastectomy and chemotherapy in 2010  2. Status post thoracocentesis x2  3. Pending study did not show any evidence of distant metastases other than the pleural effusion  4. Palliative chemotherapy with carboplatin, Alimta and Keytruda- started 01/20/2020  5. After 4 cycles of chemoimmunotherapy, chemotherapy will be dropped and she will be maintained on maintenance Keytruda   6. Due to side effects, the drug was held. 7. Improvement of side effects, the plan is to switch to Opdivo started 08/2020  BRIEF CASE HISTORY:    The patient is a 63 y.o.  female who is admitted to the hospital for chief complaints of shortness of breath. Patient has history of breast cancer for which she underwent mastectomy in 2010 followed by chemotherapy. This was done in [place]. The  Patient recently moved to [facility] area about a year ago. Patient had a significant history of tobacco dependence. Patient started noticing worsening of shortness of breath and presented to the ER. Work-up done shows right-sided pleural effusion. CT scan showed multiloculated left-sided pleural effusion with compressive atelectasis in the lingula and majority of the left lower lobe and partial compression atelectasis of the left upper lobe. There was also underlying emphysema. There was a stable 1.8 cm adrenal mass likely adrenal adenoma which had been stable since November 2018. Patient underwent ultrasound guided thoracentesis.   Cytology of pleural fluid confirms adenocarcinoma consistent with lung primary  The patient was discharged home but she came back with worsening shortness of breath and was found to have significant pleural effusion that was tapped pathology showed malignant cells, adenocarcinoma of lung primary. .    We saw the patient in house, we arrange for staging PET CT scan and brain MRI which essentially showed no evidence of metastatic disease otherwise. The patient was diagnosed with stage IV lung cancer, we plan palliative chemotherapy, started 01/20/2020. She completed 4 cycles of chemoimmunotherapy and we plan to continue with maintenance Keytruda. Unfortunately, the [facility] was very poorly tolerated - decided to hold. Nivolumab started 08/03/2020. INTERIM HISTORY: Catarina Chairez comes back today for a follow-up for lung cancer and cycle #7 Nivolumab. She complains of severe fatigue that is worse in the evening. Her knee pain with some calf pain is unchanged. She continues to tolerate treatment well. Past Medical History:   has a past medical history of Alcohol abuse, Breast cancer ([address] Chronic diarrhea, COPD (chronic obstructive pulmonary disease) ([facility] [address] Essential hypertension, Lung cancer ([facility] [address] Pleural effusion, and Tobacco abuse. Past Surgical History:   has a past surgical history that includes Appendectomy; Mastectomy (Right); and Cholecystectomy. Family History: family history includes Breast Cancer in her mother; Cancer in her brother; Deep Vein Thrombosis in her brother; Prostate Cancer in her brother and father; Stroke in her mother. Social History:   reports that she has been smoking. She has a 23.50 pack-year smoking history. She has never used smokeless tobacco. She reports current alcohol use of about 8.0 standard drinks of alcohol per week. She reports that she does not use drugs. Medications:    Reviewed in EPIC     Allergies:  Morphine, Robitussin (alcohol free) [guaifenesin], Aspirin, Penicillins, and Sulfa antibiotics    REVIEW OF SYSTEMS:   General: No fever or night sweats. Weight stable. +fatigue  ENT: No double, no tinnitus or hearing problem, no dysphagia; dry sinuses and xerostomia  Respiratory: No chest pain, no cough or hemoptysis. +shortness of breath - stable  Cardiovascular: Denies chest pain, PND or orthopnea, palpitations  Gastrointestinal: No nausea or vomiting, abdominal pain,  diarrhea and constipation. Genitourinary: Denies dysuria, hematuria, frequency, urgency or incontinence. Neurological: Denies headaches, decreased LOC, no sensory or motor focal deficits. Musculoskeletal: No arthralgia no back pain or joint swelling. +weakness and muscle fatigue; bilateral leg and knee pain - unchanged +pain at previous drain site - unchanged  Skin: No bleeding. +rash resolved  Psychiatric: No anxiety, no depression. Endocrine: No diabetes or thyroid disease. Hematologic: No bleeding, no adenopathy. PHYSICAL EXAM:      /71   Pulse 83   Temp 98 °F (36.7 °C) (Oral)   Resp 18   Wt 129 lb 8 oz (58.7 kg)   BMI 23.69 kg/m²    Temp (24hrs), Av.9 °F (36.6 °C), Min:97.9 °F (36.6 °C), Max:97.9 °F (36.6 °C)  Gen.  Exam, showed a well-appearing patient without evidence of distress or pain  HEENT, normocephalic and atraumatic, PERRLA extraocular muscles are intact  Neck showed no JVD no carotid bruit, no cervical adenopathy  Chest decreased air entry +wheezing with some rhonchi   Heart is regular without any murmur  Abdomen soft nontender no hepatosplenomegaly  Lower extremities showed no edema clubbing or cyanosis; tenderness in lateral compartment of left knee   Neurological examination was nonfocal, with intact cranial nerves  Skin  No rashes or bruising appreciated      REVIEW OF LABORATORY DATA:     Lab Results   Component Value Date    WBC 10.2 2021    HGB 12.8 2021    HCT 38.1 2021    MCV 92.5 2021     2021       Chemistry        Component Value Date/Time     (L) 2021 1309    K 3.9 2021 1309    CL 96 (L) 2021 1309    CO2 26 2021 1309    BUN 9 2021 1309    CREATININE 0.47 (L) 2021 1309        Component Value Date/Time    CALCIUM 10.2 2021 1309 ALKPHOS 82 01/18/2021 1309    AST 23 01/18/2021 1309    ALT 14 01/18/2021 1309    BILITOT 0.28 (L) 01/18/2021 1309        Lab Results   Component Value Date    TSH 3.66 01/18/2021         IMPRESSION:    1. History of breast cancer in 2010, status post mastectomy and chemotherapy  2. New diagnosis with lung cancer  3. Malignant pleural effusion stage IV disease  4. Chemotherapy with carboplatin, Alimta and Keytruda - started 01/20/2020  5. Weakness in both lower extremities  6. Constellation of symptoms including sore throat, sinus drainage, diffuse arthralgias, worsening fatigue and aches and pains. Differential is very broad but I am concerned about immunologic effect of Keytruda. 7. Opdivo started 08/2020   8. Peripheral neuropathy in both lower extremities      PLAN:   1. We discussed the knee and leg pain, likely arthralgias with some muscle tension, minimal neuropathy, I will refill her pain medication and will write for medication to help with neuropathy. Previously she was on gabapentin and she did not respond to it. We will try Lyrica 75 mg 3 times daily  2. I completed toxicity check. 3. Her lab work shows counts in range, TSH is pending and I will follow up. 4. I recommend she increase activity. 5. I counseled her on smoking cessation. 6. We will continue with treatment unchanged and plan to treat to progression, plan for imaging after cycle #8.   7. I am refilling her Amlodipine. 8. Return in 4 weeks.

## 2021-01-18 NOTE — PROGRESS NOTES
Tonia Liu arrives ambulatory with   Order for [employer] accessed per policy with #20 G 3/4 L hernández needle  Good blood return noted and blood work obtained and sent to lab  Lab results reviewed  Dr Beverlee Nyhan vs and examined  Order to proceed with chemotherapy  Opdivo initiated and completed   No reaction noted  Iv line flushed and hernández needle flushed per protocol  Hernández needle removed with needle intact  Band aid applied  Discharged per ambulatory

## 2021-02-09 ENCOUNTER — HOSPITAL ENCOUNTER (OUTPATIENT)
Facility: MEDICAL CENTER | Age: 61
End: 2021-02-09

## 2021-02-15 ENCOUNTER — TELEPHONE (OUTPATIENT)
Dept: ONCOLOGY | Age: 61
End: 2021-02-15

## 2021-02-15 ENCOUNTER — OFFICE VISIT (OUTPATIENT)
Dept: ONCOLOGY | Age: 61
End: 2021-02-15

## 2021-02-15 ENCOUNTER — HOSPITAL ENCOUNTER (OUTPATIENT)
Dept: INFUSION THERAPY | Facility: MEDICAL CENTER | Age: 61
Discharge: HOME | End: 2021-02-15

## 2021-02-15 VITALS
HEART RATE: 89 BPM | BODY MASS INDEX: 24.12 KG/M2 | DIASTOLIC BLOOD PRESSURE: 82 MMHG | RESPIRATION RATE: 18 BRPM | TEMPERATURE: 98 F | SYSTOLIC BLOOD PRESSURE: 138 MMHG | WEIGHT: 131.9 LBS

## 2021-02-15 DIAGNOSIS — J44.1 CHRONIC OBSTRUCTIVE PULMONARY DISEASE WITH ACUTE EXACERBATION (HCC): ICD-10-CM

## 2021-02-15 DIAGNOSIS — C34.92 ADENOCARCINOMA, LUNG, LEFT (HCC): ICD-10-CM

## 2021-02-15 DIAGNOSIS — C34.92 ADENOCARCINOMA, LUNG, LEFT (HCC): Primary | ICD-10-CM

## 2021-02-15 LAB
ABSOLUTE EOS #: 0.35 K/UL (ref 0–0.44)
ABSOLUTE IMMATURE GRANULOCYTE: 0.02 K/UL (ref 0–0.3)
ABSOLUTE LYMPH #: 3.11 K/UL (ref 1.1–3.7)
ABSOLUTE MONO #: 0.74 K/UL (ref 0.1–1.2)
ALBUMIN SERPL-MCNC: 4.5 G/DL (ref 3.5–5.2)
ALBUMIN/GLOBULIN RATIO: ABNORMAL (ref 1–2.5)
ALP BLD-CCNC: 77 U/L (ref 35–104)
ALT SERPL-CCNC: 18 U/L (ref 5–33)
AMYLASE: 98 U/L (ref 28–100)
ANION GAP SERPL CALCULATED.3IONS-SCNC: 9 MMOL/L (ref 9–17)
AST SERPL-CCNC: 24 U/L
BASOPHILS # BLD: 1 % (ref 0–2)
BASOPHILS ABSOLUTE: 0.06 K/UL (ref 0–0.2)
BILIRUB SERPL-MCNC: 0.23 MG/DL (ref 0.3–1.2)
BUN BLDV-MCNC: 10 MG/DL (ref 6–20)
BUN/CREAT BLD: 22 (ref 9–20)
CALCIUM SERPL-MCNC: 10.1 MG/DL (ref 8.6–10.4)
CHLORIDE BLD-SCNC: 93 MMOL/L (ref 98–107)
CO2: 28 MMOL/L (ref 20–31)
CORTISOL COLLECTION INFO: NORMAL
CORTISOL: 14.4 UG/DL (ref 2.7–18.4)
CREAT SERPL-MCNC: 0.45 MG/DL (ref 0.5–0.9)
DIFFERENTIAL TYPE: NORMAL
EOSINOPHILS RELATIVE PERCENT: 4 % (ref 1–4)
GFR AFRICAN AMERICAN: >60 ML/MIN
GFR NON-AFRICAN AMERICAN: >60 ML/MIN
GFR SERPL CREATININE-BSD FRML MDRD: ABNORMAL ML/MIN/{1.73_M2}
GFR SERPL CREATININE-BSD FRML MDRD: ABNORMAL ML/MIN/{1.73_M2}
GLUCOSE BLD-MCNC: 155 MG/DL (ref 70–99)
HCT VFR BLD CALC: 38.3 % (ref 36.3–47.1)
HEMOGLOBIN: 12.8 G/DL (ref 11.9–15.1)
IMMATURE GRANULOCYTES: 0 %
LIPASE: 42 U/L (ref 13–60)
LYMPHOCYTES # BLD: 32 % (ref 24–43)
MCH RBC QN AUTO: 31.2 PG (ref 25.2–33.5)
MCHC RBC AUTO-ENTMCNC: 33.4 G/DL (ref 28.4–34.8)
MCV RBC AUTO: 93.4 FL (ref 82.6–102.9)
MONOCYTES # BLD: 8 % (ref 3–12)
NRBC AUTOMATED: 0 PER 100 WBC
PDW BLD-RTO: 13.3 % (ref 11.8–14.4)
PLATELET # BLD: 289 K/UL (ref 138–453)
PLATELET ESTIMATE: NORMAL
PMV BLD AUTO: 8.6 FL (ref 8.1–13.5)
POTASSIUM SERPL-SCNC: 3.7 MMOL/L (ref 3.7–5.3)
RBC # BLD: 4.1 M/UL (ref 3.95–5.11)
RBC # BLD: NORMAL 10*6/UL
SEG NEUTROPHILS: 55 % (ref 36–65)
SEGMENTED NEUTROPHILS ABSOLUTE COUNT: 5.48 K/UL (ref 1.5–8.1)
SODIUM BLD-SCNC: 130 MMOL/L (ref 135–144)
TOTAL PROTEIN: 7.2 G/DL (ref 6.4–8.3)
TSH SERPL DL<=0.05 MIU/L-ACNC: 4.06 MIU/L (ref 0.3–5)
WBC # BLD: 9.8 K/UL (ref 3.5–11.3)
WBC # BLD: NORMAL 10*3/UL

## 2021-02-15 PROCEDURE — 36591 DRAW BLOOD OFF VENOUS DEVICE: CPT

## 2021-02-15 PROCEDURE — 80053 COMPREHEN METABOLIC PANEL: CPT

## 2021-02-15 PROCEDURE — 82150 ASSAY OF AMYLASE: CPT

## 2021-02-15 PROCEDURE — 99212 OFFICE O/P EST SF 10 MIN: CPT

## 2021-02-15 PROCEDURE — 82533 TOTAL CORTISOL: CPT

## 2021-02-15 PROCEDURE — 2580000003 HC RX 258: Performed by: INTERNAL MEDICINE

## 2021-02-15 PROCEDURE — 84443 ASSAY THYROID STIM HORMONE: CPT

## 2021-02-15 PROCEDURE — 83690 ASSAY OF LIPASE: CPT

## 2021-02-15 PROCEDURE — 85025 COMPLETE CBC W/AUTO DIFF WBC: CPT

## 2021-02-15 PROCEDURE — 96413 CHEMO IV INFUSION 1 HR: CPT

## 2021-02-15 PROCEDURE — 99211 OFF/OP EST MAY X REQ PHY/QHP: CPT | Performed by: INTERNAL MEDICINE

## 2021-02-15 PROCEDURE — 6360000002 HC RX W HCPCS: Performed by: INTERNAL MEDICINE

## 2021-02-15 PROCEDURE — 99214 OFFICE O/P EST MOD 30 MIN: CPT | Performed by: INTERNAL MEDICINE

## 2021-02-15 RX ORDER — IPRATROPIUM BROMIDE AND ALBUTEROL SULFATE 2.5; .5 MG/3ML; MG/3ML
1 SOLUTION RESPIRATORY (INHALATION) 4 TIMES DAILY
Qty: 360 ML | Refills: 3 | Status: SHIPPED | OUTPATIENT
Start: 2021-02-15 | End: 2021-03-25 | Stop reason: SDUPTHER

## 2021-02-15 RX ORDER — SODIUM CHLORIDE 9 MG/ML
20 INJECTION, SOLUTION INTRAVENOUS ONCE
Status: COMPLETED | OUTPATIENT
Start: 2021-02-15 | End: 2021-02-15

## 2021-02-15 RX ORDER — SODIUM CHLORIDE 0.9 % (FLUSH) 0.9 %
10 SYRINGE (ML) INJECTION PRN
Status: DISCONTINUED | OUTPATIENT
Start: 2021-02-15 | End: 2021-02-16 | Stop reason: HOSPADM

## 2021-02-15 RX ORDER — HEPARIN SODIUM (PORCINE) LOCK FLUSH IV SOLN 100 UNIT/ML 100 UNIT/ML
500 SOLUTION INTRAVENOUS PRN
Status: DISCONTINUED | OUTPATIENT
Start: 2021-02-15 | End: 2021-02-16 | Stop reason: HOSPADM

## 2021-02-15 RX ORDER — ALBUTEROL SULFATE 90 UG/1
2 AEROSOL, METERED RESPIRATORY (INHALATION) EVERY 4 HOURS PRN
Qty: 3 INHALER | Refills: 1 | Status: SHIPPED | OUTPATIENT
Start: 2021-02-15 | End: 2021-11-05

## 2021-02-15 RX ORDER — ALBUTEROL SULFATE 1.25 MG/3ML
1 SOLUTION RESPIRATORY (INHALATION)
Qty: 360 ML | Refills: 3 | Status: SHIPPED | OUTPATIENT
Start: 2021-02-15 | End: 2021-12-14

## 2021-02-15 RX ADMIN — SODIUM CHLORIDE 480 MG: 9 INJECTION, SOLUTION INTRAVENOUS at 14:18

## 2021-02-15 RX ADMIN — SODIUM CHLORIDE 20 ML/HR: 9 INJECTION, SOLUTION INTRAVENOUS at 14:18

## 2021-02-15 RX ADMIN — SODIUM CHLORIDE, PRESERVATIVE FREE 10 ML: 5 INJECTION INTRAVENOUS at 14:55

## 2021-02-15 RX ADMIN — HEPARIN 500 UNITS: 100 SYRINGE at 14:55

## 2021-02-15 NOTE — PROGRESS NOTES
ZELDA ARRIVED VIA AMBULATORY WITH HER SPOUSE. DUE FOE C8 D1 OPDIVO. MEDIPORT IS ACCESSED AND ORDERED LABS COLLECTED. LABS CONFIRMED AND OPDIVO ORDERED FROM PHARMACY. DR Maximino Willett INTO SEE PT. PLEASE REFER TO MAR. UPON COMPLETION LINE IS RINSED AND MEDIPORT IS FLUSHED AND HEPARINIZED. NEEDLE REMOVED AND BAND AID SECURED. D/C VIA AMBULATORY TO  FOR APPOINTMENT SCHEDULE.

## 2021-02-15 NOTE — TELEPHONE ENCOUNTER
ZELDA ARRIVES AMBULATORY FOR MD VISIT & TX  DR NAVA IN TO SEE PATIENT  ORDERS RECEIVED  CONTINUE CHEMOTHERAPY PER ORDERS  RV 4 WEEKS [facility] & LABS  NEED CT PRIOR TO RV  CT CHEST W/CONTRAST SCHEDULED MAINOR/EMILY FOR 3/9/210 @2PM ARRIVE BY 1:30PM Jolene Tapia MD VISIT 3/15/21 @1:30PM  [email or web address]  AVS PRINTED AND GIVEN TO PATIENT WITH INSTRUCTIONS  PATIENT REMAINS IN [address] [place] [address]

## 2021-02-15 NOTE — PROGRESS NOTES
DIAGNOSIS:   1. Metastatic adenocarcinoma of the lung, stage IV  2. Malignant pleural effusion  3. Molecular testing negative for EGFR, ALK and ROS. Instead it shows Kras and CDK mutation,   4. History of breast cancer in 2010  CURRENT THERAPY:  1. Mastectomy and chemotherapy in 2010  2. Status post thoracocentesis x2  3. Pending study did not show any evidence of distant metastases other than the pleural effusion  4. Palliative chemotherapy with carboplatin, Alimta and Keytruda- started 01/20/2020  5. After 4 cycles of chemoimmunotherapy, chemotherapy will be dropped and she will be maintained on maintenance Keytruda   6. Due to side effects, the drug was held. 7. Improvement of side effects, the plan is to switch to Opdivo started 08/2020  BRIEF CASE HISTORY:    The patient is a 63 y.o.  female who is admitted to the hospital for chief complaints of shortness of breath. Patient has history of breast cancer for which she underwent mastectomy in 2010 followed by chemotherapy. This was done in [place]. The  Patient recently moved to [facility] about a year ago. Patient had a significant history of tobacco dependence. Patient started noticing worsening of shortness of breath and presented to the ER. Work-up done shows right-sided pleural effusion. CT scan showed multiloculated left-sided pleural effusion with compressive atelectasis in the lingula and majority of the left lower lobe and partial compression atelectasis of the left upper lobe. There was also underlying emphysema. There was a stable 1.8 cm adrenal mass likely adrenal adenoma which had been stable since November 2018. Patient underwent ultrasound guided thoracentesis.   Cytology of pleural fluid confirms adenocarcinoma consistent with lung primary  The patient was discharged home but she came back with worsening shortness of breath and was found to have significant pleural effusion that was tapped pathology showed malignant shortness of breath  Cardiovascular: Denies chest pain, PND or orthopnea, palpitations  Gastrointestinal: No nausea or vomiting, abdominal pain,  diarrhea and constipation. Genitourinary: Denies dysuria, hematuria, frequency, urgency or incontinence. Neurological: Denies headaches, decreased LOC, no sensory or motor focal deficits. Musculoskeletal: No arthralgia no back pain or joint swelling. +weakness and muscle fatigue; bilateral leg and knee pain - unchanged +pain at previous drain site - unchanged  Skin: No bleeding. +rash resolved  Psychiatric: No anxiety, no depression. Endocrine: No diabetes or thyroid disease. Hematologic: No bleeding, no adenopathy. PHYSICAL EXAM:      /82   Pulse 89   Temp 98 °F (36.7 °C) (Oral)   Resp 18   Wt 131 lb 14.4 oz (59.8 kg)   BMI 24.12 kg/m²    Temp (24hrs), Av.9 °F (36.6 °C), Min:97.9 °F (36.6 °C), Max:97.9 °F (36.6 °C)  Gen.  Exam, showed a well-appearing patient without evidence of distress or pain  HEENT, normocephalic and atraumatic, PERRLA extraocular muscles are intact  Neck showed no JVD no carotid bruit, no cervical adenopathy  Chest decreased air entry +wheezing with some rhonchi   Heart is regular without any murmur  Abdomen soft nontender no hepatosplenomegaly  Lower extremities showed no edema clubbing or cyanosis; tenderness in lateral compartment of left knee   Neurological examination was nonfocal, with intact cranial nerves  Skin  No rashes or bruising appreciated      REVIEW OF LABORATORY DATA:     Lab Results   Component Value Date    WBC 9.8 02/15/2021    HGB 12.8 02/15/2021    HCT 38.3 02/15/2021    MCV 93.4 02/15/2021     02/15/2021       Chemistry        Component Value Date/Time     (L) 02/15/2021 1310    K 3.7 02/15/2021 1310    CL 93 (L) 02/15/2021 1310    CO2 28 02/15/2021 1310    BUN 10 02/15/2021 1310    CREATININE 0.45 (L) 02/15/2021 1310        Component Value Date/Time    CALCIUM 10.1 02/15/2021 1310 ALKPHOS 77 02/15/2021 1310    AST 24 02/15/2021 1310    ALT 18 02/15/2021 1310    BILITOT 0.23 (L) 02/15/2021 1310        Lab Results   Component Value Date    TSH 3.66 01/18/2021         IMPRESSION:    1. History of breast cancer in 2010, status post mastectomy and chemotherapy  2. New diagnosis with lung cancer  3. Malignant pleural effusion stage IV disease  4. Chemotherapy with carboplatin, Alimta and Keytruda - started 01/20/2020  5. Weakness in both lower extremities  6. Constellation of symptoms including sore throat, sinus drainage, diffuse arthralgias, worsening fatigue and aches and pains. Differential is very broad but I am concerned about immunologic effect of Keytruda. 7. Opdivo started 08/2020   8. Peripheral neuropathy in both lower extremities      PLAN:   1. Her lab work was reviewed, counts are in range, electrolytes are stable. 2. I completed toxicity check. 3. Clinically she is improved. 4. We will continue with treatment unchanged and plan to treat to progression. 5. I am ordering imaging to be done piror to next visit. 6. I am refilling nebulizer medications. 7. Return in 4 weeks.

## 2021-02-17 ENCOUNTER — TELEPHONE (OUTPATIENT)
Dept: ONCOLOGY | Age: 61
End: 2021-02-17

## 2021-02-27 DIAGNOSIS — C34.92 ADENOCARCINOMA OF LUNG, STAGE 4, LEFT (HCC): Primary | ICD-10-CM

## 2021-03-01 RX ORDER — LEVOTHYROXINE SODIUM 88 UG/1
TABLET ORAL
Qty: 30 TABLET | Refills: 5 | Status: SHIPPED | OUTPATIENT
Start: 2021-03-01 | End: 2021-08-23

## 2021-03-09 ENCOUNTER — HOSPITAL ENCOUNTER (OUTPATIENT)
Dept: INFUSION THERAPY | Facility: MEDICAL CENTER | Age: 61
Discharge: HOME | End: 2021-03-09

## 2021-03-09 ENCOUNTER — HOSPITAL ENCOUNTER (OUTPATIENT)
Facility: MEDICAL CENTER | Age: 61
End: 2021-03-09

## 2021-03-09 ENCOUNTER — HOSPITAL ENCOUNTER (OUTPATIENT)
Dept: CT IMAGING | Age: 61
Discharge: HOME | End: 2021-03-11

## 2021-03-09 VITALS
RESPIRATION RATE: 16 BRPM | TEMPERATURE: 98 F | SYSTOLIC BLOOD PRESSURE: 126 MMHG | DIASTOLIC BLOOD PRESSURE: 68 MMHG | HEART RATE: 81 BPM

## 2021-03-09 DIAGNOSIS — C34.92 ADENOCARCINOMA, LUNG, LEFT (HCC): ICD-10-CM

## 2021-03-09 DIAGNOSIS — J44.1 CHRONIC OBSTRUCTIVE PULMONARY DISEASE WITH ACUTE EXACERBATION (HCC): ICD-10-CM

## 2021-03-09 PROCEDURE — 2580000003 HC RX 258: Performed by: INTERNAL MEDICINE

## 2021-03-09 PROCEDURE — 6360000004 HC RX CONTRAST MEDICATION: Performed by: INTERNAL MEDICINE

## 2021-03-09 PROCEDURE — 6360000002 HC RX W HCPCS: Performed by: INTERNAL MEDICINE

## 2021-03-09 PROCEDURE — 71260 CT THORAX DX C+: CPT

## 2021-03-09 RX ORDER — HEPARIN SODIUM (PORCINE) LOCK FLUSH IV SOLN 100 UNIT/ML 100 UNIT/ML
500 SOLUTION INTRAVENOUS PRN
Status: DISCONTINUED | OUTPATIENT
Start: 2021-03-09 | End: 2021-03-10 | Stop reason: HOSPADM

## 2021-03-09 RX ORDER — SODIUM CHLORIDE 0.9 % (FLUSH) 0.9 %
10 SYRINGE (ML) INJECTION PRN
Status: DISCONTINUED | OUTPATIENT
Start: 2021-03-09 | End: 2021-03-10 | Stop reason: HOSPADM

## 2021-03-09 RX ORDER — SODIUM CHLORIDE 0.9 % (FLUSH) 0.9 %
10 SYRINGE (ML) INJECTION PRN
Status: DISCONTINUED | OUTPATIENT
Start: 2021-03-09 | End: 2021-03-12 | Stop reason: HOSPADM

## 2021-03-09 RX ORDER — 0.9 % SODIUM CHLORIDE 0.9 %
80 INTRAVENOUS SOLUTION INTRAVENOUS ONCE
Status: COMPLETED | OUTPATIENT
Start: 2021-03-09 | End: 2021-03-09

## 2021-03-09 RX ADMIN — SODIUM CHLORIDE, PRESERVATIVE FREE 10 ML: 5 INJECTION INTRAVENOUS at 13:40

## 2021-03-09 RX ADMIN — HEPARIN 500 UNITS: 100 SYRINGE at 14:25

## 2021-03-09 RX ADMIN — IOPAMIDOL 75 ML: 755 INJECTION, SOLUTION INTRAVENOUS at 14:21

## 2021-03-09 RX ADMIN — Medication 10 ML: at 14:22

## 2021-03-09 RX ADMIN — SODIUM CHLORIDE, PRESERVATIVE FREE 10 ML: 5 INJECTION INTRAVENOUS at 14:25

## 2021-03-09 RX ADMIN — SODIUM CHLORIDE 80 ML: 9 INJECTION, SOLUTION INTRAVENOUS at 14:21

## 2021-03-09 NOTE — PROGRESS NOTES
Patient arrived ambulatory per self for port access prior to CT scan. Patient denies complaints or concerns. Port accessed with brisk blood return. Patient off to CT scanning. Instructed to return after to de access port. Patient returned from CT scanning. Port flushed and heparinized with intact hernández needle removed per protocol. Patient ambulated off unit with  at discharge.

## 2021-03-15 ENCOUNTER — TELEPHONE (OUTPATIENT)
Dept: ONCOLOGY | Age: 61
End: 2021-03-15

## 2021-03-15 ENCOUNTER — OFFICE VISIT (OUTPATIENT)
Dept: ONCOLOGY | Age: 61
End: 2021-03-15

## 2021-03-15 ENCOUNTER — HOSPITAL ENCOUNTER (OUTPATIENT)
Dept: INFUSION THERAPY | Facility: MEDICAL CENTER | Age: 61
Discharge: HOME | End: 2021-03-15

## 2021-03-15 VITALS
BODY MASS INDEX: 24.22 KG/M2 | SYSTOLIC BLOOD PRESSURE: 138 MMHG | DIASTOLIC BLOOD PRESSURE: 82 MMHG | WEIGHT: 132.4 LBS | TEMPERATURE: 97.5 F | RESPIRATION RATE: 16 BRPM | HEART RATE: 89 BPM

## 2021-03-15 VITALS
HEART RATE: 89 BPM | SYSTOLIC BLOOD PRESSURE: 138 MMHG | DIASTOLIC BLOOD PRESSURE: 82 MMHG | BODY MASS INDEX: 24.22 KG/M2 | TEMPERATURE: 97 F | WEIGHT: 132.4 LBS | RESPIRATION RATE: 20 BRPM

## 2021-03-15 DIAGNOSIS — J44.1 CHRONIC OBSTRUCTIVE PULMONARY DISEASE WITH ACUTE EXACERBATION (HCC): ICD-10-CM

## 2021-03-15 DIAGNOSIS — C34.92 ADENOCARCINOMA, LUNG, LEFT (HCC): ICD-10-CM

## 2021-03-15 DIAGNOSIS — C34.92 ADENOCARCINOMA OF LUNG, STAGE 4, LEFT (HCC): Primary | ICD-10-CM

## 2021-03-15 DIAGNOSIS — E03.2 HYPOTHYROIDISM DUE TO MEDICATION: ICD-10-CM

## 2021-03-15 LAB
ABSOLUTE EOS #: 0.2 K/UL (ref 0–0.44)
ABSOLUTE IMMATURE GRANULOCYTE: 0.05 K/UL (ref 0–0.3)
ABSOLUTE LYMPH #: 3.01 K/UL (ref 1.1–3.7)
ABSOLUTE MONO #: 0.83 K/UL (ref 0.1–1.2)
ALBUMIN SERPL-MCNC: 4.3 G/DL (ref 3.5–5.2)
ALBUMIN/GLOBULIN RATIO: ABNORMAL (ref 1–2.5)
ALP BLD-CCNC: 81 U/L (ref 35–104)
ALT SERPL-CCNC: 18 U/L (ref 5–33)
AMYLASE: 71 U/L (ref 28–100)
ANION GAP SERPL CALCULATED.3IONS-SCNC: 12 MMOL/L (ref 9–17)
AST SERPL-CCNC: 25 U/L
BASOPHILS # BLD: 1 % (ref 0–2)
BASOPHILS ABSOLUTE: 0.07 K/UL (ref 0–0.2)
BILIRUB SERPL-MCNC: 0.18 MG/DL (ref 0.3–1.2)
BUN BLDV-MCNC: 12 MG/DL (ref 6–20)
BUN/CREAT BLD: ABNORMAL (ref 9–20)
CALCIUM SERPL-MCNC: 10.3 MG/DL (ref 8.6–10.4)
CHLORIDE BLD-SCNC: 96 MMOL/L (ref 98–107)
CO2: 24 MMOL/L (ref 20–31)
CORTISOL COLLECTION INFO: 1330
CORTISOL: 10.8 UG/DL (ref 2.7–18.4)
CREAT SERPL-MCNC: <0.4 MG/DL (ref 0.5–0.9)
DIFFERENTIAL TYPE: ABNORMAL
EOSINOPHILS RELATIVE PERCENT: 2 % (ref 1–4)
GFR AFRICAN AMERICAN: ABNORMAL ML/MIN
GFR NON-AFRICAN AMERICAN: ABNORMAL ML/MIN
GFR SERPL CREATININE-BSD FRML MDRD: ABNORMAL ML/MIN/{1.73_M2}
GFR SERPL CREATININE-BSD FRML MDRD: ABNORMAL ML/MIN/{1.73_M2}
GLUCOSE BLD-MCNC: 104 MG/DL (ref 70–99)
HCT VFR BLD CALC: 40.2 % (ref 36.3–47.1)
HEMOGLOBIN: 13.6 G/DL (ref 11.9–15.1)
IMMATURE GRANULOCYTES: 1 %
LIPASE: 59 U/L (ref 13–60)
LYMPHOCYTES # BLD: 28 % (ref 24–43)
MCH RBC QN AUTO: 31.4 PG (ref 25.2–33.5)
MCHC RBC AUTO-ENTMCNC: 33.8 G/DL (ref 28.4–34.8)
MCV RBC AUTO: 92.8 FL (ref 82.6–102.9)
MONOCYTES # BLD: 8 % (ref 3–12)
NRBC AUTOMATED: 0 PER 100 WBC
PDW BLD-RTO: 12.8 % (ref 11.8–14.4)
PLATELET # BLD: 286 K/UL (ref 138–453)
PLATELET ESTIMATE: ABNORMAL
PMV BLD AUTO: 8.5 FL (ref 8.1–13.5)
POTASSIUM SERPL-SCNC: 4.3 MMOL/L (ref 3.7–5.3)
RBC # BLD: 4.33 M/UL (ref 3.95–5.11)
RBC # BLD: ABNORMAL 10*6/UL
SEG NEUTROPHILS: 60 % (ref 36–65)
SEGMENTED NEUTROPHILS ABSOLUTE COUNT: 6.58 K/UL (ref 1.5–8.1)
SODIUM BLD-SCNC: 132 MMOL/L (ref 135–144)
TOTAL PROTEIN: 7.4 G/DL (ref 6.4–8.3)
TSH SERPL DL<=0.05 MIU/L-ACNC: 6.71 MIU/L (ref 0.3–5)
WBC # BLD: 10.7 K/UL (ref 3.5–11.3)
WBC # BLD: ABNORMAL 10*3/UL

## 2021-03-15 PROCEDURE — 82150 ASSAY OF AMYLASE: CPT

## 2021-03-15 PROCEDURE — 80053 COMPREHEN METABOLIC PANEL: CPT

## 2021-03-15 PROCEDURE — 99211 OFF/OP EST MAY X REQ PHY/QHP: CPT

## 2021-03-15 PROCEDURE — 82533 TOTAL CORTISOL: CPT

## 2021-03-15 PROCEDURE — 85025 COMPLETE CBC W/AUTO DIFF WBC: CPT

## 2021-03-15 PROCEDURE — 84443 ASSAY THYROID STIM HORMONE: CPT

## 2021-03-15 PROCEDURE — 99214 OFFICE O/P EST MOD 30 MIN: CPT | Performed by: INTERNAL MEDICINE

## 2021-03-15 PROCEDURE — 83690 ASSAY OF LIPASE: CPT

## 2021-03-15 NOTE — PROGRESS NOTES
DIAGNOSIS:   1. Metastatic adenocarcinoma of the lung, stage IV  2. Malignant pleural effusion  3. Molecular testing negative for EGFR, ALK and ROS. Instead it shows Kras and CDK mutation,   4. History of breast cancer in 2010  CURRENT THERAPY:  1. Mastectomy and chemotherapy in 2010  2. Status post thoracocentesis x2  3. Pending study did not show any evidence of distant metastases other than the pleural effusion  4. Palliative chemotherapy with carboplatin, Alimta and Keytruda- started 01/20/2020  5. After 4 cycles of chemoimmunotherapy, chemotherapy will be dropped and she will be maintained on maintenance Keytruda   6. Due to side effects, the drug was held. 7. Improvement of side effects, the plan is to switch to Opdivo started 08/2020  BRIEF CASE HISTORY:    The patient is a 63 y.o.  female who is admitted to the hospital for chief complaints of shortness of breath. Patient has history of breast cancer for which she underwent mastectomy in 2010 followed by chemotherapy. This was done in [place]. The  Patient recently moved to [facility] area about a year ago. Patient had a significant history of tobacco dependence. Patient started noticing worsening of shortness of breath and presented to the ER. Work-up done shows right-sided pleural effusion. CT scan showed multiloculated left-sided pleural effusion with compressive atelectasis in the lingula and majority of the left lower lobe and partial compression atelectasis of the left upper lobe. There was also underlying emphysema. There was a stable 1.8 cm adrenal mass likely adrenal adenoma which had been stable since November 2018. Patient underwent ultrasound guided thoracentesis.   Cytology of pleural fluid confirms adenocarcinoma consistent with lung primary  The patient was discharged home but she came back with worsening shortness of breath and was found to have significant pleural effusion that was tapped pathology showed malignant cells, adenocarcinoma of lung primary. .    We saw the patient in house, we arrange for staging PET CT scan and brain MRI which essentially showed no evidence of metastatic disease otherwise. The patient was diagnosed with stage IV lung cancer, we plan palliative chemotherapy, started 01/20/2020. She completed 4 cycles of chemoimmunotherapy and we plan to continue with maintenance Keytruda. Unfortunately, the [facility] was very poorly tolerated - decided to hold. Nivolumab started 08/03/2020. INTERIM HISTORY: Maura Coombs comes back today for a follow-up for lung cancer and cycle #9 Nivolumab and to review CT. She complains of irritation around the port radiating to left shoulder resulting in difficulty with raising left arm. She complains of pain along her left side and diminished quality of life with treatment. Past Medical History:   has a past medical history of Alcohol abuse, Breast cancer ([address] Chronic diarrhea, COPD (chronic obstructive pulmonary disease) ([address] Essential hypertension, Lung cancer ([facility] [address] Pleural effusion, and Tobacco abuse. Past Surgical History:   has a past surgical history that includes Appendectomy; Mastectomy (Right); and Cholecystectomy. Family History: family history includes Breast Cancer in her mother; Cancer in her brother; Deep Vein Thrombosis in her brother; Prostate Cancer in her brother and father; Stroke in her mother. Social History:   reports that she has been smoking. She has a 23.50 pack-year smoking history. She has never used smokeless tobacco. She reports current alcohol use of about 8.0 standard drinks of alcohol per week. She reports that she does not use drugs. Medications:    Reviewed in EPIC     Allergies:  Morphine, Robitussin (alcohol free) [guaifenesin], Aspirin, Penicillins, and Sulfa antibiotics    REVIEW OF SYSTEMS:   General: No fever or night sweats. Weight stable.  +fatigue  ENT: No double, no tinnitus or hearing problem, no dysphagia; dry sinuses and xerostomia  Respiratory: No chest pain, no cough or hemoptysis, shortness of breath  Cardiovascular: Denies chest pain, PND or orthopnea, palpitations  Gastrointestinal: No nausea or vomiting, abdominal pain,  diarrhea and constipation. Genitourinary: Denies dysuria, hematuria, frequency, urgency or incontinence. Neurological: Denies headaches, decreased LOC, no sensory or motor focal deficits. Musculoskeletal: No arthralgia no back pain or joint swelling. +weakness and muscle fatigue; bilateral leg and knee pain - unchanged +pain at previous drain site - unchanged +left arm pain with port pain  Skin: No bleeding. +rash resolved  Psychiatric: No anxiety, no depression. Endocrine: No diabetes or thyroid disease. Hematologic: No bleeding, no adenopathy. PHYSICAL EXAM:      /82   Pulse 89   Temp 97.5 °F (36.4 °C) (Oral)   Resp 16   Wt 132 lb 6.4 oz (60.1 kg)   BMI 24.22 kg/m²    Temp (24hrs), Av.9 °F (36.6 °C), Min:97.9 °F (36.6 °C), Max:97.9 °F (36.6 °C)  Gen. Exam, showed a well-appearing patient without evidence of distress or pain  HEENT, normocephalic and atraumatic, PERRLA extraocular muscles are intact  Neck showed no JVD no carotid bruit, no cervical adenopathy  Chest decreased air entry +wheezing with some rhonchi   Heart is regular without any murmur  Abdomen soft nontender no hepatosplenomegaly  Lower extremities showed no edema clubbing or cyanosis; tenderness in lateral compartment of left knee   Neurological examination was nonfocal, with intact cranial nerves  Skin  No rashes or bruising appreciated    REVIEW OF RADIOLOGICAL RESULTS:   2021 CT CHEST IMPRESSION:   1. No metastatic nodules or masses. 2. Centrilobular emphysema.    3. Stable exam.         REVIEW OF LABORATORY DATA:     Lab Results   Component Value Date    WBC 10.7 03/15/2021    HGB 13.6 03/15/2021    HCT 40.2 03/15/2021    MCV 92.8 03/15/2021     03/15/2021       Chemistry Component Value Date/Time     (L) 03/15/2021 1330    K 4.3 03/15/2021 1330    CL 96 (L) 03/15/2021 1330    CO2 24 03/15/2021 1330    BUN 12 03/15/2021 1330    CREATININE <0.40 (L) 03/15/2021 1330        Component Value Date/Time    CALCIUM 10.3 03/15/2021 1330    ALKPHOS 81 03/15/2021 1330    AST 25 03/15/2021 1330    ALT 18 03/15/2021 1330    BILITOT 0.18 (L) 03/15/2021 1330        Lab Results   Component Value Date    TSH 4.06 02/15/2021         IMPRESSION:    1. History of breast cancer in 2010, status post mastectomy and chemotherapy  2. New diagnosis with lung cancer  3. Malignant pleural effusion stage IV disease  4. Chemotherapy with carboplatin, Alimta and Keytruda - started 01/20/2020  5. Weakness in both lower extremities  6. Constellation of symptoms including sore throat, sinus drainage, diffuse arthralgias, worsening fatigue and aches and pains. Differential is very broad but I am concerned about immunologic effect of Keytruda. 7. Opdivo started 08/2020   8. Peripheral neuropathy in both lower extremities      PLAN:   1. We reviewed her CT which showed stable disease. 2. Her lab work was reviewed, counts are in range. 3. I completed toxicity check. 4. She questioned continuing treatment with her complaints of pain, I do not feel it's treatment related, but, the patient is insisting to take a break from chemotherapy citing side effect and also financial issues. 5. I decided to hold the treatment for a couple months to allow her to recover from side effects and also to allow her to deal with financial issues. We referred her to the billing department. 6. We will hold treatment today per patient request.    7. We discussed her port site  pain and I am ordering for removal.   8. Return in 3 months for evaluation and imaging.

## 2021-03-15 NOTE — TELEPHONE ENCOUNTER
ZELDA HERE FOR MD VISIT & TX  HOLD TX   REMOVE MEDIPORT  RV IN 3 MTHS W/ CT SCAN PRIOR TO RV  MEDIPORT REMOVAL IS ON 3/16/21 @ 10AM @ [facility] ARRIVAL 9:15AM  CT PT WILL CALL AND HAVE IT SCHEDULED BEFORE 6/14/21  MD VISIT 6/14/21 @ 10AM  AVS PRINTED W/ INSTRUCTIONS

## 2021-03-15 NOTE — PROGRESS NOTES
Patient arrived ambulatory with  for cycle 9 day 1 treatment and physician visit. Patient complains of increased discomfort at port site. She has difficulty lifting left arm over head. States site looked red and bruised. Port site shows minimal skin redness to left side of port. No visible bruising. No open areas or drainage. Patient requests port not be accessed till they speak with physician. Lab called and blood work drawn peripherally. Physician at bedside. Patient will not be receiving treatment today. Patient and  ambulated off unit at discharge.  Instructed to stop at  for AVS.

## 2021-03-16 ENCOUNTER — HOSPITAL ENCOUNTER (OUTPATIENT)
Dept: INTERVENTIONAL RADIOLOGY/VASCULAR | Age: 61
Discharge: HOME | End: 2021-03-18

## 2021-03-16 ENCOUNTER — SOCIAL WORK (OUTPATIENT)
Dept: ONCOLOGY | Age: 61
End: 2021-03-16

## 2021-03-16 VITALS
SYSTOLIC BLOOD PRESSURE: 151 MMHG | DIASTOLIC BLOOD PRESSURE: 86 MMHG | RESPIRATION RATE: 16 BRPM | HEART RATE: 77 BPM | OXYGEN SATURATION: 96 %

## 2021-03-16 DIAGNOSIS — C34.92 CARCINOMA OF LEFT LUNG (HCC): ICD-10-CM

## 2021-03-16 PROCEDURE — 77001 FLUOROGUIDE FOR VEIN DEVICE: CPT | Performed by: RADIOLOGY

## 2021-03-16 PROCEDURE — 2709999900 IR REMOVE TUNNELED CVAD W SQ PORT/PUMP INSERT

## 2021-03-16 PROCEDURE — 36590 REMOVAL TUNNELED CV CATH: CPT | Performed by: RADIOLOGY

## 2021-03-16 NOTE — PROGRESS NOTES
Patient tolerated port removal without distress. Wound closed with absorbable sutures and skin glue. Discharge instructions given, no questions at this time. Patient discharged to home.

## 2021-03-16 NOTE — PROGRESS NOTES
SW referred to follow-up with patient as she has decided to discontinue cancer treatment due to her balance to [facility] of $60,000. Patient has no insurance as her 's employer does not offer any and his employment income is over the eligible amount for Medicaid. Patient has been approved for [facility] and her bills are reduced by about 80%. [employer] consulted with Jennifer Rodrigues, about a Patient Assistance Program, and Belkis Scott has been working with the drug  and [facility]. Belkis Scott will work on this from her end and [employer] will reach out to patient offering information/encouragement. Georgia Leiva.  Eugenia Taylor

## 2021-03-25 DIAGNOSIS — J44.1 CHRONIC OBSTRUCTIVE PULMONARY DISEASE WITH ACUTE EXACERBATION (HCC): ICD-10-CM

## 2021-03-25 RX ORDER — IPRATROPIUM BROMIDE AND ALBUTEROL SULFATE 2.5; .5 MG/3ML; MG/3ML
1 SOLUTION RESPIRATORY (INHALATION) 4 TIMES DAILY
Qty: 360 ML | Refills: 3 | Status: SHIPPED | OUTPATIENT
Start: 2021-03-25 | End: 2022-05-03 | Stop reason: SDUPTHER

## 2021-04-07 ENCOUNTER — TELEPHONE (OUTPATIENT)
Dept: ONCOLOGY | Age: 61
End: 2021-04-07

## 2021-05-06 DIAGNOSIS — J43.1 PANLOBULAR EMPHYSEMA (HCC): Primary | ICD-10-CM

## 2021-05-06 RX ORDER — METHYLPREDNISOLONE 4 MG/1
TABLET ORAL
Qty: 1 KIT | Refills: 0 | Status: SHIPPED | OUTPATIENT
Start: 2021-05-06 | End: 2021-09-20 | Stop reason: SDUPTHER

## 2021-05-06 RX ORDER — AZITHROMYCIN 500 MG/1
500 TABLET, FILM COATED ORAL DAILY
Qty: 5 TABLET | Refills: 0 | Status: SHIPPED | OUTPATIENT
Start: 2021-05-06 | End: 2021-09-20 | Stop reason: SDUPTHER

## 2021-05-06 NOTE — TELEPHONE ENCOUNTER
calling triage line reporting Juventino Sweeney having some shortness of breath but using inhalers with relief, non productive cough  Has used these medications in past and was wondering if he could get refill  Writer speaks with Dr Samia Luu and ok to pend scripts to him  Scripts pended to physician

## 2021-05-10 ENCOUNTER — TELEPHONE (OUTPATIENT)
Dept: GASTROENTEROLOGY | Age: 61
End: 2021-05-10

## 2021-05-10 ENCOUNTER — HOSPITAL ENCOUNTER (OUTPATIENT)
Age: 61
Setting detail: SPECIMEN
Discharge: HOME | End: 2021-05-10

## 2021-05-10 ENCOUNTER — OFFICE VISIT (OUTPATIENT)
Dept: FAMILY MEDICINE CLINIC | Age: 61
End: 2021-05-10

## 2021-05-10 VITALS
WEIGHT: 133.2 LBS | RESPIRATION RATE: 20 BRPM | DIASTOLIC BLOOD PRESSURE: 84 MMHG | OXYGEN SATURATION: 93 % | HEART RATE: 104 BPM | HEIGHT: 62 IN | TEMPERATURE: 97.8 F | SYSTOLIC BLOOD PRESSURE: 132 MMHG | BODY MASS INDEX: 24.51 KG/M2

## 2021-05-10 DIAGNOSIS — I10 ESSENTIAL HYPERTENSION: ICD-10-CM

## 2021-05-10 DIAGNOSIS — E03.9 HYPOTHYROIDISM, UNSPECIFIED TYPE: ICD-10-CM

## 2021-05-10 DIAGNOSIS — K59.00 CONSTIPATION, UNSPECIFIED CONSTIPATION TYPE: ICD-10-CM

## 2021-05-10 DIAGNOSIS — Z12.39 SCREENING BREAST EXAMINATION: ICD-10-CM

## 2021-05-10 DIAGNOSIS — Z23 NEED FOR PROPHYLACTIC VACCINATION AND INOCULATION AGAINST VARICELLA: ICD-10-CM

## 2021-05-10 DIAGNOSIS — Z12.11 COLON CANCER SCREENING: ICD-10-CM

## 2021-05-10 DIAGNOSIS — R53.83 FATIGUE, UNSPECIFIED TYPE: Primary | ICD-10-CM

## 2021-05-10 DIAGNOSIS — Z12.31 ENCOUNTER FOR SCREENING MAMMOGRAM FOR BREAST CANCER: ICD-10-CM

## 2021-05-10 LAB
ALBUMIN SERPL-MCNC: 4.8 G/DL (ref 3.5–5.2)
ALBUMIN/GLOBULIN RATIO: 1.5 (ref 1–2.5)
ALP BLD-CCNC: 80 U/L (ref 35–104)
ALT SERPL-CCNC: 20 U/L (ref 5–33)
ANION GAP SERPL CALCULATED.3IONS-SCNC: 14 MMOL/L (ref 9–17)
AST SERPL-CCNC: 23 U/L
BILIRUB SERPL-MCNC: 0.18 MG/DL (ref 0.3–1.2)
BUN BLDV-MCNC: 10 MG/DL (ref 6–20)
BUN/CREAT BLD: ABNORMAL (ref 9–20)
CALCIUM SERPL-MCNC: 11.1 MG/DL (ref 8.6–10.4)
CHLORIDE BLD-SCNC: 93 MMOL/L (ref 98–107)
CO2: 26 MMOL/L (ref 20–31)
CREAT SERPL-MCNC: 0.65 MG/DL (ref 0.5–0.9)
GFR AFRICAN AMERICAN: >60 ML/MIN
GFR NON-AFRICAN AMERICAN: >60 ML/MIN
GFR SERPL CREATININE-BSD FRML MDRD: ABNORMAL ML/MIN/{1.73_M2}
GFR SERPL CREATININE-BSD FRML MDRD: ABNORMAL ML/MIN/{1.73_M2}
GLUCOSE BLD-MCNC: 99 MG/DL (ref 70–99)
HCT VFR BLD CALC: 41.1 % (ref 36.3–47.1)
HEMOGLOBIN: 13.6 G/DL (ref 11.9–15.1)
MCH RBC QN AUTO: 31.3 PG (ref 25.2–33.5)
MCHC RBC AUTO-ENTMCNC: 33.1 G/DL (ref 28.4–34.8)
MCV RBC AUTO: 94.5 FL (ref 82.6–102.9)
NRBC AUTOMATED: 0 PER 100 WBC
PDW BLD-RTO: 13.8 % (ref 11.8–14.4)
PLATELET # BLD: 378 K/UL (ref 138–453)
PMV BLD AUTO: 9.5 FL (ref 8.1–13.5)
POTASSIUM SERPL-SCNC: 4.8 MMOL/L (ref 3.7–5.3)
RBC # BLD: 4.35 M/UL (ref 3.95–5.11)
SODIUM BLD-SCNC: 133 MMOL/L (ref 135–144)
TOTAL PROTEIN: 8.1 G/DL (ref 6.4–8.3)
TSH SERPL DL<=0.05 MIU/L-ACNC: 2.15 MIU/L (ref 0.3–5)
WBC # BLD: 14.1 K/UL (ref 3.5–11.3)

## 2021-05-10 PROCEDURE — 99213 OFFICE O/P EST LOW 20 MIN: CPT | Performed by: NURSE PRACTITIONER

## 2021-05-10 RX ORDER — DOCUSATE SODIUM 100 MG/1
100 CAPSULE, LIQUID FILLED ORAL 2 TIMES DAILY
Qty: 60 CAPSULE | Refills: 0 | Status: SHIPPED | OUTPATIENT
Start: 2021-05-10 | End: 2021-06-09

## 2021-05-10 ASSESSMENT — ENCOUNTER SYMPTOMS
RECTAL PAIN: 0
COLOR CHANGE: 0
VOMITING: 0
CHEST TIGHTNESS: 0
NAUSEA: 0
BLOOD IN STOOL: 0
WHEEZING: 1
STRIDOR: 0
SINUS PRESSURE: 0
DIARRHEA: 0
BACK PAIN: 0
CONSTIPATION: 1
APNEA: 0
ANAL BLEEDING: 0
PHOTOPHOBIA: 0
ABDOMINAL PAIN: 0
ABDOMINAL DISTENTION: 1
CHOKING: 0
COUGH: 1
SINUS PAIN: 0
EYE PAIN: 0
SHORTNESS OF BREATH: 1

## 2021-05-10 ASSESSMENT — PATIENT HEALTH QUESTIONNAIRE - PHQ9
2. FEELING DOWN, DEPRESSED OR HOPELESS: 0
SUM OF ALL RESPONSES TO PHQ QUESTIONS 1-9: 0

## 2021-05-10 NOTE — LETTER
[address] [address]  Phone: [phone]  Fax: [phone]    SONYA Agustin NP        May 10, 2021     Patient: Claudetta Land   YOB: 1960   Date of Visit: 5/10/2021       To Whom it May Concern:    Surya Lynn was seen in my clinic on 5/10/2021. She was accompanied by her , Cr Claros. He may return to work on 05/11/2021. If you have any questions or concerns, please don't hesitate to call.     Sincerely,         SONYA Agustin NP

## 2021-05-10 NOTE — PATIENT INSTRUCTIONS
Patient Education        Constipation: Care Instructions  Your Care Instructions     Constipation means that you have a hard time passing stools (bowel movements). People pass stools from 3 times a day to once every 3 days. What is normal for you may be different. Constipation may occur with pain in the rectum and cramping. The pain may get worse when you try to pass stools. Sometimes there are small amounts of bright red blood on toilet paper or the surface of stools. This is because of enlarged veins near the rectum (hemorrhoids). A few changes in your diet and lifestyle may help you avoid ongoing constipation. Your doctor may also prescribe medicine to help loosen your stool. Some medicines can cause constipation. These include pain medicines and antidepressants. Tell your doctor about all the medicines you take. Your doctor may want to make a medicine change to ease your symptoms. Follow-up care is a key part of your treatment and safety. Be sure to make and go to all appointments, and call your doctor if you are having problems. It's also a good idea to know your test results and keep a list of the medicines you take. How can you care for yourself at home? · Drink plenty of fluids. If you have kidney, heart, or liver disease and have to limit fluids, talk with your doctor before you increase the amount of fluids you drink. · Include high-fiber foods in your diet each day. These include fruits, vegetables, beans, and whole grains. · Get at least 30 minutes of exercise on most days of the week. Walking is a good choice. You also may want to do other activities, such as running, swimming, cycling, or playing tennis or team sports. · Take a fiber supplement, such as Citrucel or Metamucil, every day. Read and follow all instructions on the label. · Schedule time each day for a bowel movement. A daily routine may help. Take your time having your bowel movement.   · Support your feet with a small step stool

## 2021-05-10 NOTE — PROGRESS NOTES
[address] [place]YN  [phone]    Nayan Leonard is a 62 y.o. female who presents today for her  medical conditions/complaints as noted below. Nayan Leonard is c/o of Fever (states she is just feverish but no fever), Bloated, Fatigue, and Shortness of Breath (wheezing during physical activity)  . HPI:     Presents for acute visit with   Has felt feverish with increasing fatigue (finally found thermometer, highest temp was 98.7) since Tuesday   Occasional non productive cough - normal for her  Has also been wheezing since Tuesday, which is concerning to her because the last time she experienced any wheezing was her cancer diagnosis   Dr. Rich Rico ordered medrol dose pack and Tommas Cocks - started yesterday, seems to be helping a little bit  Using CRISTINA as needed for symptoms - does help a little bit  Uses home O2 for baseline shortness of breath - typically uses 3L NC, but can go up to 4 L depending on severity of symptoms   Afebrile in office today   All chemo stopped a couple of months ago - due for CT on 06/07/21    GI: No bowel movement x2 days, prior to this her last BM was 5 days ago   Started taking dulcolax, only worked 1 time for her   States mirilax does not work - has not tried this   When she eats stomach gets really bloated 'I feel like I look 12 months pregnant'   Overdue for colonoscopy - was supposed to have one every 5 years, can not remember last one   Denies nausea/vomiting   Drinks 4 bottles of water daily   Does not eat much fiber throughout day    Continues to smoke 1/2 pack per day - used to smoke 3 packs per day   Has not been updated with labs completed in March - TSH slightly elevated, willing to repeat labs today     Denies any other problems/concerns at this time     Fatigue  This is a new problem. The current episode started in the past 7 days. The problem occurs constantly. The problem has been waxing and waning. Associated symptoms include coughing and fatigue. Pertinent negatives include no abdominal pain, arthralgias, chest pain, chills, headaches, myalgias, nausea, neck pain, numbness, rash, vomiting or weakness. Nothing aggravates the symptoms. She has tried lying down and rest for the symptoms. The treatment provided no relief. Shortness of Breath  This is a chronic problem. The current episode started more than 1 year ago. The problem occurs constantly. The problem has been waxing and waning. Associated symptoms include wheezing. Pertinent negatives include no abdominal pain, chest pain, headaches, neck pain, rash or vomiting. She has tried beta agonist inhalers for the symptoms. The treatment provided mild relief. Her past medical history is significant for chronic lung disease and COPD. Current Outpatient Medications   Medication Sig Dispense Refill    zoster recombinant adjuvanted vaccine (SHINGRIX) 50 MCG/0.5ML SUSR injection Inject 0.5 mLs into the muscle once for 1 dose 50 MCG IM then repeat 2-6 months. 1 each 1    docusate sodium (COLACE) 100 MG capsule Take 1 capsule by mouth 2 times daily 60 capsule 0    methylPREDNISolone (MEDROL DOSEPACK) 4 MG tablet Take by mouth.  1 kit 0    azithromycin (ZITHROMAX) 500 MG tablet Take 1 tablet by mouth daily for 5 days PER PACKAGE DIRECTIONS 5 tablet 0    ipratropium-albuterol (DUONEB) 0.5-2.5 (3) MG/3ML SOLN nebulizer solution Inhale 3 mLs into the lungs 4 times daily 360 mL 3    levothyroxine (SYNTHROID) 88 MCG tablet take 1 tablet by mouth once daily 30 tablet 5    albuterol sulfate  (90 Base) MCG/ACT inhaler Inhale 2 puffs into the lungs every 4 hours as needed for Wheezing or Shortness of Breath 3 Inhaler 1    albuterol (ACCUNEB) 1.25 MG/3ML nebulizer solution Inhale 3 mLs into the lungs every 2 hours as needed for Wheezing or Shortness of Breath 360 mL 3    amLODIPine (NORVASC) 5 MG tablet take 1 tablet by mouth once daily 30 tablet 5    pregabalin (LYRICA) 75 MG capsule Take 1 capsule by mouth 3 times daily for 120 days. 90 capsule 3    folic acid (FOLVITE) 116 MCG tablet Take 1 tablet by mouth daily 100 tablet 3    triamcinolone (KENALOG) 0.1 % cream Apply topically 2 times daily. 1 Tube 2    gabapentin (NEURONTIN) 400 MG capsule Take 1 capsule by mouth 3 times daily for 30 days. (Patient not taking: Reported on 5/10/2021) 90 capsule 1     No current facility-administered medications for this visit. Past Medical History:   Diagnosis Date    Alcohol abuse     For many years; 5-6 beers per night    Breast cancer ([facility] 2010    right radical mastectomy with positive sentinel dose, chemo, s/p tamoxifen x 7 years     Chronic diarrhea 1/29/2019    COPD (chronic obstructive pulmonary disease) ([facility]     Essential hypertension     [facility]     Pleural effusion 12/26/2019    Tobacco abuse       Past Surgical History:   Procedure Laterality Date    APPENDECTOMY      CHOLECYSTECTOMY      MASTECTOMY Right      Family History   Problem Relation Age of Onset   Wilhelminia Blazing Breast Cancer Mother     Stroke Mother     Prostate Cancer Father     Prostate Cancer Brother     Deep Vein Thrombosis Brother     Cancer Brother         \"throat cancer\"     Social History     Tobacco Use    Smoking status: Current Every Day Smoker     Packs/day: 0.50     Years: 47.00     Pack years: 23.50    Smokeless tobacco: Never Used   Substance Use Topics    Alcohol use: Yes     Alcohol/week: 8.0 standard drinks     Types: 8 Cans of beer per week     Frequency: 4 or more times a week     Drinks per session: 5 or 6     Comment: per night      Allergies   Allergen Reactions    Morphine     Robitussin (Alcohol Free) [Guaifenesin] Hives    Aspirin Hives and Rash    Penicillins Hives and Rash    Sulfa Antibiotics Hives and Rash       Subjective:      Review of Systems   Constitutional: Positive for fatigue.  Negative for activity change, chills and unexpected weight change. HENT: Negative for hearing loss, postnasal drip, sinus pressure and sinus pain. Eyes: Negative for photophobia, pain and visual disturbance. Respiratory: Positive for cough, shortness of breath and wheezing. Negative for apnea, choking, chest tightness and stridor. Cardiovascular: Negative for chest pain and palpitations. Gastrointestinal: Positive for abdominal distention and constipation. Negative for abdominal pain, anal bleeding, blood in stool, diarrhea, nausea, rectal pain and vomiting. Endocrine: Negative for polydipsia, polyphagia and polyuria. Genitourinary: Negative for dysuria, flank pain, frequency and urgency. Musculoskeletal: Negative for arthralgias, back pain, myalgias and neck pain. Skin: Negative for color change and rash. Allergic/Immunologic: Negative for environmental allergies and food allergies. Neurological: Negative for dizziness, weakness, light-headedness, numbness and headaches. Psychiatric/Behavioral: Negative for confusion, hallucinations, self-injury, sleep disturbance and suicidal ideas. The patient is not nervous/anxious. Other pertinent ROS in HPI  Objective:     /84   Pulse 104   Temp 97.8 °F (36.6 °C)   Resp 20   Ht 5' 2\" (1.575 m)   Wt 133 lb 3.2 oz (60.4 kg)   SpO2 93% Comment: usually 93-94, uses oxygen sometimes at home. BMI 24.36 kg/m²      Wt Readings from Last 3 Encounters:   05/10/21 133 lb 3.2 oz (60.4 kg)   03/15/21 132 lb 6.4 oz (60.1 kg)   03/15/21 132 lb 6.4 oz (60.1 kg)     Temp Readings from Last 3 Encounters:   05/10/21 97.8 °F (36.6 °C)   03/15/21 97 °F (36.1 °C) (Oral)   03/15/21 97.5 °F (36.4 °C) (Oral)     BP Readings from Last 3 Encounters:   05/10/21 132/84   03/16/21 (!) 151/86   03/15/21 138/82     Pulse Readings from Last 3 Encounters:   05/10/21 104   03/16/21 77   03/15/21 89       Physical Exam  Vitals signs reviewed. Constitutional:       Appearance: Normal appearance.    HENT:      Head: Normocephalic. Right Ear: Tympanic membrane normal.      Left Ear: Tympanic membrane normal.      Nose: Nose normal.      Mouth/Throat:      Mouth: Mucous membranes are moist.      Pharynx: Oropharynx is clear. Eyes:      Extraocular Movements: Extraocular movements intact. Conjunctiva/sclera: Conjunctivae normal.      Pupils: Pupils are equal, round, and reactive to light. Neck:      Musculoskeletal: Normal range of motion. Cardiovascular:      Rate and Rhythm: Normal rate and regular rhythm. Pulses: Normal pulses. Heart sounds: Normal heart sounds. Pulmonary:      Effort: Pulmonary effort is normal.      Breath sounds: No stridor. Examination of the right-upper field reveals wheezing. Examination of the left-upper field reveals wheezing. Examination of the right-middle field reveals wheezing. Examination of the left-middle field reveals wheezing. Wheezing present. No rhonchi or rales. Abdominal:      General: Abdomen is flat. Bowel sounds are normal. There is distension. Palpations: Abdomen is soft. Tenderness: There is no abdominal tenderness. Hernia: No hernia is present. Genitourinary:     Rectum: Normal.   Musculoskeletal: Normal range of motion. Skin:     General: Skin is warm and dry. Capillary Refill: Capillary refill takes less than 2 seconds. Neurological:      General: No focal deficit present. Mental Status: She is alert and oriented to person, place, and time. Mental status is at baseline. Psychiatric:         Mood and Affect: Mood normal.         Behavior: Behavior normal.         Thought Content:  Thought content normal.         Judgment: Judgment normal.         Lab Review   Hospital Outpatient Visit on 03/15/2021   Component Date Value    TSH 03/15/2021 6.71*    Cortisol 03/15/2021 10.8     Cortisol Collection Info 03/15/2021 1,330     Lipase 03/15/2021 59     Amylase 03/15/2021 71     Glucose 03/15/2021 104*    BUN 03/15/2021 12  CREATININE 03/15/2021 <0.40*    Bun/Cre Ratio 03/15/2021 CANNOT BE CALCULATED     Calcium 03/15/2021 10.3     Sodium 03/15/2021 132*    Potassium 03/15/2021 4.3     Chloride 03/15/2021 96*    CO2 03/15/2021 24     Anion Gap 03/15/2021 12     Alkaline Phosphatase 03/15/2021 81     ALT 03/15/2021 18     AST 03/15/2021 25     Total Bilirubin 03/15/2021 0.18*    Total Protein 03/15/2021 7.4     Albumin 03/15/2021 4.3     Albumin/Globulin Ratio 03/15/2021 NOT REPORTED     GFR Non- 03/15/2021 CANNOT BE CALCULATED     GFR  03/15/2021 CANNOT BE CALCULATED     GFR Comment 03/15/2021          GFR Staging 03/15/2021 NOT REPORTED     WBC 03/15/2021 10.7     RBC 03/15/2021 4.33     Hemoglobin 03/15/2021 13.6     Hematocrit 03/15/2021 40.2     MCV 03/15/2021 92.8     MCH 03/15/2021 31.4     MCHC 03/15/2021 33.8     RDW 03/15/2021 12.8     Platelets 23/49/7188 286     MPV 03/15/2021 8.5     NRBC Automated 03/15/2021 0.0     Differential Type 03/15/2021 NOT REPORTED     Seg Neutrophils 03/15/2021 60     Lymphocytes 03/15/2021 28     Monocytes 03/15/2021 8     Eosinophils % 03/15/2021 2     Basophils 03/15/2021 1     Immature Granulocytes 03/15/2021 1*    Segs Absolute 03/15/2021 6.58     Absolute Lymph # 03/15/2021 3.01     Absolute Mono # 03/15/2021 0.83     Absolute Eos # 03/15/2021 0.20     Basophils Absolute 03/15/2021 0.07     Absolute Immature Granul* 03/15/2021 0.05     WBC Morphology 03/15/2021 NOT REPORTED     RBC Morphology 03/15/2021 NOT REPORTED     Platelet Estimate 46/84/6209  [facility] Outpatient Visit on 02/15/2021   Component Date Value    TSH 02/15/2021 4.06     Cortisol 02/15/2021 14.4     Cortisol Collection Info 02/15/2021 NOT REPORTED     Lipase 02/15/2021 42     Amylase 02/15/2021 98     Glucose 02/15/2021 155*    BUN 02/15/2021 10     CREATININE 02/15/2021 0.45*    Bun/Cre Ratio 02/15/2021 22*    Calcium 02/15/2021 10.1     Sodium 02/15/2021 130*    Potassium 02/15/2021 3.7     Chloride 02/15/2021 93*    CO2 02/15/2021 28     Anion Gap 02/15/2021 9     Alkaline Phosphatase 02/15/2021 77     ALT 02/15/2021 18     AST 02/15/2021 24     Total Bilirubin 02/15/2021 0.23*    Total Protein 02/15/2021 7.2     Albumin 02/15/2021 4.5     Albumin/Globulin Ratio 02/15/2021 NOT REPORTED     GFR Non- 02/15/2021 >60     GFR  02/15/2021 >60     GFR Comment 02/15/2021          GFR Staging 02/15/2021 NOT REPORTED     WBC 02/15/2021 9.8     RBC 02/15/2021 4.10     Hemoglobin 02/15/2021 12.8     Hematocrit 02/15/2021 38.3     MCV 02/15/2021 93.4     MCH 02/15/2021 31.2     MCHC 02/15/2021 33.4     RDW 02/15/2021 13.3     Platelets 99/89/9552 289     MPV 02/15/2021 8.6     NRBC Automated 02/15/2021 0.0     Differential Type 02/15/2021 NOT REPORTED     Seg Neutrophils 02/15/2021 55     Lymphocytes 02/15/2021 32     Monocytes 02/15/2021 8     Eosinophils % 02/15/2021 4     Basophils 02/15/2021 1     Immature Granulocytes 02/15/2021 0     Segs Absolute 02/15/2021 5.48     Absolute Lymph # 02/15/2021 3.11     Absolute Mono # 02/15/2021 0.74     Absolute Eos # 02/15/2021 0.35     Basophils Absolute 02/15/2021 0.06     Absolute Immature Granul* 02/15/2021 0.02     WBC Morphology 02/15/2021 NOT REPORTED     RBC Morphology 02/15/2021 NOT REPORTED     Platelet Estimate 97/02/7114 NOT REPORTED    Hospital Outpatient Visit on 01/18/2021   Component Date Value    WBC 01/18/2021 10.2     RBC 01/18/2021 4.12     Hemoglobin 01/18/2021 12.8     Hematocrit 01/18/2021 38.1     MCV 01/18/2021 92.5     MCH 01/18/2021 31.1     MCHC 01/18/2021 33.6     RDW 01/18/2021 13.2     Platelets 62/10/5915 320     MPV 01/18/2021 8.5     NRBC Automated 01/18/2021 0.0     Differential Type 01/18/2021 NOT REPORTED     Seg Neutrophils 01/18/2021 59     Lymphocytes 01/18/2021 30     Monocytes 01/18/2021 7     Eosinophils % 01/18/2021 2     Basophils 01/18/2021 1     Immature Granulocytes 01/18/2021 1*    Segs Absolute 01/18/2021 6.14     Absolute Lymph # 01/18/2021 3.02     Absolute Mono # 01/18/2021 0.73     Absolute Eos # 01/18/2021 0.18     Basophils Absolute 01/18/2021 0.06     Absolute Immature Granul* 01/18/2021 0.05     WBC Morphology 01/18/2021 NOT REPORTED     RBC Morphology 01/18/2021 NOT REPORTED     Platelet Estimate 44/35/3822 NOT REPORTED     Glucose 01/18/2021 129*    BUN 01/18/2021 9     CREATININE 01/18/2021 0.47*    Bun/Cre Ratio 01/18/2021 19     Calcium 01/18/2021 10.2     Sodium 01/18/2021 133*    Potassium 01/18/2021 3.9     Chloride 01/18/2021 96*    CO2 01/18/2021 26     Anion Gap 01/18/2021 11     Alkaline Phosphatase 01/18/2021 82     ALT 01/18/2021 14     AST 01/18/2021 23     Total Bilirubin 01/18/2021 0.28*    Total Protein 01/18/2021 7.5     Albumin 01/18/2021 4.3     Albumin/Globulin Ratio 01/18/2021 NOT REPORTED     GFR Non- 01/18/2021 >60     GFR  01/18/2021 >60     GFR Comment 01/18/2021          GFR Staging 01/18/2021 NOT REPORTED     Amylase 01/18/2021 61     Lipase 01/18/2021 50     Cortisol 01/18/2021 14.5     Cortisol Collection Info 01/18/2021 NOT REPORTED     TSH 01/18/2021 3.66    Hospital Outpatient Visit on 12/21/2020   Component Date Value    WBC 12/21/2020 10.4     RBC 12/21/2020 4.12     Hemoglobin 12/21/2020 12.9     Hematocrit 12/21/2020 38.3     MCV 12/21/2020 93.0     MCH 12/21/2020 31.3     MCHC 12/21/2020 33.7     RDW 12/21/2020 13.3     Platelets 67/97/0183 298     MPV 12/21/2020 8.1     NRBC Automated 12/21/2020 0.0     Differential Type 12/21/2020 NOT REPORTED     Seg Neutrophils 12/21/2020 63     Lymphocytes 12/21/2020 26     Monocytes 12/21/2020 8     Eosinophils % 12/21/2020 2     Basophils 12/21/2020 1     Immature Granulocytes 12/21/2020 0     Segs Absolute 12/21/2020 6.48     Absolute Lymph # 12/21/2020 2.69     Absolute Mono # 12/21/2020 0.85     Absolute Eos # 12/21/2020 0.24     Basophils Absolute 12/21/2020 0.05     Absolute Immature Granul* 12/21/2020 0.04     WBC Morphology 12/21/2020 NOT REPORTED     RBC Morphology 12/21/2020 NOT REPORTED     Platelet Estimate 35/13/0317 NOT REPORTED     Glucose 12/21/2020 120*    BUN 12/21/2020 9     CREATININE 12/21/2020 0.42*    Bun/Cre Ratio 12/21/2020 21*    Calcium 12/21/2020 10.0     Sodium 12/21/2020 132*    Potassium 12/21/2020 4.1     Chloride 12/21/2020 95*    CO2 12/21/2020 25     Anion Gap 12/21/2020 12     Alkaline Phosphatase 12/21/2020 79     ALT 12/21/2020 16     AST 12/21/2020 22     Total Bilirubin 12/21/2020 0.19*    Total Protein 12/21/2020 7.6     Albumin 12/21/2020 4.5     Albumin/Globulin Ratio 12/21/2020 NOT REPORTED     GFR Non- 12/21/2020 >60     GFR  12/21/2020 >60     GFR Comment 12/21/2020          GFR Staging 12/21/2020 NOT REPORTED     Amylase 12/21/2020 195*    Lipase 12/21/2020 58     Cortisol 12/21/2020 9.2     Cortisol Collection Info 12/21/2020 NOT REPORTED     TSH 12/21/2020 8.49*   Hospital Outpatient Visit on 11/23/2020   Component Date Value    WBC 11/23/2020 9.4     RBC 11/23/2020 4.14     Hemoglobin 11/23/2020 13.1     Hematocrit 11/23/2020 38.0     MCV 11/23/2020 91.8     MCH 11/23/2020 31.6     MCHC 11/23/2020 34.5     RDW 11/23/2020 13.2     Platelets 40/06/1706 317     MPV 11/23/2020 8.4     NRBC Automated 11/23/2020 0.0     Differential Type 11/23/2020 NOT REPORTED     Seg Neutrophils 11/23/2020 63     Lymphocytes 11/23/2020 26     Monocytes 11/23/2020 8     Eosinophils % 11/23/2020 2     Basophils 11/23/2020 1     Immature Granulocytes 11/23/2020 0     Segs Absolute 11/23/2020 5.82     Absolute Lymph # 11/23/2020 2.49     Absolute Mono # 11/23/2020 0.79     Absolute Eos # 11/23/2020 0.23     Basophils Absolute 11/23/2020 0.05     Absolute Immature Granul* 11/23/2020 0.04     WBC Morphology 11/23/2020 NOT REPORTED     RBC Morphology 11/23/2020 NOT REPORTED     Platelet Estimate 75/09/6128 NOT REPORTED     Glucose 11/23/2020 97     BUN 11/23/2020 7     CREATININE 11/23/2020 0.40*    Bun/Cre Ratio 11/23/2020 18     Calcium 11/23/2020 10.5*    Sodium 11/23/2020 131*    Potassium 11/23/2020 4.0     Chloride 11/23/2020 93*    CO2 11/23/2020 27     Anion Gap 11/23/2020 11     Alkaline Phosphatase 11/23/2020 82     ALT 11/23/2020 16     AST 11/23/2020 25     Total Bilirubin 11/23/2020 0.31     Total Protein 11/23/2020 8.0     Albumin 11/23/2020 5.1     Albumin/Globulin Ratio 11/23/2020 NOT REPORTED     GFR Non- 11/23/2020 >60     GFR  11/23/2020 >60     GFR Comment 11/23/2020          GFR Staging 11/23/2020 NOT REPORTED     TSH 11/23/2020 4.86     Cortisol 11/23/2020 15.0     Cortisol Collection Info 11/23/2020 NOT REPORTED     Amylase 11/23/2020 55     Lipase 11/23/2020 35      Assessment/PLAN   1. Fatigue, unspecified type  -     TSH With Reflex Ft4; Future  -     CBC; Future  -     Comprehensive Metabolic Panel; Future  2. Screening breast examination  -     [facility] DIGITAL SCREEN UNILATERAL LEFT; Future  3. Encounter for screening mammogram for breast cancer  4. Need for prophylactic vaccination and inoculation against varicella  -     zoster recombinant adjuvanted vaccine [facility]) 50 MCG/0.5ML SUSR injection; Inject 0.5 mLs into the muscle once for 1 dose 50 MCG IM then repeat 2-6 months., Disp-1 each, R-1Print  5. Essential hypertension  -     CBC; Future  -     Comprehensive Metabolic Panel; Future  6. Hypothyroidism, unspecified type  -     TSH With Reflex Ft4; Future  7. Constipation, unspecified constipation type  -     docusate sodium (COLACE) 100 MG capsule;  Take 1 capsule by mouth 2 times daily, Disp-60 capsule, capsule 0     Sig: Take 1 capsule by mouth 2 times daily       This note was transcribed using dictation with Dragon services. Efforts were made to correct any errors but some words may be misinterpreted.     Electronically signed by Mar Parker CNP on 5/10/2021 at 10:09 PM Attending Attestation (For Attendings USE Only)...

## 2021-05-14 ENCOUNTER — OFFICE VISIT (OUTPATIENT)
Dept: GASTROENTEROLOGY | Age: 61
End: 2021-05-14

## 2021-05-14 VITALS
SYSTOLIC BLOOD PRESSURE: 136 MMHG | DIASTOLIC BLOOD PRESSURE: 81 MMHG | WEIGHT: 132.7 LBS | TEMPERATURE: 98.5 F | OXYGEN SATURATION: 93 % | HEART RATE: 85 BPM | BODY MASS INDEX: 24.27 KG/M2

## 2021-05-14 DIAGNOSIS — K59.00 CONSTIPATION, UNSPECIFIED CONSTIPATION TYPE: Primary | ICD-10-CM

## 2021-05-14 DIAGNOSIS — C34.92 ADENOCARCINOMA, LUNG, LEFT (HCC): ICD-10-CM

## 2021-05-14 DIAGNOSIS — E83.51 HYPOCALCEMIA: ICD-10-CM

## 2021-05-14 PROCEDURE — 99204 OFFICE O/P NEW MOD 45 MIN: CPT | Performed by: INTERNAL MEDICINE

## 2021-05-14 ASSESSMENT — ENCOUNTER SYMPTOMS
VOMITING: 0
ABDOMINAL PAIN: 0
TROUBLE SWALLOWING: 0
NAUSEA: 0
CHOKING: 0
SHORTNESS OF BREATH: 1
BLOOD IN STOOL: 0
DIARRHEA: 0
ANAL BLEEDING: 0
ABDOMINAL DISTENTION: 1
RECTAL PAIN: 0
COUGH: 1
BACK PAIN: 1
VOICE CHANGE: 0
SORE THROAT: 1
CONSTIPATION: 1

## 2021-05-14 NOTE — PROGRESS NOTES
Reason for Referral:   Flavio Powers, APRN - NP  [address],  [facility] [address]    Chief Complaint   Patient presents with   Carola Bennett Patient     Patient is here today as a new patient    GI Problem     Patient is here today due to abdominal distention and constipation stated about 2 weeks ago       1. Constipation, unspecified constipation type    2. Hypocalcemia    3. Adenocarcinoma, lung, left ([address]            HISTORY OF PRESENT ILLNESS: Nick Sweeney is a 62 y.o. female with a past history remarkable for , referred for evaluation of   Chief Complaint   Patient presents with    New Patient     Patient is here today as a new patient    GI Problem     Patient is here today due to abdominal distention and constipation stated about 2 weeks ago   . Patient seen along with her . Chief complaint is that she has constipation and abdominal distention. Patient states that she was having 1-2 bowel movements a day until about couple of weeks ago. The last 2 weeks she has constipation. Not able to move bowels for 2 to 3 days. No hematochezia. No diarrhea. Patient denies taking narcotics. No other etiology for constipation. Recently patient is noticing abdominal bloating. Usually in the postprandial state. On fasting state she is fine. Denies significant pain. No dysphagia or dyspepsia. No epigastric pain. No heartburns. Has good appetite and no weight loss. No melena, hematochezia  Recently she was given oral laxatives and also Dulcolax suppository and with that she is able to move bowels. Recently she had labs done including CMP CBC and TSH levels. Her calcium level seems to be around 10.5. However at present her calcium levels increased up to 11.1. Patient is known to have lung cancer in the last couple of years and being treated with chemotherapy. It appears that she has adenocarcinoma, lung primary. Patient was told that she has stage IV cancer.   She is going to have further studies in the next month to further stage the consent. At present she is off from chemotherapy. Past Medical,Family, and Social History reviewed and does contribute to the patient presentingcondition. Patient's PMH/PSH,SH,PSYCH Hx, MEDs, ALLERGIES, and ROS were all reviewed and updated in the appropriate sections. PAST MEDICAL HISTORY:  Past Medical History:   Diagnosis Date    Alcohol abuse     For many years; 5-6 beers per night    Breast cancer ([facility] 2010    right radical mastectomy with positive sentinel dose, chemo, s/p tamoxifen x 7 years     Chronic diarrhea 1/29/2019    COPD (chronic obstructive pulmonary disease) ([facility] [facility]     Essential hypertension     [facility]     Pleural effusion 12/26/2019    Tobacco abuse        Past Surgical History:   Procedure Laterality Date    APPENDECTOMY      CHOLECYSTECTOMY      MASTECTOMY Right        CURRENT MEDICATIONS:    Current Outpatient Medications:     docusate sodium (COLACE) 100 MG capsule, Take 1 capsule by mouth 2 times daily, Disp: 60 capsule, Rfl: 0    ipratropium-albuterol (DUONEB) 0.5-2.5 (3) MG/3ML SOLN nebulizer solution, Inhale 3 mLs into the lungs 4 times daily, Disp: 360 mL, Rfl: 3    levothyroxine (SYNTHROID) 88 MCG tablet, take 1 tablet by mouth once daily, Disp: 30 tablet, Rfl: 5    albuterol sulfate  (90 Base) MCG/ACT inhaler, Inhale 2 puffs into the lungs every 4 hours as needed for Wheezing or Shortness of Breath, Disp: 3 Inhaler, Rfl: 1    albuterol (ACCUNEB) 1.25 MG/3ML nebulizer solution, Inhale 3 mLs into the lungs every 2 hours as needed for Wheezing or Shortness of Breath, Disp: 360 mL, Rfl: 3    amLODIPine (NORVASC) 5 MG tablet, take 1 tablet by mouth once daily, Disp: 30 tablet, Rfl: 5    pregabalin (LYRICA) 75 MG capsule, Take 1 capsule by mouth 3 times daily for 120 days. , Disp: 90 capsule, Rfl: 3    folic acid (FOLVITE) 246 MCG tablet, Take 1 tablet by mouth daily, Disp: 100 meetings of clubs or organizations: Not on file     Relationship status: Not on file    Intimate partner violence     Fear of current or ex partner: Not on file     Emotionally abused: Not on file     Physically abused: Not on file     Forced sexual activity: Not on file   Other Topics Concern    Not on file   Social History Narrative    Not on file       REVIEW OF SYSTEMS:       Review of Systems   Constitutional: Positive for unexpected weight change. Negative for appetite change and fatigue. HENT: Positive for sore throat. Negative for trouble swallowing and voice change. Eyes: Positive for visual disturbance (glasses). Respiratory: Positive for cough and shortness of breath. Negative for choking. Cardiovascular: Positive for leg swelling. Negative for chest pain. Gastrointestinal: Positive for abdominal distention and constipation. Negative for abdominal pain, anal bleeding, blood in stool, diarrhea, nausea, rectal pain and vomiting. Genitourinary: Negative. Negative for difficulty urinating. Musculoskeletal: Positive for back pain. Negative for joint swelling and myalgias. Neurological: Negative for dizziness, tremors, weakness, light-headedness, numbness and headaches. Hematological: Does not bruise/bleed easily. Psychiatric/Behavioral: Negative for sleep disturbance. The patient is not nervous/anxious. PHYSICAL EXAMINATION: Vital signs reviewed per the nursing documentation. /81   Pulse 85   Temp 98.5 °F (36.9 °C)   Wt 132 lb 11.2 oz (60.2 kg)   SpO2 93%   BMI 24.27 kg/m²   Body mass index is 24.27 kg/m². Physical Exam  Vitals signs and nursing note reviewed. Constitutional:       Appearance: She is well-developed. HENT:      Head: Normocephalic and atraumatic. Eyes:      General: No scleral icterus. Conjunctiva/sclera: Conjunctivae normal.      Pupils: Pupils are equal, round, and reactive to light.    Neck:      Musculoskeletal: Normal range of motion and neck supple. Thyroid: No thyromegaly. Vascular: No hepatojugular reflux or JVD. Trachea: No tracheal deviation. Cardiovascular:      Rate and Rhythm: Normal rate and regular rhythm. Heart sounds: Normal heart sounds. Pulmonary:      Effort: Pulmonary effort is normal. No respiratory distress. Breath sounds: Normal breath sounds. No wheezing or rales. Abdominal:      General: Bowel sounds are normal. There is no distension. Palpations: Abdomen is soft. There is no hepatomegaly or mass. Tenderness: There is no abdominal tenderness. There is no rebound. Hernia: No hernia is present. Musculoskeletal:         General: No tenderness. Comments: No joint swelling   Lymphadenopathy:      Cervical: No cervical adenopathy. Skin:     General: Skin is warm. Findings: No bruising, ecchymosis, erythema or rash. Neurological:      Mental Status: She is alert and oriented to person, place, and time. Cranial Nerves: No cranial nerve deficit. Psychiatric:         Thought Content:  Thought content normal.           LABORATORY DATA: Reviewed  Lab Results   Component Value Date    WBC 14.1 (H) 05/10/2021    HGB 13.6 05/10/2021    HCT 41.1 05/10/2021    MCV 94.5 05/10/2021     05/10/2021     (L) 05/10/2021    K 4.8 05/10/2021    CL 93 (L) 05/10/2021    CO2 26 05/10/2021    BUN 10 05/10/2021    CREATININE 0.65 05/10/2021    LABALBU 4.8 05/10/2021    BILITOT 0.18 (L) 05/10/2021    ALKPHOS 80 05/10/2021    AST 23 05/10/2021    ALT 20 05/10/2021    INR 0.9 01/22/2020         Lab Results   Component Value Date    RBC 4.35 05/10/2021    HGB 13.6 05/10/2021    MCV 94.5 05/10/2021    MCH 31.3 05/10/2021    MCHC 33.1 05/10/2021    RDW 13.8 05/10/2021    MPV 9.5 05/10/2021    BASOPCT 1 03/15/2021    LYMPHSABS 3.01 03/15/2021    MONOSABS 0.83 03/15/2021    NEUTROABS 6.58 03/15/2021    EOSABS 0.20 03/15/2021    BASOSABS 0.07 03/15/2021         DIAGNOSTIC TESTING:     No results found. IMPRESSION: Ms. Damian Knott is a 62 y.o. female with     Assessment:  1. Constipation, unspecified constipation type    2. Hypocalcemia    3. Adenocarcinoma, lung, left ([address]        Plan: This patient has a recent onset of constipation. Calcium levels elevated. Need to repeat the calcium levels. If calcium levels are persistently high she needs to be treated as a hypercalcemia induced with constipation. Meanwhile patient is advised medical therapy for constipation and brochures given. Advised to have repeat calcium levels and also analyzed the calcium levels. To contact me regarding the results. Spent 30 minutes providing patient education and counseling. Thank you for allowing me to participate in the care of Ms. Damian Knott. For any further questions please do not hesitate to contact me. Note is dictated utilizing voice recognition software. Unfortunately this leads to occasional typographical errors. Please contact our office if you have any questions. I have reviewed and agree with the MA/LPN ROS.      Rashad Ruby MD, [facility]  Board Certified in Gastroenterology and [address] [place] [address] Gastroenterology  Office #: [phone]

## 2021-06-07 ENCOUNTER — HOSPITAL ENCOUNTER (OUTPATIENT)
Dept: CT IMAGING | Age: 61
Discharge: HOME | End: 2021-06-09

## 2021-06-07 DIAGNOSIS — C34.92 ADENOCARCINOMA OF LUNG, STAGE 4, LEFT (HCC): ICD-10-CM

## 2021-06-07 PROCEDURE — 6360000004 HC RX CONTRAST MEDICATION: Performed by: INTERNAL MEDICINE

## 2021-06-07 PROCEDURE — 71260 CT THORAX DX C+: CPT

## 2021-06-07 PROCEDURE — 2580000003 HC RX 258: Performed by: INTERNAL MEDICINE

## 2021-06-07 RX ORDER — SODIUM CHLORIDE 0.9 % (FLUSH) 0.9 %
10 SYRINGE (ML) INJECTION PRN
Status: DISCONTINUED | OUTPATIENT
Start: 2021-06-07 | End: 2021-06-10 | Stop reason: HOSPADM

## 2021-06-07 RX ORDER — 0.9 % SODIUM CHLORIDE 0.9 %
80 INTRAVENOUS SOLUTION INTRAVENOUS ONCE
Status: COMPLETED | OUTPATIENT
Start: 2021-06-07 | End: 2021-06-07

## 2021-06-07 RX ADMIN — IOPAMIDOL 75 ML: 755 INJECTION, SOLUTION INTRAVENOUS at 14:21

## 2021-06-07 RX ADMIN — SODIUM CHLORIDE, PRESERVATIVE FREE 10 ML: 5 INJECTION INTRAVENOUS at 14:21

## 2021-06-07 RX ADMIN — SODIUM CHLORIDE 80 ML: 9 INJECTION, SOLUTION INTRAVENOUS at 14:21

## 2021-06-08 ENCOUNTER — HOSPITAL ENCOUNTER (OUTPATIENT)
Facility: MEDICAL CENTER | Age: 61
End: 2021-06-08

## 2021-06-14 ENCOUNTER — TELEPHONE (OUTPATIENT)
Dept: ONCOLOGY | Age: 61
End: 2021-06-14

## 2021-06-14 ENCOUNTER — OFFICE VISIT (OUTPATIENT)
Dept: ONCOLOGY | Age: 61
End: 2021-06-14

## 2021-06-14 VITALS
RESPIRATION RATE: 16 BRPM | SYSTOLIC BLOOD PRESSURE: 139 MMHG | BODY MASS INDEX: 23.96 KG/M2 | HEART RATE: 90 BPM | WEIGHT: 131 LBS | DIASTOLIC BLOOD PRESSURE: 88 MMHG | TEMPERATURE: 97.7 F

## 2021-06-14 DIAGNOSIS — E03.2 HYPOTHYROIDISM DUE TO MEDICATION: ICD-10-CM

## 2021-06-14 DIAGNOSIS — C34.92 ADENOCARCINOMA OF LUNG, STAGE 4, LEFT (HCC): Primary | ICD-10-CM

## 2021-06-14 PROCEDURE — 99211 OFF/OP EST MAY X REQ PHY/QHP: CPT

## 2021-06-14 PROCEDURE — 99214 OFFICE O/P EST MOD 30 MIN: CPT | Performed by: INTERNAL MEDICINE

## 2021-06-14 NOTE — PROGRESS NOTES
cells, adenocarcinoma of lung primary. .    We saw the patient in house, we arrange for staging PET CT scan and brain MRI which essentially showed no evidence of metastatic disease otherwise. The patient was diagnosed with stage IV lung cancer, we plan palliative chemotherapy, started 01/20/2020. She completed 4 cycles of chemoimmunotherapy and we plan to continue with maintenance Keytruda. Unfortunately, the Jerri Sill was very poorly tolerated - decided to hold. Nivolumab started 08/03/2020. INTERIM HISTORY: Ezequiel Estes comes back today for a follow-up for lung cancer and to review CT. She has been off therapy for the last 3 months and feels improved with hiatus. Past Medical History:   has a past medical history of Alcohol abuse, Breast cancer ([facility] Chronic diarrhea, COPD (chronic obstructive pulmonary disease) ([facility] Essential hypertension, Lung cancer ([facility] Pleural effusion, and Tobacco abuse. Past Surgical History:   has a past surgical history that includes Appendectomy; Mastectomy (Right); and Cholecystectomy. Family History: family history includes Breast Cancer in her mother; Cancer in her brother; Deep Vein Thrombosis in her brother; Prostate Cancer in her brother and father; Stroke in her mother. Social History:   reports that she has been smoking. She has a 23.50 pack-year smoking history. She has never used smokeless tobacco. She reports current alcohol use of about 8.0 standard drinks of alcohol per week. She reports that she does not use drugs. Medications:    Reviewed in EPIC     Allergies:  Morphine, Robitussin (alcohol free) [guaifenesin], Aspirin, Penicillins, and Sulfa antibiotics    REVIEW OF SYSTEMS:   General: No fever or night sweats. Weight stable.   ENT: No double, no tinnitus or hearing problem, no dysphagia; dry sinuses and xerostomia  Respiratory: No chest pain, no cough or hemoptysis, shortness of breath  Cardiovascular: Denies chest pain, PND or orthopnea, palpitations  Gastrointestinal: No nausea or vomiting, abdominal pain, diarrhea and constipation. Genitourinary: Denies dysuria, hematuria, frequency, urgency or incontinence. Neurological: Denies headaches, decreased LOC, no sensory or motor focal deficits. Musculoskeletal: No arthralgia no back pain or joint swelling. +weakness and muscle fatigue; bilateral leg and knee pain - unchanged +pain at previous drain site - unchanged +left arm pain with port pain  Skin: No bleeding or rash  Psychiatric: No anxiety, no depression. Endocrine: No diabetes or thyroid disease. Hematologic: No bleeding, no adenopathy. PHYSICAL EXAM:      /88   Pulse 90   Temp 97.7 °F (36.5 °C) (Temporal)   Resp 16   Wt 131 lb (59.4 kg)   BMI 23.96 kg/m²    Temp (24hrs), Av.9 °F (36.6 °C), Min:97.9 °F (36.6 °C), Max:97.9 °F (36.6 °C)  Gen. Exam, showed a well-appearing patient without evidence of distress or pain  HEENT, normocephalic and atraumatic, PERRLA extraocular muscles are intact  Neck showed no JVD no carotid bruit, no cervical adenopathy  Chest decreased air entry +wheezing with some rhonchi   Heart is regular without any murmur  Abdomen soft nontender no hepatosplenomegaly  Lower extremities showed no edema clubbing or cyanosis; tenderness in lateral compartment of left knee   Neurological examination was nonfocal, with intact cranial nerves  Skin  No rashes or bruising appreciated    REVIEW OF RADIOLOGICAL RESULTS:   2021 CT CHEST W CONTRAST IMPRESSION:   1. A 0.8 cm partially cavitary nodule in the right lower lobe is more   suspicious for primary neoplasia than an infectious or inflammatory process. Consider percutaneous biopsy.  Of note, the nodule may be at or below the   lower limits of PET resolution. 2. Right mastectomy with no findings of disease recurrence. 3. No findings of metastatic disease in the chest.  Specifically, no evidence   of recurrent malignant left pleural effusion.

## 2021-06-14 NOTE — TELEPHONE ENCOUNTER
ZELDA HERE FOR MD VISIT  RV IN 3 MTHS NEED CDP CMP TSH AND CT SCAN PRIOR TO RV  CT CHEST PT WILL HAVE IT SCHEDULED BEFORE RV W/ LABS  LAB ORDERS GIVEN TO PT ON EXIT  MD VISIT 9/20/21 @ 11:45 AM  AVS PRINTED W/ INSTRUCTIONS AND GIVEN TO PT ON EXIT

## 2021-07-19 RX ORDER — AMLODIPINE BESYLATE 5 MG/1
TABLET ORAL
Qty: 30 TABLET | Refills: 5 | Status: SHIPPED | OUTPATIENT
Start: 2021-07-19 | End: 2022-01-17

## 2021-07-21 DIAGNOSIS — C34.92 ADENOCARCINOMA, LUNG, LEFT (HCC): ICD-10-CM

## 2021-07-21 DIAGNOSIS — J44.1 CHRONIC OBSTRUCTIVE PULMONARY DISEASE WITH ACUTE EXACERBATION (HCC): ICD-10-CM

## 2021-07-21 DIAGNOSIS — E03.2 HYPOTHYROIDISM DUE TO MEDICATION: ICD-10-CM

## 2021-07-21 RX ORDER — PREGABALIN 75 MG/1
75 CAPSULE ORAL 3 TIMES DAILY
Qty: 90 CAPSULE | Refills: 3 | Status: SHIPPED | OUTPATIENT
Start: 2021-07-21 | End: 2022-02-15 | Stop reason: SDUPTHER

## 2021-07-21 NOTE — TELEPHONE ENCOUNTER
RECEIVED PHONE REQUEST FROM PT SPOUSE, LIVIA FOR REFILL OF LYRICA.     PEND TO MD BISHOP FOLLOW UP 09/20/21

## 2021-08-22 DIAGNOSIS — C34.92 ADENOCARCINOMA OF LUNG, STAGE 4, LEFT (HCC): ICD-10-CM

## 2021-08-23 RX ORDER — LEVOTHYROXINE SODIUM 88 UG/1
TABLET ORAL
Qty: 30 TABLET | Refills: 5 | Status: SHIPPED | OUTPATIENT
Start: 2021-08-23 | End: 2022-02-15

## 2021-08-23 NOTE — TELEPHONE ENCOUNTER
RECEIVED INTERFACE REQUEST FROM [employer] FOR REFILL OF SYNTHROID.    TSH= 2.15 05/10/21    MD FOLLOW UP 09/20/21    PEND TO MD FOR REVIEW.

## 2021-09-01 DIAGNOSIS — C34.92 ADENOCARCINOMA OF LEFT LUNG (HCC): ICD-10-CM

## 2021-09-02 RX ORDER — TRIAMCINOLONE ACETONIDE 1 MG/G
CREAM TOPICAL
Qty: 80 G | Refills: 2 | Status: ON HOLD | OUTPATIENT
Start: 2021-09-02 | End: 2022-04-21

## 2021-09-13 ENCOUNTER — HOSPITAL ENCOUNTER (OUTPATIENT)
Dept: CT IMAGING | Age: 61
Discharge: HOME | End: 2021-09-15

## 2021-09-13 DIAGNOSIS — C34.92 ADENOCARCINOMA OF LUNG, STAGE 4, LEFT (HCC): ICD-10-CM

## 2021-09-13 LAB
CREAT SERPL-MCNC: 0.48 MG/DL (ref 0.5–0.9)
GFR AFRICAN AMERICAN: >60 ML/MIN
GFR NON-AFRICAN AMERICAN: >60 ML/MIN
GFR SERPL CREATININE-BSD FRML MDRD: ABNORMAL ML/MIN/{1.73_M2}
GFR SERPL CREATININE-BSD FRML MDRD: ABNORMAL ML/MIN/{1.73_M2}

## 2021-09-13 PROCEDURE — 82565 ASSAY OF CREATININE: CPT

## 2021-09-13 PROCEDURE — 6360000004 HC RX CONTRAST MEDICATION: Performed by: INTERNAL MEDICINE

## 2021-09-13 PROCEDURE — 71260 CT THORAX DX C+: CPT

## 2021-09-13 PROCEDURE — 2580000003 HC RX 258: Performed by: INTERNAL MEDICINE

## 2021-09-13 PROCEDURE — 36415 COLL VENOUS BLD VENIPUNCTURE: CPT

## 2021-09-13 RX ORDER — SODIUM CHLORIDE 0.9 % (FLUSH) 0.9 %
10 SYRINGE (ML) INJECTION ONCE
Status: COMPLETED | OUTPATIENT
Start: 2021-09-13 | End: 2021-09-13

## 2021-09-13 RX ADMIN — SODIUM CHLORIDE, PRESERVATIVE FREE 10 ML: 5 INJECTION INTRAVENOUS at 14:04

## 2021-09-13 RX ADMIN — IOPAMIDOL 75 ML: 755 INJECTION, SOLUTION INTRAVENOUS at 14:01

## 2021-09-14 ENCOUNTER — HOSPITAL ENCOUNTER (OUTPATIENT)
Facility: MEDICAL CENTER | Age: 61
End: 2021-09-14

## 2021-09-20 ENCOUNTER — HOSPITAL ENCOUNTER (OUTPATIENT)
Age: 61
Discharge: HOME | End: 2021-09-20

## 2021-09-20 ENCOUNTER — TELEPHONE (OUTPATIENT)
Dept: CASE MANAGEMENT | Age: 61
End: 2021-09-20

## 2021-09-20 ENCOUNTER — TELEPHONE (OUTPATIENT)
Dept: ONCOLOGY | Age: 61
End: 2021-09-20

## 2021-09-20 ENCOUNTER — OFFICE VISIT (OUTPATIENT)
Dept: ONCOLOGY | Age: 61
End: 2021-09-20

## 2021-09-20 VITALS
HEART RATE: 81 BPM | SYSTOLIC BLOOD PRESSURE: 139 MMHG | DIASTOLIC BLOOD PRESSURE: 89 MMHG | TEMPERATURE: 98.3 F | RESPIRATION RATE: 16 BRPM | BODY MASS INDEX: 23.1 KG/M2 | WEIGHT: 126.3 LBS

## 2021-09-20 DIAGNOSIS — E03.2 HYPOTHYROIDISM DUE TO MEDICATION: ICD-10-CM

## 2021-09-20 DIAGNOSIS — C34.92 ADENOCARCINOMA, LUNG, LEFT (HCC): ICD-10-CM

## 2021-09-20 DIAGNOSIS — K59.00 CONSTIPATION, UNSPECIFIED CONSTIPATION TYPE: ICD-10-CM

## 2021-09-20 DIAGNOSIS — C34.92 ADENOCARCINOMA OF LUNG, STAGE 4, LEFT (HCC): ICD-10-CM

## 2021-09-20 DIAGNOSIS — J43.1 PANLOBULAR EMPHYSEMA (HCC): ICD-10-CM

## 2021-09-20 LAB
ABSOLUTE EOS #: 0.12 K/UL (ref 0–0.44)
ABSOLUTE IMMATURE GRANULOCYTE: 0.03 K/UL (ref 0–0.3)
ABSOLUTE LYMPH #: 2.35 K/UL (ref 1.1–3.7)
ABSOLUTE MONO #: 0.79 K/UL (ref 0.1–1.2)
ALBUMIN SERPL-MCNC: 4.4 G/DL (ref 3.5–5.2)
ALBUMIN/GLOBULIN RATIO: ABNORMAL (ref 1–2.5)
ALP BLD-CCNC: 102 U/L (ref 35–104)
ALT SERPL-CCNC: 20 U/L (ref 5–33)
ANION GAP SERPL CALCULATED.3IONS-SCNC: 12 MMOL/L (ref 9–17)
AST SERPL-CCNC: 28 U/L
BASOPHILS # BLD: 1 % (ref 0–2)
BASOPHILS ABSOLUTE: 0.05 K/UL (ref 0–0.2)
BILIRUB SERPL-MCNC: 0.39 MG/DL (ref 0.3–1.2)
BUN BLDV-MCNC: 8 MG/DL (ref 8–23)
BUN/CREAT BLD: 20 (ref 9–20)
CALCIUM IONIZED: 1.38 MMOL/L (ref 1.13–1.33)
CALCIUM SERPL-MCNC: 10.2 MG/DL (ref 8.6–10.4)
CHLORIDE BLD-SCNC: 93 MMOL/L (ref 98–107)
CO2: 27 MMOL/L (ref 20–31)
CREAT SERPL-MCNC: 0.4 MG/DL (ref 0.5–0.9)
DIFFERENTIAL TYPE: NORMAL
EOSINOPHILS RELATIVE PERCENT: 1 % (ref 1–4)
GFR AFRICAN AMERICAN: >60 ML/MIN
GFR NON-AFRICAN AMERICAN: >60 ML/MIN
GFR SERPL CREATININE-BSD FRML MDRD: ABNORMAL ML/MIN/{1.73_M2}
GFR SERPL CREATININE-BSD FRML MDRD: ABNORMAL ML/MIN/{1.73_M2}
GLUCOSE BLD-MCNC: 103 MG/DL (ref 70–99)
HCT VFR BLD CALC: 44.5 % (ref 36.3–47.1)
HEMOGLOBIN: 15 G/DL (ref 11.9–15.1)
IMMATURE GRANULOCYTES: 0 %
LYMPHOCYTES # BLD: 25 % (ref 24–43)
MCH RBC QN AUTO: 30.5 PG (ref 25.2–33.5)
MCHC RBC AUTO-ENTMCNC: 33.7 G/DL (ref 28.4–34.8)
MCV RBC AUTO: 90.4 FL (ref 82.6–102.9)
MONOCYTES # BLD: 8 % (ref 3–12)
NRBC AUTOMATED: 0 PER 100 WBC
PDW BLD-RTO: 13.2 % (ref 11.8–14.4)
PLATELET # BLD: 324 K/UL (ref 138–453)
PLATELET ESTIMATE: NORMAL
PMV BLD AUTO: 8.9 FL (ref 8.1–13.5)
POTASSIUM SERPL-SCNC: 4.2 MMOL/L (ref 3.7–5.3)
RBC # BLD: 4.92 M/UL (ref 3.95–5.11)
RBC # BLD: NORMAL 10*6/UL
SEG NEUTROPHILS: 65 % (ref 36–65)
SEGMENTED NEUTROPHILS ABSOLUTE COUNT: 6.07 K/UL (ref 1.5–8.1)
SODIUM BLD-SCNC: 132 MMOL/L (ref 135–144)
TOTAL PROTEIN: 7.7 G/DL (ref 6.4–8.3)
TSH SERPL DL<=0.05 MIU/L-ACNC: 1.53 MIU/L (ref 0.3–5)
WBC # BLD: 9.4 K/UL (ref 3.5–11.3)
WBC # BLD: NORMAL 10*3/UL

## 2021-09-20 PROCEDURE — 82330 ASSAY OF CALCIUM: CPT

## 2021-09-20 PROCEDURE — 84443 ASSAY THYROID STIM HORMONE: CPT

## 2021-09-20 PROCEDURE — 85025 COMPLETE CBC W/AUTO DIFF WBC: CPT

## 2021-09-20 PROCEDURE — 99211 OFF/OP EST MAY X REQ PHY/QHP: CPT | Performed by: INTERNAL MEDICINE

## 2021-09-20 PROCEDURE — 80053 COMPREHEN METABOLIC PANEL: CPT

## 2021-09-20 PROCEDURE — 36415 COLL VENOUS BLD VENIPUNCTURE: CPT

## 2021-09-20 PROCEDURE — 99214 OFFICE O/P EST MOD 30 MIN: CPT | Performed by: INTERNAL MEDICINE

## 2021-09-20 RX ORDER — AZITHROMYCIN 500 MG/1
500 TABLET, FILM COATED ORAL DAILY
Qty: 5 TABLET | Refills: 0 | Status: SHIPPED | OUTPATIENT
Start: 2021-09-20 | End: 2022-01-04 | Stop reason: SDUPTHER

## 2021-09-20 RX ORDER — METHYLPREDNISOLONE 4 MG/1
TABLET ORAL
Qty: 1 KIT | Refills: 0 | Status: SHIPPED | OUTPATIENT
Start: 2021-09-20 | End: 2022-01-04 | Stop reason: SDUPTHER

## 2021-09-20 NOTE — TELEPHONE ENCOUNTER
Name: Amalia Amaya  : 1960  MRN: [id number]    Oncology Navigation Follow-Up Note  Navigator spoke with pt. Face to face and offering assistance. Pt. Planning to see RO before concluding to resume Opdivo or tratment Hiatus? Plan to follow.    Electronically signed by Rickie Ramsey RN on 2021 at 3:00 PM

## 2021-09-20 NOTE — PROGRESS NOTES
DIAGNOSIS:   1. Metastatic adenocarcinoma of the lung, stage IV  2. Malignant pleural effusion  3. Molecular testing negative for EGFR, ALK and ROS. Instead it shows Kras and CDK mutation,   4. History of breast cancer in 2010  CURRENT THERAPY:  1. Mastectomy and chemotherapy in 2010  2. Status post thoracocentesis x2  3. Pending study did not show any evidence of distant metastases other than the pleural effusion  4. Palliative chemotherapy with carboplatin, Alimta and Keytruda- started 01/20/2020  5. After 4 cycles of chemoimmunotherapy, chemotherapy will be dropped and she will be maintained on maintenance Keytruda   6. Due to side effects, the drug was held. 7. Improvement of side effects, the plan is to switch to Opdivo started 08/2020  8. Patient wanted treatment hiatus in March/2021  BRIEF CASE HISTORY:    The patient is a 63 y.o.  female who is admitted to the hospital for chief complaints of shortness of breath. Patient has history of breast cancer for which she underwent mastectomy in 2010 followed by chemotherapy. This was done in [place]. The  Patient recently moved to [facility] area about a year ago. Patient had a significant history of tobacco dependence. Patient started noticing worsening of shortness of breath and presented to the ER. Work-up done shows right-sided pleural effusion. CT scan showed multiloculated left-sided pleural effusion with compressive atelectasis in the lingula and majority of the left lower lobe and partial compression atelectasis of the left upper lobe. There was also underlying emphysema. There was a stable 1.8 cm adrenal mass likely adrenal adenoma which had been stable since November 2018. Patient underwent ultrasound guided thoracentesis.   Cytology of pleural fluid confirms adenocarcinoma consistent with lung primary  The patient was discharged home but she came back with worsening shortness of breath and was found to have significant pleural effusion that was tapped pathology showed malignant cells, adenocarcinoma of lung primary. .    We saw the patient in house, we arrange for staging PET CT scan and brain MRI which essentially showed no evidence of metastatic disease otherwise. The patient was diagnosed with stage IV lung cancer, we plan palliative chemotherapy, started 01/20/2020. She completed 4 cycles of chemoimmunotherapy and we plan to continue with maintenance Keytruda. Unfortunately, the Huel Lock was very poorly tolerated - decided to hold. Nivolumab started 08/03/2020. The patient decided to  take treatment hiatus in May/2021  INTERIM HISTORY: Gwen Rodriguez comes back today for a follow-up for lung cancer and to review CT. She has been off therapy for the last 6 months and feels improved with hiatus. She is obviously anxious about progressive disease. Past Medical History:   has a past medical history of Alcohol abuse, Breast cancer ([address] Chronic diarrhea, COPD (chronic obstructive pulmonary disease) ([address] Essential hypertension, Lung cancer ([facility] Pleural effusion, and Tobacco abuse. Past Surgical History:   has a past surgical history that includes Appendectomy; Mastectomy (Right); and Cholecystectomy. Family History: family history includes Breast Cancer in her mother; Cancer in her brother; Deep Vein Thrombosis in her brother; Prostate Cancer in her brother and father; Stroke in her mother. Social History:   reports that she has been smoking. She has a 23.50 pack-year smoking history. She has never used smokeless tobacco. She reports current alcohol use of about 8.0 standard drinks of alcohol per week. She reports that she does not use drugs. Medications:    Reviewed in EPIC     Allergies:  Morphine, Robitussin (alcohol free) [guaifenesin], Aspirin, Penicillins, and Sulfa antibiotics    REVIEW OF SYSTEMS:   General: No fever or night sweats. Weight stable.   ENT: No double, no tinnitus or hearing problem, no dysphagia; dry sinuses and xerostomia  Respiratory: No chest pain, no cough or hemoptysis, shortness of breath  Cardiovascular: Denies chest pain, PND or orthopnea, palpitations  Gastrointestinal: No nausea or vomiting, abdominal pain, diarrhea and constipation. Genitourinary: Denies dysuria, hematuria, frequency, urgency or incontinence. Neurological: Denies headaches, decreased LOC, no sensory or motor focal deficits. Musculoskeletal: No arthralgia no back pain or joint swelling. +weakness and muscle fatigue; bilateral leg and knee pain - unchanged +pain at previous drain site - unchanged +left arm pain with port pain  Skin: No bleeding or rash  Psychiatric: No anxiety, no depression. Endocrine: No diabetes or thyroid disease. Hematologic: No bleeding, no adenopathy. PHYSICAL EXAM:      /89   Pulse 81   Temp 98.3 °F (36.8 °C) (Temporal)   Resp 16   Wt 126 lb 4.8 oz (57.3 kg)   BMI 23.10 kg/m²    Temp (24hrs), Av.9 °F (36.6 °C), Min:97.9 °F (36.6 °C), Max:97.9 °F (36.6 °C)  Gen. Exam, showed a well-appearing patient without evidence of distress or pain  HEENT, normocephalic and atraumatic, PERRLA extraocular muscles are intact  Neck showed no JVD no carotid bruit, no cervical adenopathy  Chest decreased air entry +wheezing with some rhonchi   Heart is regular without any murmur  Abdomen soft nontender no hepatosplenomegaly  Lower extremities showed no edema clubbing or cyanosis; tenderness in lateral compartment of left knee   Neurological examination was nonfocal, with intact cranial nerves  Skin  No rashes or bruising appreciated    REVIEW OF RADIOLOGICAL RESULTS:   2021 CT CHEST W CONTRAST IMPRESSION:   1. A 0.8 cm partially cavitary nodule in the right lower lobe is more   suspicious for primary neoplasia than an infectious or inflammatory process. Consider percutaneous biopsy.  Of note, the nodule may be at or below the   lower limits of PET resolution.    2. Right mastectomy with no findings of disease recurrence. 3. No findings of metastatic disease in the chest.  Specifically, no evidence   of recurrent malignant left pleural effusion. 4. Minimal to mild bronchial wall thickening with patchy airway secretions   potentially due to bronchitis or reactive airways disease. CT scan September/2021  Impression   1.  Significantly increased size of the right lower lobe nodule, now   measuring up to 2.1 cm (previous max dimension of 0.9 cm on 6/7/2021).  This   is concerning for a metastatic nodule or new primary lung malignancy.       2. Roylene Beatrice prominent left axillary lymph nodes are indeterminate but   unchanged from previous imaging and not previously hypermetabolic.       3.  Unchanged right adrenal nodule, previously characterized as an adenoma. REVIEW OF LABORATORY DATA:     Lab Results   Component Value Date    WBC 9.4 09/20/2021    HGB 15.0 09/20/2021    HCT 44.5 09/20/2021    MCV 90.4 09/20/2021     09/20/2021       Chemistry        Component Value Date/Time     (L) 09/20/2021 1148    K 4.2 09/20/2021 1148    CL 93 (L) 09/20/2021 1148    CO2 27 09/20/2021 1148    BUN 8 09/20/2021 1148    CREATININE 0.40 (L) 09/20/2021 1148        Component Value Date/Time    CALCIUM 10.2 09/20/2021 1148    ALKPHOS 102 09/20/2021 1148    AST 28 09/20/2021 1148    ALT 20 09/20/2021 1148    BILITOT 0.39 09/20/2021 1148        Lab Results   Component Value Date    TSH 1.53 09/20/2021         IMPRESSION:    1. History of breast cancer in 2010, status post mastectomy and chemotherapy  2. New diagnosis with lung cancer  3. Malignant pleural effusion stage IV disease  4. Chemotherapy with carboplatin, Alimta and Keytruda - started 01/20/2020  5. Weakness in both lower extremities  6. Constellation of symptoms including sore throat, sinus drainage, diffuse arthralgias, worsening fatigue and aches and pains.   Differential is very broad but I am concerned about immunologic effect of Keytruda. 7. Opdivo started 08/2020   8. Peripheral neuropathy in both lower extremities  9. Treatment hiatus March/2021      PLAN:   1. Patient has slight progression of her disease  2. She is resistant to go back to immunotherapy and does not want chemotherapy  3. We will see if radiation is an option for her. We will referred her to radiation and she had after  4.  The patient understand another option to go back to immunotherapy if she  is not a candidate for radiation or she progresses after radiation

## 2021-09-23 ENCOUNTER — TELEPHONE (OUTPATIENT)
Dept: RADIATION ONCOLOGY | Facility: MEDICAL CENTER | Age: 61
End: 2021-09-23

## 2021-09-23 NOTE — TELEPHONE ENCOUNTER
Received call from Mr Jyoti Alonzoier, They were told they were seeing Dr Fanny Walsh for consut. So they drove to [facility].  I rescheduled Zhao Roman for consult on 9/27/2021 @ 9:30am

## 2021-09-27 ENCOUNTER — TELEPHONE (OUTPATIENT)
Dept: RADIATION ONCOLOGY | Facility: MEDICAL CENTER | Age: 61
End: 2021-09-27

## 2021-09-27 NOTE — TELEPHONE ENCOUNTER
Mr Clarissa Nicolettemary called to change the date of Nohemi's consult.  Cancel today and rescheduled for 9/30/2021 @ 9:30am

## 2021-09-30 ENCOUNTER — TELEPHONE (OUTPATIENT)
Dept: RADIATION ONCOLOGY | Facility: MEDICAL CENTER | Age: 61
End: 2021-09-30

## 2021-09-30 ENCOUNTER — HOSPITAL ENCOUNTER (OUTPATIENT)
Dept: RADIATION ONCOLOGY | Facility: MEDICAL CENTER | Age: 61
Discharge: HOME | End: 2021-09-30

## 2021-09-30 VITALS
OXYGEN SATURATION: 91 % | WEIGHT: 128.8 LBS | RESPIRATION RATE: 18 BRPM | BODY MASS INDEX: 22.82 KG/M2 | SYSTOLIC BLOOD PRESSURE: 129 MMHG | DIASTOLIC BLOOD PRESSURE: 87 MMHG | HEIGHT: 63 IN | HEART RATE: 100 BPM | TEMPERATURE: 98.1 F

## 2021-09-30 DIAGNOSIS — C34.92 ADENOCARCINOMA, LUNG, LEFT (HCC): Primary | ICD-10-CM

## 2021-09-30 PROCEDURE — 99245 OFF/OP CONSLTJ NEW/EST HI 55: CPT | Performed by: STUDENT IN AN ORGANIZED HEALTH CARE EDUCATION/TRAINING PROGRAM

## 2021-09-30 PROCEDURE — 99213 OFFICE O/P EST LOW 20 MIN: CPT | Performed by: STUDENT IN AN ORGANIZED HEALTH CARE EDUCATION/TRAINING PROGRAM

## 2021-09-30 ASSESSMENT — PAIN SCALES - GENERAL: PAINLEVEL_OUTOF10: 4

## 2021-09-30 ASSESSMENT — PAIN DESCRIPTION - LOCATION: LOCATION: LEG

## 2021-09-30 ASSESSMENT — PAIN DESCRIPTION - DESCRIPTORS: DESCRIPTORS: NUMBNESS;TINGLING

## 2021-09-30 ASSESSMENT — PAIN DESCRIPTION - PAIN TYPE: TYPE: CHRONIC PAIN;NEUROPATHIC PAIN

## 2021-09-30 NOTE — PROGRESS NOTES
nebulizer solution Inhale 3 mLs into the lungs 4 times daily 360 mL 3    albuterol sulfate  (90 Base) MCG/ACT inhaler Inhale 2 puffs into the lungs every 4 hours as needed for Wheezing or Shortness of Breath 3 Inhaler 1    albuterol (ACCUNEB) 1.25 MG/3ML nebulizer solution Inhale 3 mLs into the lungs every 2 hours as needed for Wheezing or Shortness of Breath 699 mL 3    folic acid (FOLVITE) 268 MCG tablet Take 1 tablet by mouth daily (Patient taking differently: Take 800 mcg by mouth daily ) 100 tablet 3    gabapentin (NEURONTIN) 400 MG capsule Take 1 capsule by mouth 3 times daily for 30 days. (Patient taking differently: Take 400 mg by mouth 3 times daily. Indications: taking BID Taking one time daily) 90 capsule 1     No current facility-administered medications for this encounter.        Past Medical History:   Diagnosis Date    Alcohol abuse     For many years; 5-6 beers per night    Breast cancer ([facility] 2010    right radical mastectomy with positive sentinel dose, chemo, s/p tamoxifen x 7 years     Chronic diarrhea 1/29/2019    COPD (chronic obstructive pulmonary disease) ([facility]     Essential hypertension     Lung cancer ([facility]     Pleural effusion 12/26/2019    Tobacco abuse        Past Surgical History:   Procedure Laterality Date    APPENDECTOMY      CHOLECYSTECTOMY      MASTECTOMY Right        Family History   Problem Relation Age of Onset   Zannie Cowden Breast Cancer Mother     Stroke Mother     Prostate Cancer Father     Prostate Cancer Brother     Deep Vein Thrombosis Brother     Cancer Brother         \"throat cancer\"       Social History     Socioeconomic History    Marital status:      Spouse name: Not on file    Number of children: Not on file    Years of education: Not on file    Highest education level: Not on file   Occupational History    Not on file   Tobacco Use    Smoking status: Current Every Day Smoker     Packs/day: 0.50     Years: 47.00     Pack years: 23.50    Smokeless tobacco: Never Used   Vaping Use    Vaping Use: Never used   Substance and Sexual Activity    Alcohol use: Yes     Alcohol/week: 8.0 standard drinks     Types: 8 Cans of beer per week     Comment: per night    Drug use: No    Sexual activity: Yes   Other Topics Concern    Not on file   Social History Narrative    Not on file     Social Determinants of Health     Financial Resource Strain:     Difficulty of Paying Living Expenses:    Food Insecurity:     Worried About Running Out of Food in the Last Year:     [address] in the Last Year:    Transportation Needs:     Lack of Transportation (Medical):  Lack of Transportation (Non-Medical):    Physical Activity:     Days of Exercise per Week:     Minutes of Exercise per Session:    Stress:     Feeling of Stress :    Social Connections:     Frequency of Communication with Friends and Family:     Frequency of Social Gatherings with Friends and Family:     Attends Yarsani Services:     Active Member of Clubs or Organizations:     Attends Club or Organization Meetings:     Marital Status:    Intimate Partner Violence:     Fear of Current or Ex-Partner:     Emotionally Abused:     Physically Abused:     Sexually Abused:              FALLS RISK SCREEN  Instructions:  Assess the patient and enter the appropriate indicators that are present for fall risk identification. Total the numbers entered and assign a fall risk score from Table 2.  Reassess patient at a minimum every 12 weeks or with status change. Assessment   Date  9/30/2021     1. Mental Ability: confusion/cognitively impaired 0     2. Elimination Issues: incontinence, frequency 0       3. Ambulatory: use of assistive devices (walker, cane, off-loading devices),        attached to equipment (IV pole, oxygen) 0     4. Sensory Limitations: dizziness, vertigo, impaired vision 0     5. Age less than 65        0     6. Age 72 or greater 0     7.   Medication: diuretics, strong analgesics, hypnotics, sedatives,        antihypertensive agents 3   8. Falls:  recent history of falls within the last 3 months (not to include slipping or        tripping) 0   TOTAL 3    If score of 4 or greater was education given? No       TABLE 2   Risk Score Risk Level Plan of Care   0-3 Little or  No Risk 1. Provide assistance as indicated for ambulation activities  2. Reorient confused/cognitively impaired patient  3. Call-light/bell within patient's reach  4. Chair/bed in low position, stretcher/bed with siderails up except when performing patient care activities  5. Educate patient/family/caregiver on falls prevention  6.  Reassess in 12 weeks or with any noted change in patient condition which places them at a risk for a fall   4-6 Moderate Risk 1. Provide assistance as indicated for ambulation activities  2. Reorient confused/cognitively impaired patient  3. Call-light/bell within patient's reach  4. Chair/bed in low position, stretcher/bed with siderails up except when performing patient care activities  5. Educate patient/family/caregiver on falls prevention     7 or   Higher High Risk 1. Place patient in easily observable treatment room  2. Patient attended at all times by family member or staff  3. Provide assistance as indicated for ambulation activities  4. Reorient confused/cognitively impaired patient  5. Call-light/bell within patient's reach  6. Chair/bed in low position, stretcher/bed with siderails up except when performing patient care activities  7. Educate patient/family/caregiver on falls prevention             Assessment/Plan: Patient was seen today for consultation. Recent CT showed increase in lung nodule. Pt has been on a treatment hiatus with MO. Pt here to discuss RT. Dr. Greta Franks updated and examined pt. Ovidio BISHOP pt will need a PET CT and follow up in [place] for SBRT.           Drew Petersen RN 9/30/2021 1:44 PM

## 2021-09-30 NOTE — CONSULTS
[facility] Radiation Oncology     [address] [address], \[facility] [place] [facility]\""[address]   Mitzi Rsoa: [phone]  F: [phone]  [email or web address]          RADIATION ONCOLOGY NEW PATIENT VISIT:    Patient: Nacho Castillo   YOB: 1960   MRN:  [id number]     Date of Service: 9/30/2021    Referring Provider for today's consult: Rakesh Crooks MD    CHIEF COMPLAINT: \"Lung nodule growing\"    DIAGNOSIS: Nacho Castillo is a 62 y.o. female with stage IV NSCLC - adenocarcinoma - diagnosed in December 2019 (stage IV due to malignant pleural fluid) s/p chemoimmunotherapy started on 01/20/2020. Most recent CT chest on 9/13/2021 revealed an enlarging 2.1 cm RLL nodule concerning for new primary lung malignany, with no other signs of disease. Of note, patient has a history of a breast cancer in 2010 treated with mastectomy and chemotherapy, treatment done in [place]. HISTORY OF PRESENT ILLNESS:     The patient is a 63 y.o.  female who was admitted to the hospital around December of 2019 for complaints of shortness of breath. Work-up done showed a right-sided pleural effusion. CT scan showed multiloculated left-sided pleural effusion with compressive atelectasis in the lingula and majority of the left lower lobe and partial compression atelectasis of the left upper lobe. There was also underlying emphysema. There was a stable 1.8 cm adrenal mass likely adrenal adenoma which had been stable since November 2018. Patient underwent ultrasound guided thoracentesis. Cytology of pleural fluid confirmed adenocarcinoma consistent with lung primary. Staging PET CT scan and brain MRI essentially showed no evidence of metastatic disease otherwise. She completed 4 cycles of chemoimmunotherapy and we plan to continue with maintenance Keytruda. Unfortunately, the Morrie Ivory was very poorly tolerated - decided to hold. Nivolumab started 08/03/2020.  The patient decided to take a treatment hiatus in May/2021. Most recent CT chest on 9/13/2021 revealed an enlarging 2.1 cm RLL nodule concerning for new primary lung malignany, with no other signs of disease. She was therefore referred to Radiation Oncology to discuss treatment to this site of disease. Of note, patient has a history of a breast cancer in 2010 treated with mastectomy and chemotherapy, treatment done in [place]. The patient recently moved to [facility] about a year ago. Current symptoms:    The patient denies shortness of breath, hemoptysis, weigh loss, headaches, vision changes, focal weakness or paraesthesias. PRIOR RADIATION HISTORY:  No prior history of radiation therapy    PACEMAKER: None    PAST MEDICAL HISTORY:    Michael Berg  has a past medical history of Alcohol abuse, Breast cancer ([facility] (2010), Chronic diarrhea (1/29/2019), COPD (chronic obstructive pulmonary disease) ([facility] Essential hypertension, Lung cancer ([facility] Pleural effusion (12/26/2019), and Tobacco abuse. PAST SURGICAL HISTORY:    Michael Berg  has a past surgical history that includes Appendectomy; Mastectomy (Right); and Cholecystectomy. MEDICATIONS:  Current Outpatient Medications on File Prior to Encounter   Medication Sig Dispense Refill    triamcinolone (KENALOG) 0.1 % cream apply to rash twice a day 80 g 2    levothyroxine (SYNTHROID) 88 MCG tablet take 1 tablet by mouth once daily 30 tablet 5    pregabalin (LYRICA) 75 MG capsule Take 1 capsule by mouth 3 times daily for 120 days.  (Patient not taking: Reported on 9/20/2021) 90 capsule 3    amLODIPine (NORVASC) 5 MG tablet take 1 tablet by mouth once daily 30 tablet 5    ipratropium-albuterol (DUONEB) 0.5-2.5 (3) MG/3ML SOLN nebulizer solution Inhale 3 mLs into the lungs 4 times daily 360 mL 3    albuterol sulfate  (90 Base) MCG/ACT inhaler Inhale 2 puffs into the lungs every 4 hours as needed for Wheezing or Shortness of Breath 3 Inhaler 1    albuterol (ACCUNEB) 1.25 diagnosis. Patient with enlarging nodule, suspicious for new lung primary (vs metastatic nodule). I discussed the case with the patient's medical oncologist, Dr. Shantelle Mariano, our plan is to order a PET-CT to restage and treat the nodule with SBRT if it is the only site of disease. I discussed the potential for a biopsy of this nodule with Dr. Shantelle Mariano, but we felt we would be comfortable proceeding with treatment if there is sufficient evidence of malignancy on the PET. Radiation logistics and potential side effects were discussed with the patient. This included a discussion of possible fatigue, cough, chance of increased shortness of breath due to lung scarring, radiation pneumonitis, as well as possibility of chest wall pain or rib fracture. She asked appropriate questions which were answered to her apparent satisfaction. The patient is a bit apprehensive about treatment, an I explained that alternatives include surgical resection. She would like to proceed with the outlined plan. I will schedule a follow-up after the PET-CT with my partner in [facility], as our [place] location does not currently offer SBRT (will start treating with SBRT around January of 2022). Thank you for the consultation and allowing me to participate in the care of this pleasant patient. Orders Placed:     Orders Placed This Encounter   Procedures    PET CT SKULL BASE TO MID THIGH     CC:    Kaylin Jacobs MD      Total time spent on this case on the day of encounter is more than 80 minutes.  This time includes combination of one or more of the following - review of necessary tests, review of pertinent medical records from the EMR, performing medically appropriate examination and evaluation, counseling and educating the patient/family/caregiver, ordering necessary medical tests, procedures etc., documenting the clinical information in the electronic medical record, care coordination, referring and communicating with other health care providers and interpretation of results independently. This note is created with the assistance of a speech recognition program.  While intending to generate a document that actually reflects the content of the visit, the document can still have some errors including those of syntax and sound a like substitutions which may escape proof reading. It such instances, actual meaning can be extrapolated by contextual diversion.

## 2021-09-30 NOTE — TELEPHONE ENCOUNTER
Dr Baruch Barthel ordered PET/CT and I scheduled it @ [place] on 10/11/2021. She will be going to [place] for treatment. Katy Kingsley scheduled her for follow up with james ovalles on 10/15/2021 @ 9am. Patient given copy of PET/CT order and appointment card for [place].

## 2021-10-07 ENCOUNTER — CLINICAL DOCUMENTATION (OUTPATIENT)
Dept: RADIATION ONCOLOGY | Facility: MEDICAL CENTER | Age: 61
End: 2021-10-07

## 2021-10-07 ENCOUNTER — TELEPHONE (OUTPATIENT)
Dept: RADIATION ONCOLOGY | Age: 61
End: 2021-10-07

## 2021-10-07 NOTE — PROGRESS NOTES
[facility] Nutrition Note  PG-SGA screening tool reviewed with score of 2    Patient Reports:  1. Weight: Decreased (1) pt reports weighing 132lb six months ago  2. Food Intake: Unchanged (0)  3. Symptoms: No problems eating (0)  4. Activities and function: Not my normal self but able to be up and about with fairly normal activities (1)    *Full assessment for scores > 4. Height: 5' 3\" (1.6 m)   Current Weight: 128 lb 12.8 oz (58.4 kg)  as of 9/30/2021  BMI: 22.82 kg/m²      Recommend monitoring patient's weight and for potential side effects from CA itself and/or tx.     Hanny Dueñas, [address],   Office Number: [phone]

## 2021-10-08 ENCOUNTER — HOSPITAL ENCOUNTER (OUTPATIENT)
Facility: MEDICAL CENTER | Age: 61
End: 2021-10-08

## 2021-10-11 ENCOUNTER — TELEPHONE (OUTPATIENT)
Dept: RADIATION ONCOLOGY | Age: 61
End: 2021-10-11

## 2021-10-11 ENCOUNTER — HOSPITAL ENCOUNTER (OUTPATIENT)
Dept: NUCLEAR MEDICINE | Age: 61
Discharge: HOME | End: 2021-10-13

## 2021-10-11 DIAGNOSIS — C34.92 ADENOCARCINOMA, LUNG, LEFT (HCC): ICD-10-CM

## 2021-10-11 LAB — GLUCOSE BLD-MCNC: 92 MG/DL (ref 65–105)

## 2021-10-11 PROCEDURE — A9552 F18 FDG: HCPCS | Performed by: STUDENT IN AN ORGANIZED HEALTH CARE EDUCATION/TRAINING PROGRAM

## 2021-10-11 PROCEDURE — 2580000003 HC RX 258: Performed by: STUDENT IN AN ORGANIZED HEALTH CARE EDUCATION/TRAINING PROGRAM

## 2021-10-11 PROCEDURE — 78815 PET IMAGE W/CT SKULL-THIGH: CPT

## 2021-10-11 PROCEDURE — 82947 ASSAY GLUCOSE BLOOD QUANT: CPT

## 2021-10-11 PROCEDURE — 3430000000 HC RX DIAGNOSTIC RADIOPHARMACEUTICAL: Performed by: STUDENT IN AN ORGANIZED HEALTH CARE EDUCATION/TRAINING PROGRAM

## 2021-10-11 RX ORDER — SODIUM CHLORIDE 0.9 % (FLUSH) 0.9 %
10 SYRINGE (ML) INJECTION PRN
Status: DISCONTINUED | OUTPATIENT
Start: 2021-10-11 | End: 2021-10-14 | Stop reason: HOSPADM

## 2021-10-11 RX ORDER — FLUDEOXYGLUCOSE F 18 200 MCI/ML
10 INJECTION, SOLUTION INTRAVENOUS
Status: COMPLETED | OUTPATIENT
Start: 2021-10-11 | End: 2021-10-11

## 2021-10-11 RX ADMIN — SODIUM CHLORIDE, PRESERVATIVE FREE 10 ML: 5 INJECTION INTRAVENOUS at 13:38

## 2021-10-11 RX ADMIN — FLUDEOXYGLUCOSE F 18 12.15 MILLICURIE: 200 INJECTION, SOLUTION INTRAVENOUS at 13:38

## 2021-10-11 NOTE — TELEPHONE ENCOUNTER
Attempted to call patient to review PET-CT results, left a message. Patient has follow-up later this week with Dr. Nia Barron.

## 2021-10-15 ENCOUNTER — HOSPITAL ENCOUNTER (OUTPATIENT)
Dept: RADIATION ONCOLOGY | Age: 61
Discharge: HOME | End: 2021-10-15

## 2021-10-15 ENCOUNTER — TELEPHONE (OUTPATIENT)
Dept: ONCOLOGY | Age: 61
End: 2021-10-15

## 2021-10-15 ENCOUNTER — APPOINTMENT (OUTPATIENT)
Dept: RADIATION ONCOLOGY | Age: 61
End: 2021-10-15

## 2021-10-15 VITALS
OXYGEN SATURATION: 98 % | WEIGHT: 128 LBS | SYSTOLIC BLOOD PRESSURE: 115 MMHG | DIASTOLIC BLOOD PRESSURE: 79 MMHG | RESPIRATION RATE: 14 BRPM | HEART RATE: 90 BPM | BODY MASS INDEX: 22.67 KG/M2 | TEMPERATURE: 98 F

## 2021-10-15 DIAGNOSIS — C34.31 MALIGNANT NEOPLASM OF LOWER LOBE OF RIGHT LUNG (HCC): ICD-10-CM

## 2021-10-15 PROCEDURE — 77263 THER RADIOLOGY TX PLNG CPLX: CPT | Performed by: RADIOLOGY

## 2021-10-15 PROCEDURE — 99212 OFFICE O/P EST SF 10 MIN: CPT | Performed by: RADIOLOGY

## 2021-10-15 PROCEDURE — 77334 RADIATION TREATMENT AID(S): CPT | Performed by: RADIOLOGY

## 2021-10-15 PROCEDURE — 77332 RADIATION TREATMENT AID(S): CPT | Performed by: RADIOLOGY

## 2021-10-15 PROCEDURE — 99214 OFFICE O/P EST MOD 30 MIN: CPT | Performed by: RADIOLOGY

## 2021-10-15 PROCEDURE — 77470 SPECIAL RADIATION TREATMENT: CPT | Performed by: RADIOLOGY

## 2021-10-15 NOTE — PROGRESS NOTES
Kristen Lundy  10/15/2021  10:59 AM      Vitals:    10/15/21 1030   BP: 115/79   Pulse: 90   Resp: 14   Temp: 98 °F (36.7 °C)   SpO2: 98%    :  Patient Currently in Pain: Wayne             Wt Readings from Last 1 Encounters:   10/15/21 128 lb (58.1 kg)                Current Outpatient Medications:     triamcinolone (KENALOG) 0.1 % cream, apply to rash twice a day, Disp: 80 g, Rfl: 2    levothyroxine (SYNTHROID) 88 MCG tablet, take 1 tablet by mouth once daily, Disp: 30 tablet, Rfl: 5    pregabalin (LYRICA) 75 MG capsule, Take 1 capsule by mouth 3 times daily for 120 days. , Disp: 90 capsule, Rfl: 3    amLODIPine (NORVASC) 5 MG tablet, take 1 tablet by mouth once daily, Disp: 30 tablet, Rfl: 5    ipratropium-albuterol (DUONEB) 0.5-2.5 (3) MG/3ML SOLN nebulizer solution, Inhale 3 mLs into the lungs 4 times daily, Disp: 360 mL, Rfl: 3    albuterol sulfate  (90 Base) MCG/ACT inhaler, Inhale 2 puffs into the lungs every 4 hours as needed for Wheezing or Shortness of Breath, Disp: 3 Inhaler, Rfl: 1    albuterol (ACCUNEB) 1.25 MG/3ML nebulizer solution, Inhale 3 mLs into the lungs every 2 hours as needed for Wheezing or Shortness of Breath, Disp: 360 mL, Rfl: 3    folic acid (FOLVITE) 100 MCG tablet, Take 1 tablet by mouth daily (Patient taking differently: Take 800 mcg by mouth daily ), Disp: 100 tablet, Rfl: 3    gabapentin (NEURONTIN) 400 MG capsule, Take 1 capsule by mouth 3 times daily for 30 days. (Patient taking differently: Take 400 mg by mouth 3 times daily. Indications: taking BID Taking one time daily), Disp: 90 capsule, Rfl: 1             FALLS RISK SCREEN  Instructions:  Assess the patient and enter the appropriate indicators that are present for fall risk identification. Total the numbers entered and assign a fall risk score from Table 2.  Reassess patient at a minimum every 12 weeks or with status change. Assessment   Date  10/15/2021     1.   Mental Ability: confusion/cognitively impaired 0     2. Elimination Issues: incontinence, frequency 0       3. Ambulatory: use of assistive devices (walker, cane, off-loading devices),        attached to equipment (IV pole, oxygen) 0     4. Sensory Limitations: dizziness, vertigo, impaired vision 0     5. Age less than 65        0     6. Age 72 or greater 0     7. Medication: diuretics, strong analgesics, hypnotics, sedatives,        antihypertensive agents 3   8. Falls:  recent history of falls within the last 3 months (not to include slipping or        tripping) 0   TOTAL 3    If score of 4 or greater was education given? No           TABLE 2   Risk Score Risk Level Plan of Care   0-3 Little or  No Risk 1. Provide assistance as indicated for ambulation activities  2. Reorient confused/cognitively impaired patient  3. Chair/bed in low position, stretcher/bed with siderails up except when performing patient care activities  5. Educate patient/family/caregiver on falls prevention  6.  Reassess in 12 weeks or with any noted change in patient condition which places them at a risk for a fall   4-6 Moderate Risk 1. Provide assistance as indicated for ambulation activities  2. Reorient confused/cognitively impaired patient  3. Chair/bed in low position, stretcher/bed with siderails up except when performing patient care activities  4. Educate patient/family/caregiver on falls prevention     7 or   Higher High Risk 1. Place patient in easily observable treatment room  2. Patient attended at all times by family member or staff  3. Provide assistance as indicated for ambulation activities  4. Reorient confused/cognitively impaired patient  5. Chair/bed in low position, stretcher/bed with siderails up except when performing patient care activities  6. Educate patient/family/caregiver on falls prevention         PLAN: Patient is seen today in follow up to discuss SBRT. Pt reports baseline cough/SOB. Denies pain.  Dr Terrence Rivero notified and examined pt. Pt given education on SBRT and SIM completed         Violet Louise RN

## 2021-10-15 NOTE — TELEPHONE ENCOUNTER
RECEIVED A CALL FROM ZELDA'S  LIVIA STATING HE IS CANCELLING HER APPOINTMENT FOR Monday 10/18/21 AND WILL NOT BE RESCHEDULING. LIVIA STATES \"ZELDA IS GOING TO BE STARTING RADIATION AND DR LESTER IS SURE HE IS GOING TO GET IT ALL\". CANCELLED MD VISIT & TX. ROUTING NOTE TO DR Mary Long.

## 2021-10-18 ENCOUNTER — HOSPITAL ENCOUNTER (OUTPATIENT)
Dept: INFUSION THERAPY | Facility: MEDICAL CENTER | Age: 61
End: 2021-10-18

## 2021-10-18 PROBLEM — C34.31 MALIGNANT NEOPLASM OF LOWER LOBE OF RIGHT LUNG (HCC): Status: ACTIVE | Noted: 2021-10-18

## 2021-10-18 NOTE — PROGRESS NOTES
[facility]            Radiation Oncology          [address] [facility]          [place] [address]        Angelica Lack: [phone]        F: [phone]       [employer] [address] [place] [address]       Radiation Oncology   [address] [facility], [address] [facility]       Angelica Lack: [phone]       F: [phone]       [email or web address]      Date of Service: 10/15/2021     Location:  [facility],   [address] [facility], [place] Russ   [phone]        RADIATION ONCOLOGY FOLLOW UP NOTE    Patient ID:   Moiz Drummond  : 1960   MRN: [id number]    DIAGNOSIS:  Cancer Staging  Malignant neoplasm of lower lobe of right lung [facility])  Staging form: Lung, AJCC 8th Edition  - Clinical stage from 2021: Stage IA3 (cT1c, cN0, cM0) - Signed by Tiffanie Hendrix MD on 10/18/2021    -history of L sided lung adenocarcinoma stage IV with (+) pleural effusion s/p chemo/immunotherapy  -history of L breast cancer treated with mastectomy and chemotherapy in  @ [facility]       INTERVAL HISTORY:   Moiz Drummond is a 62 y.o. Akyley Red female with a history of breast cancer and was diagnosed in 2019 with stage IV left-sided lung cancer after having a pleural effusion and positive cytology. Patient was treated with chemotherapy and immunotherapy. She had been getting surveillance imaging since and was noted on a CT on 2021 to have a 0.8 cm lesion in the right lower lobe lung. Patient was recommended to have a short-term follow-up CT chest which she had on 2021 and showed further increase in size of this lesion. Given her history of smoking, this is thought to be likely new lung primary. Patient had a PET scan on 2021 that showed uptake in the RLL lesion only, and therefore patient was felt to be a good candidate for SBRT. Patient was referred by Dr Meron Baca to [facility] for [facility]RT.  She denies any chest pain, shortness of breath, coughing, or hemoptysis. She denies any other symptoms headaches, dizziness, abdominal pain, nausea, weight loss, bony pain, swelling, or bleeding. She has not had any radiation treatments before for her other cancer diagnoses. MEDICATIONS:    Current Outpatient Medications:     triamcinolone (KENALOG) 0.1 % cream, apply to rash twice a day, Disp: 80 g, Rfl: 2    levothyroxine (SYNTHROID) 88 MCG tablet, take 1 tablet by mouth once daily, Disp: 30 tablet, Rfl: 5    pregabalin (LYRICA) 75 MG capsule, Take 1 capsule by mouth 3 times daily for 120 days. , Disp: 90 capsule, Rfl: 3    amLODIPine (NORVASC) 5 MG tablet, take 1 tablet by mouth once daily, Disp: 30 tablet, Rfl: 5    ipratropium-albuterol (DUONEB) 0.5-2.5 (3) MG/3ML SOLN nebulizer solution, Inhale 3 mLs into the lungs 4 times daily, Disp: 360 mL, Rfl: 3    albuterol sulfate  (90 Base) MCG/ACT inhaler, Inhale 2 puffs into the lungs every 4 hours as needed for Wheezing or Shortness of Breath, Disp: 3 Inhaler, Rfl: 1    albuterol (ACCUNEB) 1.25 MG/3ML nebulizer solution, Inhale 3 mLs into the lungs every 2 hours as needed for Wheezing or Shortness of Breath, Disp: 360 mL, Rfl: 3    folic acid (FOLVITE) 891 MCG tablet, Take 1 tablet by mouth daily (Patient taking differently: Take 800 mcg by mouth daily ), Disp: 100 tablet, Rfl: 3    gabapentin (NEURONTIN) 400 MG capsule, Take 1 capsule by mouth 3 times daily for 30 days. (Patient taking differently: Take 400 mg by mouth 3 times daily. Indications: taking BID Taking one time daily), Disp: 90 capsule, Rfl: 1    ALLERGIES:  Allergies   Allergen Reactions    Morphine     Robitussin (Alcohol Free) [Guaifenesin] Hives    Aspirin Hives and Rash    Penicillins Hives and Rash    Sulfa Antibiotics Hives and Rash         REVIEW OF SYSTEMS:    A full 14 point review of systems was performed and assessed and found to be negative except as noted above.       PHYSICAL EXAMINATION:    CHAPERONE: Family/friend/companieon Present    ECO Asymptomatic    VITAL SIGNS: /79   Pulse 90   Temp 98 °F (36.7 °C)   Resp 14   Wt 128 lb (58.1 kg)   SpO2 98%   BMI 22.67 kg/m²   GENERAL:  General appearance is that of a well-nourished, well-developed in no apparent distress. HEENT: Normocephalic, atraumatic, EOMI, moist mucosa, no erythema. NECK:  No adenopathy or a palpable thyroid mass, trachea is midline. LYMPHATICS: No cervical, supraclavicular adenopathy. HEART:  Normal rate and regular rhythm, normal heart sounds. LUNGS:  Pulmonary effort normal. Normal breath sounds. ABDOMEN:  Soft, nontender, non distended. EXTREMITIES:  No edema. No calf tenderness. MSK:  No spinal tenderness. Normal ROM. NEUROLOGICAL: Alert and oriented. Strength and sensation intact bilaterally. No focal deficits. PSYCH: Mood normal, behavior normal.  SKIN: No erythema, no desquamation. BREAST: Deferred      LABS:  WBC   Date Value Ref Range Status   2021 9.4 3.5 - 11.3 k/uL Final     Segs Absolute   Date Value Ref Range Status   2021 6.07 1.50 - 8.10 k/uL Final     Hemoglobin   Date Value Ref Range Status   2021 15.0 11.9 - 15.1 g/dL Final     Platelets   Date Value Ref Range Status   2021 324 138 - 453 k/uL Final     No results found for: , CEA  No results found for: PSA    IMAGING:   10/11/21 PET Scan  Impression   1. The patient's 2.1 cm cavitary nodule seen within the right lower lobe on   the prior CT chest study is FDG avid consistent with the patient's known   malignancy.  No PET-CT evidence of distant metastatic disease.  This nodule   appears new compared to the prior PET-CT study. 2. FDG avid presumed lymph node along the left lower chest wall suspicious   for metastatic disease.    3. Prominent left axillary lymph nodes which demonstrates sub threshold FDG   activity as seen on the prior CT chest study.  Finding is nonspecific and CT   follow-up is suggested. 9/13/21 CT Chest  Impression   1.  Significantly increased size of the right lower lobe nodule, now   measuring up to 2.1 cm (previous max dimension of 0.9 cm on 6/7/2021).  This   is concerning for a metastatic nodule or new primary lung malignancy.       2. Larina Moder prominent left axillary lymph nodes are indeterminate but   unchanged from previous imaging and not previously hypermetabolic.       3.  Unchanged right adrenal nodule, previously characterized as an adenoma. ASSESSMENT AND PLAN:  Aleks Jewell is a 62 y.o. female with a Cancer Staging  Malignant neoplasm of lower lobe of right lung [facility])  Staging form: Lung, AJCC 8th Edition  - Clinical stage from 9/13/2021: Stage IA3 (cT1c, cN0, cM0) - Signed by Yessi Cast MD on 10/18/2021      We reviewed with the patient and family the testing that has been done so far, including imaging and pathology. We also reviewed their staging based on the testing that as been done and the recommendations per the NCCN guidelines. We discussed the options of treatment, including surgery, chemotherapy, and radiation, and the rationale of why radiation therapy would be indicated for this diagnosis. We also discussed that they have the option to not pursue our recommended treatment as well. Given the increase in size over serial imaging as well as PET avidity, his right lower lobe lesion is likely a malignant lesion. I agree that SBRT treatment would be best to limit further progression and ablate this disease. We reviewed the logistics of radiation treatment planning, including a CT Simulation session, as well as 5 treatments for approximately 2 weeks.     With regards to radiation to the lung, I discussed the possible short-term side effects of skin irritation (causing redness, dryness, or peeling), tiredness, low blood counts (causing infection or bleeding), inflammation of the lungs (causing shortness of breath and cough), trouble/pain with swallowing and hair loss in treated area, or hemoptysis. Possible long-term side effects discussed included hyperpigmentation of the skin, scarring of the lungs (causing shortness of breath and cough), chronic dysphagia, damage to the nerves (causing numbness, weakness, or paralysis) and damage to the heart. Patient was advised on smoking cessation, as it can make toxicities worse during treatment and increase risk of other cancers. After ample time to review the patient's questions, an informed consent was obtained to proceed with a treatment planning session for radiation therapy. Patient was in agreement with my recommendations. All questions were answered to their satisfaction. Patient was advised to contact us anytime should they have any questions or concerns. Electronically signed by Art Ingram MD on 10/18/2021 at 12:30 PM        Medications Prescribed:   New Prescriptions    No medications on file       Orders: No orders of the defined types were placed in this encounter. CC:  Patient Care Team:  Mercer Bloch, MD as PCP - General (Internal Medicine)  Mercer Bloch, MD as PCP - [facility]  Robinson Hernandez MD as Consulting Physician (Gastroenterology)  Danie Plummer MD as Consulting Physician (Radiation Oncology)  Garcia Ochoa MD as Consulting Physician (Hematology and Oncology)  Cleve Haas RN as Nurse Navigator (Oncology)     Total time spent on this case on the day of encounter is more than 30 minutes.  This time includes combination of one or more of the following - review of necessary tests, review of pertinent medical records from the EMR, performing medically appropriate examination and evaluation, counseling and educating the patient/family/caregiver, ordering necessary medical tests, procedures etc., documenting the clinical information in the electronic medical record, care coordination, referring and communicating with other health care providers and interpretation of results independently. This note is created with the assistance of a speech recognition program.  While intending to generate a document that actually reflects the content of the visit, the document can still have some errors including those of syntax and sound a like substitutions which may escape proof reading. It such instances, actual meaning can be extrapolated by contextual diversion.

## 2021-10-22 ENCOUNTER — TELEPHONE (OUTPATIENT)
Dept: CASE MANAGEMENT | Age: 61
End: 2021-10-22

## 2021-10-22 ENCOUNTER — HOSPITAL ENCOUNTER (OUTPATIENT)
Dept: RADIATION ONCOLOGY | Age: 61
Discharge: HOME | End: 2021-10-22

## 2021-10-22 PROCEDURE — 77338 DESIGN MLC DEVICE FOR IMRT: CPT | Performed by: RADIOLOGY

## 2021-10-22 PROCEDURE — 77293 RESPIRATOR MOTION MGMT SIMUL: CPT | Performed by: RADIOLOGY

## 2021-10-22 PROCEDURE — 77301 RADIOTHERAPY DOSE PLAN IMRT: CPT | Performed by: RADIOLOGY

## 2021-10-22 PROCEDURE — 77300 RADIATION THERAPY DOSE PLAN: CPT | Performed by: RADIOLOGY

## 2021-10-22 PROCEDURE — 99999 PR OFFICE/OUTPT VISIT,PROCEDURE ONLY: CPT | Performed by: RADIOLOGY

## 2021-10-22 NOTE — TELEPHONE ENCOUNTER
Name: Heaven Ames  : 1960  MRN: [id number]    Oncology Navigation Follow-Up Note  Navigator reviewing chat and pt. Choosing to proceed with RTX -SBRT. Pt. Does not want to reschedule [place] appt. At this time, plan to follow.    Electronically signed by Karla Dennis RN on 10/22/2021 at 3:39 PM

## 2021-10-28 ENCOUNTER — TELEPHONE (OUTPATIENT)
Dept: ONCOLOGY | Age: 61
End: 2021-10-28

## 2021-10-28 NOTE — TELEPHONE ENCOUNTER
SW reached out to pt. Spoke to . States they were on MFA last year. SW suggested reapplying as she starts radiation tx. SENA consulted to Mian Matias, [address] [place] [address]

## 2021-11-02 ENCOUNTER — TELEPHONE (OUTPATIENT)
Dept: CASE MANAGEMENT | Age: 61
End: 2021-11-02

## 2021-11-02 ENCOUNTER — HOSPITAL ENCOUNTER (OUTPATIENT)
Dept: RADIATION ONCOLOGY | Age: 61
Discharge: HOME | End: 2021-11-02

## 2021-11-02 PROCEDURE — 77373 STRTCTC BDY RAD THER TX DLVR: CPT | Performed by: RADIOLOGY

## 2021-11-02 NOTE — TELEPHONE ENCOUNTER
Name: Merlin Winter  : 1960  MRN: [id number]    Oncology Navigation Follow-Up Note  Navigator reaching out to offer assistance , but could only leave a message.  Plan to follow-RTX starting today  Electronically signed by Haven Ervin RN on 2021 at 11:21 AM

## 2021-11-04 ENCOUNTER — HOSPITAL ENCOUNTER (OUTPATIENT)
Dept: RADIATION ONCOLOGY | Age: 61
Discharge: HOME | End: 2021-11-04

## 2021-11-04 PROCEDURE — 77373 STRTCTC BDY RAD THER TX DLVR: CPT | Performed by: RADIOLOGY

## 2021-11-05 DIAGNOSIS — C34.92 ADENOCARCINOMA, LUNG, LEFT (HCC): Primary | ICD-10-CM

## 2021-11-05 DIAGNOSIS — J44.1 CHRONIC OBSTRUCTIVE PULMONARY DISEASE WITH ACUTE EXACERBATION (HCC): ICD-10-CM

## 2021-11-05 RX ORDER — ALBUTEROL SULFATE 90 UG/1
AEROSOL, METERED RESPIRATORY (INHALATION)
Qty: 25.5 G | Refills: 8 | Status: SHIPPED | OUTPATIENT
Start: 2021-11-05 | End: 2022-01-21

## 2021-11-05 NOTE — TELEPHONE ENCOUNTER
RECEIVED INTERFACE REQUEST FROM [facility] FOR REFILL OF ALBUTEROL    CURRENTLY RECEIVING RTX AND DUE TO FOLLOW UP POST RTX. PEND TO MD FOR REVIEW.

## 2021-11-08 ENCOUNTER — HOSPITAL ENCOUNTER (OUTPATIENT)
Dept: RADIATION ONCOLOGY | Age: 61
Discharge: HOME | End: 2021-11-08

## 2021-11-08 PROCEDURE — 77373 STRTCTC BDY RAD THER TX DLVR: CPT | Performed by: RADIOLOGY

## 2021-11-10 ENCOUNTER — HOSPITAL ENCOUNTER (OUTPATIENT)
Dept: RADIATION ONCOLOGY | Age: 61
Discharge: HOME | End: 2021-11-10

## 2021-11-10 VITALS
WEIGHT: 128 LBS | OXYGEN SATURATION: 97 % | BODY MASS INDEX: 22.67 KG/M2 | DIASTOLIC BLOOD PRESSURE: 77 MMHG | RESPIRATION RATE: 16 BRPM | SYSTOLIC BLOOD PRESSURE: 132 MMHG | HEART RATE: 88 BPM | TEMPERATURE: 97.7 F

## 2021-11-10 PROCEDURE — 77373 STRTCTC BDY RAD THER TX DLVR: CPT | Performed by: RADIOLOGY

## 2021-11-10 NOTE — PROGRESS NOTES
Nicole Right  11/10/2021  Wt Readings from Last 3 Encounters:   11/10/21 128 lb (58.1 kg)   10/15/21 128 lb (58.1 kg)   09/30/21 128 lb 12.8 oz (58.4 kg)     Body mass index is 22.67 kg/m². Treatment Area:R lung SBRT     Patient was seen today for weekly visit. Comfort Alteration  Fatigue: Mild    Ventilation Alterations  Cough: Yes  Hemoptysis: No  Mucus Color: clear  Dyspnea: Yes, baseline       Nutritional Alteration  Anorexia: No  Nausea: No   Vomiting: No     Mucous Membrane Alteration  Voice Changes/ Stridor/Larynx: no  Pharynx & Esophagus: n/a    Elimination Alterations  Constipation: no  Diarrhea:  no    Skin Alteration   Sensation:intact     Radiation Dermatitis:  Intact [x]     Erythema  []     Discoloration  []     Rash []     Dry desquamation  []     Moist desquamation []       Emotional  Coping: effective      Injury, potential bleeding or infection: n/a     Lab Results   Component Value Date    WBC 9.4 09/20/2021     09/20/2021         /77   Pulse 88   Temp 97.7 °F (36.5 °C)   Resp 16   Wt 128 lb (58.1 kg)   SpO2 97%   BMI 22.67 kg/m²   Patient Currently in Pain: Denies               Assessment/Plan: Patient was seen today for weekly visit. Dr Ramirez Nicely notified and examined pt.        Anny Ordonez RN

## 2021-11-11 NOTE — PROGRESS NOTES
[address]       Radiation Oncology          [address] [facility], [address]        Rei Cancino: [phone]        F: [phone]       [facility]       Radiation Oncology   [address] [facility], [place], [address] [facility]       Rei Riderer: [phone]       F: [phone]       [email or web address]          RADIATION ONCOLOGY WEEKLY PROGRESS NOTE  Patient ID:   James Pringle  : 1960   MRN: [id number]    DIAGNOSIS:  Cancer Staging  Malignant neoplasm of lower lobe of right lung [facility])  Staging form: Lung, AJCC 8th Edition  - Clinical stage from 2021: Stage IA3 (cT1c, cN0, cM0) - Signed by Evelyn Robertson MD on 10/18/2021      TREATMENT DETAILS:  Treatment Site: RUL  Actual Dose: 4000 cGy  Total Planned Dose: 5000 cGy  Treatment Technique: SBRT  Fraction Technique: Every other day  Therapy imaging monitoring: CBCT daily  Concurrent Chemotherapy: NA    SUBJECTIVE:   Patient seen for their weekly on treatment evaluation today. She is doing well and denies any chest pain or coughing. She has some esophagitis but it is improved with ibuprofen. OBJECTIVE:   CHAPERONE: Family/friend/companieon Present    ECO Asymptomatic    VITAL SIGNS: /77   Pulse 88   Temp 97.7 °F (36.5 °C)   Resp 16   Wt 128 lb (58.1 kg)   SpO2 97%   BMI 22.67 kg/m²   Wt Readings from Last 5 Encounters:   11/10/21 128 lb (58.1 kg)   10/15/21 128 lb (58.1 kg)   21 128 lb 12.8 oz (58.4 kg)   21 126 lb 4.8 oz (57.3 kg)   21 131 lb (59.4 kg)     GENERAL:  General appearance is that of a well-nourished, well-developed in no apparent distress. EXTREMITIES:  No edema. No calf tenderness. MSK:  No spinal tenderness. Normal ROM. NEUROLOGICAL: Alert and oriented. Strength and sensation intact bilaterally. No focal deficits. PSYCH: Mood normal, behavior normal.  SKIN: No erythema, no desquamation.       LABS:  WBC   Date Value Ref mouth daily ), Disp: 100 tablet, Rfl: 3    gabapentin (NEURONTIN) 400 MG capsule, Take 1 capsule by mouth 3 times daily for 30 days. (Patient taking differently: Take 400 mg by mouth 3 times daily. Indications: taking BID Taking one time daily), Disp: 90 capsule, Rfl: 1      ASSESSMENT PLAN:     Treatment setup and plan reviewed. Port images/CBCT images reviewed. Appropriate laboratory work was reviewed. Treatment side effects and toxicities reviewed with the patient, and appropriate management was advised. Will continue radiation treatment as planned, and recommend patient contact us if they have any questions or concerns. Patient will follow up in 3 months with repeat CT Chest after treatment. Electronically signed by Chepe Wild MD on 11/10/2021 at 7:45 PM      Drugs Prescribed:  New Prescriptions    No medications on file       Other Orders Placed:  No orders of the defined types were placed in this encounter.

## 2021-11-12 ENCOUNTER — HOSPITAL ENCOUNTER (OUTPATIENT)
Dept: RADIATION ONCOLOGY | Age: 61
Discharge: HOME | End: 2021-11-12

## 2021-11-12 DIAGNOSIS — C34.31 MALIGNANT NEOPLASM OF LOWER LOBE OF RIGHT LUNG (HCC): Primary | ICD-10-CM

## 2021-11-12 PROCEDURE — 77373 STRTCTC BDY RAD THER TX DLVR: CPT | Performed by: RADIOLOGY

## 2021-11-12 PROCEDURE — 77336 RADIATION PHYSICS CONSULT: CPT | Performed by: RADIOLOGY

## 2021-11-12 PROCEDURE — 77435 SBRT MANAGEMENT: CPT | Performed by: RADIOLOGY

## 2021-11-17 NOTE — PROGRESS NOTES
[address]            Radiation Oncology          [address]          [facility], [address]        Laramie [phone]        F: [phone]       [employer] [facility][note 2]3 [facility]       Radiation Oncology   [address] [facility], [address] [facility]       Laramie [phone]       F: [phone]       [employer]      Dear Dr Asad Colunga: Thank you for referring Lashay Gomes to me for evaluation and treatment. Below is a summary of the patient's recently completed radiation course. If you have questions, please do not hesitate to call me. I look forward to following this patient along with you.     Sincerely,  Electronically signed by Juan Antonio Estes MD on 21 at 4:43 PM EST      CC: Patient Care Team:  Tal Pruitt MD as PCP - General (Internal Medicine)  Tal Pruitt MD as PCP - [address] [place] [address] Dr Zuniga Provider  Javier Acosta MD as Consulting Physician (Gastroenterology)  Lam Escoto MD as Consulting Physician (Radiation Oncology)  Juliet Fields MD as Consulting Physician (Hematology and Oncology)  Stu Garzon RN as Nurse Navigator (Oncology)  ------------------------------------------------------------------------------------------------------------------------------------------------------------------------------------------        Date of Service: 2021     Location:  [facility] Radiation Oncology,   [address] [facility], [facility]   [phone]        RADIATION ONCOLOGY END OF TREATMENT SUMMARY:    Patient ID:   Lashay Gomes  : 1960   MRN: [id number]    DIAGNOSIS:  Cancer Staging  Malignant neoplasm of lower lobe of right lung [facility])  Staging form: Lung, AJCC 8th Edition  - Clinical stage from 2021: Stage IA3 (cT1c, cN0, cM0) - Signed by Juan Antonio Estes MD on 10/18/2021      TREATMENT DETAILS:    Treatment Technique: SBRT  Fraction Technique: Every other day        Concurrent Chemotherapy: NA    CLINICAL COURSE:    Patient completed the treatment as prescribed and tolerated treatment as expected. Patient developed no significant toxicities. Patient will come back in 3 month for a follow up visit and to assess treatment toxicity and response. Patient was advised to continue close follow up with pumonology as well. Patient does have our contact information in case they have any questions or concerns in the interim.     Electronically signed by Albaro Santa MD on 11/17/2021 at 4:43 PM

## 2021-12-14 ENCOUNTER — OFFICE VISIT (OUTPATIENT)
Dept: FAMILY MEDICINE CLINIC | Age: 61
End: 2021-12-14

## 2021-12-14 VITALS
TEMPERATURE: 97.2 F | HEART RATE: 80 BPM | SYSTOLIC BLOOD PRESSURE: 122 MMHG | BODY MASS INDEX: 22.92 KG/M2 | OXYGEN SATURATION: 94 % | WEIGHT: 129.4 LBS | DIASTOLIC BLOOD PRESSURE: 80 MMHG

## 2021-12-14 DIAGNOSIS — K59.00 CONSTIPATION, UNSPECIFIED CONSTIPATION TYPE: Primary | ICD-10-CM

## 2021-12-14 DIAGNOSIS — Z72.0 TOBACCO ABUSE DISORDER: Chronic | ICD-10-CM

## 2021-12-14 DIAGNOSIS — I10 ESSENTIAL HYPERTENSION: ICD-10-CM

## 2021-12-14 DIAGNOSIS — E03.9 HYPOTHYROIDISM, UNSPECIFIED TYPE: ICD-10-CM

## 2021-12-14 DIAGNOSIS — C34.92 ADENOCARCINOMA, LUNG, LEFT (HCC): ICD-10-CM

## 2021-12-14 DIAGNOSIS — I25.10 CORONARY ARTERY DISEASE INVOLVING NATIVE CORONARY ARTERY OF NATIVE HEART WITHOUT ANGINA PECTORIS: ICD-10-CM

## 2021-12-14 PROCEDURE — 99214 OFFICE O/P EST MOD 30 MIN: CPT | Performed by: INTERNAL MEDICINE

## 2021-12-14 SDOH — ECONOMIC STABILITY: FOOD INSECURITY: WITHIN THE PAST 12 MONTHS, YOU WORRIED THAT YOUR FOOD WOULD RUN OUT BEFORE YOU GOT MONEY TO BUY MORE.: NEVER TRUE

## 2021-12-14 SDOH — ECONOMIC STABILITY: FOOD INSECURITY: WITHIN THE PAST 12 MONTHS, THE FOOD YOU BOUGHT JUST DIDN'T LAST AND YOU DIDN'T HAVE MONEY TO GET MORE.: NEVER TRUE

## 2021-12-14 ASSESSMENT — ENCOUNTER SYMPTOMS
ANAL BLEEDING: 0
DIARRHEA: 1
BLOOD IN STOOL: 0
NAUSEA: 0
VOMITING: 0
WHEEZING: 0
SHORTNESS OF BREATH: 0
CHOKING: 0
ABDOMINAL PAIN: 0
COUGH: 0
CHEST TIGHTNESS: 0
CONSTIPATION: 1

## 2021-12-14 ASSESSMENT — VISUAL ACUITY: OU: 1

## 2021-12-14 ASSESSMENT — SOCIAL DETERMINANTS OF HEALTH (SDOH): HOW HARD IS IT FOR YOU TO PAY FOR THE VERY BASICS LIKE FOOD, HOUSING, MEDICAL CARE, AND HEATING?: NOT VERY HARD

## 2021-12-14 NOTE — PROGRESS NOTES
Subjective:       Patient ID:     Elijah Nephew is a 62 y.o. female who presents for   Chief Complaint   Patient presents with    Bloated     stomach       HPI:  Nursing note reviewed and discussed with patient. HPI  Stomach is bloated all the time   Gassy all the time. Alternating constipation and diarrhea. Has BMs 2-3 times a week, not hard, has to push or strain though to have BMs. Was sent to GI but was told to take metamucil so was not impressed, has not been taking anything to help her bowel movements   Eating okay, 1-2 times per day, multiple snacks through the day   Still smoking - half a pack per day   Following closely with oncology, has been through 5 radiation treatments so far for lung adenocarcinoma       Patient's medications, allergies, past medical, surgical, social and family histories were reviewed and updated as appropriate. Past Medical History:   Diagnosis Date    Alcohol abuse     For many years; 5-6 beers per night    Breast cancer ([facility] [address] 2010    right radical mastectomy with positive sentinel dose, chemo, s/p tamoxifen x 7 years     Chronic diarrhea 1/29/2019    COPD (chronic obstructive pulmonary disease) (HCC)     Essential hypertension     Lung cancer ([facility] [address]     Pleural effusion 12/26/2019    Tobacco abuse      Past Surgical History:   Procedure Laterality Date    APPENDECTOMY      CHOLECYSTECTOMY      MASTECTOMY Right        Social History     Tobacco Use    Smoking status: Current Every Day Smoker     Packs/day: 0.50     Years: 47.00     Pack years: 23.50    Smokeless tobacco: Never Used   Substance Use Topics    Alcohol use:  Yes     Alcohol/week: 8.0 standard drinks     Types: 8 Cans of beer per week     Comment: per night      Patient Active Problem List   Diagnosis    Panlobular emphysema ([facility] [address]    Essential hypertension    History of right breast cancer    Loculated pleural effusion    Hyponatremia    Coronary artery disease involving native coronary artery of native heart without angina pectoris    Tobacco abuse disorder    Alcohol abuse    Leukocytosis    Adenocarcinoma, lung, left (HCC)    Shortness of breath    Insomnia    Anxiety    Malignant neoplasm of lower lobe of right lung ([address]         Prior to Visit Medications    Medication Sig Taking? Authorizing Provider   albuterol sulfate  (90 Base) MCG/ACT inhaler inhale 2 puffs by mouth and INTO THE LUNGS every 4 hours if needed for cough shortness of breath or wheezing Yes Oumar Beltran MD   triamcinolone (KENALOG) 0.1 % cream apply to rash twice a day Yes Ciaran Jose MD   levothyroxine (SYNTHROID) 88 MCG tablet take 1 tablet by mouth once daily Yes Uriel Frias MD   pregabalin (LYRICA) 75 MG capsule Take 1 capsule by mouth 3 times daily for 120 days. Yes Uriel Frias MD   amLODIPine (NORVASC) 5 MG tablet take 1 tablet by mouth once daily Yes Uriel Frias MD   ipratropium-albuterol (DUONEB) 0.5-2.5 (3) MG/3ML SOLN nebulizer solution Inhale 3 mLs into the lungs 4 times daily Yes Uriel Frias MD   folic acid (FOLVITE) 340 MCG tablet Take 1 tablet by mouth daily  Patient taking differently: Take 800 mcg by mouth daily  Yes Uriel Frias MD   gabapentin (NEURONTIN) 400 MG capsule Take 1 capsule by mouth 3 times daily for 30 days. Patient taking differently: Take 400 mg by mouth 3 times daily. Indications: taking BID Taking one time daily Yes Gurjit Dacosta MD     Review of Systems  Review of Systems   Constitutional: Negative for fatigue, fever and unexpected weight change. Respiratory: Negative for cough, choking, chest tightness, shortness of breath and wheezing. Cardiovascular: Negative for chest pain, palpitations and leg swelling. Gastrointestinal: Positive for constipation and diarrhea. Negative for abdominal pain, anal bleeding, blood in stool, nausea and vomiting. Endocrine: Negative.     Musculoskeletal: Negative for joint swelling and myalgias. Skin: Negative. Neurological: Negative for dizziness. Psychiatric/Behavioral: Negative for sleep disturbance. All other systems reviewed and are negative. Objective:       Physical Exam:  /80 (Site: Left Upper Arm, Position: Sitting, Cuff Size: Medium Adult)   Pulse 80   Temp 97.2 °F (36.2 °C) (Temporal)   Wt 129 lb 6.4 oz (58.7 kg)   SpO2 94%   BMI 22.92 kg/m²   Physical Exam  Vitals and nursing note reviewed. Constitutional:       General: She is not in acute distress. Appearance: She is well-developed. She is not ill-appearing. Eyes:      General: Lids are normal. Vision grossly intact. Cardiovascular:      Rate and Rhythm: Normal rate and regular rhythm. Heart sounds: Normal heart sounds, S1 normal and S2 normal. No murmur heard. No friction rub. No gallop. Pulmonary:      Effort: Pulmonary effort is normal. No respiratory distress. Breath sounds: Normal breath sounds. No wheezing. Abdominal:      General: Bowel sounds are normal.      Palpations: Abdomen is soft. There is no mass. Tenderness: There is no abdominal tenderness. There is no guarding. Musculoskeletal:         General: Normal range of motion. Skin:     General: Skin is warm and dry. Capillary Refill: Capillary refill takes less than 2 seconds. Neurological:      General: No focal deficit present. Mental Status: She is alert and oriented to person, place, and time. Data Review  Rad onc and oncology notes reviewed in chart   Labs reviewed - persistent mild hyponatremia noted        Assessment/Plan:      1. Constipation, unspecified constipation type  Increase fluid intake, start daily MiraLAX or Metamucil-has been up at home. Patient Instructions   Start with one tablespoon of fiber daily with 4oz water and double the dose every 3 days till you are able to have a good bowel movement without straining          2.  Essential hypertension  Continue amlodipine    3. Hypothyroidism, unspecified type  Continue Synthroid    4. Adenocarcinoma, lung, left [facility])  Follow-up oncology and radiation oncology as scheduled. 5. Coronary artery disease involving native coronary artery of native heart without angina pectoris  Smoking cessation advised, patient not interested at this time. Asymptomatic, declines further intervention    6. Tobacco abuse disorder  Smoking cessation advised, patient not interested at this time.            Health Maintenance Due   Topic Date Due    DTaP/Tdap/Td vaccine (1 - Tdap) Never done    Cervical cancer screen  Never done    Breast cancer screen  Never done    Colon cancer screen colonoscopy  Never done    Shingles Vaccine (1 of 2) Never done       Electronically signed by Perico Isaacs MD on 12/14/2021 at 9:36 AM

## 2021-12-14 NOTE — PATIENT INSTRUCTIONS
Start with one tablespoon of fiber daily with 4oz water and double the dose every 3 days till you are able to have a good bowel movement without straining

## 2021-12-14 NOTE — PROGRESS NOTES
Pt is here for a 6 month F/U  She states her stomach is always bloated. She states her bowels are always changing. She does get sharp stabbing pain when she bends a certain way.

## 2022-01-04 DIAGNOSIS — J43.1 PANLOBULAR EMPHYSEMA (HCC): ICD-10-CM

## 2022-01-04 NOTE — TELEPHONE ENCOUNTER
Message from  Juan Carlos Izaguirre, requesting refill on Z bryce and medrol dose pack, Fransisco Morales has Covid and had to cancel her appointment due to this but they would still like the prescriptions   Called Dr Sophia Garcia, okay to call in prescription    Called Juan Carlos Izaguirre, advised I will call in prescriptions     [facility] prescriptions for Z bryce and Medrol dose pack called in

## 2022-01-06 RX ORDER — AZITHROMYCIN 500 MG/1
500 TABLET, FILM COATED ORAL DAILY
Qty: 5 TABLET | Refills: 0 | OUTPATIENT
Start: 2022-01-06 | End: 2022-01-11

## 2022-01-06 RX ORDER — METHYLPREDNISOLONE 4 MG/1
TABLET ORAL
Qty: 1 KIT | Refills: 0 | OUTPATIENT
Start: 2022-01-06 | End: 2022-01-10

## 2022-01-17 RX ORDER — AMLODIPINE BESYLATE 5 MG/1
TABLET ORAL
Qty: 30 TABLET | Refills: 5 | Status: SHIPPED | OUTPATIENT
Start: 2022-01-17 | End: 2022-07-28 | Stop reason: SDUPTHER

## 2022-01-18 DIAGNOSIS — C34.92 ADENOCARCINOMA, LUNG, LEFT (HCC): ICD-10-CM

## 2022-01-18 DIAGNOSIS — J44.1 CHRONIC OBSTRUCTIVE PULMONARY DISEASE WITH ACUTE EXACERBATION (HCC): ICD-10-CM

## 2022-01-21 ENCOUNTER — TELEPHONE (OUTPATIENT)
Dept: FAMILY MEDICINE CLINIC | Age: 62
End: 2022-01-21

## 2022-01-21 DIAGNOSIS — R14.0 ABDOMINAL BLOATING: Primary | ICD-10-CM

## 2022-01-21 DIAGNOSIS — K59.00 CONSTIPATION, UNSPECIFIED CONSTIPATION TYPE: ICD-10-CM

## 2022-01-21 RX ORDER — ALBUTEROL SULFATE 90 UG/1
AEROSOL, METERED RESPIRATORY (INHALATION)
Qty: 25.5 G | Refills: 1 | Status: SHIPPED | OUTPATIENT
Start: 2022-01-21 | End: 2022-05-03 | Stop reason: SDUPTHER

## 2022-01-21 NOTE — TELEPHONE ENCOUNTER
----- Message from Lani Austin sent at 1/21/2022  4:18 PM EST -----  Subject: Referral Request    QUESTIONS   Reason for referral request? Pt states she was adv by PCP at her last appt   that if her condition did not improve in a month, to get an ultrasound on   her abdomen. Has the physician seen you for this condition before? No   Preferred Specialist (if applicable)? Do you already have an appointment scheduled? No  Additional Information for Provider? Pt needs an order for an ultrasound.   ---------------------------------------------------------------------------  --------------  CALL BACK INFO  What is the best way for the office to contact you? OK to leave message on   voicemail  Preferred Call Back Phone Number?  [phone]

## 2022-01-24 RX ORDER — LACTULOSE 10 G/15ML
10 SOLUTION ORAL 2 TIMES DAILY
Qty: 150 ML | Refills: 0 | Status: SHIPPED | OUTPATIENT
Start: 2022-01-24 | End: 2022-01-29

## 2022-01-24 NOTE — TELEPHONE ENCOUNTER
Pts  said pt is constipated and very bloated, said Norberto Merrill looks  like she is 8 months pregnant! \"

## 2022-01-24 NOTE — TELEPHONE ENCOUNTER
Please verify if having abdominal pain, nausea, vomiting - or constipation only - may benefit from CT more than US

## 2022-02-14 ENCOUNTER — HOSPITAL ENCOUNTER (OUTPATIENT)
Dept: CT IMAGING | Age: 62
Discharge: HOME | End: 2022-02-16

## 2022-02-14 DIAGNOSIS — C34.31 MALIGNANT NEOPLASM OF LOWER LOBE OF RIGHT LUNG (HCC): ICD-10-CM

## 2022-02-14 DIAGNOSIS — K59.00 CONSTIPATION, UNSPECIFIED CONSTIPATION TYPE: ICD-10-CM

## 2022-02-14 DIAGNOSIS — R14.0 ABDOMINAL BLOATING: ICD-10-CM

## 2022-02-14 DIAGNOSIS — C34.92 ADENOCARCINOMA OF LUNG, STAGE 4, LEFT (HCC): ICD-10-CM

## 2022-02-14 LAB
CREAT SERPL-MCNC: 0.49 MG/DL (ref 0.5–0.9)
GFR AFRICAN AMERICAN: >60 ML/MIN
GFR NON-AFRICAN AMERICAN: >60 ML/MIN
GFR SERPL CREATININE-BSD FRML MDRD: ABNORMAL ML/MIN/{1.73_M2}
GFR SERPL CREATININE-BSD FRML MDRD: ABNORMAL ML/MIN/{1.73_M2}

## 2022-02-14 PROCEDURE — 6360000004 HC RX CONTRAST MEDICATION: Performed by: INTERNAL MEDICINE

## 2022-02-14 PROCEDURE — 82565 ASSAY OF CREATININE: CPT

## 2022-02-14 PROCEDURE — 36415 COLL VENOUS BLD VENIPUNCTURE: CPT

## 2022-02-14 PROCEDURE — 71260 CT THORAX DX C+: CPT

## 2022-02-14 PROCEDURE — 74177 CT ABD & PELVIS W/CONTRAST: CPT

## 2022-02-14 PROCEDURE — 2580000003 HC RX 258: Performed by: INTERNAL MEDICINE

## 2022-02-14 RX ORDER — SODIUM CHLORIDE 0.9 % (FLUSH) 0.9 %
10 SYRINGE (ML) INJECTION PRN
Status: DISCONTINUED | OUTPATIENT
Start: 2022-02-14 | End: 2022-02-17 | Stop reason: HOSPADM

## 2022-02-14 RX ORDER — 0.9 % SODIUM CHLORIDE 0.9 %
80 INTRAVENOUS SOLUTION INTRAVENOUS ONCE
Status: COMPLETED | OUTPATIENT
Start: 2022-02-14 | End: 2022-02-14

## 2022-02-14 RX ADMIN — SODIUM CHLORIDE 80 ML: 9 INJECTION, SOLUTION INTRAVENOUS at 17:22

## 2022-02-14 RX ADMIN — IOPAMIDOL 100 ML: 755 INJECTION, SOLUTION INTRAVENOUS at 17:22

## 2022-02-14 RX ADMIN — IOHEXOL 50 ML: 300 INJECTION, SOLUTION INTRAVENOUS at 17:22

## 2022-02-14 RX ADMIN — SODIUM CHLORIDE, PRESERVATIVE FREE 10 ML: 5 INJECTION INTRAVENOUS at 17:22

## 2022-02-15 ENCOUNTER — OFFICE VISIT (OUTPATIENT)
Dept: ONCOLOGY | Age: 62
End: 2022-02-15

## 2022-02-15 ENCOUNTER — HOSPITAL ENCOUNTER (OUTPATIENT)
Dept: RADIATION ONCOLOGY | Age: 62
Discharge: HOME | End: 2022-02-15

## 2022-02-15 ENCOUNTER — TELEPHONE (OUTPATIENT)
Dept: ONCOLOGY | Age: 62
End: 2022-02-15

## 2022-02-15 VITALS
TEMPERATURE: 97.2 F | HEART RATE: 88 BPM | WEIGHT: 132 LBS | BODY MASS INDEX: 23.38 KG/M2 | DIASTOLIC BLOOD PRESSURE: 87 MMHG | SYSTOLIC BLOOD PRESSURE: 142 MMHG

## 2022-02-15 VITALS
OXYGEN SATURATION: 93 % | DIASTOLIC BLOOD PRESSURE: 82 MMHG | SYSTOLIC BLOOD PRESSURE: 142 MMHG | WEIGHT: 132.6 LBS | TEMPERATURE: 97.2 F | BODY MASS INDEX: 23.49 KG/M2 | RESPIRATION RATE: 18 BRPM | HEART RATE: 88 BPM

## 2022-02-15 DIAGNOSIS — C34.31 MALIGNANT NEOPLASM OF LOWER LOBE OF RIGHT LUNG (HCC): Primary | ICD-10-CM

## 2022-02-15 DIAGNOSIS — J44.1 CHRONIC OBSTRUCTIVE PULMONARY DISEASE WITH ACUTE EXACERBATION (HCC): ICD-10-CM

## 2022-02-15 DIAGNOSIS — E03.2 HYPOTHYROIDISM DUE TO MEDICATION: ICD-10-CM

## 2022-02-15 DIAGNOSIS — C34.92 ADENOCARCINOMA, LUNG, LEFT (HCC): ICD-10-CM

## 2022-02-15 PROCEDURE — 99211 OFF/OP EST MAY X REQ PHY/QHP: CPT | Performed by: INTERNAL MEDICINE

## 2022-02-15 PROCEDURE — 99213 OFFICE O/P EST LOW 20 MIN: CPT | Performed by: RADIOLOGY

## 2022-02-15 PROCEDURE — 99214 OFFICE O/P EST MOD 30 MIN: CPT | Performed by: INTERNAL MEDICINE

## 2022-02-15 PROCEDURE — 99212 OFFICE O/P EST SF 10 MIN: CPT | Performed by: RADIOLOGY

## 2022-02-15 RX ORDER — PREGABALIN 75 MG/1
75 CAPSULE ORAL 3 TIMES DAILY
Qty: 90 CAPSULE | Refills: 5 | Status: SHIPPED | OUTPATIENT
Start: 2022-02-15 | End: 2022-11-02

## 2022-02-15 RX ORDER — LEVOTHYROXINE SODIUM 88 UG/1
TABLET ORAL
Qty: 30 TABLET | Refills: 5 | Status: SHIPPED | OUTPATIENT
Start: 2022-02-15 | End: 2022-07-28

## 2022-02-15 NOTE — PROGRESS NOTES
Jennifer Cisneros  2/15/2022  3:33 PM      Vitals:    02/15/22 1526   BP: (!) 142/82   Pulse: 88   Resp: 18   Temp: 97.2 °F (36.2 °C)   SpO2: 93%    :  Patient Currently in Pain: Yes             Wt Readings from Last 1 Encounters:   02/15/22 132 lb 9.6 oz (60.1 kg)                Current Outpatient Medications:     albuterol sulfate  (90 Base) MCG/ACT inhaler, INHALE 2 PUFFS INTO THE LUNGS EVERY 4 HOURS AS NEEDED FOR WHEEZING OR SHORTNESS OF BREATH, Disp: 25.5 g, Rfl: 1    amLODIPine (NORVASC) 5 MG tablet, take 1 tablet by mouth once daily, Disp: 30 tablet, Rfl: 5    triamcinolone (KENALOG) 0.1 % cream, apply to rash twice a day, Disp: 80 g, Rfl: 2    levothyroxine (SYNTHROID) 88 MCG tablet, take 1 tablet by mouth once daily, Disp: 30 tablet, Rfl: 5    pregabalin (LYRICA) 75 MG capsule, Take 1 capsule by mouth 3 times daily for 120 days. , Disp: 90 capsule, Rfl: 3    ipratropium-albuterol (DUONEB) 0.5-2.5 (3) MG/3ML SOLN nebulizer solution, Inhale 3 mLs into the lungs 4 times daily, Disp: 360 mL, Rfl: 3    folic acid (FOLVITE) 546 MCG tablet, Take 1 tablet by mouth daily (Patient taking differently: Take 800 mcg by mouth daily ), Disp: 100 tablet, Rfl: 3  No current facility-administered medications for this encounter.     Facility-Administered Medications Ordered in Other Encounters:     sodium chloride flush 0.9 % injection 10 mL, 10 mL, IntraVENous, PRN, Kat Sellers MD, 10 mL at 02/14/22 1722    Immunizations:    Influenza status:    []   Current   [x]   Patient declined    Pneumococcal status:  []   Current  [x]   Patient declined  Covid status:   []  Dose #1:                     []  Dose #2:               [x]   Patient declined    Smoking Status:    [x] Smoker - PPD:1   [] Nonsmoker - Quit Date:               [] Never a smoker      Cancer Screening:  Colonoscopy   [] Current       [] Not current   [x] Not current, but scheduled   [] NA  Mammogram   [x] Current       [] Not current   [] Not current, but scheduled   [] NA  Prostate           [] Current       [] Not current   [] Not current, but scheduled   [x] NA  PAP/Pelvic      [x] Current       [] Not current   [] Not current, but scheduled   [] NA  Skin                 [x] Current       [] Not current   [] Not current, but scheduled   [] NA     Hormone:  Lupron []   Last dose given:           Next dose due:   Eligard []   Last dose given:           Next dose due:   Aromatase Inhibitors []   Medication name:   N/A:  [x]           FALLS RISK SCREEN  Instructions:  Assess the patient and enter the appropriate indicators that are present for fall risk identification. Total the numbers entered and assign a fall risk score from Table 2.  Reassess patient at a minimum every 12 weeks or with status change. Assessment   Date  2/15/2022     1. Mental Ability: confusion/cognitively impaired 0     2. Elimination Issues: incontinence, frequency 0       3. Ambulatory: use of assistive devices (walker, cane, off-loading devices),        attached to equipment (IV pole, oxygen) 0     4. Sensory Limitations: dizziness, vertigo, impaired vision 0     5. Age less than 65        0     6. Age 72 or greater 0     7. Medication: diuretics, strong analgesics, hypnotics, sedatives,        antihypertensive agents 0   8. Falls:  recent history of falls within the last 3 months (not to include slipping or        tripping) 0   TOTAL 0    If score of 4 or greater was education given? No           TABLE 2   Risk Score Risk Level Plan of Care   0-3 Little or  No Risk 1. Provide assistance as indicated for ambulation activities  2. Reorient confused/cognitively impaired patient  3. Chair/bed in low position, stretcher/bed with siderails up except when performing patient care activities  5. Educate patient/family/caregiver on falls prevention  6.  Reassess in 12 weeks or with any noted change in patient condition which places them at a risk for a fall   4-6 Moderate Risk 1. Provide assistance as indicated for ambulation activities  2. Reorient confused/cognitively impaired patient  3. Chair/bed in low position, stretcher/bed with siderails up except when performing patient care activities  4. Educate patient/family/caregiver on falls prevention     7 or   Higher High Risk 1. Place patient in easily observable treatment room  2. Patient attended at all times by family member or staff  3. Provide assistance as indicated for ambulation activities  4. Reorient confused/cognitively impaired patient  5. Chair/bed in low position, stretcher/bed with siderails up except when performing patient care activities  6. Educate patient/family/caregiver on falls prevention         PLAN: Patient is seen today in follow up. C/o constipation and is overdue for colonoscopy. Encouraged patient to call PCP for referral.  Dr. Juli Seals examined patient and will see her back in 6 months with a CT scan.         Wilian Su RN

## 2022-02-15 NOTE — PROGRESS NOTES
DIAGNOSIS:   1. Metastatic adenocarcinoma of the lung, stage IV  2. Malignant pleural effusion  3. Molecular testing negative for EGFR, ALK and ROS. Instead it shows Kras and CDK mutation,   4. History of breast cancer in 2010  CURRENT THERAPY:  1. Mastectomy and chemotherapy in 2010  2. Status post thoracocentesis x2  3. Pending study did not show any evidence of distant metastases other than the pleural effusion  4. Palliative chemotherapy with carboplatin, Alimta and Keytruda- started 01/20/2020  5. After 4 cycles of chemoimmunotherapy, chemotherapy will be dropped and she will be maintained on maintenance Keytruda   6. Due to side effects, the drug was held. 7. Improvement of side effects, the plan is to switch to Opdivo started 08/2020  8. Patient wanted treatment hiatus in March/2021  9. S/P radiation  BRIEF CASE HISTORY:    The patient is a 63 y.o.  female who is admitted to the hospital for chief complaints of shortness of breath. Patient has history of breast cancer for which she underwent mastectomy in 2010 followed by chemotherapy. This was done in [place]. The  Patient recently moved to [facility] area about a year ago. Patient had a significant history of tobacco dependence. Patient started noticing worsening of shortness of breath and presented to the ER. Work-up done shows right-sided pleural effusion. CT scan showed multiloculated left-sided pleural effusion with compressive atelectasis in the lingula and majority of the left lower lobe and partial compression atelectasis of the left upper lobe. There was also underlying emphysema. There was a stable 1.8 cm adrenal mass likely adrenal adenoma which had been stable since November 2018. Patient underwent ultrasound guided thoracentesis.   Cytology of pleural fluid confirms adenocarcinoma consistent with lung primary  The patient was discharged home but she came back with worsening shortness of breath and was found to have significant pleural effusion that was tapped pathology showed malignant cells, adenocarcinoma of lung primary. .    We saw the patient in house, we arrange for staging PET CT scan and brain MRI which essentially showed no evidence of metastatic disease otherwise. The patient was diagnosed with stage IV lung cancer, we plan palliative chemotherapy, started 01/20/2020. She completed 4 cycles of chemoimmunotherapy and we plan to continue with maintenance Keytruda. Unfortunately, the Sherian Chela was very poorly tolerated - decided to hold. Nivolumab started 08/03/2020. The patient decided to take treatment hiatus in May/2021. INTERIM HISTORY: Parminder Gaspar comes back today for a follow-up for lung cancer and to review CT following completion of radiation and discuss further treatment plan. She has been struggling with constipation, she is using prune juice and had enema. She plans to follow up with PCP for colonoscopy referral. Her leg pain persists but recovered otherwise. She has gained weight and complains of bloating. She continues to smoke. Past Medical History:   has a past medical history of Alcohol abuse, Breast cancer ([address] Chronic diarrhea, COPD (chronic obstructive pulmonary disease) ([address] Essential hypertension, Lung cancer ([facility] [address] Pleural effusion, and Tobacco abuse. Past Surgical History:   has a past surgical history that includes Appendectomy; Mastectomy (Right); and Cholecystectomy. Family History: family history includes Breast Cancer in her mother; Cancer in her brother; Deep Vein Thrombosis in her brother; Prostate Cancer in her brother and father; Stroke in her mother. Social History:   reports that she has been smoking. She has a 23.50 pack-year smoking history. She has never used smokeless tobacco. She reports current alcohol use of about 8.0 standard drinks of alcohol per week. She reports that she does not use drugs.    Medications:    Reviewed in EPIC     Allergies:  Morphine, Robitussin (alcohol free) [guaifenesin], Aspirin, Penicillins, and Sulfa antibiotics    REVIEW OF SYSTEMS:   General: No fever or night sweats. Weight stable. ENT: No double, no tinnitus or hearing problem, no dysphagia; dry sinuses and xerostomia  Respiratory: No chest pain, no cough or hemoptysis, shortness of breath  Cardiovascular: Denies chest pain, PND or orthopnea, palpitations  Gastrointestinal: No nausea or vomiting, abdominal pain, diarrhea; + constipation and bloating  Genitourinary: Denies dysuria, hematuria, frequency, urgency or incontinence. Neurological: Denies headaches, decreased LOC, no sensory or motor focal deficits. Musculoskeletal: No arthralgia no back pain or joint swelling. +weakness and muscle fatigue; bilateral leg and knee pain - unchanged +pain at previous drain site - unchanged +left arm pain with port pain  Skin: No bleeding or rash  Psychiatric: No anxiety, no depression. Endocrine: No diabetes or thyroid disease. Hematologic: No bleeding, no adenopathy. PHYSICAL EXAM:      There were no vitals taken for this visit. Temp (24hrs), Av.9 °F (36.6 °C), Min:97.9 °F (36.6 °C), Max:97.9 °F (36.6 °C)  Gen.  Exam, showed a well-appearing patient without evidence of distress or pain  HEENT, normocephalic and atraumatic, PERRLA extraocular muscles are intact  Neck showed no JVD no carotid bruit, no cervical adenopathy  Chest decreased air entry +wheezing with some rhonchi   Heart is regular without any murmur  Abdomen soft nontender no hepatosplenomegaly  Lower extremities showed no edema clubbing or cyanosis; tenderness in lateral compartment of left knee   Neurological examination was nonfocal, with intact cranial nerves  Skin  No rashes or bruising appreciated    REVIEW OF RADIOLOGICAL RESULTS:       REVIEW OF LABORATORY DATA:     Lab Results   Component Value Date    WBC 9.4 2021    HGB 15.0 2021    HCT 44.5 2021    MCV 90.4 2021     09/20/2021       Chemistry        Component Value Date/Time     (L) 09/20/2021 1148    K 4.2 09/20/2021 1148    CL 93 (L) 09/20/2021 1148    CO2 27 09/20/2021 1148    BUN 8 09/20/2021 1148    CREATININE 0.49 (L) 02/14/2022 1610        Component Value Date/Time    CALCIUM 10.2 09/20/2021 1148    ALKPHOS 102 09/20/2021 1148    AST 28 09/20/2021 1148    ALT 20 09/20/2021 1148    BILITOT 0.39 09/20/2021 1148        Lab Results   Component Value Date    TSH 1.53 09/20/2021         IMPRESSION:    1. History of breast cancer in 2010, status post mastectomy and chemotherapy  2. New diagnosis with lung cancer  3. Malignant pleural effusion stage IV disease  4. Chemotherapy with carboplatin, Alimta and Keytruda - started 01/20/2020  5. Weakness in both lower extremities  6. Constellation of symptoms including sore throat, sinus drainage, diffuse arthralgias, worsening fatigue and aches and pains. Differential is very broad but I am concerned about immunologic effect of Keytruda. 7. Opdivo started 08/2020   8. Peripheral neuropathy in both lower extremities  9. Treatment hiatus March/2021  10. S/P radiation       PLAN:   1. Her CT shows response to radiation, no new sights of disease. 2. I am refilling her Lyrica. 3. We discussed her constipation, I recommend she continue with prune juice and will consider script if enema is not effective, with her bloating I explained functional change and recommendation for regular walking. 4. I counseled her on smoking cessation and working towards half pack daily. 5. We discussed ongoing surveillance. 6. Return in 6 months. Scribe Attestation   This note was created by Myrtle Adamson acting as scribe for the physician signing this note  Electronically Signed  Myrtle Adamson, 2/15/2022  Scrshankar, [employer]. Attending Attestation   Note was reviewed and edited.   I am in agreement with the note as entered    Diamante Beltran MD  Hematologist/Medical [id number] [facility] hematology oncology physicians

## 2022-02-15 NOTE — TELEPHONE ENCOUNTER
Per Nadege  6 month with R/O DUal      *rv is sched for Rakesh@[employer]    PT was given AVS and an appt schedule

## 2022-02-16 ENCOUNTER — OFFICE VISIT (OUTPATIENT)
Dept: FAMILY MEDICINE CLINIC | Age: 62
End: 2022-02-16

## 2022-02-16 VITALS
BODY MASS INDEX: 23.24 KG/M2 | DIASTOLIC BLOOD PRESSURE: 78 MMHG | OXYGEN SATURATION: 92 % | TEMPERATURE: 98.4 F | HEART RATE: 92 BPM | SYSTOLIC BLOOD PRESSURE: 110 MMHG | WEIGHT: 131.2 LBS

## 2022-02-16 DIAGNOSIS — K59.00 CONSTIPATION, UNSPECIFIED CONSTIPATION TYPE: Primary | ICD-10-CM

## 2022-02-16 DIAGNOSIS — Z12.31 ENCOUNTER FOR SCREENING MAMMOGRAM FOR BREAST CANCER: ICD-10-CM

## 2022-02-16 DIAGNOSIS — R14.0 BLOATING: ICD-10-CM

## 2022-02-16 PROCEDURE — 99213 OFFICE O/P EST LOW 20 MIN: CPT | Performed by: INTERNAL MEDICINE

## 2022-02-16 NOTE — PROGRESS NOTES
[address]            Radiation Oncology          [address] [facility]          [address]        Giulia Risk: [phone]        F: [phone]       [place] [facility]       Radiation Oncology   [address] [facility], [address] [facility]       Giulia Risk: [phone]       F: [phone]       [email or web address]      Date of Service: 2/15/2022     Location:  [facility] [place]den Grace,   [address] [facility] [place]   [phone]        [address] [place] [address] FOLLOW UP NOTE    Patient ID:   Nino Knott  : 1960   MRN: [id number]    DIAGNOSIS:  Cancer Staging  Malignant neoplasm of lower lobe of right lung [facility])  Staging form: Lung, AJCC 8th Edition  - Clinical stage from 2021: Stage IA3 (cT1c, cN0, cM0) - Signed by Clark Gomez MD on 10/18/2021    -s/p RLL SBRT 50Gy 22    INTERVAL HISTORY:   Nino Knott is a 62 y.o. [place] female with a history of a right lower lobe lung cancer that was treated with SBRT completing approximately 3 months ago. Patient comes in today for routine follow his exam.  Overall she is doing well without any acute complaints. She denies any chest pain, shortness of breath, coughing, skin irritation, or pain. She had a CT of the chest done on 2022 which significant reduction in the size of her lung tumor. She otherwise denies any headaches, dizziness, bony pain, swelling, or bleeding. Patient does have abdominal discomfort and pain as well as constipation for which she also had a CT scan done yesterday that was unremarkable. Patient however is overdue for a colonoscopy exam.  She does still report of smoking at least a pack of cigarettes a day.        MEDICATIONS:    Current Outpatient Medications:     levothyroxine (SYNTHROID) 88 MCG tablet, take 1 tablet by mouth once daily, Disp: 30 tablet, Rfl: 5    pregabalin (LYRICA) 75 MG capsule, Take 1 capsule by mouth 3 times daily for 120 days. , Disp: 90 capsule, Rfl: 5    albuterol sulfate  (90 Base) MCG/ACT inhaler, INHALE 2 PUFFS INTO THE LUNGS EVERY 4 HOURS AS NEEDED FOR WHEEZING OR SHORTNESS OF BREATH, Disp: 25.5 g, Rfl: 1    amLODIPine (NORVASC) 5 MG tablet, take 1 tablet by mouth once daily, Disp: 30 tablet, Rfl: 5    triamcinolone (KENALOG) 0.1 % cream, apply to rash twice a day, Disp: 80 g, Rfl: 2    ipratropium-albuterol (DUONEB) 0.5-2.5 (3) MG/3ML SOLN nebulizer solution, Inhale 3 mLs into the lungs 4 times daily, Disp: 360 mL, Rfl: 3    folic acid (FOLVITE) 519 MCG tablet, Take 1 tablet by mouth daily (Patient taking differently: Take 800 mcg by mouth daily ), Disp: 100 tablet, Rfl: 3  No current facility-administered medications for this encounter. Facility-Administered Medications Ordered in Other Encounters:     sodium chloride flush 0.9 % injection 10 mL, 10 mL, IntraVENous, PRN, Kat Trent Huerta MD, 10 mL at 22 1722    ALLERGIES:  Allergies   Allergen Reactions    Morphine     Robitussin (Alcohol Free) [Guaifenesin] Hives    Aspirin Hives and Rash    Penicillins Hives and Rash    Sulfa Antibiotics Hives and Rash         REVIEW OF SYSTEMS:    A full 14 point review of systems was performed and assessed and found to be negative except as noted above. PHYSICAL EXAMINATION:    CHAPERONE: Family/friend/companieon Present    ECO Asymptomatic    VITAL SIGNS: BP (!) 142/82   Pulse 88   Temp 97.2 °F (36.2 °C) (Temporal)   Resp 18   Wt 132 lb 9.6 oz (60.1 kg)   SpO2 93%   BMI 23.49 kg/m²   GENERAL:  General appearance is that of a well-nourished, well-developed in no apparent distress. HEENT: Normocephalic, atraumatic, EOMI, moist mucosa, no erythema. NECK:  No adenopathy or a palpable thyroid mass, trachea is midline. LYMPHATICS: No cervical, supraclavicular adenopathy. HEART:  Normal rate and regular rhythm, normal heart sounds.    LUNGS:  Pulmonary effort normal. Normal breath sounds. ABDOMEN:  Soft, nontender, non distended. EXTREMITIES:  No edema. No calf tenderness. MSK:  No spinal tenderness. Normal ROM. NEUROLOGICAL: Alert and oriented. Strength and sensation intact bilaterally. No focal deficits. PSYCH: Mood normal, behavior normal.  SKIN: No erythema, no desquamation. LABS:  WBC   Date Value Ref Range Status   2021 9.4 3.5 - 11.3 k/uL Final     Segs Absolute   Date Value Ref Range Status   2021 6.07 1.50 - 8.10 k/uL Final     Hemoglobin   Date Value Ref Range Status   2021 15.0 11.9 - 15.1 g/dL Final     Platelets   Date Value Ref Range Status   2021 324 138 - 453 k/uL Final     No results found for: , CEA  No results found for: PSA    IMAGIN22 CT Chest  Impression   Marked reduction in the known malignant right lower lobe cavitary nodule   indicating good response to therapy.       Stable prominent left axillary and left lower chest wall lymph nodes. ASSESSMENT AND PLAN:  Nlida Braden is a 62 y.o. female with a Cancer Staging  Malignant neoplasm of lower lobe of right lung [facility])  Staging form: Lung, AJCC 8th Edition  - Clinical stage from 2021: Stage IA3 (cT1c, cN0, cM0) - Signed by Torres Villafuerte MD on 10/18/2021  . Patient comes in today for a follow-up visit approximately 3 months after completion of her SBRT treatment to her right lower lobe lung lesion. Overall patient has recovered well without any significant symptoms related to her lung cancer. Patient does have issues with constipation and abdominal discomfort. Patient is advised to follow-up with her PCP and recommend a GI referral for possible EGD and colonoscopy as she is overdue. Patient was also recommended to increase her fiber supplements and hydration. Patient will be advised to have another repeat CT chest done in 6 months for continued surveillance. Patient will follow up with us afterwards to review the results.   Should she have any questions or concerns she can certainly see us sooner. Patient was counseled on smoking cessation advice was offered however she is not interested in any assistance at this time. Patient was in agreement with my recommendations. All questions were answered to their satisfaction. Patient was advised to contact us anytime should they have any questions or concerns. Electronically signed by Teetee Rahman MD on 2/16/2022 at 4:30 PM        Medications Prescribed:   New Prescriptions    No medications on file       Orders:   Orders Placed This Encounter   Procedures    CT CHEST WO CONTRAST       CC:  Patient Care Team:  Richy Cueva MD as PCP - General (Internal Medicine)  Richy Cueva MD as PCP - [facility]  Paulie Cheek MD as Consulting Physician (Gastroenterology)  Cynthia Parekh MD as Consulting Physician (Radiation Oncology)  Renate Greene MD as Consulting Physician (Hematology and Oncology)  Angelika Matthews RN as Nurse Navigator (Oncology)     Total time spent on this case on the day of encounter is more than 30 minutes. This time includes combination of one or more of the following - review of necessary tests, review of pertinent medical records from the EMR, performing medically appropriate examination and evaluation, counseling and educating the patient/family/caregiver, ordering necessary medical tests, procedures etc., documenting the clinical information in the electronic medical record, care coordination, referring and communicating with other health care providers and interpretation of results independently. This note is created with the assistance of a speech recognition program.  While intending to generate a document that actually reflects the content of the visit, the document can still have some errors including those of syntax and sound a like substitutions which may escape proof reading.   It such instances, actual meaning can be extrapolated by contextual diversion.

## 2022-02-16 NOTE — PROGRESS NOTES
Subjective:       Patient ID:     Amy Awan is a 62 y.o. female who presents for   Chief Complaint   Patient presents with    Results       HPI:  Nursing note reviewed and discussed with patient. HPI  States she is not able to have bowel movements properly - she has small smears that she passes every other day or less frequently. If she has a bowel movement it is always loose. Eating regular meals - some veggies - one serving every other day. No fruit. Lots of pasta, meats, rice. Not a lot of bread. Tried exlax, prune juice, states got more bloated with lactulose. Patient's medications, allergies, past medical, surgical, social and family histories were reviewed and updated as appropriate. Past Medical History:   Diagnosis Date    Alcohol abuse     For many years; 5-6 beers per night    Breast cancer ([facility] [address] 2010    right radical mastectomy with positive sentinel dose, chemo, s/p tamoxifen x 7 years     Chronic diarrhea 1/29/2019    COPD (chronic obstructive pulmonary disease) (HCC)     Essential hypertension     Lung cancer ([facility] [address]     Pleural effusion 12/26/2019    Tobacco abuse      Past Surgical History:   Procedure Laterality Date    APPENDECTOMY      CHOLECYSTECTOMY      MASTECTOMY Right        Social History     Tobacco Use    Smoking status: Current Every Day Smoker     Packs/day: 0.50     Years: 47.00     Pack years: 23.50    Smokeless tobacco: Never Used   Substance Use Topics    Alcohol use:  Yes     Alcohol/week: 8.0 standard drinks     Types: 8 Cans of beer per week     Comment: per night      Patient Active Problem List   Diagnosis    Panlobular emphysema ([facility] [address]    Essential hypertension    History of right breast cancer    Loculated pleural effusion    Hyponatremia    Coronary artery disease involving native coronary artery of native heart without angina pectoris    Tobacco abuse disorder    Alcohol abuse    Leukocytosis    Adenocarcinoma, lung, left ([facility] [address]    Shortness of breath    Insomnia    Anxiety    Malignant neoplasm of lower lobe of right lung (HCC)         Prior to Visit Medications    Medication Sig Taking? Authorizing Provider   levothyroxine (SYNTHROID) 88 MCG tablet take 1 tablet by mouth once daily Yes Sera Chatman MD   pregabalin (LYRICA) 75 MG capsule Take 1 capsule by mouth 3 times daily for 120 days. Yes Sera Chatman MD   albuterol sulfate  (90 Base) MCG/ACT inhaler INHALE 2 PUFFS INTO THE LUNGS EVERY 4 HOURS AS NEEDED FOR WHEEZING OR SHORTNESS OF BREATH Yes Sera Chatman MD   amLODIPine (NORVASC) 5 MG tablet take 1 tablet by mouth once daily Yes Alex Nolan MD   triamcinolone (KENALOG) 0.1 % cream apply to rash twice a day Yes Javier Rangel MD   ipratropium-albuterol (DUONEB) 0.5-2.5 (3) MG/3ML SOLN nebulizer solution Inhale 3 mLs into the lungs 4 times daily Yes Sera Chatman MD   folic acid (FOLVITE) 513 MCG tablet Take 1 tablet by mouth daily  Patient taking differently: Take 800 mcg by mouth daily  Yes Sera Chatman MD     Review of Systems  Review of Systems   Constitutional: Negative for fatigue, fever and unexpected weight change. Respiratory: Negative for cough, choking, chest tightness, shortness of breath and wheezing. Cardiovascular: Negative for chest pain, palpitations and leg swelling. Gastrointestinal: Positive for abdominal distention and constipation. Negative for abdominal pain, anal bleeding, blood in stool, diarrhea, nausea and vomiting. Endocrine: Negative. Musculoskeletal: Negative for joint swelling and myalgias. Skin: Negative. Neurological: Negative for dizziness. Psychiatric/Behavioral: Negative for sleep disturbance. All other systems reviewed and are negative.          Objective:       Physical Exam:  /78   Pulse 92   Temp 98.4 °F (36.9 °C) (Temporal)   Wt 131 lb 3.2 oz (59.5 kg)   SpO2 92%   BMI 23.24 kg/m²   Physical Exam  Vitals and nursing note reviewed. Constitutional:       General: She is not in acute distress. Appearance: She is well-developed. She is not ill-appearing. Eyes:      General: Lids are normal. Vision grossly intact. Cardiovascular:      Rate and Rhythm: Normal rate and regular rhythm. Heart sounds: Normal heart sounds, S1 normal and S2 normal. No murmur heard. No friction rub. No gallop. Pulmonary:      Effort: Pulmonary effort is normal. No respiratory distress. Breath sounds: Normal breath sounds. No wheezing. Abdominal:      General: Bowel sounds are normal.      Palpations: Abdomen is soft. There is no mass. Tenderness: There is no abdominal tenderness. There is no guarding. Musculoskeletal:         General: Normal range of motion. Skin:     General: Skin is warm and dry. Capillary Refill: Capillary refill takes less than 2 seconds. Neurological:      General: No focal deficit present. Mental Status: She is alert and oriented to person, place, and time. Data Review  Oncology note reviewed in media        Assessment/Plan:      1. Constipation, unspecified constipation type  Increase fiber content in diet   Can see GI if willing     2. Bloating  Due to #1    3. Encounter for screening mammogram for breast cancer  - ELLE DIGITAL SCREEN UNILATERAL LEFT;  Future           Health Maintenance Due   Topic Date Due    DTaP/Tdap/Td vaccine (1 - Tdap) Never done    Cervical cancer screen  Never done    Breast cancer screen  Never done    Colorectal Cancer Screen  Never done    Shingles Vaccine (1 of 2) Never done       Electronically signed by Angelina Loo MD on 2/16/2022 at 10:48 AM

## 2022-02-16 NOTE — PATIENT INSTRUCTIONS
[facility] Gastroenterology- [place], [address]  [phone]    Start taking a fiber supplement daily and increase the amount of whole grain foods, vegetables and fruit in your diet. This will provide food for healthy bacteria in your intestines. Add a probiotic supplement over the counter (Culturelle, Lactobacillus, etc.) for about two weeks as well - this will help reintroduce healthy bacteria to your body as well. Patient Education        High-Fiber Diet: Care Instructions  Overview     A high-fiber diet may help you relieve constipation and feel less bloated. Your doctor and dietitian will help you make a high-fiber eating plan based on your personal needs. The plan will include the things you like to eat. It will also make sure that you get 25 to 35 grams of fiber a day. Before you make changes to the way you eat, be sure to talk with your doctor or dietitian. Follow-up care is a key part of your treatment and safety. Be sure to make and go to all appointments, and call your doctor if you are having problems. It's also a good idea to know your test results and keep a list of the medicines you take. How can you care for yourself at home? · You can increase how much fiber you get if you eat more of certain foods. These foods include:  ? Whole-grain breads and cereals. ? Fruits, such as pears, apples, and peaches. Eat the skins and peels if you can.  ? Vegetables, such as broccoli, cabbage, spinach, carrots, asparagus, and squash. ? Starchy vegetables. These include potatoes with skins, kidney beans, and lima beans. · Take a fiber supplement every day if your doctor recommends it. Examples are Benefiber, Citrucel, FiberCon, and Metamucil. Ask your doctor how much to take. · Drink plenty of fluids.  If you have kidney, heart, or liver disease and have to limit fluids, talk with your doctor before you increase the amount of fluids you drink. Where can you learn more? Go to [email or web address] org and sign in to your [email or web address] account. Enter B222 in the [facility] box to learn more about \"High-Fiber Diet: Care Instructions. \"     If you do not have an account, please click on the \"Sign Up Now\" link. Current as of: September 8, 2021               Content Version: 13.1  © 2006-2021 [employer]. Care instructions adapted under license by [facility] ([facility]). If you have questions about a medical condition or this instruction, always ask your healthcare professional. [address] any warranty or liability for your use of this information.

## 2022-02-19 ASSESSMENT — ENCOUNTER SYMPTOMS
SHORTNESS OF BREATH: 0
CHOKING: 0
ABDOMINAL PAIN: 0
COUGH: 0
DIARRHEA: 0
ANAL BLEEDING: 0
VOMITING: 0
ABDOMINAL DISTENTION: 1
WHEEZING: 0
NAUSEA: 0
CONSTIPATION: 1
CHEST TIGHTNESS: 0
BLOOD IN STOOL: 0

## 2022-02-19 ASSESSMENT — VISUAL ACUITY: OU: 1

## 2022-02-28 ENCOUNTER — TELEPHONE (OUTPATIENT)
Dept: CASE MANAGEMENT | Age: 62
End: 2022-02-28

## 2022-02-28 NOTE — TELEPHONE ENCOUNTER
Name: Velma Clement  : 1960  MRN: [id number]    Oncology Navigation Follow-Up Note  Navigator reviewing chart and pt. On surveillance only . Navigation completed and will remove self from care team. Navigator available inf needed.    Electronically signed by Kodi Lovett RN on 2022 at 10:11 AM

## 2022-03-17 ENCOUNTER — TELEPHONE (OUTPATIENT)
Dept: GASTROENTEROLOGY | Age: 62
End: 2022-03-17

## 2022-04-01 ENCOUNTER — TELEPHONE (OUTPATIENT)
Dept: INFUSION THERAPY | Age: 62
End: 2022-04-01

## 2022-04-01 NOTE — TELEPHONE ENCOUNTER
Maurice Daughters patients spouse called stating that patient was still constipated asking if Dr. Ashley Rachel could order something. RN told him that Dr. Darlene Huynh office was closed until Monday but she could try senacot s take 2 pills in AM and before bed until she starts having a BM then take 1 daily until she becomes regular. She could also try miralax. He stated that miralax hasn't worked and neither has prune juice. If this doesn't help Dr. Darlene Huynh office will be open Monday. Verbalized understanding.

## 2022-04-12 ENCOUNTER — OFFICE VISIT (OUTPATIENT)
Dept: GASTROENTEROLOGY | Age: 62
End: 2022-04-12

## 2022-04-12 DIAGNOSIS — K59.09 CHRONIC CONSTIPATION: Primary | ICD-10-CM

## 2022-04-12 PROCEDURE — 99214 OFFICE O/P EST MOD 30 MIN: CPT | Performed by: INTERNAL MEDICINE

## 2022-04-12 ASSESSMENT — ENCOUNTER SYMPTOMS
BLOOD IN STOOL: 0
ABDOMINAL PAIN: 1
ABDOMINAL DISTENTION: 1
NAUSEA: 0
SHORTNESS OF BREATH: 0
VOMITING: 0
RECTAL PAIN: 0
BACK PAIN: 0
COUGH: 0
SORE THROAT: 0
ANAL BLEEDING: 0
DIARRHEA: 0
CHOKING: 0
TROUBLE SWALLOWING: 0
VOICE CHANGE: 0
CONSTIPATION: 1

## 2022-04-12 NOTE — PROGRESS NOTES
GI OFFICE FOLLOW UP    INTERVAL HISTORY:   No referring provider defined for this encounter. Chief Complaint   Patient presents with    Constipation     Patient is here today c/o of constipation       1. Chronic constipation              HISTORY OF PRESENT ILLNESS: Anne Marie is a 62 y.o. female with a past history remarkable for  Patient seen along with her  for evaluation of chronic constipation. Last time she was seen by me was about 1 year ago. Patient is known to have adenocarcinoma of the lung. Recent imaging studies did reveal minimal decrease of the size of the lesion. Patient states that by taking laxatives she is able to move bowels. However she also stated that she gets significant loose bowels after taking laxatives. She denies nausea vomiting. Fair appetite. No melena or hematochezia. By reviewing her medications, she is not on narcotics. She used to have hypercalcemia in the past.  However recently calcium levels within normal limits. Patient gives history of incomplete colonoscopy in [place] several years ago. She has good appetite and there is no dysphagia. Has a mild dyspepsia and occasional heartburns. Past Medical,Family, and Social History reviewed and does contribute to the patient presenting condition. Patient's PMH/PSH,SH,PSYCH Hx, MEDs, ALLERGIES, and ROS were all reviewed and updated in the appropriate sections.  Yes      PAST MEDICAL HISTORY:  Past Medical History:   Diagnosis Date    Alcohol abuse     For many years; 5-6 beers per night    Breast cancer ([address] 2010    right radical mastectomy with positive sentinel dose, chemo, s/p tamoxifen x 7 years     Chronic diarrhea 1/29/2019    COPD (chronic obstructive pulmonary disease) ([address]     Essential hypertension     Lung cancer ([address]     Pleural effusion 12/26/2019    Tobacco abuse        Past Surgical History:   Procedure Laterality Date    APPENDECTOMY      CHOLECYSTECTOMY      MASTECTOMY Right        CURRENT MEDICATIONS:    Current Outpatient Medications:     levothyroxine (SYNTHROID) 88 MCG tablet, take 1 tablet by mouth once daily, Disp: 30 tablet, Rfl: 5    pregabalin (LYRICA) 75 MG capsule, Take 1 capsule by mouth 3 times daily for 120 days. , Disp: 90 capsule, Rfl: 5    albuterol sulfate  (90 Base) MCG/ACT inhaler, INHALE 2 PUFFS INTO THE LUNGS EVERY 4 HOURS AS NEEDED FOR WHEEZING OR SHORTNESS OF BREATH, Disp: 25.5 g, Rfl: 1    amLODIPine (NORVASC) 5 MG tablet, take 1 tablet by mouth once daily, Disp: 30 tablet, Rfl: 5    triamcinolone (KENALOG) 0.1 % cream, apply to rash twice a day, Disp: 80 g, Rfl: 2    ipratropium-albuterol (DUONEB) 0.5-2.5 (3) MG/3ML SOLN nebulizer solution, Inhale 3 mLs into the lungs 4 times daily, Disp: 360 mL, Rfl: 3    folic acid (FOLVITE) 029 MCG tablet, Take 1 tablet by mouth daily (Patient taking differently: Take 800 mcg by mouth daily ), Disp: 100 tablet, Rfl: 3    ALLERGIES:   Allergies   Allergen Reactions    Morphine     Robitussin (Alcohol Free) [Guaifenesin] Hives    Aspirin Hives and Rash    Penicillins Hives and Rash    Sulfa Antibiotics Hives and Rash       FAMILY HISTORY:       Problem Relation Age of Onset    Breast Cancer Mother     Stroke Mother     Prostate Cancer Father     Prostate Cancer Brother     Deep Vein Thrombosis Brother     Cancer Brother         \"throat cancer\"         SOCIAL HISTORY:   Social History     Socioeconomic History    Marital status:      Spouse name: Not on file    Number of children: Not on file    Years of education: Not on file    Highest education level: Not on file   Occupational History    Not on file   Tobacco Use    Smoking status: Current Every Day Smoker     Packs/day: 0.50     Years: 47.00     Pack years: 23.50    Smokeless tobacco: Never Used   Vaping Use    Vaping Use: Never used   Substance and Sexual Activity    Alcohol use: Yes     Alcohol/week: 8.0 standard drinks     Types: 8 Cans of beer per week     Comment: per night    Drug use: No    Sexual activity: Yes   Other Topics Concern    Not on file   Social History Narrative    Not on file     Social Determinants of Health     Financial Resource Strain: Low Risk     Difficulty of Paying Living Expenses: Not very hard   Food Insecurity: No Food Insecurity    Worried About Running Out of Food in the Last Year: Never true    Linsey of Food in the Last Year: Never true   Transportation Needs:     Lack of Transportation (Medical): Not on file    Lack of Transportation (Non-Medical): Not on file   Physical Activity:     Days of Exercise per Week: Not on file    Minutes of Exercise per Session: Not on file   Stress:     Feeling of Stress : Not on file   Social Connections:     Frequency of Communication with Friends and Family: Not on file    Frequency of Social Gatherings with Friends and Family: Not on file    Attends Cheondoism Services: Not on file    Active Member of [address] [place] [address] or Organizations: Not on file    Attends Club or Organization Meetings: Not on file    Marital Status: Not on file   Intimate Partner Violence:     Fear of Current or Ex-Partner: Not on file    Emotionally Abused: Not on file    Physically Abused: Not on file    Sexually Abused: Not on file   Housing Stability:     Unable to Pay for Housing in the Last Year: Not on file    Number of [place] in the Last Year: Not on file    Unstable Housing in the Last Year: Not on file         REVIEW OF SYSTEMS:         Review of Systems   Constitutional: Positive for unexpected weight change. Negative for appetite change and fatigue. HENT: Negative for dental problem, sore throat, trouble swallowing and voice change. Eyes: Positive for visual disturbance (glasses).    Respiratory: Negative for cough, choking and shortness of breath. Cardiovascular: Negative for chest pain and leg swelling. Gastrointestinal: Positive for abdominal distention, abdominal pain and constipation. Negative for anal bleeding, blood in stool, diarrhea, nausea, rectal pain and vomiting. Genitourinary: Negative. Negative for difficulty urinating. Musculoskeletal: Negative for back pain, joint swelling and myalgias. Neurological: Negative for dizziness, tremors, weakness, light-headedness, numbness and headaches. Hematological: Bruises/bleeds easily (bruise). Psychiatric/Behavioral: Negative for sleep disturbance. The patient is not nervous/anxious. PHYSICAL EXAMINATION:     Vital signs reviewed per the nursing documentation. There were no vitals taken for this visit. There is no height or weight on file to calculate BMI. Physical Exam  Vitals and nursing note reviewed. Constitutional:       Appearance: Normal appearance. She is well-developed. Comments: Has facial puffiness may be secondary to chemotherapy side effects. HENT:      Head: Normocephalic and atraumatic. Eyes:      Extraocular Movements: Extraocular movements intact. Conjunctiva/sclera: Conjunctivae normal.   Neck:      Thyroid: No thyromegaly. Vascular: No hepatojugular reflux or JVD. Trachea: No tracheal deviation. Cardiovascular:      Rate and Rhythm: Normal rate and regular rhythm. Heart sounds: Normal heart sounds. Pulmonary:      Effort: Pulmonary effort is normal. No respiratory distress. Breath sounds: Normal breath sounds. No wheezing or rales. Abdominal:      General: Bowel sounds are normal. There is no distension. Palpations: Abdomen is soft. There is no hepatomegaly or mass. Tenderness: There is no abdominal tenderness. There is no rebound. Hernia: No hernia is present. Musculoskeletal:         General: No tenderness. Lymphadenopathy:      Cervical: No cervical adenopathy.    Skin:     General: Skin is warm and dry. Findings: No bruising, ecchymosis, erythema or rash. Neurological:      Mental Status: She is alert and oriented to person, place, and time. Cranial Nerves: No cranial nerve deficit. Psychiatric:         Mood and Affect: Mood normal.         Behavior: Behavior normal.         Thought Content: Thought content normal.           LABORATORY DATA: Reviewed  Lab Results   Component Value Date    WBC 9.4 09/20/2021    HGB 15.0 09/20/2021    HCT 44.5 09/20/2021    MCV 90.4 09/20/2021     09/20/2021     (L) 09/20/2021    K 4.2 09/20/2021    CL 93 (L) 09/20/2021    CO2 27 09/20/2021    BUN 8 09/20/2021    CREATININE 0.49 (L) 02/14/2022    LABALBU 4.4 09/20/2021    BILITOT 0.39 09/20/2021    ALKPHOS 102 09/20/2021    AST 28 09/20/2021    ALT 20 09/20/2021    INR 0.9 01/22/2020         Lab Results   Component Value Date    RBC 4.92 09/20/2021    HGB 15.0 09/20/2021    MCV 90.4 09/20/2021    MCH 30.5 09/20/2021    MCHC 33.7 09/20/2021    RDW 13.2 09/20/2021    MPV 8.9 09/20/2021    BASOPCT 1 09/20/2021    LYMPHSABS 2.35 09/20/2021    MONOSABS 0.79 09/20/2021    NEUTROABS 6.07 09/20/2021    EOSABS 0.12 09/20/2021    BASOSABS 0.05 09/20/2021         DIAGNOSTIC TESTING:     No results found. Assessment  1. Chronic constipation        Plan  Patient is explained regarding chronic constipation management and medical management of constipation. Brochures given. She requests investigations to make sure that she does not have colonic pathology such as malignancy obstruction. Recently she had a CT scan of the abdomen and pelvis done and was nonspecific. Discussed with the patient regarding barium enema and colonoscopy. After discussion regarding pros and cons of these procedures, she requested barium enema that is arranged. I did discuss with the patient regarding preparation for barium enema. Patient understood and agreed.     Thank you for allowing me to participate in the care of Ms. Mil Rodriges. For any further questions please do not hesitate to contact me. I have reviewed and agree with the ROS entered by the MA/LPN. Note is dictated utilizing voice recognition software. Unfortunately this leads to occasional typographical errors.  Please contact our office if you have any questions        Colin Bruce MD,FACP, [facility]  Board Certified in Gastroenterology and [address] [place] [address] Gastroenterology  Office #: [phone]

## 2022-04-20 ENCOUNTER — TELEPHONE (OUTPATIENT)
Dept: FAMILY MEDICINE CLINIC | Age: 62
End: 2022-04-20

## 2022-04-20 ENCOUNTER — TELEPHONE (OUTPATIENT)
Dept: ONCOLOGY | Age: 62
End: 2022-04-20

## 2022-04-20 NOTE — TELEPHONE ENCOUNTER
Fayetta Holstein, patient's , called asking for medication. States patient is not feeling well, having congestion. Informed him she would need to go to walk-in/urgent care. Verbalized understanding.

## 2022-04-20 NOTE — TELEPHONE ENCOUNTER
Pt's , Neri Woodson, called stating pt is experiencing congestion, runny nose, and sore throat. He states Dr. Avelina Baumgarten has prescribed zpack and medrol dosepak in the past, and he is requesting this again. Writer advised for pt to see her PCP. Neri Woodson verbalized understanding.

## 2022-04-21 ENCOUNTER — APPOINTMENT (OUTPATIENT)
Dept: GENERAL RADIOLOGY | Age: 62
DRG: 191 | End: 2022-04-21

## 2022-04-21 ENCOUNTER — APPOINTMENT (OUTPATIENT)
Dept: CT IMAGING | Age: 62
DRG: 191 | End: 2022-04-21

## 2022-04-21 ENCOUNTER — HOSPITAL ENCOUNTER (INPATIENT)
Age: 62
LOS: 1 days | Discharge: HOME | DRG: 191 | End: 2022-04-21
Attending: EMERGENCY MEDICINE | Admitting: FAMILY MEDICINE

## 2022-04-21 VITALS
SYSTOLIC BLOOD PRESSURE: 118 MMHG | BODY MASS INDEX: 23.21 KG/M2 | HEART RATE: 92 BPM | OXYGEN SATURATION: 95 % | RESPIRATION RATE: 21 BRPM | HEIGHT: 63 IN | TEMPERATURE: 98.9 F | DIASTOLIC BLOOD PRESSURE: 75 MMHG | WEIGHT: 131 LBS

## 2022-04-21 DIAGNOSIS — J44.1 COPD EXACERBATION (HCC): Primary | ICD-10-CM

## 2022-04-21 DIAGNOSIS — J44.1 CHRONIC OBSTRUCTIVE PULMONARY DISEASE WITH ACUTE EXACERBATION (HCC): ICD-10-CM

## 2022-04-21 DIAGNOSIS — J44.1 COPD WITH ACUTE EXACERBATION (HCC): ICD-10-CM

## 2022-04-21 DIAGNOSIS — C34.92 ADENOCARCINOMA, LUNG, LEFT (HCC): ICD-10-CM

## 2022-04-21 LAB
ABSOLUTE EOS #: 0.03 K/UL (ref 0–0.44)
ABSOLUTE IMMATURE GRANULOCYTE: 0.03 K/UL (ref 0–0.3)
ABSOLUTE LYMPH #: 1.1 K/UL (ref 1.1–3.7)
ABSOLUTE MONO #: 0.48 K/UL (ref 0.1–1.2)
ANION GAP SERPL CALCULATED.3IONS-SCNC: 14 MMOL/L (ref 9–17)
BASOPHILS # BLD: 1 % (ref 0–2)
BASOPHILS ABSOLUTE: 0.04 K/UL (ref 0–0.2)
BUN BLDV-MCNC: 4 MG/DL (ref 8–23)
BUN/CREAT BLD: 10 (ref 9–20)
CALCIUM SERPL-MCNC: 9.3 MG/DL (ref 8.6–10.4)
CHLORIDE BLD-SCNC: 88 MMOL/L (ref 98–107)
CO2: 24 MMOL/L (ref 20–31)
CREAT SERPL-MCNC: 0.4 MG/DL (ref 0.5–0.9)
EKG ATRIAL RATE: 102 BPM
EKG P AXIS: 116 DEGREES
EKG P-R INTERVAL: 168 MS
EKG Q-T INTERVAL: 342 MS
EKG QRS DURATION: 82 MS
EKG QTC CALCULATION (BAZETT): 445 MS
EKG R AXIS: 96 DEGREES
EKG T AXIS: 77 DEGREES
EKG VENTRICULAR RATE: 102 BPM
EOSINOPHILS RELATIVE PERCENT: 1 % (ref 1–4)
GFR AFRICAN AMERICAN: >60 ML/MIN
GFR NON-AFRICAN AMERICAN: >60 ML/MIN
GFR SERPL CREATININE-BSD FRML MDRD: ABNORMAL ML/MIN/{1.73_M2}
GLUCOSE BLD-MCNC: 95 MG/DL (ref 70–99)
HCT VFR BLD CALC: 38.5 % (ref 36.3–47.1)
HEMOGLOBIN: 13 G/DL (ref 11.9–15.1)
IMMATURE GRANULOCYTES: 1 %
LYMPHOCYTES # BLD: 21 % (ref 24–43)
MAGNESIUM: 1.8 MG/DL (ref 1.6–2.6)
MCH RBC QN AUTO: 31.6 PG (ref 25.2–33.5)
MCHC RBC AUTO-ENTMCNC: 33.8 G/DL (ref 28.4–34.8)
MCV RBC AUTO: 93.7 FL (ref 82.6–102.9)
MONOCYTES # BLD: 9 % (ref 3–12)
NRBC AUTOMATED: 0 PER 100 WBC
PDW BLD-RTO: 12.9 % (ref 11.8–14.4)
PLATELET # BLD: 250 K/UL (ref 138–453)
PMV BLD AUTO: 9.1 FL (ref 8.1–13.5)
POTASSIUM SERPL-SCNC: 3.5 MMOL/L (ref 3.7–5.3)
PROCALCITONIN: 0.11 NG/ML
RBC # BLD: 4.11 M/UL (ref 3.95–5.11)
SARS-COV-2, RAPID: NOT DETECTED
SEG NEUTROPHILS: 67 % (ref 36–65)
SEGMENTED NEUTROPHILS ABSOLUTE COUNT: 3.66 K/UL (ref 1.5–8.1)
SODIUM BLD-SCNC: 126 MMOL/L (ref 135–144)
SODIUM BLD-SCNC: 130 MMOL/L (ref 135–144)
SPECIMEN DESCRIPTION: NORMAL
WBC # BLD: 5.3 K/UL (ref 3.5–11.3)

## 2022-04-21 PROCEDURE — 99285 EMERGENCY DEPT VISIT HI MDM: CPT

## 2022-04-21 PROCEDURE — 2060000000 HC ICU INTERMEDIATE R&B

## 2022-04-21 PROCEDURE — 99222 1ST HOSP IP/OBS MODERATE 55: CPT | Performed by: NURSE PRACTITIONER

## 2022-04-21 PROCEDURE — 93005 ELECTROCARDIOGRAM TRACING: CPT | Performed by: FAMILY MEDICINE

## 2022-04-21 PROCEDURE — 6370000000 HC RX 637 (ALT 250 FOR IP): Performed by: EMERGENCY MEDICINE

## 2022-04-21 PROCEDURE — 6360000002 HC RX W HCPCS: Performed by: CLINICAL NURSE SPECIALIST

## 2022-04-21 PROCEDURE — 71045 X-RAY EXAM CHEST 1 VIEW: CPT

## 2022-04-21 PROCEDURE — 84145 PROCALCITONIN (PCT): CPT

## 2022-04-21 PROCEDURE — 2580000003 HC RX 258: Performed by: CLINICAL NURSE SPECIALIST

## 2022-04-21 PROCEDURE — 94640 AIRWAY INHALATION TREATMENT: CPT

## 2022-04-21 PROCEDURE — 85025 COMPLETE CBC W/AUTO DIFF WBC: CPT

## 2022-04-21 PROCEDURE — APPSS45 APP SPLIT SHARED TIME 31-45 MINUTES: Performed by: NURSE PRACTITIONER

## 2022-04-21 PROCEDURE — 6360000004 HC RX CONTRAST MEDICATION: Performed by: EMERGENCY MEDICINE

## 2022-04-21 PROCEDURE — 96375 TX/PRO/DX INJ NEW DRUG ADDON: CPT

## 2022-04-21 PROCEDURE — 6370000000 HC RX 637 (ALT 250 FOR IP): Performed by: NURSE PRACTITIONER

## 2022-04-21 PROCEDURE — 83735 ASSAY OF MAGNESIUM: CPT

## 2022-04-21 PROCEDURE — 6370000000 HC RX 637 (ALT 250 FOR IP): Performed by: CLINICAL NURSE SPECIALIST

## 2022-04-21 PROCEDURE — 71260 CT THORAX DX C+: CPT

## 2022-04-21 PROCEDURE — 2700000000 HC OXYGEN THERAPY PER DAY

## 2022-04-21 PROCEDURE — 96365 THER/PROPH/DIAG IV INF INIT: CPT

## 2022-04-21 PROCEDURE — 80048 BASIC METABOLIC PNL TOTAL CA: CPT

## 2022-04-21 PROCEDURE — 87635 SARS-COV-2 COVID-19 AMP PRB: CPT

## 2022-04-21 PROCEDURE — 94761 N-INVAS EAR/PLS OXIMETRY MLT: CPT

## 2022-04-21 PROCEDURE — 6360000002 HC RX W HCPCS: Performed by: EMERGENCY MEDICINE

## 2022-04-21 PROCEDURE — 84295 ASSAY OF SERUM SODIUM: CPT

## 2022-04-21 PROCEDURE — 36415 COLL VENOUS BLD VENIPUNCTURE: CPT

## 2022-04-21 PROCEDURE — 2580000003 HC RX 258: Performed by: EMERGENCY MEDICINE

## 2022-04-21 RX ORDER — AZITHROMYCIN 250 MG/1
500 TABLET, FILM COATED ORAL DAILY
Status: DISCONTINUED | OUTPATIENT
Start: 2022-04-22 | End: 2022-04-21 | Stop reason: HOSPADM

## 2022-04-21 RX ORDER — SODIUM CHLORIDE 9 MG/ML
INJECTION, SOLUTION INTRAVENOUS PRN
Status: DISCONTINUED | OUTPATIENT
Start: 2022-04-21 | End: 2022-04-21 | Stop reason: HOSPADM

## 2022-04-21 RX ORDER — SODIUM CHLORIDE 9 MG/ML
INJECTION, SOLUTION INTRAVENOUS CONTINUOUS
Status: DISCONTINUED | OUTPATIENT
Start: 2022-04-21 | End: 2022-04-21 | Stop reason: HOSPADM

## 2022-04-21 RX ORDER — CLINDAMYCIN HYDROCHLORIDE 300 MG/1
300 CAPSULE ORAL 3 TIMES DAILY
Qty: 21 CAPSULE | Refills: 0 | Status: SHIPPED | OUTPATIENT
Start: 2022-04-21 | End: 2022-04-28

## 2022-04-21 RX ORDER — SODIUM CHLORIDE 0.9 % (FLUSH) 0.9 %
5-40 SYRINGE (ML) INJECTION PRN
Status: DISCONTINUED | OUTPATIENT
Start: 2022-04-21 | End: 2022-04-21 | Stop reason: HOSPADM

## 2022-04-21 RX ORDER — PREDNISONE 10 MG/1
TABLET ORAL
Qty: 30 TABLET | Refills: 0 | Status: SHIPPED | OUTPATIENT
Start: 2022-04-23 | End: 2022-05-03 | Stop reason: ALTCHOICE

## 2022-04-21 RX ORDER — ONDANSETRON 4 MG/1
4 TABLET, ORALLY DISINTEGRATING ORAL EVERY 8 HOURS PRN
Status: DISCONTINUED | OUTPATIENT
Start: 2022-04-21 | End: 2022-04-21 | Stop reason: HOSPADM

## 2022-04-21 RX ORDER — POTASSIUM CHLORIDE 20 MEQ/1
40 TABLET, EXTENDED RELEASE ORAL PRN
Status: DISCONTINUED | OUTPATIENT
Start: 2022-04-21 | End: 2022-04-21 | Stop reason: HOSPADM

## 2022-04-21 RX ORDER — LEVOTHYROXINE SODIUM 88 UG/1
88 TABLET ORAL DAILY
Status: DISCONTINUED | OUTPATIENT
Start: 2022-04-21 | End: 2022-04-21 | Stop reason: HOSPADM

## 2022-04-21 RX ORDER — METHYLPREDNISOLONE SODIUM SUCCINATE 40 MG/ML
40 INJECTION, POWDER, LYOPHILIZED, FOR SOLUTION INTRAMUSCULAR; INTRAVENOUS EVERY 6 HOURS
Status: DISCONTINUED | OUTPATIENT
Start: 2022-04-21 | End: 2022-04-21 | Stop reason: HOSPADM

## 2022-04-21 RX ORDER — METHYLPREDNISOLONE SODIUM SUCCINATE 125 MG/2ML
125 INJECTION, POWDER, LYOPHILIZED, FOR SOLUTION INTRAMUSCULAR; INTRAVENOUS ONCE
Status: COMPLETED | OUTPATIENT
Start: 2022-04-21 | End: 2022-04-21

## 2022-04-21 RX ORDER — SODIUM CHLORIDE 0.9 % (FLUSH) 0.9 %
5-40 SYRINGE (ML) INJECTION EVERY 12 HOURS SCHEDULED
Status: DISCONTINUED | OUTPATIENT
Start: 2022-04-21 | End: 2022-04-21 | Stop reason: HOSPADM

## 2022-04-21 RX ORDER — ALBUTEROL SULFATE 2.5 MG/3ML
2.5 SOLUTION RESPIRATORY (INHALATION)
Status: DISCONTINUED | OUTPATIENT
Start: 2022-04-21 | End: 2022-04-21 | Stop reason: HOSPADM

## 2022-04-21 RX ORDER — IPRATROPIUM BROMIDE AND ALBUTEROL SULFATE 2.5; .5 MG/3ML; MG/3ML
1 SOLUTION RESPIRATORY (INHALATION) 4 TIMES DAILY
Status: DISCONTINUED | OUTPATIENT
Start: 2022-04-21 | End: 2022-04-21

## 2022-04-21 RX ORDER — POTASSIUM CHLORIDE 7.45 MG/ML
10 INJECTION INTRAVENOUS PRN
Status: DISCONTINUED | OUTPATIENT
Start: 2022-04-21 | End: 2022-04-21 | Stop reason: HOSPADM

## 2022-04-21 RX ORDER — UREA 10 %
800 LOTION (ML) TOPICAL DAILY
COMMUNITY

## 2022-04-21 RX ORDER — LANOLIN ALCOHOL/MO/W.PET/CERES
800 CREAM (GRAM) TOPICAL DAILY
Status: DISCONTINUED | OUTPATIENT
Start: 2022-04-21 | End: 2022-04-21 | Stop reason: HOSPADM

## 2022-04-21 RX ORDER — AMLODIPINE BESYLATE 5 MG/1
5 TABLET ORAL DAILY
Status: DISCONTINUED | OUTPATIENT
Start: 2022-04-21 | End: 2022-04-21 | Stop reason: HOSPADM

## 2022-04-21 RX ORDER — HYDROCODONE BITARTRATE AND ACETAMINOPHEN 5; 325 MG/1; MG/1
1 TABLET ORAL ONCE
Status: COMPLETED | OUTPATIENT
Start: 2022-04-21 | End: 2022-04-21

## 2022-04-21 RX ORDER — AZITHROMYCIN 250 MG/1
500 TABLET, FILM COATED ORAL DAILY
Status: DISCONTINUED | OUTPATIENT
Start: 2022-04-22 | End: 2022-04-21

## 2022-04-21 RX ORDER — ACETAMINOPHEN 650 MG/1
650 SUPPOSITORY RECTAL EVERY 6 HOURS PRN
Status: DISCONTINUED | OUTPATIENT
Start: 2022-04-21 | End: 2022-04-21 | Stop reason: HOSPADM

## 2022-04-21 RX ORDER — AZITHROMYCIN 500 MG/1
500 TABLET, FILM COATED ORAL DAILY
Qty: 3 TABLET | Refills: 0 | Status: SHIPPED | OUTPATIENT
Start: 2022-04-22 | End: 2022-04-25

## 2022-04-21 RX ORDER — 0.9 % SODIUM CHLORIDE 0.9 %
80 INTRAVENOUS SOLUTION INTRAVENOUS ONCE
Status: COMPLETED | OUTPATIENT
Start: 2022-04-21 | End: 2022-04-21

## 2022-04-21 RX ORDER — SODIUM CHLORIDE 0.9 % (FLUSH) 0.9 %
10 SYRINGE (ML) INJECTION PRN
Status: DISCONTINUED | OUTPATIENT
Start: 2022-04-21 | End: 2022-04-21 | Stop reason: SDUPTHER

## 2022-04-21 RX ORDER — POLYETHYLENE GLYCOL 3350 17 G/17G
17 POWDER, FOR SOLUTION ORAL DAILY PRN
Status: DISCONTINUED | OUTPATIENT
Start: 2022-04-21 | End: 2022-04-21 | Stop reason: HOSPADM

## 2022-04-21 RX ORDER — IPRATROPIUM BROMIDE AND ALBUTEROL SULFATE 2.5; .5 MG/3ML; MG/3ML
1 SOLUTION RESPIRATORY (INHALATION) EVERY 4 HOURS
Status: DISCONTINUED | OUTPATIENT
Start: 2022-04-21 | End: 2022-04-21 | Stop reason: HOSPADM

## 2022-04-21 RX ORDER — ENOXAPARIN SODIUM 100 MG/ML
40 INJECTION SUBCUTANEOUS DAILY
Status: DISCONTINUED | OUTPATIENT
Start: 2022-04-21 | End: 2022-04-21 | Stop reason: HOSPADM

## 2022-04-21 RX ORDER — ONDANSETRON 2 MG/ML
4 INJECTION INTRAMUSCULAR; INTRAVENOUS EVERY 6 HOURS PRN
Status: DISCONTINUED | OUTPATIENT
Start: 2022-04-21 | End: 2022-04-21 | Stop reason: HOSPADM

## 2022-04-21 RX ORDER — PREDNISONE 20 MG/1
40 TABLET ORAL DAILY
Status: DISCONTINUED | OUTPATIENT
Start: 2022-04-23 | End: 2022-04-21 | Stop reason: HOSPADM

## 2022-04-21 RX ORDER — ACETAMINOPHEN 325 MG/1
650 TABLET ORAL EVERY 6 HOURS PRN
Status: DISCONTINUED | OUTPATIENT
Start: 2022-04-21 | End: 2022-04-21 | Stop reason: HOSPADM

## 2022-04-21 RX ORDER — TRIAMCINOLONE ACETONIDE 1 MG/G
CREAM TOPICAL DAILY
COMMUNITY

## 2022-04-21 RX ORDER — IPRATROPIUM BROMIDE AND ALBUTEROL SULFATE 2.5; .5 MG/3ML; MG/3ML
1 SOLUTION RESPIRATORY (INHALATION) ONCE
Status: COMPLETED | OUTPATIENT
Start: 2022-04-21 | End: 2022-04-21

## 2022-04-21 RX ORDER — PREGABALIN 75 MG/1
75 CAPSULE ORAL 3 TIMES DAILY
Status: DISCONTINUED | OUTPATIENT
Start: 2022-04-21 | End: 2022-04-21 | Stop reason: HOSPADM

## 2022-04-21 RX ADMIN — HYDROCODONE BITARTRATE AND ACETAMINOPHEN 1 TABLET: 5; 325 TABLET ORAL at 07:29

## 2022-04-21 RX ADMIN — METHYLPREDNISOLONE SODIUM SUCCINATE 125 MG: 125 INJECTION, POWDER, FOR SOLUTION INTRAMUSCULAR; INTRAVENOUS at 03:55

## 2022-04-21 RX ADMIN — METHYLPREDNISOLONE SODIUM SUCCINATE 40 MG: 40 INJECTION, POWDER, FOR SOLUTION INTRAMUSCULAR; INTRAVENOUS at 16:00

## 2022-04-21 RX ADMIN — LEVOTHYROXINE SODIUM 88 MCG: 0.09 TABLET ORAL at 08:16

## 2022-04-21 RX ADMIN — FOLIC ACID TAB 400 MCG 800 MCG: 400 TAB at 08:48

## 2022-04-21 RX ADMIN — IPRATROPIUM BROMIDE AND ALBUTEROL SULFATE 1 AMPULE: 2.5; .5 SOLUTION RESPIRATORY (INHALATION) at 05:40

## 2022-04-21 RX ADMIN — ENOXAPARIN SODIUM 40 MG: 40 INJECTION SUBCUTANEOUS at 08:48

## 2022-04-21 RX ADMIN — IOPAMIDOL 75 ML: 755 INJECTION, SOLUTION INTRAVENOUS at 06:02

## 2022-04-21 RX ADMIN — SODIUM CHLORIDE: 9 INJECTION, SOLUTION INTRAVENOUS at 08:17

## 2022-04-21 RX ADMIN — IPRATROPIUM BROMIDE AND ALBUTEROL SULFATE 3 ML: 2.5; .5 SOLUTION RESPIRATORY (INHALATION) at 14:32

## 2022-04-21 RX ADMIN — AZITHROMYCIN MONOHYDRATE 500 MG: 500 INJECTION, POWDER, LYOPHILIZED, FOR SOLUTION INTRAVENOUS at 06:23

## 2022-04-21 RX ADMIN — METHYLPREDNISOLONE SODIUM SUCCINATE 40 MG: 40 INJECTION, POWDER, FOR SOLUTION INTRAMUSCULAR; INTRAVENOUS at 10:34

## 2022-04-21 RX ADMIN — IPRATROPIUM BROMIDE AND ALBUTEROL SULFATE 3 ML: 2.5; .5 SOLUTION RESPIRATORY (INHALATION) at 10:12

## 2022-04-21 RX ADMIN — PREGABALIN 75 MG: 75 CAPSULE ORAL at 08:48

## 2022-04-21 RX ADMIN — SODIUM CHLORIDE 80 ML: 9 INJECTION, SOLUTION INTRAVENOUS at 06:02

## 2022-04-21 RX ADMIN — AMLODIPINE BESYLATE 5 MG: 5 TABLET ORAL at 08:51

## 2022-04-21 RX ADMIN — IPRATROPIUM BROMIDE AND ALBUTEROL SULFATE 3 ML: 2.5; .5 SOLUTION RESPIRATORY (INHALATION) at 18:08

## 2022-04-21 RX ADMIN — PREGABALIN 75 MG: 75 CAPSULE ORAL at 14:30

## 2022-04-21 RX ADMIN — SODIUM CHLORIDE, PRESERVATIVE FREE 10 ML: 5 INJECTION INTRAVENOUS at 06:02

## 2022-04-21 RX ADMIN — POTASSIUM CHLORIDE 40 MEQ: 20 TABLET, EXTENDED RELEASE ORAL at 10:34

## 2022-04-21 ASSESSMENT — ENCOUNTER SYMPTOMS
COLOR CHANGE: 0
SINUS PRESSURE: 0
DIARRHEA: 0
NAUSEA: 0
ABDOMINAL DISTENTION: 0
VOMITING: 0
EYES NEGATIVE: 1
EYE REDNESS: 0
COUGH: 1
SORE THROAT: 0
SHORTNESS OF BREATH: 0
CONSTIPATION: 0
SINUS PAIN: 0
SHORTNESS OF BREATH: 1
EYE DISCHARGE: 0
APNEA: 0
ABDOMINAL PAIN: 0
CHEST TIGHTNESS: 0

## 2022-04-21 ASSESSMENT — PAIN SCALES - GENERAL
PAINLEVEL_OUTOF10: 0
PAINLEVEL_OUTOF10: 10
PAINLEVEL_OUTOF10: 0

## 2022-04-21 ASSESSMENT — PAIN DESCRIPTION - ORIENTATION: ORIENTATION: RIGHT

## 2022-04-21 ASSESSMENT — PAIN DESCRIPTION - DESCRIPTORS: DESCRIPTORS: ACHING

## 2022-04-21 ASSESSMENT — PAIN DESCRIPTION - LOCATION: LOCATION: RIB CAGE

## 2022-04-21 NOTE — FLOWSHEET NOTE
[facility]  PROGRESS NOTE    Room # 1105/1105-01   Name: Merlene Samuel            Mandaeism: unknown     Reason for visit: Routine    I visited the patient. Admit Date & Time: 4/21/2022  3:30 AM    Assessment:  Merlene Samuel is a 62 y.o. female in the hospital because of COPD. Upon entering the room the patient was laying in bed resting. No family members were present. Intervention:  I introduced myself and my title as spiritual care provider and the patient engaged in a brief conversation and shares she's doing well and hopes to be discharged later this evening. Writer stated he was glad to hear she was doing well and is excited about her discharge. Patient shares she has no needs or request but is thankful for the visit. Writer stated he will pray for continued comfort even after her discharge. Outcome:  Patient is grateful for the visit and services of spiritual care. Plan:  Chaplains will remain available to offer spiritual and emotional support as needed.     Electronically signed by Miriam Trujillo on 4/21/2022 at 2:47 PM.  Tanvi     04/21/22 [id number]   Encounter Summary   Encounter Overview/Reason  Initial Encounter   Service Provided For: Patient   Referral/Consult From: Ragini   Last Encounter  04/21/22   Complexity of Encounter Low   Encounter    Type Initial Screen/Assessment   Assessment/Intervention/Outcome   Assessment Peaceful;Coping;Calm   Intervention Nurtured Hope;Sustaining Presence/Ministry of presence   Outcome Engaged in conversation;Expressed Gratitude

## 2022-04-21 NOTE — RT PROTOCOL NOTE
order(s) with same Frequency and PRN reasons based on the score. If a patient is on this medication at home then do not decrease Frequency below that used at home. 0-3 - enter or revise RT bronchodilator order(s) to equivalent RT Bronchodilator order with Frequency of every 4 hours PRN for wheezing or increased work of breathing using Per Protocol order mode. 4-6 - enter or revise RT Bronchodilator order(s) to two equivalent RT bronchodilator orders with one order with BID Frequency and one order with Frequency of every 4 hours PRN wheezing or increased work of breathing using Per Protocol order mode. 7-10 - enter or revise RT Bronchodilator order(s) to two equivalent RT bronchodilator orders with one order with TID Frequency and one order with Frequency of every 4 hours PRN wheezing or increased work of breathing using Per Protocol order mode. 11-13 - enter or revise RT Bronchodilator order(s) to one equivalent RT bronchodilator order with QID Frequency and an Albuterol order with Frequency of every 4 hours PRN wheezing or increased work of breathing using Per Protocol order mode. Greater than 13 - enter or revise RT Bronchodilator order(s) to one equivalent RT bronchodilator order with every 4 hours Frequency and an Albuterol order with Frequency of every 2 hours PRN wheezing or increased work of breathing using Per Protocol order mode. RT to enter RT Home Evaluation for COPD & MDI Assessment order using Per Protocol order mode.     Electronically signed by Dennise Guerra RCP on 4/21/2022 at 2:37 PM

## 2022-04-21 NOTE — ED NOTES
Pt presenting to the ED with complaints of SOB, cough, and fatigue. Pt states she has been feeling this way for a few days, and is unsure if she has been around anyone that is sick. Upon arrival, pt was at 85% on room air. Pt was placed on 2L NC and went up to 100%. Pt states she is supposed to wear 2L NC at all times, but only wears it when sleeping and doing physical activity. Pt has hx of COPD and has been using her inhalers with no relief.       Rebeka Cabrera  04/21/22 [id number]       Rebeka Cabrera  04/21/22 [id number]

## 2022-04-21 NOTE — LETTER
[facility] ED  [address] [address]  Phone: [phone]             April 21, 2022    Patient: Josh Jiménez   YOB: 1960   Date of Visit: 4/21/2022       To Whom It May Concern:    Cisco Waldron was seen and treated in our emergency department on 4/21/2022.  She was brought by Kip Restrepo for treatment arriving at 3:26 am.        DR Michelle Srivastava    Signature:__________________________________

## 2022-04-21 NOTE — PROGRESS NOTES
Transitions of Care Pharmacy Service   Medication Review    The patient's list of current home medications has been reviewed. Source(s) of information: spoke to patient and sure scripts     Based on information provided by the above source(s), I have updated the patient's home med list as described below. I changed or updated the following medications on the patient's home medication list:  Discontinued None      Added Ascorbic Acid (VITAMIN C) 500 MG CHEW     Adjusted   folic acid (FOLVITE) 434 MCG tablet  triamcinolone (KENALOG) 0.1 % cream  ipratropium-albuterol (DUONEB) 0.5-2.5 (3) MG/3ML SOLN nebulizer solution     Other Notes None            Please feel free to call me with any questions about this encounter. Thank you. This note will be reviewed and co-signed by the Transitions of Care Pharmacist. The pharmacist will review inpatient orders and contact the physician about any discrepancies. Alec Lugo, pharmacy technician  Transitions of [facility] Pharm[facility] Service  Phone:  [phone]  Fax: [phone]      Electronically signed by Alec Lugo on 4/21/2022 at 11:37 AM         Prior to Admission medications    Medication Sig Start Date End Date Taking? Authorizing Provider   folic acid (FOLVITE) 437 MCG tablet Take 800 mcg by mouth daily       triamcinolone (KENALOG) 0.1 % cream Apply topically once daily. Ascorbic Acid (VITAMIN C) 500 MG CHEW Take 500 mg by mouth daily       levothyroxine (SYNTHROID) 88 MCG tablet take 1 tablet by mouth once daily       pregabalin (LYRICA) 75 MG capsule Take 1 capsule by mouth 3 times daily.        albuterol sulfate  (90 Base) MCG/ACT inhaler INHALE 2 PUFFS INTO THE LUNGS EVERY 4 HOURS AS NEEDED FOR WHEEZING OR SHORTNESS OF BREATH       amLODIPine (NORVASC) 5 MG tablet take 1 tablet by mouth once daily       ipratropium-albuterol (DUONEB) 0.5-2.5 (3) MG/3ML SOLN nebulizer solution Inhale 3 mLs into the lungs 4 times daily as needed

## 2022-04-21 NOTE — PROGRESS NOTES
Discharge teaching and instructions completed with patient. AVS and prescriptions reviewed. Patient verbalized understanding follow up and care planning. IV and telemetry removed.

## 2022-04-21 NOTE — PROGRESS NOTES
Messaged admitting provider that patient was comfortable being discharged per their conversation this morning that the patient may be discharged this evening. Patient reported she will follow up with her PCP and she has 02 at home. Messaged admitting provider that the patient wants to be discharged home tonight and a discharge order for home was entered.

## 2022-04-21 NOTE — RT PROTOCOL NOTE
RT Inhaler-Nebulizer Bronchodilator Protocol Note    There is a bronchodilator order in the chart from a provider indicating to follow the RT Bronchodilator Protocol and there is an Initiate RT Inhaler-Nebulizer Bronchodilator Protocol order as well (see protocol at bottom of note). CXR Findings:  XR CHEST PORTABLE    Result Date: 4/21/2022  1. There is a 1.4 cm nodule in the right lower lobe, superior segment, corresponding to the lesion noted on the previous CT from 02/14/2022, compatible with the patient's known lung malignancy. 2. No acute cardiopulmonary disease. The findings from the last RT Protocol Assessment were as follows:   History Pulmonary Disease: Chronic pulmonary disease (COPD/LUNG CA/CURRENT EXAC)  Respiratory Pattern: Mild dyspnea at rest, irregular pattern, or RR 21-25 bpm  Breath Sounds: Intermittent or unilateral wheezes  Cough: Strong, productive  Indication for Bronchodilator Therapy: Decreased or absent breath sounds,On home bronchodilators,Wheezing associated with pulm disorder  Bronchodilator Assessment Score: 11    Aerosolized bronchodilator medication orders have been revised according to the RT Inhaler-Nebulizer Bronchodilator Protocol below. Respiratory Therapist to perform RT Therapy Protocol Assessment initially then follow the protocol. Repeat RT Therapy Protocol Assessment PRN for score 0-3 or on second treatment, BID, and PRN for scores above 3. No Indications - adjust the frequency to every 6 hours PRN wheezing or bronchospasm, if no treatments needed after 48 hours then discontinue using Per Protocol order mode. If indication present, adjust the RT bronchodilator orders based on the Bronchodilator Assessment Score as indicated below.   Use Inhaler orders unless patient has one or more of the following: on home nebulizer, not able to hold breath for 10 seconds, is not alert and oriented, cannot activate and use MDI correctly, or respiratory rate 25 breaths per minute or more, then use the equivalent nebulizer order(s) with same Frequency and PRN reasons based on the score. If a patient is on this medication at home then do not decrease Frequency below that used at home. 0-3 - enter or revise RT bronchodilator order(s) to equivalent RT Bronchodilator order with Frequency of every 4 hours PRN for wheezing or increased work of breathing using Per Protocol order mode. 4-6 - enter or revise RT Bronchodilator order(s) to two equivalent RT bronchodilator orders with one order with BID Frequency and one order with Frequency of every 4 hours PRN wheezing or increased work of breathing using Per Protocol order mode. 7-10 - enter or revise RT Bronchodilator order(s) to two equivalent RT bronchodilator orders with one order with TID Frequency and one order with Frequency of every 4 hours PRN wheezing or increased work of breathing using Per Protocol order mode. 11-13 - enter or revise RT Bronchodilator order(s) to one equivalent RT bronchodilator order with QID Frequency and an Albuterol order with Frequency of every 4 hours PRN wheezing or increased work of breathing using Per Protocol order mode. Greater than 13 - enter or revise RT Bronchodilator order(s) to one equivalent RT bronchodilator order with every 4 hours Frequency and an Albuterol order with Frequency of every 2 hours PRN wheezing or increased work of breathing using Per Protocol order mode. RT to enter RT Home Evaluation for COPD & MDI Assessment order using Per Protocol order mode.     Electronically signed by Flakita Oropeza RCP on 4/21/2022 at 5:53 AM

## 2022-04-21 NOTE — H&P
[facility]  Office: [address], DO, Noelle Cruz DO, Michael Delgado DO, Lennox Mars Blood, DO, Rita Guzman MD, Neri Hayes MD, Evaristo Lopez MD, Presley Nunez MD, Krystina Sanchez MD, Magdaleno Kwon MD, Alexander Bell MD, Mar Pederson, DO, Chasity Granados, DO, Lia Ge MD,  John Paul Alvarez DO, Burna Cockayne, MD, Michelle Bella MD, Angelika Rice MD, Salas Conklin DO, Rl Davis MD, Irlanda Herzog MD, [facility], [facility] Gomez, CNP, David Bourgeois CNP, Eloina Duong CNP, Jeremy Prince CNP, Sheila Renee CNP, Chele Mock PA-C, Daisey Meigs, [facility], Akash Friedman, CNP, Sharon Curry, CNP, Watson Alonso, CNP, Paddy Duane, CNS, Ladonna Gray, [facility], [place], CNP, Pedro Luis Pack, CNP, Donna Maza, [address]    HISTORY AND PHYSICAL EXAMINATION            Date:   4/21/2022  Patient name:  Gabby Lane  Date of admission:  4/21/2022  3:30 AM  MRN:   [id number]  Account:  [de-identified]  YOB: 1960  PCP:    Staci Dumont MD  Room:   [address] [place] [address]  Code Status:    Full Code    Chief Complaint:     Chief Complaint   Patient presents with    Shortness of Breath       History Obtained From:     patient    History of Present Illness:     Gabby Lane is a 62 y.o. Non- / non  female who presents with Shortness of Breath   and is admitted to the hospital for the management of COPD with acute exacerbation [facility] Patient is a 62 y.o. female with a history significant for COPD and malignant neoplasm of the right lung who presented to the emergency department with shortness of breath, cough, wheezing, fever, chills, and malaise for the last three days. She states she has increased frequency of cough with minimal sputum production. She endorses multiple encounters for similar symptoms, post chemotherapy and radiation treatment.  Her shortness of breath is triamcinolone (KENALOG) 0.1 % cream apply to rash twice a day 9/2/21   Adore Chris MD   ipratropium-albuterol (DUONEB) 0.5-2.5 (3) MG/3ML SOLN nebulizer solution Inhale 3 mLs into the lungs 4 times daily 3/25/21   [facility] MD Ruby   folic acid (FOLVITE) 051 MCG tablet Take 1 tablet by mouth daily  Patient taking differently: Take 800 mcg by mouth daily  10/26/20   Johnny Rocha MD        Allergies:     Morphine, Robitussin (alcohol free) [guaifenesin], Aspirin, Penicillins, and Sulfa antibiotics    Social History:     Tobacco:    reports that she has been smoking. She has a 23.50 pack-year smoking history. She has never used smokeless tobacco.  Alcohol:      reports current alcohol use of about 8.0 standard drinks of alcohol per week. Drug Use:  reports no history of drug use. Family History:     Family History   Problem Relation Age of Onset   Nataliya Harris Breast Cancer Mother     Stroke Mother     Prostate Cancer Father     Prostate Cancer Brother     Deep Vein Thrombosis Brother     Cancer Brother         \"throat cancer\"       Review of Systems:     Positive and Negative as described in HPI. Review of Systems   Constitutional: Positive for chills and fever. HENT: Negative for congestion, sinus pressure, sinus pain and sore throat. Eyes: Negative for discharge and redness. Respiratory: Positive for cough and shortness of breath. Cardiovascular: Negative for chest pain and leg swelling. Gastrointestinal: Negative for abdominal distention, abdominal pain, diarrhea, nausea and vomiting. Endocrine: Negative for polyuria. Genitourinary: Negative for difficulty urinating and dysuria. Musculoskeletal: Positive for myalgias. Negative for joint swelling. Skin: Negative for color change and rash. Neurological: Negative for dizziness and numbness. Hematological: Negative for adenopathy. Does not bruise/bleed easily.    Psychiatric/Behavioral: Negative for agitation and confusion. Physical Exam:   /81   Pulse 81   Temp 99.6 °F (37.6 °C) (Oral)   Resp 19   Ht 5' 3\" (1.6 m)   Wt 131 lb (59.4 kg)   SpO2 95%   BMI 23.21 kg/m²   Temp (24hrs), Av.7 °F (37.6 °C), Min:99.6 °F (37.6 °C), Max:99.7 °F (37.6 °C)    No results for input(s): POCGLU in the last 72 hours. Intake/Output Summary (Last 24 hours) at 2022 1042  Last data filed at 2022 0905  Gross per 24 hour   Intake 250 ml   Output 250 ml   Net 0 ml       Physical Exam  Constitutional:       General: She is not in acute distress. Appearance: She is not ill-appearing. HENT:      Head: Normocephalic and atraumatic. Nose: Nose normal. No congestion or rhinorrhea. Mouth/Throat:      Mouth: Mucous membranes are moist.      Pharynx: Oropharynx is clear. No oropharyngeal exudate. Eyes:      General:         Right eye: No discharge. Left eye: No discharge. Extraocular Movements: Extraocular movements intact. Conjunctiva/sclera: Conjunctivae normal.      Pupils: Pupils are equal, round, and reactive to light. Cardiovascular:      Rate and Rhythm: Normal rate and regular rhythm. Pulses: Normal pulses. Heart sounds: Normal heart sounds. No murmur heard. No friction rub. No gallop. Pulmonary:      Effort: Pulmonary effort is normal. No respiratory distress. Breath sounds: Examination of the right-lower field reveals decreased breath sounds. Examination of the left-lower field reveals decreased breath sounds. Decreased breath sounds present. No wheezing. Abdominal:      General: Abdomen is flat. Bowel sounds are normal. There is no distension. Palpations: Abdomen is soft. Tenderness: There is no abdominal tenderness. Musculoskeletal:         General: Normal range of motion. Cervical back: Normal range of motion and neck supple. No rigidity or tenderness. Right lower leg: No edema. Left lower leg: No edema.    Skin:     General: Skin is warm and dry. Capillary Refill: Capillary refill takes less than 2 seconds. Coloration: Skin is not jaundiced. Findings: No rash. Neurological:      General: No focal deficit present. Mental Status: She is alert and oriented to person, place, and time. Cranial Nerves: No cranial nerve deficit. Motor: No weakness. Psychiatric:         Mood and Affect: Mood normal.         Behavior: Behavior normal.         Investigations:      Laboratory Testing:  Recent Results (from the past 24 hour(s))   EKG 12 Lead    Collection Time: 04/21/22  3:32 AM   Result Value Ref Range    Ventricular Rate 102 BPM    Atrial Rate 102 BPM    P-R Interval 168 ms    QRS Duration 82 ms    Q-T Interval 342 ms    QTc Calculation (Bazett) 445 ms    P Axis 116 degrees    R Axis 96 degrees    T Axis 77 degrees   COVID-19, Rapid    Collection Time: 04/21/22  3:35 AM    Specimen: Nasopharyngeal Swab   Result Value Ref Range    Specimen Description . NASOPHARYNGEAL SWAB     SARS-CoV-2, Rapid Not Detected Not Detected   CBC with Auto Differential    Collection Time: 04/21/22  3:44 AM   Result Value Ref Range    WBC 5.3 3.5 - 11.3 k/uL    RBC 4.11 3.95 - 5.11 m/uL    Hemoglobin 13.0 11.9 - 15.1 g/dL    Hematocrit 38.5 36.3 - 47.1 %    MCV 93.7 82.6 - 102.9 fL    MCH 31.6 25.2 - 33.5 pg    MCHC 33.8 28.4 - 34.8 g/dL    RDW 12.9 11.8 - 14.4 %    Platelets 929 973 - 983 k/uL    MPV 9.1 8.1 - 13.5 fL    NRBC Automated 0.0 0.0 per 100 WBC    Seg Neutrophils 67 (H) 36 - 65 %    Lymphocytes 21 (L) 24 - 43 %    Monocytes 9 3 - 12 %    Eosinophils % 1 1 - 4 %    Basophils 1 0 - 2 %    Immature Granulocytes 1 (H) 0 %    Segs Absolute 3.66 1.50 - 8.10 k/uL    Absolute Lymph # 1.10 1.10 - 3.70 k/uL    Absolute Mono # 0.48 0.10 - 1.20 k/uL    Absolute Eos # 0.03 0.00 - 0.44 k/uL    Basophils Absolute 0.04 0.00 - 0.20 k/uL    Absolute Immature Granulocyte 0.03 0.00 - 0.30 k/uL   Basic Metabolic Panel w/ Reflex to MG Collection Time: 04/21/22  3:44 AM   Result Value Ref Range    Glucose 95 70 - 99 mg/dL    BUN 4 (L) 8 - 23 mg/dL    CREATININE 0.40 (L) 0.50 - 0.90 mg/dL    Bun/Cre Ratio 10 9 - 20    Calcium 9.3 8.6 - 10.4 mg/dL    Sodium 126 (L) 135 - 144 mmol/L    Potassium 3.5 (L) 3.7 - 5.3 mmol/L    Chloride 88 (L) 98 - 107 mmol/L    CO2 24 20 - 31 mmol/L    Anion Gap 14 9 - 17 mmol/L    GFR Non-African American >60 >60 mL/min    GFR African American >60 >60 mL/min    GFR Comment         Procalcitonin    Collection Time: 04/21/22  3:44 AM   Result Value Ref Range    Procalcitonin 0.11 (H) <0.09 ng/mL   Magnesium    Collection Time: 04/21/22  3:44 AM   Result Value Ref Range    Magnesium 1.8 1.6 - 2.6 mg/dL       Imaging/Diagnostics:  XR CHEST PORTABLE    Result Date: 4/21/2022  1. There is a 1.4 cm nodule in the right lower lobe, superior segment, corresponding to the lesion noted on the previous CT from 02/14/2022, compatible with the patient's known lung malignancy. 2. No acute cardiopulmonary disease. CT CHEST PULMONARY EMBOLISM W CONTRAST    Result Date: 4/21/2022  1. The previous cavitary nodule in the right lower lobe has a more solid appearance compared to the 02/14/2022 exam with somewhat irregular margins. While this could be related to a superimposed infectious process, tumor recurrence/progression is suspected. 2. No pulmonary emboli. 3. Extensive centrilobular and paraseptal emphysema. 4. Stable thickening of the adrenal glands bilaterally, asymmetric on the right dating back to 09/13/2021. This is likely related to hyperplasia versus adenomas.        Assessment :      Hospital Problems           Last Modified POA    * (Principal) COPD with acute exacerbation ([address] 4/21/2022 Yes    Alcohol abuse (Chronic) 4/21/2022 Yes    Panlobular emphysema ([address] 4/21/2022 Yes    Overview Signed 12/19/2019  4:20 AM by SONYA Ward - RISSA     multiloculated left-sided pleural effusion with compressive atelectasis Essential hypertension (Chronic) 4/21/2022 Yes    Hyponatremia 4/21/2022 Yes    Coronary artery disease involving native coronary artery of native heart without angina pectoris 4/21/2022 Yes    Malignant neoplasm of lower lobe of right lung ([address] 4/21/2022 Yes          Plan:     Patient status inpatient in the  Progressive Unit/Step down    1. COPD with acute exacerbation   1. Supplemental oxygen, wean as tolerated  2. Start IV methylprednisolone followed by oral prednisone taper  3. Start empiric antibiotic coverage with oral azithromycin  4. Cavitary nodule of the right lower lobe is likely tumor recurrence/ progression however superimposed infectious process remains possible, we will start empiric antibiotic coverage and recommend follow up CT chest in 10-14 days post discharge   2. Essential hypertension   1. Continue home regimen   3. Hypothyroidism   1. Continue home synthroid    4. Malignant neoplasm of the lower lobe of the right lung  1. Cavitary nodule of the right lower lobe is likely tumor recurrence/ progression however superimposed infectious process remains possible, we will start empiric antibiotic coverage and recommend follow up CT chest in 10-14 days post discharge   2. Patient states she has a follow up appointment with her oncologist in august for evaluation of right lower lobe malignancy. This follow-up will need to be accelerated. 5. Alcohol abuse/ hyponatremia   1. Chronic hyponatremia from long term daily alcohol abuse, patient denies experiencing symptoms of withdrawal past or present. 2. Continue home folic acid   3. Recheck sodium later today and evaluate IV fluids needs.      Consultations:   IP CONSULT TO INTERNAL MEDICINE    Patient is admitted as inpatient status because of co-morbidities listed above, severity of signs and symptoms as outlined, requirement for current medical therapies and most importantly because of direct risk to patient if care not provided in a hospital setting. Expected length of stay > 48 hours.     SONYA Bryant NP  4/21/2022  10:42 AM    Copy sent to Dr. Deven Lua MD

## 2022-04-21 NOTE — ED PROVIDER NOTES
EMERGENCY DEPARTMENT ENCOUNTER    Pt Name: Babs Pearson  MRN: [id number]  [place] 1960  Date of evaluation: 4/21/22  CHIEF COMPLAINT       Chief Complaint   Patient presents with    Shortness of Breath     HISTORY OF PRESENT ILLNESS   32-year-old female presents emergency room for shortness of breath cough and wheezing. Patient reports chills as well. Patient has history of COPD. She is on oxygen at night but at this time is requiring it awake. She reports some tightness in the chest.  She has a dry cough. REVIEW OF SYSTEMS     Review of Systems   Constitutional: Negative for activity change, chills and diaphoresis. HENT: Negative for congestion, sinus pain and tinnitus. Eyes: Negative. Respiratory: Negative for apnea, chest tightness and shortness of breath. Gastrointestinal: Negative for abdominal distention, constipation, diarrhea and vomiting. Genitourinary: Negative for difficulty urinating and frequency. Musculoskeletal: Negative for arthralgias and myalgias. Skin: Negative for color change and rash. Neurological: Negative for dizziness. Hematological: Negative. Psychiatric/Behavioral: Negative.         PASTMEDICAL HISTORY     Past Medical History:   Diagnosis Date    Alcohol abuse     For many years; 5-6 beers per night    Breast cancer ([facility] [address] 2010    right radical mastectomy with positive sentinel dose, chemo, s/p tamoxifen x 7 years     Chronic diarrhea 1/29/2019    COPD (chronic obstructive pulmonary disease) ([facility] [address]     Essential hypertension     Lung cancer ([facility] [address]     Pleural effusion 12/26/2019    Tobacco abuse      Past Problem List  Patient Active Problem List   Diagnosis Code    Panlobular emphysema ([facility] [address] J43.1    Essential hypertension I10    History of right breast cancer Z85.3    Loculated pleural effusion J90    Hyponatremia E87.1    Coronary artery disease involving native coronary artery of native heart without angina pectoris I25.10    Tobacco abuse disorder Z72.0    Alcohol abuse F10.10    Leukocytosis D72.829    Adenocarcinoma, lung, 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    Shortness of breath R06.02    Insomnia G47.00    Anxiety F41.9    Malignant neoplasm of lower lobe of right lung (HCC) C34.31    COPD with acute exacerbation (HCC) J44.1     SURGICAL HISTORY       Past Surgical History:   Procedure Laterality Date    APPENDECTOMY      CHOLECYSTECTOMY      MASTECTOMY Right      CURRENT MEDICATIONS       Discharge Medication List as of 4/21/2022  6:40 PM      CONTINUE these medications which have NOT CHANGED    Details   folic acid (FOLVITE) 268 MCG tablet Take 800 mcg by mouth dailyHistorical Med      triamcinolone (KENALOG) 0.1 % cream Apply topically daily Apply topically once daily. , Topical, DAILY, Historical Med      Ascorbic Acid (VITAMIN C) 500 MG CHEW Take 500 mg by mouth dailyHistorical Med      levothyroxine (SYNTHROID) 88 MCG tablet take 1 tablet by mouth once daily, Disp-30 tablet, R-5Normal      pregabalin (LYRICA) 75 MG capsule Take 1 capsule by mouth 3 times daily for 120 days. , Disp-90 capsule, R-5Normal      albuterol sulfate  (90 Base) MCG/ACT inhaler INHALE 2 PUFFS INTO THE LUNGS EVERY 4 HOURS AS NEEDED FOR WHEEZING OR SHORTNESS OF BREATH, Disp-25.5 g, R-1Normal      amLODIPine (NORVASC) 5 MG tablet take 1 tablet by mouth once daily, Disp-30 tablet, R-5Normal      ipratropium-albuterol (DUONEB) 0.5-2.5 (3) MG/3ML SOLN nebulizer solution Inhale 3 mLs into the lungs 4 times daily, Disp-360 mL, R-3Normal           ALLERGIES     is allergic to morphine, robitussin (alcohol free) [guaifenesin], aspirin, penicillins, and sulfa antibiotics. FAMILY HISTORY     She indicated that the status of her mother is unknown. She indicated that the status of her father is unknown. She reported the following about one of her brothers: all 3 brothers had prostate cancer.      SOCIAL HISTORY       Social History     Tobacco Use    Smoking status: Current Every Day Smoker     Packs/day: 0.50     Years: 47.00     Pack years: 23.50    Smokeless tobacco: Never Used   Vaping Use    Vaping Use: Never used   Substance Use Topics    Alcohol use: Yes     Alcohol/week: 8.0 standard drinks     Types: 8 Cans of beer per week     Comment: per night    Drug use: No     PHYSICAL EXAM     INITIAL VITALS: /75   Pulse 92   Temp 98.9 °F (37.2 °C)   Resp 21   Ht 5' 3\" (1.6 m)   Wt 131 lb (59.4 kg)   SpO2 95%   BMI 23.21 kg/m²    Physical Exam  Constitutional:       General: She is not in acute distress. Appearance: She is well-developed. She is ill-appearing. HENT:      Head: Normocephalic. Eyes:      Pupils: Pupils are equal, round, and reactive to light. Cardiovascular:      Rate and Rhythm: Normal rate and regular rhythm. Heart sounds: Normal heart sounds. Pulmonary:      Effort: Pulmonary effort is normal. No respiratory distress. Breath sounds: Wheezing present. Abdominal:      General: Bowel sounds are normal.      Palpations: Abdomen is soft. Tenderness: There is no abdominal tenderness. Musculoskeletal:         General: Normal range of motion. Skin:     General: Skin is warm and dry. Neurological:      Mental Status: She is alert and oriented to person, place, and time. MEDICAL DECISION MAKIN-year-old female presenting to the emergency room for dry harsh cough shortness of breath and wheezing. Patient is currently on 2 L nasal cannula. She has considerable wheezing throughout all lung fields. She has minimal improvement after steroids and breathing treatment. Patient does not have elevated white count. Her COVID test is negative here. Imaging does not show pulmonary embolism. Given her work-up here in the ED she is being treated as COPD exacerbation. Case discussed with [facility] who will accept.     CRITICAL CARE:       PROCEDURES:    Procedures    DIAGNOSTIC RESULTS   EKG:All EKG's are interpreted by the Emergency Department Physician who either signs or Co-signs this chart in the absence of a cardiologist.  EKG sinus rhythm rate 102 premature atrial complexes  Nonspecific ST changes  No STEMI criteria  Motion artifact present affecting interpretation  Right axis deviation          RADIOLOGY:All plain film, CT, MRI, and formal ultrasound images (except ED bedside ultrasound) are read by the radiologist, see reports below, unless otherwisenoted in MDM or here. CT CHEST PULMONARY EMBOLISM W CONTRAST   Final Result   1. The previous cavitary nodule in the right lower lobe has a more solid   appearance compared to the 02/14/2022 exam with somewhat irregular margins. While this could be related to a superimposed infectious process, tumor   recurrence/progression is suspected. 2. No pulmonary emboli. 3. Extensive centrilobular and paraseptal emphysema. 4. Stable thickening of the adrenal glands bilaterally, asymmetric on the   right dating back to 09/13/2021. This is likely related to hyperplasia   versus adenomas. XR CHEST PORTABLE   Final Result   1. There is a 1.4 cm nodule in the right lower lobe, superior segment,   corresponding to the lesion noted on the previous CT from 02/14/2022,   compatible with the patient's known lung malignancy. 2. No acute cardiopulmonary disease. LABS: All lab results were reviewed by myself, and all abnormals are listed below.   Labs Reviewed   CBC WITH AUTO DIFFERENTIAL - Abnormal; Notable for the following components:       Result Value    Seg Neutrophils 67 (*)     Lymphocytes 21 (*)     Immature Granulocytes 1 (*)     All other components within normal limits   BASIC METABOLIC PANEL W/ REFLEX TO MG FOR LOW K - Abnormal; Notable for the following components:    BUN 4 (*)     CREATININE 0.40 (*)     Sodium 126 (*)     Potassium 3.5 (*)     Chloride 88 (*)     All other components within normal limits   PROCALCITONIN - Abnormal; Notable for the following components:    Procalcitonin 0.11 (*)     All other components within normal limits   SODIUM - Abnormal; Notable for the following components:    Sodium 130 (*)     All other components within normal limits   COVID-19, RAPID   MAGNESIUM       EMERGENCY DEPARTMENTCOURSE:         Vitals:    Vitals:    04/21/22 1100 04/21/22 1432 04/21/22 1500 04/21/22 1808   BP: 115/75  118/75    Pulse: 82  92    Resp: 21 23 24 21   Temp: 99 °F (37.2 °C)  98.9 °F (37.2 °C)    TempSrc:       SpO2: 95% 96% 93% 95%   Weight:       Height:           The patient was given the following medications while in the emergency department:  Orders Placed This Encounter   Medications    methylPREDNISolone sodium (SOLU-MEDROL) injection 125 mg    ipratropium-albuterol (DUONEB) nebulizer solution 1 ampule     Order Specific Question:   Initiate RT Bronchodilator Protocol     Answer: Yes    0.9 % sodium chloride bolus    iopamidol (ISOVUE-370) 76 % injection 75 mL    DISCONTD: sodium chloride flush 0.9 % injection 10 mL    DISCONTD: azithromycin (ZITHROMAX) 500 mg in dextrose 5 % 250 mL IVPB     Order Specific Question:   Antimicrobial Indications     Answer:   COPD Exacerbation    DISCONTD: amLODIPine (NORVASC) tablet 5 mg    DISCONTD: folic acid (FOLVITE) tablet 800 mcg    DISCONTD: ipratropium-albuterol (DUONEB) nebulizer solution 3 mL     Order Specific Question:   Initiate RT Bronchodilator Protocol     Answer:    Yes    DISCONTD: levothyroxine (SYNTHROID) tablet 88 mcg    DISCONTD: pregabalin (LYRICA) capsule 75 mg    DISCONTD: 0.9 % sodium chloride infusion    DISCONTD: sodium chloride flush 0.9 % injection 5-40 mL    DISCONTD: sodium chloride flush 0.9 % injection 5-40 mL    DISCONTD: 0.9 % sodium chloride infusion    DISCONTD: ondansetron (ZOFRAN-ODT) disintegrating tablet 4 mg    DISCONTD: ondansetron (ZOFRAN) injection 4 mg    DISCONTD: polyethylene glycol (GLYCOLAX) packet 17 g    DISCONTD: enoxaparin (LOVENOX) injection 40 mg     Order Specific Question:   Indication of Use     Answer:   Prophylaxis-DVT/PE    DISCONTD: acetaminophen (TYLENOL) tablet 650 mg    DISCONTD: acetaminophen (TYLENOL) suppository 650 mg    DISCONTD: albuterol (PROVENTIL) nebulizer solution 2.5 mg     Order Specific Question:   Initiate RT Bronchodilator Protocol     Answer: Yes    DISCONTD: methylPREDNISolone sodium (SOLU-MEDROL) injection 40 mg    DISCONTD: predniSONE (DELTASONE) tablet 40 mg    DISCONTD: azithromycin (ZITHROMAX) tablet 500 mg     Order Specific Question:   Antimicrobial Indications     Answer:   COPD Exacerbation     Order Specific Question:   COPD exacerbation duration of therapy     Answer: Other     Comments:   3 days    HYDROcodone-acetaminophen (NORCO) 5-325 MG per tablet 1 tablet    DISCONTD: potassium chloride (KLOR-CON M) extended release tablet 40 mEq    DISCONTD: potassium bicarb-citric acid (EFFER-K) effervescent tablet 40 mEq    DISCONTD: potassium chloride 10 mEq/100 mL IVPB (Peripheral Line)    DISCONTD: azithromycin (ZITHROMAX) tablet 500 mg     Order Specific Question:   Antimicrobial Indications     Answer:   COPD Exacerbation     Order Specific Question:   COPD exacerbation duration of therapy     Answer: Other     Comments:   3 days    DISCONTD: ipratropium-albuterol (DUONEB) nebulizer solution 3 mL     Order Specific Question:   Initiate RT Bronchodilator Protocol     Answer: Yes    predniSONE (DELTASONE) 10 MG tablet     Sig: Take 4 tablets by mouth daily for 3 days, THEN 3 tablets daily for 3 days, THEN 2 tablets daily for 3 days, THEN 1 tablet daily for 3 days.      Dispense:  30 tablet     Refill:  0    clindamycin (CLEOCIN) 300 MG capsule     Sig: Take 1 capsule by mouth 3 times daily for 7 days     Dispense:  21 capsule     Refill:  0    azithromycin (ZITHROMAX) 500 MG tablet     Sig: Take 1 tablet by mouth daily for 3 doses     Dispense:  3 tablet Refill:  0     CONSULTS:  IP CONSULT TO INTERNAL MEDICINE    FINAL IMPRESSION      1. COPD exacerbation ([facility] [address]    2. Adenocarcinoma, lung, left ([facility] [address]    3. Chronic obstructive pulmonary disease with acute exacerbation ([facility] [address]    4. COPD with acute exacerbation ([facility] [address]          DISPOSITION/PLAN   DISPOSITION Admitted 04/21/2022 07:03:07 AM      PATIENT REFERRED TO:  Kat Toney, [address] [address]  [phone]      hospital follow    DISCHARGE MEDICATIONS:  Discharge Medication List as of 4/21/2022  6:40 PM      START taking these medications    Details   predniSONE (DELTASONE) 10 MG tablet Take 4 tablets by mouth daily for 3 days, THEN 3 tablets daily for 3 days, THEN 2 tablets daily for 3 days, THEN 1 tablet daily for 3 days. , Disp-30 tablet, R-0Normal      clindamycin (CLEOCIN) 300 MG capsule Take 1 capsule by mouth 3 times daily for 7 days, Disp-21 capsule, R-0Normal      azithromycin (ZITHROMAX) 500 MG tablet Take 1 tablet by mouth daily for 3 doses, Disp-3 tablet, R-0Normal           Nita Pederson MD  Attending Emergency Physician      Care during this encounter was due to an unprecedented national emergency due to COVID-19.            Donte Goode MD  04/23/22 1011 [facility] Jen López MD  04/23/22 2605

## 2022-04-21 NOTE — PLAN OF CARE
Problem: Risk for Falls  Goal: Fall prevention  Description: Patient  will remain free from falls as evidenced by no witnessed or reported falls each shift during the inpatient hospice stay. Patient  and or family/caregiver will receive education on fall prevention as evidenced by verbalizing recall of using the call lights system, fall prevention devices, and asking for help during the admission process and ongoing as needed during the inpatient hospice stay. Outcome: Progressing  Note: Siderails up x 2  Hourly rounding  Call light in reach  Instructed to call for assist before attempting out of bed.   Remains free from falls and accidental injury at this time   Floor free from obstacles  Bed is locked and in lowest position  Adequate lighting provided  Bed alarm on, Red Falling star and Stay with Me signs posted

## 2022-04-21 NOTE — CARE COORDINATION
Case Management Initial Discharge Plan  Erica Pitt,             Met with:patient to discuss discharge plans. Information verified: address, contacts, phone number, , insurance Yes  Insurance Provider: none. Nehal Alexandre from [facility] saw the patient    Emergency Contact/Next of Kin name & number: reagan/spouse     [phone]  Who are involved in patient's support system? spouse    PCP: Jacklyn Hansen MD  Date of last visit: 3 months ago      Discharge Planning    Living Arrangements:        Home has 1 stories  3 stairs to climb to get into front door, 0stairs to climb to reach second floor  Location of bedroom/bathroom in home main    Patient able to perform ADL's:Independent    Current Services (outpatient & in home) none  DME equipment: home O2 but it is not the patient's  DME provider: unknown    Is patient receiving oral anticoagulation therapy? No    If indicated:   Physician managing anticoagulation treatment:   Where does patient obtain lab work for ATC treatment? Does patient have any issues/concerns obtaining medications? No  If yes, what are patient's concerns? Is there a preferred Pharmacy after hours or on weekends? Yes    If yes, which pharmacy? Potential Assistance Needed:       Patient agreeable to home care: No  [place] of choice provided:  n/a    Prior SNF/Rehab Placement and Facility: n/a  Agreeable to SNF/Rehab: No  [place] of choice provided: n/a     Evaluation: no    Expected Discharge date:       Patient expects to be discharged to: If home: is the family and/or caregiver wiling & able to provide support at home? yes  Who will be providing this support?  Self and spouse    Follow Up Appointment: Best Day/ Time:      Transportation provider: spouse  Transportation arrangements needed for discharge: No    Readmission Risk              Risk of Unplanned Readmission:  14             Does patient have a readmission risk score greater than 14?: No  If yes, follow-up appointment must be made within 7 days of discharge. Goals of Care:       Educated patient on transitional options, provided freedom of choice and are agreeable with plan      Discharge Plan: COPD exacerbation  Patient lives with spouse in a 1 story home with 3 steps to enter. Declines any skilled needs. Has home O2 but it is from a friend. Has no insurance. Kayode Jenkins from [facility] spoke with the patient. May need a home O2 eval and will need a medication voucher. Continue to follow.            Electronically signed by Stephan Rust RN on 4/21/22 at 1:40 PM EDT

## 2022-04-22 NOTE — DISCHARGE SUMMARY
[facility]  Office: [address], DO, Sujata Burnett, DO, Ramirez Shown, DO, Luis Miguellillimelissa Remeddann Blood, DO, Jose Aguayo MD, Marcus Castellanos MD, Jenna Gao MD, Marcela Allen MD, Ximena Aiken MD, Aramis Lopez MD, Flakita Oropeza MD, Marielle Boateng, DO, Makayla Lopes, DO, Melia Winston MD,  aBsil Hutson, DO, Olga Godoy MD, Melvern Essex, MD, Gwen Izaguirre MD, Camacho Wu, DO, Preethi Jc MD, Devin Whitney MD, Hailey Carl [facility], [facility], [facility], Bhaskar Dietrich, [facility], Chandrika Canas, CNP, Shea Paige, CNP, Yady Felipe, CNP, Angelique Nguyen PA-C, Ranjeet Rojas, [facility], Chitra Parry, CNP, Trinidad Franco, CNP, Deborah Gannon, CNP, Melany Osorio, CNS, Alonzo Howe, [facility], Ross Toth, CNP, Sabiha Parada, CNP, Binh Yousif, [facility]    Discharge Summary     Patient ID: Misty Herring  :  1960   MRN: [id number]     ACCOUNT:  [de-identified]   Patient's PCP: Trevor Powell MD  Admit Date: 2022   Discharge Date: 2022     Length of Stay: 1  Code Status:  Prior  Admitting Physician: Jenna Gao MD  Discharge Physician: SONYA Urbina - PAULA     Active Discharge Diagnoses:     Hospital Problem Lists:  Principal Problem:    COPD with acute exacerbation [facility])  Active Problems:    Alcohol abuse    Panlobular emphysema ([facility]    Essential hypertension    Hyponatremia    Coronary artery disease involving native coronary artery of native heart without angina pectoris    Malignant neoplasm of lower lobe of right lung ([facility]  Resolved Problems:    * No resolved hospital problems.  *      Admission Condition:  fair     Discharged Condition: stable    Hospital Stay:     Hospital Course:  Misty Herring is a 62 y.o. female who was admitted for the management of  COPD with acute exacerbation ([address] , presented to ER with Shortness of Breath    Patient is a 62 y.o. female with a history significant for COPD and malignant neoplasm of the right lung who presented to the emergency department with shortness of breath, cough, wheezing, fever, chills, and malaise for the last three days. She states she has increased frequency of cough with minimal sputum production. She endorses multiple encounters for similar symptoms, post chemotherapy and radiation treatment. Her shortness of breath is worsened with exertion and minimally relieved by extended rest periods. She denies chest pain, nausea, vomiting, and diarrhea.       Oxygen saturations on arrival were 88% she was placed on supplemental oxygen via nasal cannula with immediate resolution of hypoxia. CXR shows a 1.4 cm right lower lobe nodule consistent with her history of lung malignancy and previous scans. CT of the chest reveals a cavitary nodule of the right lower lobe that is appears more solid than previous exams, likely related to tumor recurrence/progression although superimposed infectious process is possible. Laboratory testing reveals hyponatremia (Na+: 126), patient endorses chronic alcohol abuse with 5-6 beers per day. 4/21 - Take the antibiotics exactly as prescribed. Follow-up with your primary care provider in 2 weeks to have a repeat CAT scan done. Follow-up with oncology within 1 month to be evaluated for metastatic process.        Significant therapeutic interventions: As above    Significant Diagnostic Studies:   Labs / Micro:  CBC:   Lab Results   Component Value Date    WBC 5.3 04/21/2022    RBC 4.11 04/21/2022    HGB 13.0 04/21/2022    HCT 38.5 04/21/2022    MCV 93.7 04/21/2022    MCH 31.6 04/21/2022    MCHC 33.8 04/21/2022    RDW 12.9 04/21/2022     04/21/2022     BMP:    Lab Results   Component Value Date    GLUCOSE 95 04/21/2022     04/21/2022    K 3.5 04/21/2022    CL 88 04/21/2022    CO2 24 04/21/2022    ANIONGAP 14 04/21/2022    BUN 4 04/21/2022    CREATININE 0.40 04/21/2022    BUNCRER 10 04/21/2022    CALCIUM 9.3 04/21/2022 LABGLOM >60 04/21/2022    GFRAA >60 04/21/2022    GFR      04/21/2022        Radiology:  XR CHEST PORTABLE    Result Date: 4/21/2022  1. There is a 1.4 cm nodule in the right lower lobe, superior segment, corresponding to the lesion noted on the previous CT from 02/14/2022, compatible with the patient's known lung malignancy. 2. No acute cardiopulmonary disease. CT CHEST PULMONARY EMBOLISM W CONTRAST    Result Date: 4/21/2022  1. The previous cavitary nodule in the right lower lobe has a more solid appearance compared to the 02/14/2022 exam with somewhat irregular margins. While this could be related to a superimposed infectious process, tumor recurrence/progression is suspected. 2. No pulmonary emboli. 3. Extensive centrilobular and paraseptal emphysema. 4. Stable thickening of the adrenal glands bilaterally, asymmetric on the right dating back to 09/13/2021. This is likely related to hyperplasia versus adenomas. Consultations:    Consults:     Final Specialist Recommendations/Findings:   IP CONSULT TO INTERNAL MEDICINE      The patient was seen and examined on day of discharge and this discharge summary is in conjunction with any daily progress note from day of discharge. Discharge plan:     Disposition: Home    Physician Follow Up:     Vin Nguyen MD  [address] [place] [address]  [phone]      hospital follow       Requiring Further Evaluation/Follow Up POST HOSPITALIZATION/Incidental Findings: CT findings    Diet: regular diet    Activity: As tolerated    Instructions to Patient: Take the antibiotics exactly as prescribed. Follow-up with your primary care provider in 2 weeks to have a repeat CAT scan done.   Follow-up with oncology within 1 month to be evaluated for metastatic process    Discharge Medications:      Medication List      START taking these medications    azithromycin 500 MG tablet  Commonly known as: ZITHROMAX  Take 1 tablet by mouth daily for 3 doses clindamycin 300 MG capsule  Commonly known as: CLEOCIN  Take 1 capsule by mouth 3 times daily for 7 days     predniSONE 10 MG tablet  Commonly known as: DELTASONE  Take 4 tablets by mouth daily for 3 days, THEN 3 tablets daily for 3 days, THEN 2 tablets daily for 3 days, THEN 1 tablet daily for 3 days. Start taking on: April 23, 2022        CHANGE how you take these medications    folic acid 210 MCG tablet  Commonly known as: [place]  What changed: Another medication with the same name was removed. Continue taking this medication, and follow the directions you see here. CONTINUE taking these medications    albuterol sulfate  (90 Base) MCG/ACT inhaler  INHALE 2 PUFFS INTO THE LUNGS EVERY 4 HOURS AS NEEDED FOR WHEEZING OR SHORTNESS OF BREATH     amLODIPine 5 MG tablet  Commonly known as: NORVASC  take 1 tablet by mouth once daily     ipratropium-albuterol 0.5-2.5 (3) MG/3ML Soln nebulizer solution  Commonly known as: DUONEB  Inhale 3 mLs into the lungs 4 times daily     levothyroxine 88 MCG tablet  Commonly known as: SYNTHROID  take 1 tablet by mouth once daily     pregabalin 75 MG capsule  Commonly known as: Lyrica  Take 1 capsule by mouth 3 times daily for 120 days. triamcinolone 0.1 % cream  Commonly known as: KENALOG     Vitamin C 500 MG Chew           Where to Get Your Medications      These medications were sent to [address] [place] [address] Neva Garvin [phone]  [address] [place] [id number]    Phone: [phone]   · azithromycin 500 MG tablet  · clindamycin 300 MG capsule  · predniSONE 10 MG tablet         No discharge procedures on file. Time Spent on discharge is  33 mins in patient examination, evaluation, counseling as well as medication reconciliation, prescriptions for required medications, discharge plan and follow up.     Electronically signed by   SONYA Loewry NP  4/22/2022  7:44 AM      Thank you Dr. Sophie Ibarra MD for the opportunity to be involved in this patient's care.

## 2022-04-25 ENCOUNTER — TELEPHONE (OUTPATIENT)
Dept: RADIATION ONCOLOGY | Age: 62
End: 2022-04-25

## 2022-04-25 NOTE — TELEPHONE ENCOUNTER
Patient spouse called in requesting you review the recent CT scan done in the hospital.  Patient has a follow up CT in August and visit after. Please advise if you need anything done from rad onc standpoint thank you.

## 2022-04-29 ENCOUNTER — TELEPHONE (OUTPATIENT)
Dept: RADIATION ONCOLOGY | Age: 62
End: 2022-04-29

## 2022-04-29 NOTE — TELEPHONE ENCOUNTER
Patient's  called and left  for our department that patient was not feeling well and requested guidance. Stated her bp is 140's/80's, temp 99 to \"almost 101,\" pulse ox is 89% on exertion. There are several notes regarding her COPD and recent hospitalization. She continues on Prednisone and is not taking any antibiotics ( states she stopped Clindamycin because it gave her diarrhea). Patient told  \"something is not right. \"   states she her breathing is not worse since being discharged. She does not have a pulmonologist.   states PCP office told him she is too sick to come in for appt. RN reviewed with Dr. Monico Nageotte and again told  he is not overly concerned from a lung cancer standpoint with recent CT chest.  RN called PCP and obtained appt for 5/3 at 11:30.  instructed if breathing worsens prior to this that pt would need to go the ER. Requested possible referral to Pulmonologist from PCP when pt is there next week for that visit.  verbalized understanding and agreeable to plan.

## 2022-05-02 ENCOUNTER — TELEPHONE (OUTPATIENT)
Dept: GASTROENTEROLOGY | Age: 62
End: 2022-05-02

## 2022-05-02 RX ORDER — POLYETHYLENE GLYCOL 3350, SODIUM SULFATE ANHYDROUS, SODIUM BICARBONATE, SODIUM CHLORIDE, POTASSIUM CHLORIDE 236; 22.74; 6.74; 5.86; 2.97 G/4L; G/4L; G/4L; G/4L; G/4L
4 POWDER, FOR SOLUTION ORAL ONCE
Qty: 4000 ML | Refills: 0 | Status: SHIPPED | OUTPATIENT
Start: 2022-05-02 | End: 2022-05-02

## 2022-05-02 NOTE — TELEPHONE ENCOUNTER
Dr Jose Zelaya ordered a barium enema test which is scheduled 5/4/22 at [facility]. Per Tessy Oneill, pt needs golytely sent. Called and spoke to Ryan Weiner. Prep instructions given and emailed to [email or web address]

## 2022-05-03 ENCOUNTER — OFFICE VISIT (OUTPATIENT)
Dept: FAMILY MEDICINE CLINIC | Age: 62
End: 2022-05-03

## 2022-05-03 VITALS
OXYGEN SATURATION: 92 % | TEMPERATURE: 98.2 F | DIASTOLIC BLOOD PRESSURE: 74 MMHG | SYSTOLIC BLOOD PRESSURE: 118 MMHG | BODY MASS INDEX: 23.13 KG/M2 | HEART RATE: 95 BPM | WEIGHT: 130.6 LBS

## 2022-05-03 DIAGNOSIS — J44.1 CHRONIC OBSTRUCTIVE PULMONARY DISEASE WITH ACUTE EXACERBATION (HCC): ICD-10-CM

## 2022-05-03 DIAGNOSIS — Z12.31 ENCOUNTER FOR SCREENING MAMMOGRAM FOR BREAST CANCER: Primary | ICD-10-CM

## 2022-05-03 DIAGNOSIS — C34.92 ADENOCARCINOMA, LUNG, LEFT (HCC): ICD-10-CM

## 2022-05-03 DIAGNOSIS — Z09 HOSPITAL DISCHARGE FOLLOW-UP: ICD-10-CM

## 2022-05-03 PROCEDURE — 99214 OFFICE O/P EST MOD 30 MIN: CPT | Performed by: INTERNAL MEDICINE

## 2022-05-03 PROCEDURE — 1111F DSCHRG MED/CURRENT MED MERGE: CPT | Performed by: INTERNAL MEDICINE

## 2022-05-03 RX ORDER — ALBUTEROL SULFATE 90 UG/1
AEROSOL, METERED RESPIRATORY (INHALATION)
Qty: 25.5 G | Refills: 1 | Status: SHIPPED | OUTPATIENT
Start: 2022-05-03

## 2022-05-03 RX ORDER — ALBUTEROL SULFATE 2.5 MG/3ML
2.5 SOLUTION RESPIRATORY (INHALATION) EVERY 6 HOURS PRN
Qty: 120 EACH | Refills: 3 | Status: SHIPPED | OUTPATIENT
Start: 2022-05-03

## 2022-05-03 RX ORDER — IPRATROPIUM BROMIDE AND ALBUTEROL SULFATE 2.5; .5 MG/3ML; MG/3ML
1 SOLUTION RESPIRATORY (INHALATION) 4 TIMES DAILY
Qty: 360 ML | Refills: 3 | Status: SHIPPED | OUTPATIENT
Start: 2022-05-03

## 2022-05-03 RX ORDER — METHYLPREDNISOLONE 4 MG/1
TABLET ORAL
Qty: 1 KIT | Refills: 0 | Status: SHIPPED | OUTPATIENT
Start: 2022-05-03 | End: 2022-07-28 | Stop reason: ALTCHOICE

## 2022-05-03 RX ORDER — CEPHALEXIN 500 MG/1
500 CAPSULE ORAL 3 TIMES DAILY
Qty: 21 CAPSULE | Refills: 0 | Status: SHIPPED | OUTPATIENT
Start: 2022-05-03 | End: 2022-05-10

## 2022-05-03 NOTE — PROGRESS NOTES
Pt is here for a hospital F/U due to COPD exacerbation  Pt is having a hard time keeping O2 levels up.  She states at home is in the high 80's sometimes

## 2022-05-03 NOTE — PATIENT INSTRUCTIONS
Call [phone] or fill out the form at [email or web address] for patient assistance program for Treleg[employer].   Use your duoneb every six hours

## 2022-05-03 NOTE — PROGRESS NOTES
Post-Discharge Transitional Care Follow Up      Whit Orozco   YOB: 1960    Date of Office Visit:  5/3/2022  Date of Hospital Admission: 4/21/22  Date of Hospital Discharge: 4/21/22  Readmission Risk Score (high >=14%. Medium >=10%):Readmission Risk Score: 9.8 ( )      Care management risk score Rising risk (score 2-5) and Complex Care (Scores >=6): 2     Non face to face  following discharge, date last encounter closed (first attempt may have been earlier): *No documented post hospital discharge outreach found in the last 14 days     Call initiated 2 business days of discharge: *No response recorded in the last 14 days     Encounter for screening mammogram for breast cancer  Chronic obstructive pulmonary disease with acute exacerbation ([facility]  -     ipratropium-albuterol (DUONEB) 0.5-2.5 (3) MG/3ML SOLN nebulizer solution; Inhale 3 mLs into the lungs 4 times daily, Disp-360 mL, R-3Normal  -     albuterol sulfate  (90 Base) MCG/ACT inhaler; INHALE 2 PUFFS INTO THE LUNGS EVERY 4 HOURS AS NEEDED FOR WHEEZING OR SHORTNESS OF BREATH, Disp-25.5 g, R-1Normal  -     ID DISCHARGE MEDS RECONCILED W/ CURRENT OUTPATIENT MED LIST  -     fluticasone-umeclidin-vilant (TRELEGY ELLIPTA) 100-62.5-25 MCG/INH AEPB; Inhale 1 puff into the lungs daily, Disp-1 each, R-5Print  -     Mare Lopes MD, Pulmonology, [place]  -     albuterol (PROVENTIL) (2.5 MG/3ML) 0.083% nebulizer solution; Take 3 mLs by nebulization every 6 hours as needed for Wheezing or Shortness of Breath, Disp-120 each, R-3Normal  -     cephALEXin (KEFLEX) 500 MG capsule; Take 1 capsule by mouth 3 times daily for 7 days, Disp-21 capsule, R-0Normal  -     methylPREDNISolone (MEDROL DOSEPACK) 4 MG tablet;  Take by mouth., Disp-1 kit, R-0Normal  Adenocarcinoma, lung, left (H[facility])  -     albuterol sulfate  (90 Base) MCG/ACT inhaler; INHALE 2 PUFFS INTO THE LUNGS EVERY 4 HOURS AS NEEDED FOR WHEEZING OR SHORTNESS OF BREATH, Disp-25.5 g, R-1Normal  -     [place] DISCHARGE MEDS RECONCILED W/ CURRENT OUTPATIENT MED LIST  -     CT CHEST W CONTRAST; Future  -     Dannielle Ojeda MD, Pulmonology, [facility] discharge follow-up  -     [place] DISCHARGE MEDS RECONCILED W/ CURRENT OUTPATIENT MED LIST      Medical Decision Making: high complexity  No follow-ups on file. On this date 5/3/2022 I have spent 15 minutes reviewing previous notes, test results and face to face with the patient discussing the diagnosis and importance of compliance with the treatment plan as well as documenting on the day of the visit. Subjective:   Per d/c summary, \" Gabby Lane is a 62 y.o. female who was admitted for the management of  COPD with acute exacerbation ([facility] , presented to ER with Shortness of Breath     Patient is a 62 y.o. female with a history significant for COPD and malignant neoplasm of the right lung who presented to the emergency department with shortness of breath, cough, wheezing, fever, chills, and malaise for the last three days. She states she has increased frequency of cough with minimal sputum production. She endorses multiple encounters for similar symptoms, post chemotherapy and radiation treatment. Her shortness of breath is worsened with exertion and minimally relieved by extended rest periods. She denies chest pain, nausea, vomiting, and diarrhea.       Oxygen saturations on arrival were 88% she was placed on supplemental oxygen via nasal cannula with immediate resolution of hypoxia. CXR shows a 1.4 cm right lower lobe nodule consistent with her history of lung malignancy and previous scans. CT of the chest reveals a cavitary nodule of the right lower lobe that is appears more solid than previous exams, likely related to tumor recurrence/progression although superimposed infectious process is possible.  Laboratory testing reveals hyponatremia (Na+: 126), patient endorses chronic alcohol abuse with 5-6 beers per day.      4/21 - Take the antibiotics exactly as prescribed. Follow-up with your primary care provider in 2 weeks to have a repeat CAT scan done. Follow-up with oncology within 1 month to be evaluated for metastatic process. \"      Inpatient course: Discharge summary reviewed- see chart. Interval history/Current status: oxygen level is fluctuating - after going to bathroom her oxygen level is 92-93%, then 20 minutes later drops to 86-87% and comes back up. She is not short of breath with moving around. Not on inhalers due to cost   She continues to drink 5-6 beers daily   Working on cutting back on smoking, not very successful, declines help because it helps with her anxiety around her health issues     Patient Active Problem List   Diagnosis    Panlobular emphysema ([address]    Essential hypertension    History of right breast cancer    Loculated pleural effusion    Hyponatremia    Coronary artery disease involving native coronary artery of native heart without angina pectoris    Tobacco abuse disorder    Alcohol abuse    Leukocytosis    Adenocarcinoma, lung, left ([address]    Shortness of breath    Insomnia    Anxiety    Malignant neoplasm of lower lobe of right lung ([address]    COPD with acute exacerbation ([address]       Medications listed as ordered at the time of discharge from hospital     Medication List            Accurate as of May 3, 2022 11:49 AM. If you have any questions, ask your nurse or doctor.                 CONTINUE taking these medications      albuterol sulfate  (90 Base) MCG/ACT inhaler  INHALE 2 PUFFS INTO THE LUNGS EVERY 4 HOURS AS NEEDED FOR WHEEZING OR SHORTNESS OF BREATH     amLODIPine 5 MG tablet  Commonly known as: NORVASC  take 1 tablet by mouth once daily     folic acid 476 MCG tablet  Commonly known as: FOLVITE     ipratropium-albuterol 0.5-2.5 (3) MG/3ML Soln nebulizer solution  Commonly known as: DUONEB  Inhale 3 mLs into the lungs 4 times daily     levothyroxine 88 MCG tablet  Commonly known as: SYNTHROID  take 1 tablet by mouth once daily     pregabalin 75 MG capsule  Commonly known as: Lyrica  Take 1 capsule by mouth 3 times daily for 120 days. triamcinolone 0.1 % cream  Commonly known as: KENALOG            STOP taking these medications      predniSONE 10 MG tablet  Commonly known as: Merrill Radha by: Ike Byrd MD     Vitamin C 500 MG Chew  Stopped by: Ike Byrd MD               Medications marked \"taking\" at this time  Outpatient Medications Marked as Taking for the 5/3/22 encounter (Office Visit) with Sravan Chino MD   Medication Sig Dispense Refill    folic acid (FOLVITE) 566 MCG tablet Take 800 mcg by mouth daily      triamcinolone (KENALOG) 0.1 % cream Apply topically daily Apply topically once daily.  levothyroxine (SYNTHROID) 88 MCG tablet take 1 tablet by mouth once daily 30 tablet 5    pregabalin (LYRICA) 75 MG capsule Take 1 capsule by mouth 3 times daily for 120 days. 90 capsule 5    albuterol sulfate  (90 Base) MCG/ACT inhaler INHALE 2 PUFFS INTO THE LUNGS EVERY 4 HOURS AS NEEDED FOR WHEEZING OR SHORTNESS OF BREATH 25.5 g 1    amLODIPine (NORVASC) 5 MG tablet take 1 tablet by mouth once daily 30 tablet 5    ipratropium-albuterol (DUONEB) 0.5-2.5 (3) MG/3ML SOLN nebulizer solution Inhale 3 mLs into the lungs 4 times daily 360 mL 3        Medications patient taking as of now reconciled against medications ordered at time of hospital discharge: Yes    Review of Systems   Constitutional: Negative for fatigue, fever and unexpected weight change. Respiratory: Positive for cough, chest tightness, shortness of breath and wheezing. Negative for choking. Cardiovascular: Negative for chest pain, palpitations and leg swelling. Gastrointestinal: Negative for abdominal pain, anal bleeding, blood in stool, constipation, diarrhea, nausea and vomiting. Endocrine: Negative.     Musculoskeletal: Negative for joint swelling and myalgias. Skin: Negative. Neurological: Negative for dizziness. Psychiatric/Behavioral: Negative for sleep disturbance. All other systems reviewed and are negative. Objective:    /74   Pulse 95   Temp 98.2 °F (36.8 °C) (Temporal)   Wt 130 lb 9.6 oz (59.2 kg)   SpO2 92% Comment: dropped to 91  BMI 23.13 kg/m²   Physical Exam  Vitals and nursing note reviewed. Constitutional:       General: She is not in acute distress. Appearance: She is well-developed. She is not ill-appearing. Eyes:      General: Lids are normal. Vision grossly intact. Cardiovascular:      Rate and Rhythm: Normal rate and regular rhythm. Heart sounds: Normal heart sounds, S1 normal and S2 normal. No murmur heard. No friction rub. No gallop. Pulmonary:      Effort: Pulmonary effort is normal. No respiratory distress. Breath sounds: Decreased breath sounds and wheezing present. Abdominal:      General: Bowel sounds are normal.      Palpations: Abdomen is soft. There is no mass. Tenderness: There is no abdominal tenderness. There is no guarding. Musculoskeletal:         General: Normal range of motion. Skin:     General: Skin is warm and dry. Capillary Refill: Capillary refill takes less than 2 seconds. Neurological:      General: No focal deficit present. Mental Status: She is alert and oriented to person, place, and time. An electronic signature was used to authenticate this note.   --Nivia Green MD

## 2022-05-04 ENCOUNTER — TELEPHONE (OUTPATIENT)
Dept: GASTROENTEROLOGY | Age: 62
End: 2022-05-04

## 2022-05-04 ENCOUNTER — HOSPITAL ENCOUNTER (OUTPATIENT)
Dept: GENERAL RADIOLOGY | Age: 62
Discharge: HOME | End: 2022-05-06

## 2022-05-04 DIAGNOSIS — K59.09 CHRONIC CONSTIPATION: ICD-10-CM

## 2022-05-04 PROCEDURE — 74280 X-RAY XM COLON 2CNTRST STD: CPT

## 2022-05-04 PROCEDURE — A4641 RADIOPHARM DX AGENT NOC: HCPCS | Performed by: INTERNAL MEDICINE

## 2022-05-04 PROCEDURE — 6360000004 HC RX CONTRAST MEDICATION: Performed by: INTERNAL MEDICINE

## 2022-05-04 RX ADMIN — BARIUM SULFATE 1200 ML: 1.05 SUSPENSION ORAL; RECTAL at 15:09

## 2022-05-04 NOTE — TELEPHONE ENCOUNTER
Radiology called and would like to speak with either dedrick or his nurse regarding barium enema. They need to be able to speak with someone today or it will be cancelled.      Please call Dr Holly Rider [phone]

## 2022-05-10 ASSESSMENT — ENCOUNTER SYMPTOMS
DIARRHEA: 0
BLOOD IN STOOL: 0
NAUSEA: 0
ANAL BLEEDING: 0
CHEST TIGHTNESS: 1
ABDOMINAL PAIN: 0
VOMITING: 0
SHORTNESS OF BREATH: 1
WHEEZING: 1
CHOKING: 0
CONSTIPATION: 0
COUGH: 1

## 2022-05-10 ASSESSMENT — VISUAL ACUITY: OU: 1

## 2022-05-25 ENCOUNTER — OFFICE VISIT (OUTPATIENT)
Dept: GASTROENTEROLOGY | Age: 62
End: 2022-05-25

## 2022-05-25 VITALS
SYSTOLIC BLOOD PRESSURE: 117 MMHG | WEIGHT: 132.5 LBS | DIASTOLIC BLOOD PRESSURE: 74 MMHG | TEMPERATURE: 97.7 F | BODY MASS INDEX: 23.47 KG/M2 | HEART RATE: 95 BPM | OXYGEN SATURATION: 93 %

## 2022-05-25 DIAGNOSIS — C34.92 ADENOCARCINOMA, LUNG, LEFT (HCC): ICD-10-CM

## 2022-05-25 DIAGNOSIS — K59.09 CHRONIC CONSTIPATION: Primary | ICD-10-CM

## 2022-05-25 PROCEDURE — APPSS30 APP SPLIT SHARED TIME 16-30 MINUTES: Performed by: NURSE PRACTITIONER

## 2022-05-25 PROCEDURE — 99213 OFFICE O/P EST LOW 20 MIN: CPT | Performed by: INTERNAL MEDICINE

## 2022-05-25 RX ORDER — SENNA PLUS 8.6 MG/1
1 TABLET ORAL DAILY
COMMUNITY

## 2022-05-25 RX ORDER — LUBIPROSTONE 24 UG/1
24 CAPSULE, GELATIN COATED ORAL 2 TIMES DAILY WITH MEALS
Qty: 60 CAPSULE | Refills: 3 | Status: SHIPPED | OUTPATIENT
Start: 2022-05-25 | End: 2022-05-26 | Stop reason: DRUGHIGH

## 2022-05-25 ASSESSMENT — ENCOUNTER SYMPTOMS
DIARRHEA: 0
COUGH: 0
TROUBLE SWALLOWING: 0
SORE THROAT: 0
CONSTIPATION: 1
RECTAL PAIN: 0
NAUSEA: 0
ANAL BLEEDING: 0
BLOOD IN STOOL: 0
SHORTNESS OF BREATH: 0
VOMITING: 0
BACK PAIN: 0
CHOKING: 0
VOICE CHANGE: 0
ABDOMINAL PAIN: 1
ABDOMINAL DISTENTION: 1

## 2022-05-25 NOTE — PROGRESS NOTES
GI OFFICE FOLLOW UP    INTERVAL HISTORY:   No referring provider defined for this encounter. Chief Complaint   Patient presents with    Constipation     Patient is here today to f/u on barium enema       1. Chronic constipation    2. Adenocarcinoma, lung, left ([facility]              HISTORY OF PRESENT ILLNESS:   Patient seen for follow-up of chronic constipation. Per patient choice, patient had a barium enema done and I reviewed the report with the patient. Patient has been using senna for long time and the finding of featureless mucosa in the left colon may be secondary to chronic senna use. At present, with senna use she is able to move bowels satisfactorily. No melena or hematochezia. Reports loose stools. No formed BM. States she has tried Miralax, Metamucil, prune juice in the past with unsatisfactory results    No weight loss  Has good appetite  No nausea, vomiting  No dysphagia, odynophagia        Past Medical,Family, and Social History reviewed and does contribute to the patient presenting condition. Patient's PMH/PSH,SH,PSYCH Hx, MEDs, ALLERGIES, and ROS were all reviewed and updated in the appropriate sections.  Yes      PAST MEDICAL HISTORY:  Past Medical History:   Diagnosis Date    Alcohol abuse     For many years; 5-6 beers per night    Breast cancer ([facility] [address] 2010    right radical mastectomy with positive sentinel dose, chemo, s/p tamoxifen x 7 years     Chronic diarrhea 1/29/2019    COPD (chronic obstructive pulmonary disease) (HCC)     Essential hypertension     Lung cancer ([facility] [address] [address]     Pleural effusion 12/26/2019    Tobacco abuse        Past Surgical History:   Procedure Laterality Date    APPENDECTOMY      CHOLECYSTECTOMY      MASTECTOMY Right        CURRENT MEDICATIONS:    Current Outpatient Medications:     senna (SENOKOT) 8.6 MG tablet, Take 1 tablet by mouth daily, Disp: , Rfl:     lubiprostone (AMITIZA) 24 MCG capsule, Take 1 capsule by mouth 2 times daily (with meals), Disp: 60 capsule, Rfl: 3    ipratropium-albuterol (DUONEB) 0.5-2.5 (3) MG/3ML SOLN nebulizer solution, Inhale 3 mLs into the lungs 4 times daily, Disp: 360 mL, Rfl: 3    albuterol sulfate  (90 Base) MCG/ACT inhaler, INHALE 2 PUFFS INTO THE LUNGS EVERY 4 HOURS AS NEEDED FOR WHEEZING OR SHORTNESS OF BREATH, Disp: 25.5 g, Rfl: 1    fluticasone-umeclidin-vilant (TRELEGY ELLIPTA) 100-62.5-25 MCG/INH AEPB, Inhale 1 puff into the lungs daily, Disp: 1 each, Rfl: 5    albuterol (PROVENTIL) (2.5 MG/3ML) 0.083% nebulizer solution, Take 3 mLs by nebulization every 6 hours as needed for Wheezing or Shortness of Breath, Disp: 120 each, Rfl: 3    methylPREDNISolone (MEDROL DOSEPACK) 4 MG tablet, Take by mouth., Disp: 1 kit, Rfl: 0    folic acid (FOLVITE) 482 MCG tablet, Take 800 mcg by mouth daily, Disp: , Rfl:     triamcinolone (KENALOG) 0.1 % cream, Apply topically daily Apply topically once daily. , Disp: , Rfl:     levothyroxine (SYNTHROID) 88 MCG tablet, take 1 tablet by mouth once daily, Disp: 30 tablet, Rfl: 5    pregabalin (LYRICA) 75 MG capsule, Take 1 capsule by mouth 3 times daily for 120 days. , Disp: 90 capsule, Rfl: 5    amLODIPine (NORVASC) 5 MG tablet, take 1 tablet by mouth once daily, Disp: 30 tablet, Rfl: 5    ALLERGIES:   Allergies   Allergen Reactions    Morphine     Azithromycin Hives    Robitussin (Alcohol Free) [Guaifenesin] Hives    Aspirin Hives and Rash    Penicillins Hives and Rash    Sulfa Antibiotics Hives and Rash       FAMILY HISTORY:       Problem Relation Age of Onset    Breast Cancer Mother     Stroke Mother     Prostate Cancer Father     Prostate Cancer Brother     Deep Vein Thrombosis Brother     Cancer Brother         \"throat cancer\"         SOCIAL HISTORY:   Social History     Socioeconomic History    Marital status:      Spouse name: Not on file    Number of children: Not on file    Years of education: Not on file    Highest education level: Not on file   Occupational History    Not on file   Tobacco Use    Smoking status: Current Every Day Smoker     Packs/day: 0.50     Years: 47.00     Pack years: 23.50    Smokeless tobacco: Never Used   Vaping Use    Vaping Use: Never used   Substance and Sexual Activity    Alcohol use: Yes     Alcohol/week: 8.0 standard drinks     Types: 8 Cans of beer per week     Comment: per night    Drug use: No    Sexual activity: Yes   Other Topics Concern    Not on file   Social History Narrative    Not on file     Social Determinants of Health     Financial Resource Strain: Low Risk     Difficulty of Paying Living Expenses: Not very hard   Food Insecurity: No Food Insecurity    Worried About Running Out of Food in the Last Year: Never true    Linsey of Food in the Last Year: Never true   Transportation Needs:     Lack of Transportation (Medical): Not on file    Lack of Transportation (Non-Medical):  Not on file   Physical Activity:     Days of Exercise per Week: Not on file    Minutes of Exercise per Session: Not on file   Stress:     Feeling of Stress : Not on file   Social Connections:     Frequency of Communication with Friends and Family: Not on file    Frequency of Social Gatherings with Friends and Family: Not on file    Attends Buddhist Services: Not on file    Active Member of [address] [place] [address] or Organizations: Not on file    Attends Club or Organization Meetings: Not on file    Marital Status: Not on file   Intimate Partner Violence:     Fear of Current or Ex-Partner: Not on file    Emotionally Abused: Not on file    Physically Abused: Not on file    Sexually Abused: Not on file   Housing Stability:     Unable to Pay for Housing in the Last Year: Not on file    Number of [place] in the Last Year: Not on file    Unstable Housing in the Last Year: Not on file         REVIEW OF SYSTEMS: Review of Systems   Constitutional: Positive for unexpected weight change. Negative for appetite change and fatigue. HENT: Negative for dental problem, sore throat, trouble swallowing and voice change. Eyes: Positive for visual disturbance (glasses). Respiratory: Negative for cough, choking and shortness of breath. Cardiovascular: Negative for chest pain and leg swelling. Gastrointestinal: Positive for abdominal distention, abdominal pain and constipation. Negative for anal bleeding, blood in stool, diarrhea, nausea, rectal pain and vomiting. Genitourinary: Negative. Negative for difficulty urinating. Musculoskeletal: Negative for back pain, joint swelling and myalgias. Neurological: Negative for dizziness, tremors, weakness, light-headedness, numbness and headaches. Hematological: Bruises/bleeds easily (bruise). Psychiatric/Behavioral: Negative for sleep disturbance. The patient is not nervous/anxious. PHYSICAL EXAMINATION:     Vital signs reviewed per the nursing documentation. /74   Pulse 95   Temp 97.7 °F (36.5 °C)   Wt 132 lb 8 oz (60.1 kg)   SpO2 93%   BMI 23.47 kg/m²   Body mass index is 23.47 kg/m². Physical Exam  Vitals and nursing note reviewed. Constitutional:       Appearance: She is well-developed. HENT:      Head: Normocephalic and atraumatic. Eyes:      General: No scleral icterus. Conjunctiva/sclera: Conjunctivae normal.      Pupils: Pupils are equal, round, and reactive to light. Neck:      Thyroid: No thyromegaly. Vascular: No hepatojugular reflux or JVD. Trachea: No tracheal deviation. Cardiovascular:      Rate and Rhythm: Normal rate and regular rhythm. Heart sounds: Normal heart sounds. Pulmonary:      Effort: Pulmonary effort is normal. No respiratory distress. Breath sounds: Normal breath sounds. No wheezing or rales. Abdominal:      General: Bowel sounds are normal. There is no distension. Palpations: Abdomen is soft. There is no hepatomegaly or mass. Tenderness: There is no abdominal tenderness. There is no rebound. Hernia: No hernia is present. Comments: Protuberant abdomen     Genitourinary:     Rectum: Guaiac result negative. Musculoskeletal:         General: No tenderness. Cervical back: Normal range of motion and neck supple. Comments: No joint swelling   Lymphadenopathy:      Cervical: No cervical adenopathy. Skin:     General: Skin is warm. Findings: No bruising, ecchymosis, erythema or rash. Neurological:      Mental Status: She is alert and oriented to person, place, and time. Cranial Nerves: No cranial nerve deficit. Psychiatric:         Thought Content:  Thought content normal.           LABORATORY DATA: Reviewed  Lab Results   Component Value Date    WBC 5.3 04/21/2022    HGB 13.0 04/21/2022    HCT 38.5 04/21/2022    MCV 93.7 04/21/2022     04/21/2022     (L) 04/21/2022    K 3.5 (L) 04/21/2022    CL 88 (L) 04/21/2022    CO2 24 04/21/2022    BUN 4 (L) 04/21/2022    CREATININE 0.40 (L) 04/21/2022    LABALBU 4.4 09/20/2021    BILITOT 0.39 09/20/2021    ALKPHOS 102 09/20/2021    AST 28 09/20/2021    ALT 20 09/20/2021    INR 0.9 01/22/2020         Lab Results   Component Value Date    RBC 4.11 04/21/2022    HGB 13.0 04/21/2022    MCV 93.7 04/21/2022    MCH 31.6 04/21/2022    MCHC 33.8 04/21/2022    RDW 12.9 04/21/2022    MPV 9.1 04/21/2022    BASOPCT 1 04/21/2022    LYMPHSABS 1.10 04/21/2022    MONOSABS 0.48 04/21/2022    NEUTROABS 3.66 04/21/2022    EOSABS 0.03 04/21/2022    BASOSABS 0.04 04/21/2022         DIAGNOSTIC TESTING:     FL BARIUM ENEMA W AIR CONTRAST    Result Date: 5/4/2022  EXAMINATION: DOUBLE CONTRAST BARIUM ENEMA 5/4/2022 2:19 pm TECHNIQUE: Double contrast enema was performed with air and barium contrast. FLUOROSCOPY DOSE AND TYPE OR TIME AND EXPOSURES: Fluoro time: 2.1 minutes DAP: 81.567 Gy cm2 COMPARISON: CT abdomen pelvis from 02/14/2022 HISTORY: ORDERING SYSTEM PROVIDED HISTORY: Chronic constipation TECHNOLOGIST PROVIDED HISTORY: Reason for Exam: Chronic constipation 26-year-old female with chronic constipation FINDINGS:  image demonstrates no acute abnormality. No abnormally dilated small bowel loops. Surgical clips in the medial right upper quadrant consistent with prior cholecystectomy. Pelvic phleboliths. Atherosclerotic calcification of the vasculature. No abnormal calcifications projecting over the bilateral renal shadows. Barium flows freely from the rectum to the cecum. No contour abnormality, suspicious filling defect or constricting mass lesion is seen. Mild colonic diverticulosis. Relatively featureless pattern within the rectosigmoid colon. Evaluation of the rectosigmoid colon is somewhat limited due to overlap. Exam is also limited due to suboptimal evacuation of contrast especially in the region of the ascending colon and cecum. 1. Mild colonic diverticulosis. 2. No suspicious filling defect or constricting mass lesion evident. 3. Relatively featureless pattern of the rectosigmoid colon which could be related to sequela of a prior colitis or inflammatory bowel disease. 4. Limitations of the exam as detailed above especially due to overlap in the rectosigmoid colon and suboptimal valuation of contrast especially for evaluation of the ascending colon and cecum. Otherwise, unremarkable double-contrast barium enema. Assessment  1. Chronic constipation    2. Adenocarcinoma, lung, left ([address]        Plan  . Patient has chronic constipation. Recently patient had a barium enema done and was nonspecific. Patient was taking senna for long time. The barium enema findings and the rectosigmoid area may be secondary to chronic use of senna. With senna, she has loose stools. No formed BM for few months. During this visit her stool is negative for occult blood.     Discussed with the patient regarding medical management of constipation. Patient is reassured and advised to see in the next 3 to 4 months. Also Amitiza prescribed. Basing on improvement will plan further management  Thank you for allowing me to participate in the care of Ms. Thania Meng. For any further questions please do not hesitate to contact me. I have reviewed and agree with the ROS entered by the MA/LPN. Note is dictated utilizing voice recognition software. Unfortunately this leads to occasional typographical errors.  Please contact our office if you have any questions        Radha Chino MD,FACP, [facility]  Board Certified in Gastroenterology and [address] [place] [address] Gastroenterology  Office #: [phone]

## 2022-05-26 ENCOUNTER — OFFICE VISIT (OUTPATIENT)
Dept: PULMONOLOGY | Age: 62
End: 2022-05-26

## 2022-05-26 ENCOUNTER — TELEPHONE (OUTPATIENT)
Dept: GASTROENTEROLOGY | Age: 62
End: 2022-05-26

## 2022-05-26 VITALS
HEART RATE: 92 BPM | SYSTOLIC BLOOD PRESSURE: 125 MMHG | WEIGHT: 134 LBS | OXYGEN SATURATION: 92 % | TEMPERATURE: 98.2 F | BODY MASS INDEX: 23.74 KG/M2 | DIASTOLIC BLOOD PRESSURE: 80 MMHG | HEIGHT: 63 IN

## 2022-05-26 DIAGNOSIS — C34.31 MALIGNANT NEOPLASM OF LOWER LOBE OF RIGHT LUNG (HCC): ICD-10-CM

## 2022-05-26 DIAGNOSIS — J90 LOCULATED PLEURAL EFFUSION: ICD-10-CM

## 2022-05-26 DIAGNOSIS — Z72.0 TOBACCO ABUSE DISORDER: ICD-10-CM

## 2022-05-26 DIAGNOSIS — J44.1 COPD WITH ACUTE EXACERBATION (HCC): Primary | ICD-10-CM

## 2022-05-26 PROCEDURE — 94729 DIFFUSING CAPACITY: CPT | Performed by: INTERNAL MEDICINE

## 2022-05-26 PROCEDURE — 99204 OFFICE O/P NEW MOD 45 MIN: CPT | Performed by: INTERNAL MEDICINE

## 2022-05-26 PROCEDURE — 94375 RESPIRATORY FLOW VOLUME LOOP: CPT | Performed by: INTERNAL MEDICINE

## 2022-05-26 PROCEDURE — 94726 PLETHYSMOGRAPHY LUNG VOLUMES: CPT | Performed by: INTERNAL MEDICINE

## 2022-05-26 RX ORDER — LUBIPROSTONE 8 UG/1
8 CAPSULE, GELATIN COATED ORAL DAILY
Qty: 30 CAPSULE | Refills: 3 | Status: SHIPPED | OUTPATIENT
Start: 2022-05-26 | End: 2022-11-02

## 2022-05-26 RX ORDER — BUDESONIDE 0.5 MG/2ML
500 INHALANT ORAL 2 TIMES DAILY
Qty: 60 EACH | Refills: 3 | Status: SHIPPED | OUTPATIENT
Start: 2022-05-26

## 2022-05-26 NOTE — TELEPHONE ENCOUNTER
Pt  called stating medication Amitiza is too expensive for them, adv pt will sen note to Dr Floyd Merritt MA to review and discuss alternative with Dr, pt  thanked writer call ended.

## 2022-05-26 NOTE — PROGRESS NOTES
[facility],[address] [place], [place].  [address] [facility] [place], operated by [facility] [address]  Dept: [phone]  Dept Fax: [phone]      5/26/22    Patient: Román Ramon  YOB: 1960    Dear Gurpreet Matthews MD,    I had the pleasure of seeing one of your patients, Fabi Gimenez today in the office today. Please find attached my note with the assessment and plan of care. Thank you for allowing me to participate in the care of this patient. I will keep you updated on this patient's follow up and I look forward to serving you and your patients again in the future.     Christiano Benítez MD  5/26/2022 2:55 PM

## 2022-05-26 NOTE — TELEPHONE ENCOUNTER
Writer called the [facility] to see if the medication was received and the caller stated that it was received but the cost was 360.00. Writer consulted with Amber Olson and informed her of the cost and the concerns the patient's  has with the side effects. Amber Olson stated that the side effects were mild and the side effects that was concerning the  were the rare 1% chance. Amber Olson also changed the dosage from 24 MCG to 8 MCG and sent it to the pharmacy. Writer called the patient's  and informed him of the dosage change, the risk of the side effects, and the cost of the first script. Writer advise to call the office back if the new script is too expensive and Sergeilan Screen will change to a different medication. Caller verbalized understanding and thanked the caller.

## 2022-05-26 NOTE — PROGRESS NOTES
Good effort and understanding. Transcutaneous Hb 13. Pt reports palpitations started last night, pt has hx of afib.

## 2022-05-26 NOTE — TELEPHONE ENCOUNTER
Medication was sent to [address] [place] [address] on [facility]. Side effects are mild. Typically diarrhea, nausea, and sometimes headache.    Would suggest she try the medication and if needed, we will adjust.  Electronically signed by Reyes Brothers, APRN - NP on 5/26/2022 at 9:10 AM

## 2022-05-26 NOTE — TELEPHONE ENCOUNTER
Patient's  called the office on 5/26/22 and stated that the pharmacy did not receive the Amitiza script that was sent. Also the caller is having some concerns with the side effects that this medication will cause and wants to know if it will be good for her to take. Writer stated that she will consult with Stephanie Boston and get her recommendations and call back. Caller verbalize understanding and thanked the caller.

## 2022-05-26 NOTE — PROGRESS NOTES
PULMONARY  CONSULTATION        REFERRED BY: MADALYN ABEL MD    REASON FOR CONSULTATION: COPD and lung cancer    HISTORY OF PRESENT ILLNESS:    Jeremy Clark is a 62y.o. year old female here for evaluation of above problems  Patient with known history of COPD and on home oxygen  No complications using home oxygen  Patient recently had a COPD exacerbation and has used corticosteroids with improvement  Denied any increasing shortness of breath, wheezing, coughing, hemoptysis  Regarding her lung cancer, patient follows up with radiation oncology  Denies any headaches, bone pain or abdominal pain        PAST MEDICAL HISTORY:       Diagnosis Date    Alcohol abuse     For many years; 5-6 beers per night    Breast cancer ([facility] [address] 2010    right radical mastectomy with positive sentinel dose, chemo, s/p tamoxifen x 7 years     Chronic diarrhea 1/29/2019    COPD (chronic obstructive pulmonary disease) ([facility] [address]     Essential hypertension     [facility]     Pleural effusion 12/26/2019    Tobacco abuse        SURGICAL HISTORY:    Past Surgical History:   Procedure Laterality Date    APPENDECTOMY      CHOLECYSTECTOMY      MASTECTOMY Right        ALLERGIES:    Allergies   Allergen Reactions    Morphine     Azithromycin Hives    Robitussin (Alcohol Free) [Guaifenesin] Hives    Aspirin Hives and Rash    Penicillins Hives and Rash    Sulfa Antibiotics Hives and Rash       MEDICATIONS:   Current Outpatient Medications   Medication Sig Dispense Refill    lubiprostone (AMITIZA) 8 MCG CAPS capsule Take 1 capsule by mouth daily 30 capsule 3    budesonide (PULMICORT) 0.5 MG/2ML nebulizer suspension Take 2 mLs by nebulization 2 times daily 60 each 3    senna (SENOKOT) 8.6 MG tablet Take 1 tablet by mouth daily      ipratropium-albuterol (DUONEB) 0.5-2.5 (3) MG/3ML SOLN nebulizer solution Inhale 3 mLs into the lungs 4 times daily 360 mL 3    albuterol sulfate  (90 Base) MCG/ACT inhaler INHALE 2 PUFFS INTO THE LUNGS EVERY 4 HOURS AS NEEDED FOR WHEEZING OR SHORTNESS OF BREATH 25.5 g 1    albuterol (PROVENTIL) (2.5 MG/3ML) 0.083% nebulizer solution Take 3 mLs by nebulization every 6 hours as needed for Wheezing or Shortness of Breath 120 each 3    methylPREDNISolone (MEDROL DOSEPACK) 4 MG tablet Take by mouth. 1 kit 0    folic acid (FOLVITE) 894 MCG tablet Take 800 mcg by mouth daily      triamcinolone (KENALOG) 0.1 % cream Apply topically daily Apply topically once daily.  levothyroxine (SYNTHROID) 88 MCG tablet take 1 tablet by mouth once daily 30 tablet 5    pregabalin (LYRICA) 75 MG capsule Take 1 capsule by mouth 3 times daily for 120 days. 90 capsule 5    amLODIPine (NORVASC) 5 MG tablet take 1 tablet by mouth once daily 30 tablet 5    Plecanatide 3 MG TABS Take 1 tablet by mouth daily 30 tablet 2    fluticasone-umeclidin-vilant (TRELEGY ELLIPTA) 100-62.5-25 MCG/INH AEPB Inhale 1 puff into the lungs daily 1 each 5     No current facility-administered medications for this visit. FAMILY HISTORY: family history includes Breast Cancer in her mother; Cancer in her brother; Deep Vein Thrombosis in her brother; Prostate Cancer in her brother and father; Stroke in her mother. SOCIAL AND OCCUPATIONAL HEALTH:  The patient is a Current smoker of more than 20 pack years. There  is not history of TB or TB exposure. There is not asbestos or silica dust exposure. The patient reports does not have coal, foundry, quarry or [employer] exposure. Travel history reveals no significant history of risk factors for pulm disease. There is not  history of recreational or IV drug use. The patient does not have pets, dogs, cats turtles or exotic birds.         Review of Systems:  Review of Systems -   General ROS: Completed and except as mentioned above were negative   Psychological ROS:  Completed and except as mentioned above were negative  Ophthalmic ROS:  Completed and except as mentioned above were negative  ENT ROS:  Completed and except as mentioned above were negative  Allergy and Immunology ROS:  Completed and except as mentioned above were negative  Hematological and Lymphatic ROS:  Completed and except as mentioned above were negative  Endocrine ROS: Completed and except as mentioned above were negative  Breast ROS:  Completed and except as mentioned above were negative  Respiratory ROS:  Completed and except as mentioned above were negative  Cardiovascular ROS:  Completed and except as mentioned above were negative  Gastrointestinal ROS: Completed and except as mentioned above were negative  Genito-Urinary ROS:  Completed and except as mentioned above were negative  Musculoskeletal ROS:  Completed and except as mentioned above were negative  Neurological ROS:  Completed and except as mentioned above were negative  Dermatological ROS:  Completed and except as mentioned above were negative    SLEEP  No epistaxis sor sore throat. does not have fatigue, sleepiness ortiredness in am.   No MVA. No edema. PHYSICAL EXAMINATION:  Vitals:    05/26/22 1418   BP: 125/80   Site: Left Upper Arm   Position: Sitting   Cuff Size: Large Adult   Pulse: 92   Temp: 98.2 °F (36.8 °C)   SpO2: 92%   Weight: 134 lb (60.8 kg)   Height: 5' 3\" (1.6 m)     PHYSICAL EXAMINATION:  Vitals:    05/26/22 1418   BP: 125/80   Site: Left Upper Arm   Position: Sitting   Cuff Size: Large Adult   Pulse: 92   Temp: 98.2 °F (36.8 °C)   SpO2: 92%   Weight: 134 lb (60.8 kg)   Height: 5' 3\" (1.6 m)     Constitutional: This is a well developed, well nourished, 18.5-24.9 - Normal 62y.o. year old female who is alert, oriented, cooperative and in no apparent distress. Head:normocephalic and atraumatic. EENT:  CORDELIA. No conjunctival injections. Septum was midline, mucosa was without erythema, exudates or cobblestoning. No thrush was noted. MallampatiII (soft palate, uvula, fauces visible)  Neck: Supple without thyromegaly.  No effusion, left                   PLAN:       Reviewed CT scan of the chest and pulmonary function tests  Patient is to follow-up with radiation oncology  Refills were provided -Pulmicort 0.5 mg nebulized twice a day  Patient was recommended to have prednisone and an antibiotic available for use during an exacerbation  Educated and clarified the medication use. Discussed use, benefit, and side effects of prescribed medications. Barriers to medication compliance addressed. Mary Lucia received counseling on the following healthy behaviors: nutrition, exercise and medication adherence  Recommend flu vaccination in the fall annually. Recommendations given regarding pneumococcal vaccinations. Recommend COVID-19 vaccination  Patient is not uptodate with vaccinations from pulmonary perspective. Maintain an active lifestyle. recommend smoking cessation. Mary Lucia was instructed on smoking cessation for greater than 10 minutes. I discussed with this patient today the risks and benefits of various tobacco cessation strategies  Patient was explained importance of pulmonary rehabilitation with regards to decreasing the hospitalization risk and also help with his dyspnea  Patient was educated on how to use the respiratory medications. All the questions that the patient has had were answered to   her satisfaction. Home O2 evaluation was done. Supplemental oxygen was To be continued  Patient was informed of the need for using oxygen. Patient was educated on the importance of compliance and the benefits of oxygen use. Complications of oxygen use, including dryness of the nostrils, epistaxis and also the fire hazard were explained to the patient. Patient willingly accepts to use  the oxygen as recommended.   Pt met the criteria for lung cancer screening and was explained the possibility of having findings that would need monitoring such as lung nodules and the need for more than yearly screening ct chest. Pt acknowledges understanding and questions answered to their satisfaction. We'll see the patient back in 6 months or earlier if needed. Patient will call us if she is sick, so she can be seen sooner. Thank you for having us involved in the care of your patient. Please call us if you have any questions or concerns.               Coy Matson MD MD  6/3/2022 9:38 AM

## 2022-05-29 NOTE — PROGRESS NOTES
PFTs were done on 5/26/2022    Patient has stage II COPD with an FEV1 of 1.06 L and FVC of 1.86 L  Total lung capacity is increased at 184% predicted  Diffusing capacity is decreased at 45% predicted  Flow-volume loop shows an obstructive ventilatory pattern  Oxygen saturation on room air is 92%  Transcutaneous hemoglobin is 13    Impression:    Patient has stage II COPD with an FEV1 of 1.06 L and FVC of 1.86 L with associated emphysema and air trapping.     Electronically signed byJo Curry MD  5/29/2022 12:12 PM

## 2022-06-10 ENCOUNTER — TELEPHONE (OUTPATIENT)
Dept: FAMILY MEDICINE CLINIC | Age: 62
End: 2022-06-10

## 2022-06-10 NOTE — TELEPHONE ENCOUNTER
Pts  called, just wanting to inform office of a fewt things that need to be addressed at pts upcoming appt on 06/14/2022, pt is still having pain and discomfort in her LT side  Still having issues having a bowel movement, either constipated or has diarrhea, stools at are yellow color and have an awful smell, feeling good other than the discomfort     Trelegy worked great, but pt started having suicidal thoughts when using it, stopped using it around 05/15/2022

## 2022-06-10 NOTE — TELEPHONE ENCOUNTER
Will review at visit on 6/14. Can try Stiolto or Anoro instead, can get a sample to try prior to visit before coming in.

## 2022-06-14 ENCOUNTER — HOSPITAL ENCOUNTER (OUTPATIENT)
Age: 62
Setting detail: SPECIMEN
Discharge: HOME | End: 2022-06-14

## 2022-06-14 ENCOUNTER — OFFICE VISIT (OUTPATIENT)
Dept: FAMILY MEDICINE CLINIC | Age: 62
End: 2022-06-14

## 2022-06-14 VITALS
SYSTOLIC BLOOD PRESSURE: 110 MMHG | HEART RATE: 107 BPM | DIASTOLIC BLOOD PRESSURE: 64 MMHG | OXYGEN SATURATION: 93 % | BODY MASS INDEX: 22.85 KG/M2 | WEIGHT: 129 LBS

## 2022-06-14 DIAGNOSIS — J43.1 PANLOBULAR EMPHYSEMA (HCC): ICD-10-CM

## 2022-06-14 DIAGNOSIS — E03.9 HYPOTHYROIDISM, UNSPECIFIED TYPE: ICD-10-CM

## 2022-06-14 DIAGNOSIS — I10 ESSENTIAL HYPERTENSION: ICD-10-CM

## 2022-06-14 DIAGNOSIS — R10.9 LEFT SIDED ABDOMINAL PAIN OF UNKNOWN CAUSE: Primary | ICD-10-CM

## 2022-06-14 DIAGNOSIS — E87.1 HYPONATREMIA: ICD-10-CM

## 2022-06-14 LAB
ANION GAP SERPL CALCULATED.3IONS-SCNC: 15 MMOL/L (ref 9–17)
BUN BLDV-MCNC: 6 MG/DL (ref 8–23)
CALCIUM SERPL-MCNC: 10.3 MG/DL (ref 8.6–10.4)
CHLORIDE BLD-SCNC: 91 MMOL/L (ref 98–107)
CO2: 26 MMOL/L (ref 20–31)
CREAT SERPL-MCNC: 0.59 MG/DL (ref 0.5–0.9)
GFR AFRICAN AMERICAN: >60 ML/MIN
GFR NON-AFRICAN AMERICAN: >60 ML/MIN
GFR SERPL CREATININE-BSD FRML MDRD: ABNORMAL ML/MIN/{1.73_M2}
GLUCOSE BLD-MCNC: 103 MG/DL (ref 70–99)
POTASSIUM SERPL-SCNC: 4.3 MMOL/L (ref 3.7–5.3)
SODIUM BLD-SCNC: 132 MMOL/L (ref 135–144)
THYROXINE, FREE: 1.28 NG/DL (ref 0.93–1.7)
TSH SERPL DL<=0.05 MIU/L-ACNC: 7.94 UIU/ML (ref 0.3–5)

## 2022-06-14 PROCEDURE — 99214 OFFICE O/P EST MOD 30 MIN: CPT | Performed by: INTERNAL MEDICINE

## 2022-06-14 RX ORDER — PREDNISONE 10 MG/1
TABLET ORAL
Qty: 30 TABLET | Refills: 0 | Status: SHIPPED | OUTPATIENT
Start: 2022-06-14 | End: 2022-11-02

## 2022-06-14 RX ORDER — AZITHROMYCIN 250 MG/1
250 TABLET, FILM COATED ORAL SEE ADMIN INSTRUCTIONS
Qty: 6 TABLET | Refills: 0 | Status: SHIPPED | OUTPATIENT
Start: 2022-06-14 | End: 2022-06-19

## 2022-06-14 ASSESSMENT — PATIENT HEALTH QUESTIONNAIRE - PHQ9
SUM OF ALL RESPONSES TO PHQ QUESTIONS 1-9: 0
SUM OF ALL RESPONSES TO PHQ9 QUESTIONS 1 & 2: 0
SUM OF ALL RESPONSES TO PHQ QUESTIONS 1-9: 0
SUM OF ALL RESPONSES TO PHQ QUESTIONS 1-9: 0
1. LITTLE INTEREST OR PLEASURE IN DOING THINGS: 0
SUM OF ALL RESPONSES TO PHQ QUESTIONS 1-9: 0
2. FEELING DOWN, DEPRESSED OR HOPELESS: 0

## 2022-06-14 NOTE — PROGRESS NOTES
Subjective:       Patient ID:     Román Ramon is a 62 y.o. female who presents for   Chief Complaint   Patient presents with    6 Month Follow-Up       HPI:  Nursing note reviewed and discussed with patient. HPI   Has not had a bowel movement on her own since she had COVID at the end of January. Has been advised to continue prune juice and metamucil, feels senna works better so using that instead of prune juice   Has been prescribed plecanatide - on back order, has not been able to start yet         Patient's medications, allergies, past medical, surgical, social and family histories were reviewed and updated as appropriate. Past Medical History:   Diagnosis Date    Alcohol abuse     For many years; 5-6 beers per night    Breast cancer ([facility] [address] 2010    right radical mastectomy with positive sentinel dose, chemo, s/p tamoxifen x 7 years     Chronic diarrhea 1/29/2019    COPD (chronic obstructive pulmonary disease) (HCC)     Essential hypertension     Lung cancer ([facility] [address]     Pleural effusion 12/26/2019    Tobacco abuse      Past Surgical History:   Procedure Laterality Date    APPENDECTOMY      CHOLECYSTECTOMY      MASTECTOMY Right        Social History     Tobacco Use    Smoking status: Current Every Day Smoker     Packs/day: 0.50     Years: 47.00     Pack years: 23.50    Smokeless tobacco: Never Used   Substance Use Topics    Alcohol use:  Yes     Alcohol/week: 8.0 standard drinks     Types: 8 Cans of beer per week     Comment: per night      Patient Active Problem List   Diagnosis    Panlobular emphysema ([facility] [address]    Essential hypertension    History of right breast cancer    Loculated pleural effusion    Hyponatremia    Coronary artery disease involving native coronary artery of native heart without angina pectoris    Tobacco abuse disorder    Alcohol abuse    Leukocytosis    Adenocarcinoma, lung, left (HCC)    Shortness of breath    Insomnia    Anxiety    Malignant neoplasm of lower lobe of right lung ([facility] [address]    COPD with acute exacerbation ([facility] [address]         Prior to Visit Medications    Medication Sig Taking? Authorizing Provider   budesonide (PULMICORT) 0.5 MG/2ML nebulizer suspension Take 2 mLs by nebulization 2 times daily Yes Shania Anthony MD   senna (SENOKOT) 8.6 MG tablet Take 1 tablet by mouth daily Yes Historical Provider, MD   ipratropium-albuterol (DUONEB) 0.5-2.5 (3) MG/3ML SOLN nebulizer solution Inhale 3 mLs into the lungs 4 times daily Yes Kat Dan MD   albuterol sulfate  (90 Base) MCG/ACT inhaler INHALE 2 PUFFS INTO THE LUNGS EVERY 4 HOURS AS NEEDED FOR WHEEZING OR SHORTNESS OF BREATH Yes Kat Dan MD   albuterol (PROVENTIL) (2.5 MG/3ML) 0.083% nebulizer solution Take 3 mLs by nebulization every 6 hours as needed for Wheezing or Shortness of Breath Yes Kat Dan MD   folic acid (FOLVITE) 436 MCG tablet Take 800 mcg by mouth daily Yes Historical Provider, MD   triamcinolone (KENALOG) 0.1 % cream Apply topically daily Apply topically once daily. Yes Historical Provider, MD   levothyroxine (SYNTHROID) 88 MCG tablet take 1 tablet by mouth once daily Yes Tiny Orlando MD   pregabalin (LYRICA) 75 MG capsule Take 1 capsule by mouth 3 times daily for 120 days. Yes Tiny Orlando MD   amLODIPine (NORVASC) 5 MG tablet take 1 tablet by mouth once daily Yes Kat Dan MD   Plecanatide 3 MG TABS Take 1 tablet by mouth daily  Patient not taking: Reported on 6/14/2022  Rheba Rajiv, APRN - NP   lubiprostone (AMITIZA) 8 MCG CAPS capsule Take 1 capsule by mouth daily  Rheba Rajiv, APRN - NP   fluticasone-umeclidin-vilant (TRELEGY ELLIPTA) 100-62.5-25 MCG/INH AEPB Inhale 1 puff into the lungs daily  Patient not taking: Reported on 6/14/2022  Kat Dan MD   methylPREDNISolone (MEDROL DOSEPACK) 4 MG tablet Take by mouth.   Patient not taking: Reported on 6/14/2022  Kat Dan MD     Review of Systems  Review of Systems   Constitutional: Negative for fatigue, fever and unexpected weight change. Respiratory: Negative for cough, choking, chest tightness, shortness of breath and wheezing. Cardiovascular: Negative for chest pain, palpitations and leg swelling. Gastrointestinal: Positive for constipation. Negative for abdominal pain, anal bleeding, blood in stool, diarrhea, nausea and vomiting. Endocrine: Negative. Musculoskeletal: Negative for joint swelling and myalgias. Skin: Negative. Neurological: Negative for dizziness. Psychiatric/Behavioral: Negative for sleep disturbance. All other systems reviewed and are negative. Objective:       Physical Exam:  /64 (Site: Left Upper Arm, Position: Sitting, Cuff Size: Small Adult)   Pulse (!) 107   Wt 129 lb (58.5 kg)   SpO2 93%   BMI 22.85 kg/m²   Wt Readings from Last 3 Encounters:   06/14/22 129 lb (58.5 kg)   05/26/22 134 lb (60.8 kg)   05/25/22 132 lb 8 oz (60.1 kg)         Physical Exam  Vitals and nursing note reviewed. Constitutional:       General: She is not in acute distress. Appearance: She is well-developed. She is not ill-appearing. Eyes:      General: Lids are normal. Vision grossly intact. Cardiovascular:      Rate and Rhythm: Normal rate and regular rhythm. Heart sounds: Normal heart sounds, S1 normal and S2 normal. No murmur heard. No friction rub. No gallop. Pulmonary:      Effort: Pulmonary effort is normal. No respiratory distress. Breath sounds: Normal breath sounds. No wheezing. Abdominal:      General: Bowel sounds are normal.      Palpations: Abdomen is soft. There is no mass. Tenderness: There is no abdominal tenderness. There is no guarding. Musculoskeletal:         General: Normal range of motion. Skin:     General: Skin is warm and dry. Capillary Refill: Capillary refill takes less than 2 seconds. Neurological:      General: No focal deficit present.       Mental Status: She is alert and oriented to person, place, and time. Data Review  Admission on 04/21/2022, Discharged on 04/21/2022   Component Date Value    Specimen Description 04/21/2022 . NASOPHARYNGEAL SWAB     SARS-CoV-2, Rapid 04/21/2022 Not Detected     WBC 04/21/2022 5.3     RBC 04/21/2022 4.11     Hemoglobin 04/21/2022 13.0     Hematocrit 04/21/2022 38.5     MCV 04/21/2022 93.7     MCH 04/21/2022 31.6     MCHC 04/21/2022 33.8     RDW 04/21/2022 12.9     Platelets 74/90/6750 250     MPV 04/21/2022 9.1     NRBC Automated 04/21/2022 0.0     Seg Neutrophils 04/21/2022 67*    Lymphocytes 04/21/2022 21*    Monocytes 04/21/2022 9     Eosinophils % 04/21/2022 1     Basophils 04/21/2022 1     Immature Granulocytes 04/21/2022 1*    Segs Absolute 04/21/2022 3.66     Absolute Lymph # 04/21/2022 1.10     Absolute Mono # 04/21/2022 0.48     Absolute Eos # 04/21/2022 0.03     Basophils Absolute 04/21/2022 0.04     Absolute Immature Granul* 04/21/2022 0.03     Glucose 04/21/2022 95     BUN 04/21/2022 4*    CREATININE 04/21/2022 0.40*    Bun/Cre Ratio 04/21/2022 10     Calcium 04/21/2022 9.3     Sodium 04/21/2022 126*    Potassium 04/21/2022 3.5*    Chloride 04/21/2022 88*    CO2 04/21/2022 24     Anion Gap 04/21/2022 14     GFR Non- 04/21/2022 >60     GFR  04/21/2022 >60     GFR Comment 04/21/2022          Procalcitonin 04/21/2022 0.11*    Magnesium 04/21/2022 1.8     Ventricular Rate 04/21/2022 102     Atrial Rate 04/21/2022 102     P-R Interval 04/21/2022 168     QRS Duration 04/21/2022 82     Q-T Interval 04/21/2022 342     QTc Calculation (Bazett) 04/21/2022 445     P Axis 04/21/2022 116     R Axis 04/21/2022 96     T Axis 04/21/2022 77     Sodium 04/21/2022 130*     Lab Results   Component Value Date    CHOL 347 04/13/2020    TRIG 480 04/13/2020    HDL 79 04/13/2020    LDLDIRECT 226 04/13/2020          Assessment/Plan:      1.  Left sided abdominal pain of unknown cause   US ABDOMEN LIMITED; Future    2. Panlobular emphysema (HCC)  - predniSONE (DELTASONE) 10 MG tablet; 4 tabs by mouth daily for 3 days, then 3 tabs daily for 3 days, then 2 tabs daily for 3 days, then 1 tab daily till gone. Dispense: 30 tablet; Refill: 0  - tiotropium (SPIRIVA RESPIMAT) 2.5 MCG/ACT AERS inhaler; Inhale 2 puffs into the lungs daily  Dispense: 1 each; Refill: 3  - azithromycin (ZITHROMAX) 250 MG tablet; Take 1 tablet by mouth See Admin Instructions for 5 days 500mg on day 1 followed by 250mg on days 2 - 5  Dispense: 6 tablet; Refill: 0  - tiotropium (SPIRIVA RESPIMAT) 2.5 MCG/ACT AERS inhaler; Inhale 2 puffs into the lungs daily  Dispense: 2 each; Refill: 0    3. Essential hypertension  - Basic Metabolic Panel; Future    4. Hyponatremia  - Basic Metabolic Panel; Future    5.  Hypothyroidism, unspecified type  - TSH With Reflex Ft4; Future           Health Maintenance Due   Topic Date Due    Cervical cancer screen  Never done    Breast cancer screen  Never done    Shingles vaccine (1 of 2) Never done    Low dose CT lung screening  Never done       Electronically signed by Anisha Lepe MD on 6/14/2022 at 1:20 PM

## 2022-06-14 NOTE — PROGRESS NOTES
Patient is present for 6 month f/u    From telephone encounter from 11/5  Pts  called, just wanting to inform office of a fewt things that need to be addressed at pts upcoming appt on 06/14/2022, pt is still having pain and discomfort in her LT side  Still having issues having a bowel movement, either constipated or has diarrhea, stools at are yellow color and have an awful smell, feeling good other than the discomfort      Trelegy worked great, but pt started having suicidal thoughts when using it, stopped using it around 05/15/2022

## 2022-06-18 ASSESSMENT — ENCOUNTER SYMPTOMS
WHEEZING: 0
CONSTIPATION: 1
CHEST TIGHTNESS: 0
ABDOMINAL PAIN: 0
VOMITING: 0
COUGH: 0
ANAL BLEEDING: 0
CHOKING: 0
DIARRHEA: 0
SHORTNESS OF BREATH: 0
BLOOD IN STOOL: 0
NAUSEA: 0

## 2022-06-18 ASSESSMENT — VISUAL ACUITY: OU: 1

## 2022-06-20 ENCOUNTER — HOSPITAL ENCOUNTER (OUTPATIENT)
Dept: ULTRASOUND IMAGING | Age: 62
Discharge: HOME | End: 2022-06-22

## 2022-06-20 DIAGNOSIS — R10.9 LEFT SIDED ABDOMINAL PAIN OF UNKNOWN CAUSE: ICD-10-CM

## 2022-06-20 PROCEDURE — 76705 ECHO EXAM OF ABDOMEN: CPT

## 2022-06-27 ENCOUNTER — TELEPHONE (OUTPATIENT)
Dept: FAMILY MEDICINE CLINIC | Age: 62
End: 2022-06-27

## 2022-06-27 DIAGNOSIS — R10.9 LEFT SIDED ABDOMINAL PAIN OF UNKNOWN CAUSE: Primary | ICD-10-CM

## 2022-06-27 NOTE — TELEPHONE ENCOUNTER
There are a few different possibilities-  1. Effectively treat her constipation - how is she doing with her bowel movements? 2. Evaluate her for mesenteric ischemia given the presence of atherosclerosis in the arteries of the stomach-this would involve referral to vascular surgery and evaluation with either a CTA or angiography or both.

## 2022-06-27 NOTE — TELEPHONE ENCOUNTER
Patient's  contacted office. He is asking what the next step is in regards to the Ultrasound coming back normal.     He stated they did some research on their end and saw that Pregabalin has the side effects of what is going on.  He stated they started to wean her off of medication this past weekend to see if things clear up

## 2022-06-28 NOTE — TELEPHONE ENCOUNTER
Spoke to the patients . He states the patient is having loose stools still. Patients  states he is okay with the referral to Vascular surgery. Suggestions on a surgeon?

## 2022-06-29 ENCOUNTER — HOSPITAL ENCOUNTER (EMERGENCY)
Age: 62
Discharge: HOME | End: 2022-06-29
Attending: EMERGENCY MEDICINE

## 2022-06-29 VITALS
OXYGEN SATURATION: 99 % | DIASTOLIC BLOOD PRESSURE: 87 MMHG | HEART RATE: 94 BPM | SYSTOLIC BLOOD PRESSURE: 120 MMHG | RESPIRATION RATE: 18 BRPM | TEMPERATURE: 97.2 F

## 2022-06-29 DIAGNOSIS — K59.04 CHRONIC IDIOPATHIC CONSTIPATION: Primary | ICD-10-CM

## 2022-06-29 DIAGNOSIS — R10.12 LEFT UPPER QUADRANT ABDOMINAL PAIN: ICD-10-CM

## 2022-06-29 LAB
ABSOLUTE EOS #: 0.11 K/UL (ref 0–0.44)
ABSOLUTE IMMATURE GRANULOCYTE: <0.03 K/UL (ref 0–0.3)
ABSOLUTE LYMPH #: 1.82 K/UL (ref 1.1–3.7)
ABSOLUTE MONO #: 0.74 K/UL (ref 0.1–1.2)
ALBUMIN SERPL-MCNC: 4.5 G/DL (ref 3.5–5.2)
ALBUMIN/GLOBULIN RATIO: 1.4 (ref 1–2.5)
ALP BLD-CCNC: 96 U/L (ref 35–104)
ALT SERPL-CCNC: 23 U/L (ref 5–33)
ANION GAP SERPL CALCULATED.3IONS-SCNC: 11 MMOL/L (ref 9–17)
AST SERPL-CCNC: 27 U/L
BASOPHILS # BLD: 0 % (ref 0–2)
BASOPHILS ABSOLUTE: 0.03 K/UL (ref 0–0.2)
BILIRUB SERPL-MCNC: 0.42 MG/DL (ref 0.3–1.2)
BUN BLDV-MCNC: 6 MG/DL (ref 8–23)
CALCIUM SERPL-MCNC: 10.5 MG/DL (ref 8.6–10.4)
CHLORIDE BLD-SCNC: 91 MMOL/L (ref 98–107)
CO2: 25 MMOL/L (ref 20–31)
CREAT SERPL-MCNC: 0.41 MG/DL (ref 0.5–0.9)
EOSINOPHILS RELATIVE PERCENT: 1 % (ref 1–4)
GFR AFRICAN AMERICAN: >60 ML/MIN
GFR NON-AFRICAN AMERICAN: >60 ML/MIN
GFR SERPL CREATININE-BSD FRML MDRD: ABNORMAL ML/MIN/{1.73_M2}
GLUCOSE BLD-MCNC: 110 MG/DL (ref 70–99)
HCT VFR BLD CALC: 40.9 % (ref 36.3–47.1)
HEMOGLOBIN: 14.5 G/DL (ref 11.9–15.1)
IMMATURE GRANULOCYTES: 0 %
LIPASE: 36 U/L (ref 13–60)
LYMPHOCYTES # BLD: 20 % (ref 24–43)
MCH RBC QN AUTO: 32.6 PG (ref 25.2–33.5)
MCHC RBC AUTO-ENTMCNC: 35.5 G/DL (ref 28.4–34.8)
MCV RBC AUTO: 91.9 FL (ref 82.6–102.9)
MONOCYTES # BLD: 8 % (ref 3–12)
NRBC AUTOMATED: 0 PER 100 WBC
PDW BLD-RTO: 13.3 % (ref 11.8–14.4)
PLATELET # BLD: 294 K/UL (ref 138–453)
PMV BLD AUTO: 8.6 FL (ref 8.1–13.5)
POTASSIUM SERPL-SCNC: 3.8 MMOL/L (ref 3.7–5.3)
RBC # BLD: 4.45 M/UL (ref 3.95–5.11)
SEG NEUTROPHILS: 71 % (ref 36–65)
SEGMENTED NEUTROPHILS ABSOLUTE COUNT: 6.23 K/UL (ref 1.5–8.1)
SODIUM BLD-SCNC: 127 MMOL/L (ref 135–144)
TOTAL PROTEIN: 7.7 G/DL (ref 6.4–8.3)
WBC # BLD: 9 K/UL (ref 3.5–11.3)

## 2022-06-29 PROCEDURE — 80053 COMPREHEN METABOLIC PANEL: CPT

## 2022-06-29 PROCEDURE — 85025 COMPLETE CBC W/AUTO DIFF WBC: CPT

## 2022-06-29 PROCEDURE — 83690 ASSAY OF LIPASE: CPT

## 2022-06-29 PROCEDURE — 99283 EMERGENCY DEPT VISIT LOW MDM: CPT

## 2022-06-29 RX ORDER — POLYETHYLENE GLYCOL 3350 17 G/17G
17 POWDER, FOR SOLUTION ORAL 2 TIMES DAILY
Qty: 116 G | Refills: 0 | Status: SHIPPED | OUTPATIENT
Start: 2022-06-29 | End: 2022-07-02

## 2022-06-29 RX ORDER — ONDANSETRON 4 MG/1
4 TABLET, FILM COATED ORAL EVERY 8 HOURS PRN
Qty: 5 TABLET | Refills: 0 | Status: SHIPPED | OUTPATIENT
Start: 2022-06-29

## 2022-06-29 ASSESSMENT — ENCOUNTER SYMPTOMS
VOMITING: 1
NAUSEA: 1
SINUS PRESSURE: 0
COUGH: 0
ABDOMINAL PAIN: 1
CHEST TIGHTNESS: 0
EYE PAIN: 0
SORE THROAT: 0
SHORTNESS OF BREATH: 0
RHINORRHEA: 0
EYE REDNESS: 0
PHOTOPHOBIA: 0
DIARRHEA: 0
COLOR CHANGE: 0
CONSTIPATION: 1

## 2022-06-29 NOTE — ED PROVIDER NOTES
[facility]  ED  Emergency Department Encounter  EmergencyMedicine Resident     Pt Osmin Thomas  MRN: [id number]  Rigobertogfkatey 1960  Date of evaluation: 6/29/22  PCP:  Diana Russell MD    This patient was evaluated in the Emergency Department for symptoms described in the history of present illness. The patient was evaluated in the context of the global COVID-19 pandemic, which necessitated consideration that the patient might be at risk for infection with the SARS-CoV-2 virus that causes COVID-19. Institutional protocols and algorithms that pertain to the evaluation of patients at risk for COVID-19 are in a state of rapid change based on information released by regulatory bodies including the CDC and federal and state organizations. These policies and algorithms were followed during the patient's care in the ED. CHIEF COMPLAINT       Chief Complaint   Patient presents with    Abdominal Pain     abd pain with n/v/d/c for past 2 years but worse over the last 6 months.  c/o pain to LUQ and LLQ. HISTORY OF PRESENT ILLNESS  (Location/Symptom, Timing/Onset, Context/Setting, Quality, Duration, Modifying Factors, Severity.)      Kirsty Olivia is a 62 y.o. female who presents with alcohol abuse, PD, hypertension lung cancer, presenting with left upper quadrant pain has been ongoing for the past 6 months and steadily worsening. Patient endorsing constipation that is also been ongoing for 6 months and states she usually defecates every couple days as tiny artemio. Describes pain as a stretching pulling to the left side of her abdomen. 1 1 episode of emesis yesterday she described as clear liquid. Able to pass flatus. Last bowel movement was this morning and was small. She is using senna daily, for the past 2 months. Denies melena or hematochezia. Denies any weight loss. She has tried MiraLAX, Metamucil and prune juice without relief as well.     She has had repeated work-ups for her bowel issue, recently had barium enema April of this year without relief. Has colonoscopy she states was 15 years prior which showed some polyps. US of the spleen performed in June, unremarkable. CT abdomen pelvis with IV contrast back in February negative. PAST MEDICAL / SURGICAL / SOCIAL / FAMILY HISTORY      has a past medical history of Alcohol abuse, Breast cancer ([facility] [address] Chronic diarrhea, COPD (chronic obstructive pulmonary disease) ([facility] [address] Essential hypertension, Lung cancer ([facility] [address] Pleural effusion, and Tobacco abuse.       has a past surgical history that includes Appendectomy; Mastectomy (Right); and Cholecystectomy. Social History     Socioeconomic History    Marital status:      Spouse name: Not on file    Number of children: Not on file    Years of education: Not on file    Highest education level: Not on file   Occupational History    Not on file   Tobacco Use    Smoking status: Current Every Day Smoker     Packs/day: 0.50     Years: 47.00     Pack years: 23.50    Smokeless tobacco: Never Used   Vaping Use    Vaping Use: Never used   Substance and Sexual Activity    Alcohol use: Yes     Alcohol/week: 8.0 standard drinks     Types: 8 Cans of beer per week     Comment: per night    Drug use: No    Sexual activity: Yes   Other Topics Concern    Not on file   Social History Narrative    Not on file     Social Determinants of Health     Financial Resource Strain: Low Risk     Difficulty of Paying Living Expenses: Not very hard   Food Insecurity: No Food Insecurity    Worried About Running Out of Food in the Last Year: Never true    Linsey of Food in the Last Year: Never true   Transportation Needs:     Lack of Transportation (Medical): Not on file    Lack of Transportation (Non-Medical):  Not on file   Physical Activity:     Days of Exercise per Week: Not on file    Minutes of Exercise per Session: Not on file   Stress:     Feeling of Stress : Not on file   Social Connections:     Frequency of Communication with Friends and Family: Not on file    Frequency of Social Gatherings with Friends and Family: Not on file    Attends [employer] Services: Not on file    Active Member of Clubs or Organizations: Not on file    Attends Club or Organization Meetings: Not on file    Marital Status: Not on file   Intimate Partner Violence:     Fear of Current or Ex-Partner: Not on file    Emotionally Abused: Not on file    Physically Abused: Not on file    Sexually Abused: Not on file   Housing Stability:     Unable to Pay for Housing in the Last Year: Not on file    Number of [place] in the Last Year: Not on file    Unstable Housing in the Last Year: Not on file       Family History   Problem Relation Age of Onset    Breast Cancer Mother     Stroke Mother     Prostate Cancer Father     Prostate Cancer Brother     Deep Vein Thrombosis Brother     Cancer Brother         \"throat cancer\"       Allergies:  Morphine, Robitussin (alcohol free) [guaifenesin], Aspirin, Penicillins, and Sulfa antibiotics    Home Medications:  Prior to Admission medications    Medication Sig Start Date End Date Taking? Authorizing Provider   polyethylene glycol (GLYCOLAX) 17 GM/SCOOP powder Take 17 g by mouth in the morning and at bedtime for 3 days 6/29/22 7/2/22 Yes Ana Maria Villegas MD   magnesium citrate solution Take 296 mLs by mouth once for 1 dose 6/29/22 6/29/22 Yes Ana Maria Villegas MD   ondansetron (ZOFRAN) 4 MG tablet Take 1 tablet by mouth every 8 hours as needed for Nausea 6/29/22  Yes Ana Maria Villegas MD   predniSONE (DELTASONE) 10 MG tablet 4 tabs by mouth daily for 3 days, then 3 tabs daily for 3 days, then 2 tabs daily for 3 days, then 1 tab daily till gone.  6/14/22   Kat Brenner MD   tiotropium (SPIRIVA RESPIMAT) 2.5 MCG/ACT AERS inhaler Inhale 2 puffs into the lungs daily 6/14/22 6/14/23  Kat Brenner MD   tiotropium (SPIRIVA RESPIMAT) 2.5 MCG/ACT AERS inhaler Inhale 2 rhinorrhea, sinus pressure and sore throat. Eyes: Negative for photophobia, pain and redness. Respiratory: Negative for cough, chest tightness and shortness of breath. Cardiovascular: Negative for chest pain and leg swelling. Gastrointestinal: Positive for abdominal pain, constipation, nausea and vomiting. Negative for diarrhea. Genitourinary: Negative for dysuria, flank pain, frequency and urgency. Musculoskeletal: Negative for arthralgias, myalgias and neck stiffness. Skin: Negative for color change, pallor and rash. Neurological: Negative for dizziness, syncope, weakness, light-headedness, numbness and headaches. PHYSICAL EXAM   (up to 7 for level 4, 8 or more for level 5)      INITIAL VITALS:   /87   Pulse 94   Temp 97.2 °F (36.2 °C) (Oral)   Resp 18   SpO2 99%     Physical Exam  Constitutional:  Well developed, Well nourished. HENT:  Normocephalic, Atraumatic, Bilateral external ears normal,  Nose normal.   Neck: Normal range of motion, No stridor. Eyes:   No discharge. Respiratory:   No respiratory distress, Normal breath sounds without any wheezing, rales or rhonchi. Cardiovascular: Normal S1, S2. No rubs, gallops or murmurs. Gastrointestinal:  No organomegaly, no tenderness, no rebound or guarding. Abdomen appears round and distended, is not tense, no stigmata, no CVA tenderness  Musculoskeletal:  No extremity deformity. Lymphatic: No lymphadenopathy noted  Skin:  Warm, Dry,  No rash. Neurologic:  Alert & oriented x 3, Normal motor function,  No focal deficits noted.    Psychiatric:  Affect normal, Judgment normal, Mood normal.              DIFFERENTIAL  DIAGNOSIS     PLAN (LABS / IMAGING / EKG):  Orders Placed This Encounter   Procedures    CBC with Auto Differential    Comprehensive Metabolic Panel    Lipase       MEDICATIONS ORDERED:  Orders Placed This Encounter   Medications    polyethylene glycol (GLYCOLAX) 17 GM/SCOOP powder     Sig: Take 17 g by Albumin 4.5 3.5 - 5.2 g/dL    Albumin/Globulin Ratio 1.4 1.0 - 2.5    GFR Non-African American >60 >60 mL/min    GFR African American >60 >60 mL/min    GFR Comment         Lipase   Result Value Ref Range    Lipase 36 13 - 60 U/L         RADIOLOGY:  No orders to display          IMPRESSION: 81-year-old female presenting with chronic left-sided abdominal pain and constipation has been ongoing for the last 6 months. Patient has had multiple imaging and work-up leading lab work and CT abdomen pelvis with IV contrast, splenic ultrasound which were all negative. This is likely slow transit constipation will need a aggressive regimen to relieve symptoms. No concern for small bowel obstruction she is able to pass gas and have a small BM, as well as previous imaging negative. Low concern for mesenteric ischemia, as there is no severe tenderness to abdomen, no blood in stool. Symptoms have not worsened in the past 6 months, less concern for imaging at this point, will obtain labs including CBC and CMP. And likely anticipate discharge home with magnesium citrate. EMERGENCY DEPARTMENT COURSE:  ED Course as of 06/29/22 1710   Wed Jun 29, 2022   1546 Sodium(!): 127 [QC]   1603 Lipase: 36  Patient has chronic hyponatremia, likely due to alcohol abuse. [QC]   1413 Discussed with patient the need for adequate fluid intake, including electrolytes, and to a different bowel regimen. Dates that she has tried magnesium citrate in the past which did help resolve her constipation, however she does not like the flavor. Patient to try mag again as well as MiraLAX and to drink it as fast as she can tolerate. Advised patient to also follow-up with GI, continue with obtaining a vascular surgery appointment. In addition call the family physician tomorrow for further recommendations on a bowel regimen.   Also provided with return precautions to the ED, including severe worsening abdominal pain with an inability to walk, nausea or vomiting with inability to hold down food or if she notices blood in her stool or emesis. Patient verbalizes understanding. [QC]      ED Course User Index  [QC] Trina Mueller MD               PROCEDURES:      CONSULTS:  None        FINAL IMPRESSION      1. Chronic idiopathic constipation    2.  Left upper quadrant abdominal pain          DISPOSITION / PLAN     DISPOSITION    Discharged    PATIENT REFERRED TO:  Kat Shirley MD  [employer]  [phone]    Schedule an appointment as soon as possible for a visit in 2 days  for further recommendations for constipation management    [facility]  [address] [place] [address]  [phone]    If symptoms worsen      DISCHARGE MEDICATIONS:  Discharge Medication List as of 6/29/2022  4:14 PM      START taking these medications    Details   polyethylene glycol (GLYCOLAX) 17 GM/SCOOP powder Take 17 g by mouth in the morning and at bedtime for 3 days, Disp-116 g, R-0Print      magnesium citrate solution Take 296 mLs by mouth once for 1 dose, Disp-296 mL, R-0Print      ondansetron (ZOFRAN) 4 MG tablet Take 1 tablet by mouth every 8 hours as needed for Nausea, Disp-5 tablet, R-0Print             Trina Mueller MD  Emergency Medicine Resident PGY2    (Please note that portions of thisnote were completed with a voice recognition program.  Efforts were made to edit the dictations but occasionally words are mis-transcribed.)         Trina Mueller MD  Resident  06/29/22 2908

## 2022-06-29 NOTE — Clinical Note
Cecelia Frausto accompanied Suhail Sun to the emergency department on 6/29/2022. They may return to work on 06/30/2022. If you have any questions or concerns, please don't hesitate to call.       Trina Mueller MD

## 2022-06-29 NOTE — ED NOTES
PT arrived to the ED with complaints of abdominal pain and constipation ongoing for two years but got worse within the last few months. Pt states her pain is 5/10 while moving but subsides when siting still  Pt states she has been seen by her PCP and a specialists   Pt states nothing has helped  Pt alert and oriented x4, talking in complete sentences, respirations even and unlabored. Pt acting age appropriate.  White board updated, will continue to plan of care      Maverick Rawls RN  06/29/22 0631

## 2022-06-29 NOTE — ED PROVIDER NOTES
[facility]     Emergency Department     Faculty Attestation    I performed a history and physical examination of the patient and discussed management with the resident. I reviewed the residents note and agree with the documented findings and plan of care. Any areas of disagreement are noted on the chart. I was personally present for the key portions of any procedures. I have documented in the chart those procedures where I was not present during the key portions. I have reviewed the emergency nurses triage note. I agree with the chief complaint, past medical history, past surgical history, allergies, medications, social and family history as documented unless otherwise noted below. For Physician Assistant/ Nurse Practitioner cases/documentation I have personally evaluated this patient and have completed at least one if not all key elements of the E/M (history, physical exam, and MDM). Additional findings are as noted. Patient here with ongoing abdominal pain constipation. States has been ongoing for almost 6 months. No new or worsening symptoms today just \"tired of it. \"  Did have 1 episode of vomiting yesterday no nausea or vomiting today. Has had extensive work-up in the past including a normal CT in February, and ultrasound and this month as well as barium enema last month. Exam patient appears uncomfortable but nontoxic. Abdomen soft mild diffuse tenderness but no rebound or guarding nondistended.   Will check labs, reevaluate need for additional imaging today, likely discharge given chronicity of symptoms      Critical Care     none    Guillermo Hwang MD, Andres Alegria  Attending Emergency  Physician             Guillermo Hwang MD  06/29/22 [id number]

## 2022-06-29 NOTE — ED NOTES
The following labs labeled with pt sticker and tubed to lab:     [x] Blue     [x] Lavender   [] on ice  [x] Green/yellow  [x] Green/black [] on ice  [] Yellow  [] Red  [] Pink      [] COVID-19 swab    [] Rapid  [] PCR  [] Flu swab  [] Peds Viral Panel     [] Urine Sample  [] Pelvic Cultures  [] Blood Cultures           Laina Moreland RN  06/29/22 0961

## 2022-07-08 ENCOUNTER — HOSPITAL ENCOUNTER (EMERGENCY)
Age: 62
Discharge: LEFT BEFORE DISCHARGE | End: 2022-07-08
Attending: STUDENT IN AN ORGANIZED HEALTH CARE EDUCATION/TRAINING PROGRAM

## 2022-07-08 ENCOUNTER — APPOINTMENT (OUTPATIENT)
Dept: CT IMAGING | Age: 62
End: 2022-07-08

## 2022-07-08 VITALS
TEMPERATURE: 98.2 F | BODY MASS INDEX: 19.85 KG/M2 | RESPIRATION RATE: 18 BRPM | HEIGHT: 68 IN | WEIGHT: 131 LBS | DIASTOLIC BLOOD PRESSURE: 76 MMHG | HEART RATE: 116 BPM | OXYGEN SATURATION: 95 % | SYSTOLIC BLOOD PRESSURE: 119 MMHG

## 2022-07-08 DIAGNOSIS — Z53.21 ELOPED FROM EMERGENCY DEPARTMENT: ICD-10-CM

## 2022-07-08 DIAGNOSIS — R10.84 GENERALIZED ABDOMINAL PAIN: ICD-10-CM

## 2022-07-08 DIAGNOSIS — E83.52 HYPERCALCEMIA: Primary | ICD-10-CM

## 2022-07-08 LAB
ABSOLUTE EOS #: 0.13 K/UL (ref 0–0.44)
ABSOLUTE IMMATURE GRANULOCYTE: 0.04 K/UL (ref 0–0.3)
ABSOLUTE LYMPH #: 2.45 K/UL (ref 1.1–3.7)
ABSOLUTE MONO #: 0.77 K/UL (ref 0.1–1.2)
ALBUMIN SERPL-MCNC: 4.3 G/DL (ref 3.5–5.2)
ALP BLD-CCNC: 95 U/L (ref 35–104)
ALT SERPL-CCNC: 21 U/L (ref 5–33)
ANION GAP SERPL CALCULATED.3IONS-SCNC: 12 MMOL/L (ref 9–17)
AST SERPL-CCNC: 26 U/L
BASOPHILS # BLD: 1 % (ref 0–2)
BASOPHILS ABSOLUTE: 0.07 K/UL (ref 0–0.2)
BILIRUB SERPL-MCNC: 0.27 MG/DL (ref 0.3–1.2)
BUN BLDV-MCNC: 8 MG/DL (ref 8–23)
BUN/CREAT BLD: 18 (ref 9–20)
CALCIUM IONIZED: 1.37 MMOL/L (ref 1.13–1.33)
CALCIUM SERPL-MCNC: 10.3 MG/DL (ref 8.6–10.4)
CHLORIDE BLD-SCNC: 94 MMOL/L (ref 98–107)
CO2: 24 MMOL/L (ref 20–31)
CREAT SERPL-MCNC: 0.45 MG/DL (ref 0.5–0.9)
EOSINOPHILS RELATIVE PERCENT: 1 % (ref 1–4)
GFR AFRICAN AMERICAN: >60 ML/MIN
GFR NON-AFRICAN AMERICAN: >60 ML/MIN
GFR SERPL CREATININE-BSD FRML MDRD: ABNORMAL ML/MIN/{1.73_M2}
GLUCOSE BLD-MCNC: 122 MG/DL (ref 70–99)
HCT VFR BLD CALC: 42.5 % (ref 36.3–47.1)
HEMOGLOBIN: 14.2 G/DL (ref 11.9–15.1)
IMMATURE GRANULOCYTES: 0 %
LIPASE: 47 U/L (ref 13–60)
LYMPHOCYTES # BLD: 20 % (ref 24–43)
MCH RBC QN AUTO: 31.2 PG (ref 25.2–33.5)
MCHC RBC AUTO-ENTMCNC: 33.4 G/DL (ref 28.4–34.8)
MCV RBC AUTO: 93.4 FL (ref 82.6–102.9)
MONOCYTES # BLD: 6 % (ref 3–12)
NRBC AUTOMATED: 0 PER 100 WBC
PDW BLD-RTO: 13.2 % (ref 11.8–14.4)
PLATELET # BLD: 339 K/UL (ref 138–453)
PMV BLD AUTO: 8.4 FL (ref 8.1–13.5)
POTASSIUM SERPL-SCNC: 4.1 MMOL/L (ref 3.7–5.3)
RBC # BLD: 4.55 M/UL (ref 3.95–5.11)
SEG NEUTROPHILS: 72 % (ref 36–65)
SEGMENTED NEUTROPHILS ABSOLUTE COUNT: 8.68 K/UL (ref 1.5–8.1)
SODIUM BLD-SCNC: 130 MMOL/L (ref 135–144)
TOTAL PROTEIN: 7.2 G/DL (ref 6.4–8.3)
WBC # BLD: 12.1 K/UL (ref 3.5–11.3)

## 2022-07-08 PROCEDURE — 99285 EMERGENCY DEPT VISIT HI MDM: CPT

## 2022-07-08 PROCEDURE — 6360000004 HC RX CONTRAST MEDICATION: Performed by: STUDENT IN AN ORGANIZED HEALTH CARE EDUCATION/TRAINING PROGRAM

## 2022-07-08 PROCEDURE — 82330 ASSAY OF CALCIUM: CPT

## 2022-07-08 PROCEDURE — 2580000003 HC RX 258: Performed by: STUDENT IN AN ORGANIZED HEALTH CARE EDUCATION/TRAINING PROGRAM

## 2022-07-08 PROCEDURE — 83690 ASSAY OF LIPASE: CPT

## 2022-07-08 PROCEDURE — 85025 COMPLETE CBC W/AUTO DIFF WBC: CPT

## 2022-07-08 PROCEDURE — 80053 COMPREHEN METABOLIC PANEL: CPT

## 2022-07-08 PROCEDURE — 71260 CT THORAX DX C+: CPT

## 2022-07-08 RX ORDER — SODIUM CHLORIDE 0.9 % (FLUSH) 0.9 %
10 SYRINGE (ML) INJECTION PRN
Status: DISCONTINUED | OUTPATIENT
Start: 2022-07-08 | End: 2022-07-09 | Stop reason: HOSPADM

## 2022-07-08 RX ORDER — 0.9 % SODIUM CHLORIDE 0.9 %
100 INTRAVENOUS SOLUTION INTRAVENOUS ONCE
Status: COMPLETED | OUTPATIENT
Start: 2022-07-08 | End: 2022-07-08

## 2022-07-08 RX ADMIN — SODIUM CHLORIDE, PRESERVATIVE FREE 10 ML: 5 INJECTION INTRAVENOUS at 20:31

## 2022-07-08 RX ADMIN — IOPAMIDOL 75 ML: 755 INJECTION, SOLUTION INTRAVENOUS at 20:31

## 2022-07-08 RX ADMIN — SODIUM CHLORIDE 100 ML: 9 INJECTION, SOLUTION INTRAVENOUS at 20:32

## 2022-07-08 ASSESSMENT — PAIN DESCRIPTION - LOCATION: LOCATION: ABDOMEN

## 2022-07-08 ASSESSMENT — PAIN - FUNCTIONAL ASSESSMENT: PAIN_FUNCTIONAL_ASSESSMENT: 0-10

## 2022-07-08 ASSESSMENT — PAIN SCALES - GENERAL: PAINLEVEL_OUTOF10: 5

## 2022-07-08 ASSESSMENT — PAIN DESCRIPTION - FREQUENCY: FREQUENCY: CONTINUOUS

## 2022-07-08 ASSESSMENT — PAIN DESCRIPTION - DESCRIPTORS: DESCRIPTORS: PRESSURE;TIGHTNESS

## 2022-07-08 NOTE — ED NOTES
Pt presents to the er c/o abdominal bloating and constipation for the past few days     Nick Rossi RN  07/08/22 1927

## 2022-07-09 ASSESSMENT — ENCOUNTER SYMPTOMS
COLOR CHANGE: 0
ABDOMINAL PAIN: 1
EYE DISCHARGE: 0
ABDOMINAL DISTENTION: 1
EYE REDNESS: 0
SHORTNESS OF BREATH: 0
NAUSEA: 1
CONSTIPATION: 1

## 2022-07-09 NOTE — ED PROVIDER NOTES
[facility]  Emergency Department Encounter     Pt Name: Nicola Pearce  MRN: [id number]  [place] 1960  Date of evaluation: 7/9/22  PCP:  Raysa Khan MD    [address] [place] [address]       Chief Complaint   Patient presents with   Keyla Alessandroluiserry    Constipation       HISTORY [place]  (Location/Symptom, Timing/Onset, Context/Setting, Quality, Duration, Modifying Factors,Severity.)      Nicola Pearce is a 62 y.o. female who presents with decreased bowel movements, generalized abdominal bloating and concern for constipation. This has been ongoing for the past 6 months. Has been extensively evaluated by GI including barium enema as well is using multiple laxatives including enemas with minimal bowel movements. She states she stays well-hydrated. Has been increasing fiber. Endorsing generalized nonspecific abdominal pain. Mild. Cramping. Entire abdomen. No vomiting but feeling nauseous. Does have history of lung cancer and reportedly in remission not on active chemotherapy. Denies any narcotic pain medication use    PAST MEDICAL / SURGICAL / SOCIAL / FAMILY HISTORY      has a past medical history of Alcohol abuse, Breast cancer ([address] Chronic diarrhea, COPD (chronic obstructive pulmonary disease) ([facility] Essential hypertension, Lung cancer ([facility] Pleural effusion, and Tobacco abuse.     has a past surgical history that includes Appendectomy; Mastectomy (Right); and Cholecystectomy.     Social History     Socioeconomic History    Marital status:      Spouse name: Not on file    Number of children: Not on file    Years of education: Not on file    Highest education level: Not on file   Occupational History    Not on file   Tobacco Use    Smoking status: Current Every Day Smoker     Packs/day: 0.50     Years: 47.00     Pack years: 23.50     Types: Cigarettes    Smokeless tobacco: Never Used   Vaping Use    Vaping Use: Never used   Substance and Sexual Activity    Alcohol use: Yes     Alcohol/week: 8.0 standard drinks     Types: 8 Cans of beer per week     Comment: per night  4-5 beers nightly    Drug use: No    Sexual activity: Yes   Other Topics Concern    Not on file   Social History Narrative    Not on file     Social Determinants of Health     Financial Resource Strain: Low Risk     Difficulty of Paying Living Expenses: Not very hard   Food Insecurity: No Food Insecurity    Worried About Running Out of Food in the Last Year: Never true    Linsey of Food in the Last Year: Never true   Transportation Needs:     Lack of Transportation (Medical): Not on file    Lack of Transportation (Non-Medical):  Not on file   Physical Activity:     Days of Exercise per Week: Not on file    Minutes of Exercise per Session: Not on file   Stress:     Feeling of Stress : Not on file   Social Connections:     Frequency of Communication with Friends and Family: Not on file    Frequency of Social Gatherings with Friends and Family: Not on file    Attends Orthodox Services: Not on file    Active Member of [email or web address] or Organizations: Not on file    Attends Club or Organization Meetings: Not on file    Marital Status: Not on file   Intimate Partner Violence:     Fear of Current or Ex-Partner: Not on file    Emotionally Abused: Not on file    Physically Abused: Not on file    Sexually Abused: Not on file   Housing Stability:     Unable to Pay for Housing in the Last Year: Not on file    Number of [place] in the Last Year: Not on file    Unstable Housing in the Last Year: Not on file       Family History   Problem Relation Age of Onset    Breast Cancer Mother     Stroke Mother     Prostate Cancer Father     Prostate Cancer Brother     Deep Vein Thrombosis Brother     Cancer Brother         \"throat cancer\"       Allergies:  Morphine, Robitussin (alcohol free) [guaifenesin], Aspirin, Penicillins, and Sulfa antibiotics    Home Medications:  Prior to Admission medications    Medication Sig Start Date End Date Taking? Authorizing Provider   magnesium citrate solution Take 296 mLs by mouth once for 1 dose 6/29/22 6/29/22  Jenn Johns MD   ondansetron [facility]) 4 MG tablet Take 1 tablet by mouth every 8 hours as needed for Nausea 6/29/22   Jenn Johns MD   predniSONE (DELTASONE) 10 MG tablet 4 tabs by mouth daily for 3 days, then 3 tabs daily for 3 days, then 2 tabs daily for 3 days, then 1 tab daily till gone. 6/14/22   Kat Coughlin MD   tiotropium (SPIRIVA RESPIMAT) 2.5 MCG/ACT AERS inhaler Inhale 2 puffs into the lungs daily 6/14/22 6/14/23  Kat Coughlin MD   tiotropium (SPIRIVA RESPIMAT) 2.5 MCG/ACT AERS inhaler Inhale 2 puffs into the lungs daily 6/14/22   Kat Coughlin MD   Plecanatide 3 MG TABS Take 1 tablet by mouth daily  Patient not taking: Reported on 6/14/2022 5/27/22   SONYA Cooper NP   lubiprostone (AMITIZA) 8 MCG CAPS capsule Take 1 capsule by mouth daily 5/26/22   SONYA Cooper NP   budesonide (PULMICORT) 0.5 MG/2ML nebulizer suspension Take 2 mLs by nebulization 2 times daily 5/26/22   Robbie Valle MD   senna (SENOKOT) 8.6 MG tablet Take 1 tablet by mouth daily    Historical Provider, MD   ipratropium-albuterol (DUONEB) 0.5-2.5 (3) MG/3ML SOLN nebulizer solution Inhale 3 mLs into the lungs 4 times daily 5/3/22   Kat Coughlin MD   albuterol sulfate  (90 Base) MCG/ACT inhaler INHALE 2 PUFFS INTO THE LUNGS EVERY 4 HOURS AS NEEDED FOR WHEEZING OR SHORTNESS OF BREATH 5/3/22   Kat Coughlin MD   albuterol (PROVENTIL) (2.5 MG/3ML) 0.083% nebulizer solution Take 3 mLs by nebulization every 6 hours as needed for Wheezing or Shortness of Breath 5/3/22   Kat Coughlin MD   methylPREDNISolone (MEDROL DOSEPACK) 4 MG tablet Take by mouth.   Patient not taking: Reported on 6/14/2022 5/3/22   Kat Coughlin MD   folic acid (FOLVITE) 814 MCG tablet Take 800 mcg by mouth daily    Historical Provider, MD   triamcinolone (KENALOG) 0.1 % cream Apply topically daily Apply topically once daily. Historical Provider, MD   levothyroxine (SYNTHROID) 88 MCG tablet take 1 tablet by mouth once daily 2/15/22   Fadia Fields MD   pregabalin (LYRICA) 75 MG capsule Take 1 capsule by mouth 3 times daily for 120 days. 2/15/22 6/15/22  Oumar Beltran MD   amLODIPine (NORVASC) 5 MG tablet take 1 tablet by mouth once daily 1/17/22   Kat Menendez MD       REVIEW OF SYSTEMS    (2-9 systems for level 4, 10 or more for level 5)      Review of Systems   Constitutional: Negative for chills and fever. Eyes: Negative for discharge and redness. Respiratory: Negative for shortness of breath. Cardiovascular: Negative for chest pain. Gastrointestinal: Positive for abdominal distention, abdominal pain, constipation and nausea. Genitourinary: Negative for flank pain. Musculoskeletal: Negative for myalgias. Skin: Negative for color change and rash. Allergic/Immunologic: Positive for environmental allergies. Neurological: Negative for headaches. Psychiatric/Behavioral: Negative for agitation and confusion. PHYSICAL EXAM   (up to 7 for level 4, 8 or more for level 5)     INITIAL VITALS:    height is 5' 8\" (1.727 m) and weight is 131 lb (59.4 kg). Her oral temperature is 98.2 °F (36.8 °C). Her blood pressure is 119/76 and her pulse is 116 (abnormal). Her respiration is 18 and oxygen saturation is 95%. Physical Exam  Vitals and nursing note reviewed. Constitutional:       Appearance: She is well-developed. HENT:      Head: Normocephalic and atraumatic. Nose: Nose normal.      Mouth/Throat:      Mouth: Mucous membranes are moist.   Eyes:      General: No scleral icterus. Conjunctiva/sclera: Conjunctivae normal.      Pupils: Pupils are equal, round, and reactive to light. Neck:      Trachea: No tracheal deviation. Cardiovascular:      Rate and Rhythm: Regular rhythm. Tachycardia present. Heart sounds: Normal heart sounds. No murmur heard. No friction rub. No gallop. Pulmonary:      Effort: Pulmonary effort is normal. No respiratory distress. Breath sounds: Normal breath sounds. No wheezing or rales. Abdominal:      Comments: Mild generalized abdominal tenderness, no focality, mildly distended   Musculoskeletal:         General: Normal range of motion. Cervical back: Neck supple. Skin:     General: Skin is warm and dry. Findings: No erythema or rash. Neurological:      Mental Status: She is alert and oriented to person, place, and time. Psychiatric:         Behavior: Behavior normal.         DIFFERENTIAL  DIAGNOSIS     PLAN (LABS / IMAGING / EKG):  Orders Placed This Encounter   Procedures    CT CHEST ABDOMEN PELVIS W CONTRAST    CBC with Auto Differential    Comprehensive Metabolic Panel    Lipase    Calcium, Ionized    Insert peripheral IV       MEDICATIONS ORDERED:  Orders Placed This Encounter   Medications    0.9 % sodium chloride bolus    iopamidol (ISOVUE-370) 76 % injection 75 mL    DISCONTD: sodium chloride flush 0.9 % injection 10 mL       DDX: Hypercalcemia versus malignancy recurrence versus dehydration versus other constipation    Initial MDM/Plan: 62 y.o. female who presents with constipation, bloated. This been ongoing for many months. His attempted multiple laxatives and treatments with minimal relief. Does appear to be consistently hypercalcemic. Given previous lung cancer history we will repeat labs, CT imaging chest abdomen pelvis rule out metastatic disease. Otherwise would have patient follow-up with primary care doctor and may benefit from endocrinology or other treatment of hypercalcemia to see if this improves constipation symptoms.     DIAGNOSTIC RESULTS / EMERGENCY DEPARTMENT COURSE / MDM     LABS:  Labs Reviewed   CBC WITH AUTO DIFFERENTIAL - Abnormal; Notable for the following components:       Result Value adnexal structures appear stable, with a possible small uterine fibroid seen along the anterior mid uterine body, best seen on sagittal imaging such as image 66. This is unchanged compared to the recent comparison. No bulky pelvic lymphadenopathy. No free fluid is identified. Peritoneum/Retroperitoneum: Aorto iliac atherosclerosis is identified. No severe stenosis is seen. No aortic aneurysm is identified. No retroperitoneal or mesenteric lymphadenopathy is identified. There is a small ventral fat containing hernia noted. No bowel herniation is identified. Bones/Soft Tissues: No acute subcutaneous soft tissue abnormality is identified. No acute osseous abnormality is identified. Multilevel degenerative changes are seen within the spine. No acute fracture is identified. No osseous destructive process is seen. 1. The primary lung mass in the right lower lobe is mildly decreased in size, measuring 11 x 9 mm, previously measuring 13 x 11 mm. 2. No new or suspicious pulmonary nodules are identified. 3. No CT findings to suggest distant metastatic disease within the abdomen or pelvis. 4. Emphysema. 5. Atherosclerosis including coronary artery involvement. 6. Other similar findings as above. EMERGENCY DEPARTMENT COURSE:  CT imaging showing no new pulmonary nodules or other signs of metastatic disease. Ionized calcium as well as total calcium elevated. Suspect this could be etiology for bloating. No large stool burden on CT imaging. Patient unfortunately eloped prior to my reassessment. PROCEDURES:  None    CONSULTS:  None    CRITICAL CARE:  0    FINAL IMPRESSION      1. Hypercalcemia    2. Generalized abdominal pain    3. Eloped from emergency department          DISPOSITION / [address] - Left Before Treatment Complete 07/08/2022 10:31:26 PM        PATIENTREFERRED TO:  No follow-up provider specified.     DISCHARGE MEDICATIONS:  Discharge Medication List as of 7/8/2022 10:35 PM          Paige Snyder DO  EmergencyMedicine Attending    (Please note that portions of this note were completed with a voice recognition program.  Efforts were made to edit the dictations but occasionally words are mis-transcribed.)       Paige Snyder DO  07/09/22 040

## 2022-07-11 ENCOUNTER — TELEPHONE (OUTPATIENT)
Dept: GASTROENTEROLOGY | Age: 62
End: 2022-07-11

## 2022-07-11 NOTE — TELEPHONE ENCOUNTER
Patient's  called stating his wife was just at the ER and she is having a lot of stomach issues and feeling lethargic, but CT scan showed nothing. Adeline Carlson made a sooner appointment to see Dr Delfino Wilson on July 19 th.

## 2022-07-12 ENCOUNTER — TELEPHONE (OUTPATIENT)
Dept: FAMILY MEDICINE CLINIC | Age: 62
End: 2022-07-12

## 2022-07-12 NOTE — TELEPHONE ENCOUNTER
Pt's  is calling in regards to CT Chest Abdomen Pelvis imaging that was done 7/8/2022 while at the hosptial.

## 2022-07-13 ENCOUNTER — TELEPHONE (OUTPATIENT)
Dept: GASTROENTEROLOGY | Age: 62
End: 2022-07-13

## 2022-07-13 ENCOUNTER — TELEPHONE (OUTPATIENT)
Dept: FAMILY MEDICINE CLINIC | Age: 62
End: 2022-07-13

## 2022-07-13 NOTE — TELEPHONE ENCOUNTER
Pt  called very upset and frustrated that GI cancelled 07/19 appointment due to the doctor scheduling conflict. Pt  stated \"my wife has not had a BM in 6 months does anyone care? \" Pt  states he cant not keep taking off work.  also states \"if there was a doctor conflict then why was it rescheduled sooner for that day? \"  I explained to  I can try to contact GI and see what other options there is. Lucia Carlisle the MA at  states the  was busy and he would call me back. I called the  back and explained and he said lets wait to see if he calls back.

## 2022-07-13 NOTE — TELEPHONE ENCOUNTER
Patient's  contacted office to inform that patient was put back with GI for 7/19/22. Informed  that I spoke with the Rep for Trulance (prescribed by , but patient has been unable to get due to backorder at [facility]). I was informed that [address] [place] [address] and [facility] have different suppliers. I reached out to [address] [place] [address], they have Trulance in stock and patient is able to have script transferred to their pharmacy.      informed to have script transferred

## 2022-07-13 NOTE — TELEPHONE ENCOUNTER
Our office called and cancelled appointment for 7/19/22 due to provider conflict.  Ruby Sanchez called office stating that he got a call about this. Offered next available with Dr Brandon of 8/2/22 and or 7/21/22 with Nancy Hutchinson and then declined. Then stated the following:   \"I cannot keep changing my work schedule around and I don't think that anyone understands the severity of what is going on. \"  The patient is not able to have a bowel movement. Please call the patient and follow up. Any suggestions etc.  Thank you.

## 2022-07-13 NOTE — TELEPHONE ENCOUNTER
No new findings in the chest, abdomen or pelvis - no new masses, bowel obstruction or impaction noted. Atherosclerosis of coronary and abdominal arteries noted again.

## 2022-07-13 NOTE — TELEPHONE ENCOUNTER
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 Writer had sent the appointment date to patient for him to get some time off.

## 2022-07-14 DIAGNOSIS — R79.89 ELEVATED TSH: Primary | ICD-10-CM

## 2022-07-19 ENCOUNTER — OFFICE VISIT (OUTPATIENT)
Dept: GASTROENTEROLOGY | Age: 62
End: 2022-07-19

## 2022-07-19 VITALS
DIASTOLIC BLOOD PRESSURE: 87 MMHG | HEART RATE: 107 BPM | SYSTOLIC BLOOD PRESSURE: 129 MMHG | WEIGHT: 127 LBS | BODY MASS INDEX: 19.31 KG/M2

## 2022-07-19 DIAGNOSIS — K59.09 CHRONIC CONSTIPATION: Primary | ICD-10-CM

## 2022-07-19 PROCEDURE — 99214 OFFICE O/P EST MOD 30 MIN: CPT | Performed by: INTERNAL MEDICINE

## 2022-07-19 ASSESSMENT — ENCOUNTER SYMPTOMS
CONSTIPATION: 1
DIARRHEA: 1
ABDOMINAL PAIN: 1
ALLERGIC/IMMUNOLOGIC NEGATIVE: 1
RECTAL PAIN: 1
RESPIRATORY NEGATIVE: 1
NAUSEA: 1

## 2022-07-19 NOTE — PROGRESS NOTES
GI OFFICE FOLLOW UP    INTERVAL HISTORY:   No referring provider defined for this encounter. Chief Complaint   Patient presents with    Constipation     Pt here today for sever constipation. Pt states she cannot have bm unless she takes something        No diagnosis found. HISTORY OF PRESENT ILLNESS:   Patient seen for follow-up of constipation. Patient seen along with her . Patient states that she continues to have issues with constipation. She has been taking stool softeners and laxatives which causes loose bowels. Without stool softeners and laxatives she is not able to move bowels. She has tenesmus. She is frustrated. It appears that she is very upset and emotional because of abnormal bowel movements. No hematochezia. Patient is known to have carcinoma of the lung being treated. Barium enema revealed signs of chronic laxative use. Patient is electing to follow high-fiber diet, fiber supplements. Past Medical,Family, and Social History reviewed and does contribute to the patient presenting condition. Patient's PMH/PSH,SH,PSYCH Hx, MEDs, ALLERGIES, and ROS were all reviewed and updated in the appropriate sections.  Yes      PAST MEDICAL HISTORY:  Past Medical History:   Diagnosis Date    Alcohol abuse     For many years; 5-6 beers per night    Breast cancer ([facility] [address] 2010    right radical mastectomy with positive sentinel dose, chemo, s/p tamoxifen x 7 years     Chronic diarrhea 1/29/2019    COPD (chronic obstructive pulmonary disease) (HCC)     Essential hypertension     Lung cancer (HCC)     Pleural effusion 12/26/2019    Tobacco abuse        Past Surgical History:   Procedure Laterality Date    APPENDECTOMY      CHOLECYSTECTOMY      MASTECTOMY Right        CURRENT MEDICATIONS:    Current Outpatient Medications:     ondansetron (ZOFRAN) 4 MG tablet, Take 1 tablet by mouth every 8 hours as needed for Nausea, Disp: 5 tablet, Rfl: 0    predniSONE (DELTASONE) 10 MG tablet, 4 tabs by mouth daily for 3 days, then 3 tabs daily for 3 days, then 2 tabs daily for 3 days, then 1 tab daily till gone., Disp: 30 tablet, Rfl: 0    tiotropium (SPIRIVA RESPIMAT) 2.5 MCG/ACT AERS inhaler, Inhale 2 puffs into the lungs daily, Disp: 1 each, Rfl: 3    tiotropium (SPIRIVA RESPIMAT) 2.5 MCG/ACT AERS inhaler, Inhale 2 puffs into the lungs daily, Disp: 2 each, Rfl: 0    Plecanatide 3 MG TABS, Take 1 tablet by mouth daily, Disp: 30 tablet, Rfl: 2    lubiprostone (AMITIZA) 8 MCG CAPS capsule, Take 1 capsule by mouth daily, Disp: 30 capsule, Rfl: 3    budesonide (PULMICORT) 0.5 MG/2ML nebulizer suspension, Take 2 mLs by nebulization 2 times daily, Disp: 60 each, Rfl: 3    senna (SENOKOT) 8.6 MG tablet, Take 1 tablet by mouth daily, Disp: , Rfl:     ipratropium-albuterol (DUONEB) 0.5-2.5 (3) MG/3ML SOLN nebulizer solution, Inhale 3 mLs into the lungs 4 times daily, Disp: 360 mL, Rfl: 3    albuterol sulfate  (90 Base) MCG/ACT inhaler, INHALE 2 PUFFS INTO THE LUNGS EVERY 4 HOURS AS NEEDED FOR WHEEZING OR SHORTNESS OF BREATH, Disp: 25.5 g, Rfl: 1    albuterol (PROVENTIL) (2.5 MG/3ML) 0.083% nebulizer solution, Take 3 mLs by nebulization every 6 hours as needed for Wheezing or Shortness of Breath, Disp: 120 each, Rfl: 3    methylPREDNISolone (MEDROL DOSEPACK) 4 MG tablet, Take by mouth., Disp: 1 kit, Rfl: 0    folic acid (FOLVITE) 847 MCG tablet, Take 800 mcg by mouth daily, Disp: , Rfl:     triamcinolone (KENALOG) 0.1 % cream, Apply topically daily Apply topically once daily. , Disp: , Rfl:     levothyroxine (SYNTHROID) 88 MCG tablet, take 1 tablet by mouth once daily, Disp: 30 tablet, Rfl: 5    amLODIPine (NORVASC) 5 MG tablet, take 1 tablet by mouth once daily, Disp: 30 tablet, Rfl: 5    magnesium citrate solution, Take 296 mLs by mouth once for 1 dose, Disp: 296 mL, Rfl: 0    pregabalin (LYRICA) 75 MG capsule, Take 1 capsule by mouth 3 times daily for 120 days. , Disp: 90 capsule, Rfl: 5    ALLERGIES:   Allergies   Allergen Reactions    Morphine     Robitussin (Alcohol Free) [Guaifenesin] Hives    Aspirin Hives and Rash    Penicillins Hives and Rash    Sulfa Antibiotics Hives and Rash       FAMILY HISTORY:       Problem Relation Age of Onset    Breast Cancer Mother     Stroke Mother     Prostate Cancer Father     Prostate Cancer Brother     Deep Vein Thrombosis Brother     Cancer Brother         \"throat cancer\"         SOCIAL HISTORY:   Social History     Socioeconomic History    Marital status:      Spouse name: Not on file    Number of children: Not on file    Years of education: Not on file    Highest education level: Not on file   Occupational History    Not on file   Tobacco Use    Smoking status: Every Day     Packs/day: 0.50     Years: 47.00     Pack years: 23.50     Types: Cigarettes    Smokeless tobacco: Never   Vaping Use    Vaping Use: Never used   Substance and Sexual Activity    Alcohol use: Yes     Alcohol/week: 8.0 standard drinks     Types: 8 Cans of beer per week     Comment: per night  4-5 beers nightly    Drug use: No    Sexual activity: Yes   Other Topics Concern    Not on file   Social History Narrative    Not on file     Social Determinants of Health     Financial Resource Strain: Low Risk     Difficulty of Paying Living Expenses: Not very hard   Food Insecurity: No Food Insecurity    Worried About Running Out of Food in the Last Year: Never true    Ran Out of Food in the Last Year: Never true   Transportation Needs: Not on file   Physical Activity: Not on file   Stress: Not on file   Social Connections: Not on file   Intimate Partner Violence: Not on file   Housing Stability: Not on file         REVIEW OF SYSTEMS:         Review of Systems   Constitutional:  Positive for appetite change (loss) and fatigue. HENT: Negative.      Eyes:  Positive for visual disturbance (glasses). Respiratory: Negative. Cardiovascular: Negative. Gastrointestinal:  Positive for abdominal pain, constipation, diarrhea, nausea and rectal pain. Endocrine: Negative. Genitourinary: Negative. Musculoskeletal: Negative. Skin: Negative. Allergic/Immunologic: Negative. Neurological: Negative. Hematological: Negative. Psychiatric/Behavioral: Negative. PHYSICAL EXAMINATION:     Vital signs reviewed per the nursing documentation. There were no vitals taken for this visit. There is no height or weight on file to calculate BMI. Physical Exam  Vitals and nursing note reviewed. Constitutional:       Appearance: Normal appearance. She is well-developed. Comments: Patient appears to be very reserved. HENT:      Head: Normocephalic and atraumatic. Eyes:      General: No scleral icterus. Extraocular Movements: Extraocular movements intact. Conjunctiva/sclera: Conjunctivae normal.      Pupils: Pupils are equal, round, and reactive to light. Neck:      Thyroid: No thyromegaly. Vascular: No hepatojugular reflux or JVD. Trachea: No tracheal deviation. Cardiovascular:      Rate and Rhythm: Normal rate and regular rhythm. Heart sounds: Normal heart sounds. Pulmonary:      Effort: Pulmonary effort is normal. No respiratory distress. Breath sounds: Normal breath sounds. No wheezing or rales. Abdominal:      General: Bowel sounds are normal. There is no distension. Palpations: Abdomen is soft. There is no hepatomegaly or mass. Tenderness: There is no abdominal tenderness. There is no rebound. Hernia: No hernia is present. Comments: Ileostomy functioning. Musculoskeletal:         General: No tenderness. Cervical back: Normal range of motion and neck supple. Comments: No joint swelling   Lymphadenopathy:      Cervical: No cervical adenopathy. Skin:     General: Skin is warm and dry.       Findings: No bruising, ecchymosis, erythema or rash. Neurological:      Mental Status: She is alert and oriented to person, place, and time. Cranial Nerves: No cranial nerve deficit. Psychiatric:         Mood and Affect: Mood normal.         Behavior: Behavior normal.         Thought Content: Thought content normal.         LABORATORY DATA: Reviewed  Lab Results   Component Value Date    WBC 12.1 (H) 07/08/2022    HGB 14.2 07/08/2022    HCT 42.5 07/08/2022    MCV 93.4 07/08/2022     07/08/2022     (L) 07/08/2022    K 4.1 07/08/2022    CL 94 (L) 07/08/2022    CO2 24 07/08/2022    BUN 8 07/08/2022    CREATININE 0.45 (L) 07/08/2022    LABALBU 4.3 07/08/2022    BILITOT 0.27 (L) 07/08/2022    ALKPHOS 95 07/08/2022    AST 26 07/08/2022    ALT 21 07/08/2022    INR 0.9 01/22/2020         Lab Results   Component Value Date    RBC 4.55 07/08/2022    HGB 14.2 07/08/2022    MCV 93.4 07/08/2022    MCH 31.2 07/08/2022    MCHC 33.4 07/08/2022    RDW 13.2 07/08/2022    MPV 8.4 07/08/2022    BASOPCT 1 07/08/2022    LYMPHSABS 2.45 07/08/2022    MONOSABS 0.77 07/08/2022    NEUTROABS 8.68 (H) 07/08/2022    EOSABS 0.13 07/08/2022    BASOSABS 0.07 07/08/2022         DIAGNOSTIC TESTING:     US ABDOMEN LIMITED Specify organ? SPLEEN    Result Date: 6/20/2022  EXAMINATION: RIGHT UPPER QUADRANT ULTRASOUND 6/20/2022 10:41 am COMPARISON: None. HISTORY: ORDERING SYSTEM PROVIDED HISTORY: Left sided abdominal pain of unknown cause TECHNOLOGIST PROVIDED HISTORY: LUQ heaviness and pain Specify organ?->SPLEEN FINDINGS: Ultrasound the spleen demonstrates no focal abnormality in overall normal in echotexture with blood flow documented. Maximum diameter 7.6 cm.      Unremarkable ultrasound of the spleen     CT CHEST ABDOMEN PELVIS W CONTRAST    Result Date: 7/8/2022  EXAMINATION: CT OF THE CHEST, ABDOMEN, AND PELVIS WITH CONTRAST 7/8/2022 8:28 pm TECHNIQUE: CT of the chest, abdomen and pelvis was performed with the administration of intravenous contrast. Multiplanar reformatted images are provided for review. Automated exposure control, iterative reconstruction, and/or weight based adjustment of the mA/kV was utilized to reduce the radiation dose to as low as reasonably achievable. COMPARISON: 04/21/2022, 02/14/2022, 10/11/2021 HISTORY: ORDERING SYSTEM PROVIDED HISTORY: History of lung cancer, hypercalcemic, abdominal pain bloating, rule out recurrence or metastasis TECHNOLOGIST PROVIDED HISTORY: History of lung cancer, hypercalcemic, abdominal pain bloating, rule out recurrence or metastasis Decision Support Exception - unselect if not a suspected or confirmed emergency medical condition->Emergency Medical Condition (MA) Reason for Exam: History of lung cancer, hypercalcemic, abdominal pain bloating, rule out recurrence or metastasis FINDINGS: Chest: Mediastinum: No thoracic aortic aneurysm is identified. No aortic dissection. Atherosclerotic calcifications are seen, including coronary artery involvement. No significant pericardial effusion is identified. Interatrial lipomatous hypertrophy is noted. No focal esophageal thickening is identified. No great vessel stenosis. No mediastinal lymphadenopathy is identified. Lungs/pleura: No pleural effusion. Stable right upper lobe subpleural pulmonary nodule, unchanged dating back to the prior PET scan on axial image 26 measuring 4 mm. There is a right lower lobe pulmonary nodule measuring 11 x 9 mm, previously measuring 13 x 11 mm. Otherwise no significant interval change when compared to the previous examination with regards to this nodule. No new suspicious pulmonary nodule is identified. No acute parenchymal infiltrate. Background emphysematous changes are again noted. Soft Tissues/Bones: Postoperative changes are seen related to right mastectomy. No axillary lymphadenopathy is identified. No supraclavicular lymphadenopathy is seen. No left axillary lymphadenopathy.  The left axillary lymph nodes appear smaller on today's exam when compared to the previous study. There is a small stable subcentimeter short axis anterolateral chest wall lymph nodes seen over the lower left chest on axial image 95, unchanged from the previous examination. Degenerative changes are seen within the spine. No acute fracture is identified. Vertebral body heights appear normal.  No compression deformity is identified. Abdomen/Pelvis: Organs: No enhancing or hypodense mass identified within the liver or spleen. Again noted is a right adrenal nodule versus thickening, though similar in size and appearance compared to the previous examination. The right-sided adrenal nodule was noted on the prior PET scan without increased FDG uptake. Cholecystectomy clips are identified in the gallbladder fossa. No pancreatic mass, ductal dilation or peripancreatic inflammatory process. No enhancing renal mass is identified. No urinary tract obstructive calculi. GI/Bowel: No ileus or obstruction is identified. No focal inflammatory bowel process. No significant wall thickening is identified. Pelvis: No pelvic mass. The bladder appears unremarkable. The uterus and adnexal structures appear stable, with a possible small uterine fibroid seen along the anterior mid uterine body, best seen on sagittal imaging such as image 66. This is unchanged compared to the recent comparison. No bulky pelvic lymphadenopathy. No free fluid is identified. Peritoneum/Retroperitoneum: Aorto iliac atherosclerosis is identified. No severe stenosis is seen. No aortic aneurysm is identified. No retroperitoneal or mesenteric lymphadenopathy is identified. There is a small ventral fat containing hernia noted. No bowel herniation is identified. Bones/Soft Tissues: No acute subcutaneous soft tissue abnormality is identified. No acute osseous abnormality is identified. Multilevel degenerative changes are seen within the spine. No acute fracture is identified. No osseous destructive process is seen. 1. The primary lung mass in the right lower lobe is mildly decreased in size, measuring 11 x 9 mm, previously measuring 13 x 11 mm. 2. No new or suspicious pulmonary nodules are identified. 3. No CT findings to suggest distant metastatic disease within the abdomen or pelvis. 4. Emphysema. 5. Atherosclerosis including coronary artery involvement. 6. Other similar findings as above. Assessment  No diagnosis found. Plan    This patient may not be a candidate for Linzess. Also insurance is not covering [address]. Apparently patient tried Trulance and according to patient and , that is not helping patient. I did discuss with patient and  regarding constipation management and various etiologies of constipation. Main issue appears to be long-term laxative abuse. The next step is to evaluate possibility of colonic inertia. Advised Sitzmarks study. If patient issue of constipation is not resolved, at the end we may have to resort to either colostomy or ileostomy basing on the Sitzmarks study results. This was also discussed. Advised to contact me soon after the Sitzmarks study is done. Thank you for allowing me to participate in the care of Ms. Milagros Foster. For any further questions please do not hesitate to contact me. I have reviewed and agree with the ROS entered by the MA/LPN. Note is dictated utilizing voice recognition software. Unfortunately this leads to occasional typographical errors.  Please contact our office if you have any questions        Ross Vela MD,FACP, [facility]  Board Certified in Gastroenterology and [address] [place] [address] Gastroenterology  Office #: [phone]

## 2022-07-20 ENCOUNTER — HOSPITAL ENCOUNTER (EMERGENCY)
Age: 62
Discharge: HOME | End: 2022-07-20
Attending: EMERGENCY MEDICINE

## 2022-07-20 VITALS
SYSTOLIC BLOOD PRESSURE: 134 MMHG | TEMPERATURE: 97.8 F | HEART RATE: 90 BPM | RESPIRATION RATE: 18 BRPM | HEIGHT: 68 IN | WEIGHT: 127 LBS | BODY MASS INDEX: 19.25 KG/M2 | DIASTOLIC BLOOD PRESSURE: 87 MMHG | OXYGEN SATURATION: 98 %

## 2022-07-20 DIAGNOSIS — R19.7 DIARRHEA, UNSPECIFIED TYPE: Primary | ICD-10-CM

## 2022-07-20 DIAGNOSIS — R53.1 GENERAL WEAKNESS: ICD-10-CM

## 2022-07-20 LAB
ABSOLUTE EOS #: 0.1 K/UL (ref 0–0.44)
ABSOLUTE IMMATURE GRANULOCYTE: 0.03 K/UL (ref 0–0.3)
ABSOLUTE LYMPH #: 2.08 K/UL (ref 1.1–3.7)
ABSOLUTE MONO #: 0.79 K/UL (ref 0.1–1.2)
ALBUMIN SERPL-MCNC: 4.6 G/DL (ref 3.5–5.2)
ALP BLD-CCNC: 91 U/L (ref 35–104)
ALT SERPL-CCNC: 22 U/L (ref 5–33)
ANION GAP SERPL CALCULATED.3IONS-SCNC: 13 MMOL/L (ref 9–17)
AST SERPL-CCNC: 28 U/L
BASOPHILS # BLD: 1 % (ref 0–2)
BASOPHILS ABSOLUTE: 0.06 K/UL (ref 0–0.2)
BILIRUB SERPL-MCNC: 0.41 MG/DL (ref 0.3–1.2)
BILIRUBIN DIRECT: 0.17 MG/DL
BILIRUBIN, INDIRECT: 0.24 MG/DL (ref 0–1)
BUN BLDV-MCNC: 8 MG/DL (ref 8–23)
BUN/CREAT BLD: 16 (ref 9–20)
CALCIUM SERPL-MCNC: 10.2 MG/DL (ref 8.6–10.4)
CHLORIDE BLD-SCNC: 93 MMOL/L (ref 98–107)
CO2: 25 MMOL/L (ref 20–31)
CREAT SERPL-MCNC: 0.51 MG/DL (ref 0.5–0.9)
EOSINOPHILS RELATIVE PERCENT: 1 % (ref 1–4)
GFR AFRICAN AMERICAN: >60 ML/MIN
GFR NON-AFRICAN AMERICAN: >60 ML/MIN
GFR SERPL CREATININE-BSD FRML MDRD: ABNORMAL ML/MIN/{1.73_M2}
GLUCOSE BLD-MCNC: 114 MG/DL (ref 70–99)
HCT VFR BLD CALC: 43.8 % (ref 36.3–47.1)
HEMOGLOBIN: 14.8 G/DL (ref 11.9–15.1)
IMMATURE GRANULOCYTES: 0 %
LACTIC ACID, SEPSIS: 0.7 MMOL/L (ref 0.5–1.9)
LIPASE: 20 U/L (ref 13–60)
LYMPHOCYTES # BLD: 19 % (ref 24–43)
MCH RBC QN AUTO: 31.3 PG (ref 25.2–33.5)
MCHC RBC AUTO-ENTMCNC: 33.8 G/DL (ref 28.4–34.8)
MCV RBC AUTO: 92.6 FL (ref 82.6–102.9)
MONOCYTES # BLD: 7 % (ref 3–12)
NRBC AUTOMATED: 0 PER 100 WBC
PDW BLD-RTO: 12.9 % (ref 11.8–14.4)
PLATELET # BLD: 298 K/UL (ref 138–453)
PMV BLD AUTO: 8.4 FL (ref 8.1–13.5)
POTASSIUM SERPL-SCNC: 3.8 MMOL/L (ref 3.7–5.3)
RBC # BLD: 4.73 M/UL (ref 3.95–5.11)
SEG NEUTROPHILS: 72 % (ref 36–65)
SEGMENTED NEUTROPHILS ABSOLUTE COUNT: 7.95 K/UL (ref 1.5–8.1)
SODIUM BLD-SCNC: 131 MMOL/L (ref 135–144)
TOTAL PROTEIN: 7.7 G/DL (ref 6.4–8.3)
WBC # BLD: 11 K/UL (ref 3.5–11.3)

## 2022-07-20 PROCEDURE — 83690 ASSAY OF LIPASE: CPT

## 2022-07-20 PROCEDURE — 85025 COMPLETE CBC W/AUTO DIFF WBC: CPT

## 2022-07-20 PROCEDURE — 99283 EMERGENCY DEPT VISIT LOW MDM: CPT

## 2022-07-20 PROCEDURE — 80048 BASIC METABOLIC PNL TOTAL CA: CPT

## 2022-07-20 PROCEDURE — 80076 HEPATIC FUNCTION PANEL: CPT

## 2022-07-20 PROCEDURE — 83605 ASSAY OF LACTIC ACID: CPT

## 2022-07-20 ASSESSMENT — ENCOUNTER SYMPTOMS
DIARRHEA: 1
COUGH: 0
NAUSEA: 0
RHINORRHEA: 0
COLOR CHANGE: 0
VOMITING: 0
SORE THROAT: 0
EYE DISCHARGE: 0
EYE REDNESS: 0
ABDOMINAL PAIN: 1
SHORTNESS OF BREATH: 0

## 2022-07-20 ASSESSMENT — PAIN - FUNCTIONAL ASSESSMENT: PAIN_FUNCTIONAL_ASSESSMENT: NONE - DENIES PAIN

## 2022-07-20 NOTE — ED NOTES
Pt to ED via private auto. Pt c/o diarrhea since January, pt reports she has not had a solid BM since then and her BM's have been watery. Pt also c/o lethargy x2 weeks. Pt A&Ox4.      Jay Head, [facility]  07/20/22 4064

## 2022-07-20 NOTE — ED NOTES
Pt aware a stool sample is needed when she is able to give one. Pt unable to give one at this time.      Hermilo Estrada, [facility]  07/20/22 8726

## 2022-07-20 NOTE — ED PROVIDER NOTES
EMERGENCY DEPARTMENT ENCOUNTER    Pt Name: Camacho Gutierrez  MRN: [id number]  [place] 1960  Date of evaluation: 7/20/22  CHIEF COMPLAINT       Chief Complaint   Patient presents with    Diarrhea     X 6 months      Fatigue     X 2 weeks       HISTORY OF PRESENT ILLNESS   62year-old presents with complaints of diarrhea, fatigue, not feeling well. The patient states that she has been seen by the GI physician over the course of the past 6 months, she is had multiple tests without significant improvement of her symptoms. She states that she has intermittent episodes of severe constipation associated with intermittent episodes of diarrhea. She denies any blood in her stool, no fevers or chills, she denies cough or sputum production. REVIEW OF SYSTEMS     Review of Systems   Constitutional:  Negative for chills and fever. HENT:  Negative for rhinorrhea and sore throat. Eyes:  Negative for discharge, redness and visual disturbance. Respiratory:  Negative for cough and shortness of breath. Cardiovascular:  Negative for chest pain, palpitations and leg swelling. Gastrointestinal:  Positive for abdominal pain and diarrhea. Negative for nausea and vomiting. Genitourinary:  Negative for dysuria and hematuria. Musculoskeletal:  Negative for arthralgias, myalgias and neck pain. Skin:  Negative for color change and rash. Neurological:  Positive for weakness. Negative for seizures and headaches. Psychiatric/Behavioral:  Negative for hallucinations, self-injury and suicidal ideas.     PASTMEDICAL HISTORY     Past Medical History:   Diagnosis Date    Alcohol abuse     For many years; 5-6 beers per night    Breast cancer ([facility] [address] 2010    right radical mastectomy with positive sentinel dose, chemo, s/p tamoxifen x 7 years     Chronic diarrhea 1/29/2019    COPD (chronic obstructive pulmonary disease) (HCC)     Essential hypertension     Lung cancer (HCC)     Pleural effusion 12/26/2019    Tobacco abuse Past Problem List  Patient Active Problem List   Diagnosis Code    Panlobular emphysema ([facility] J43.1    Essential hypertension I10    History of right breast cancer Z85.3    Loculated pleural effusion J90    Hyponatremia E87.1    Coronary artery disease involving native coronary artery of native heart without angina pectoris I25.10    Tobacco abuse disorder Z72.0    Alcohol abuse F10.10    Leukocytosis D72.829    Adenocarcinoma, lung, 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    Shortness of breath R06.02    Insomnia G47.00    Anxiety F41.9    Malignant neoplasm of lower lobe of right lung (HCC) C34.31    COPD with acute exacerbation ([facility] J44.1     SURGICAL HISTORY       Past Surgical History:   Procedure Laterality Date    APPENDECTOMY      CHOLECYSTECTOMY      MASTECTOMY Right      CURRENT MEDICATIONS       Current Discharge Medication List        CONTINUE these medications which have NOT CHANGED    Details   magnesium citrate solution Take 296 mLs by mouth once for 1 dose  Qty: 296 mL, Refills: 0      ondansetron (ZOFRAN) 4 MG tablet Take 1 tablet by mouth every 8 hours as needed for Nausea  Qty: 5 tablet, Refills: 0      predniSONE (DELTASONE) 10 MG tablet 4 tabs by mouth daily for 3 days, then 3 tabs daily for 3 days, then 2 tabs daily for 3 days, then 1 tab daily till gone. Qty: 30 tablet, Refills: 0    Associated Diagnoses: Panlobular emphysema ([facility]      ! ! tiotropium (SPIRIVA RESPIMAT) 2.5 MCG/ACT AERS inhaler Inhale 2 puffs into the lungs daily  Qty: 1 each, Refills: 3    Associated Diagnoses: Panlobular emphysema ([facility]      ! ! tiotropium (SPIRIVA RESPIMAT) 2.5 MCG/ACT AERS inhaler Inhale 2 puffs into the lungs daily  Qty: 2 each, Refills: 0    Comments: Lot: [id number]  Exp: 9/2023  Associated Diagnoses: Panlobular emphysema (HCC)      Plecanatide 3 MG TABS Take 1 tablet by mouth daily  Qty: 30 tablet, Refills: 2      lubiprostone (AMITIZA) 8 MCG CAPS capsule Take 1 capsule by mouth daily  Qty: 30 capsule, Refills: 3 signs include Carney's sign and McBurney's sign. Musculoskeletal:      Cervical back: Normal range of motion and neck supple. Skin:     General: Skin is warm. Findings: No rash. Neurological:      Mental Status: She is alert and oriented to person, place, and time. GCS: GCS eye subscore is 4. GCS verbal subscore is 5. GCS motor subscore is 6. Psychiatric:         Speech: Speech normal.       MEDICAL DECISION MAKIN-year-old presents with complaints of abdominal pain and diarrhea. Plan is basic labs and reevaluation. 8:49 PM EDT  Patient's laboratory studies unremarkable, I discussed the case with the hospitalist service who believes that the patient is appropriate for outpatient follow-up with her PCP. CRITICAL CARE:       PROCEDURES:    Procedures    DIAGNOSTIC RESULTS   EKG:All EKG's are interpreted by the Emergency Department Physician who either signs or Co-signs this chart in the absence of a cardiologist.        RADIOLOGY:All plain film, CT, MRI, and formal ultrasound images (except ED bedside ultrasound) are read by the radiologist, see reports below, unless otherwisenoted in MDM or here. No orders to display     LABS: All lab results were reviewed by myself, and all abnormals are listed below.   Labs Reviewed   BASIC METABOLIC PANEL - Abnormal; Notable for the following components:       Result Value    Glucose 114 (*)     Sodium 131 (*)     Chloride 93 (*)     All other components within normal limits   CBC WITH AUTO DIFFERENTIAL - Abnormal; Notable for the following components:    Seg Neutrophils 72 (*)     Lymphocytes 19 (*)     All other components within normal limits   C DIFF TOXIN/ANTIGEN   GASTROINTESTINAL PANEL, MOLECULAR   HEPATIC FUNCTION PANEL   LIPASE   LACTATE, SEPSIS   BLOOD OCCULT STOOL SCREEN #1   LACTATE, SEPSIS       EMERGENCY DEPARTMENTCOURSE:         Vitals:    Vitals:    22 1828   BP: 134/87   Pulse: 90   Resp: 18   Temp: 97.8 °F (36.6 °C) SpO2: 98%   Weight: 127 lb (57.6 kg)   Height: 5' 8\" (1.727 m)       The patient was given the following medications while in the emergency department:  No orders of the defined types were placed in this encounter. CONSULTS:  IP CONSULT TO HOSPITALIST    FINAL IMPRESSION      1. Diarrhea, unspecified type    2. General weakness          DISPOSITION/PLAN   DISPOSITION Decision To Discharge 07/20/2022 08:47:31 PM      PATIENT REFERRED TO:  Celina Low, [address] [facility] [address]  [phone]    Schedule an appointment as soon as possible for a visit in 2 days    DISCHARGE MEDICATIONS:  Current Discharge Medication List        The care is provided during an unprecedented national emergency due to the novel coronavirus, COVID 19.   MD Jaylen Epps MD  07/20/22 2042

## 2022-07-21 ENCOUNTER — TELEPHONE (OUTPATIENT)
Dept: GASTROENTEROLOGY | Age: 62
End: 2022-07-21

## 2022-07-21 NOTE — ED NOTES
Pt states she is not able to give stool sample at this time and doesn't think she will be able to soon.        [facility]  07/20/22 2110

## 2022-07-21 NOTE — TELEPHONE ENCOUNTER
Patients'  called office stating patient is getting weaker by the day. He had her in the ER last night because she is to the point where she can barely stand. They have not been able to do Sitz markers yet, but want something done about her bowel movement issues before she gets any worse. They are hoping for patient to get a direct admit to the hospital.  Please advise pt.

## 2022-07-22 NOTE — TELEPHONE ENCOUNTER
Pt  called stating pt is getting weaker and needs to be admitted to the hospital by the physician adv pt  message has been sent back to [facility] nurse practitioner, pt  states that is not good enough and wants her admitted right away, adv pt  to take pt to the ER if symtpoms are worsening pt  states have already done that and they just sent pt back home, adv pt I understand his frustration and will send another note back, pt  thanked writer call ended.

## 2022-07-25 ENCOUNTER — TELEPHONE (OUTPATIENT)
Dept: VASCULAR SURGERY | Age: 62
End: 2022-07-25

## 2022-07-25 NOTE — TELEPHONE ENCOUNTER
----- Message from Suraj Richardson MA sent at 7/25/2022 12:26 PM EDT -----  No answer no machine letter sent  ----- Message -----  From: Suraj Richardson MA  Sent: 7/22/2022   2:33 PM EDT  To: [facility]    No answer no machine   ----- Message -----  From: Suraj Richardson MA  Sent: 7/6/2022   1:18 PM EDT  To: [facility]    No answer no machine   ----- Message -----  From: Veronica Hope MD  Sent: 7/5/2022   5:09 PM EDT  To: Suraj Richardson MA, #    She has abdominal CT that should be good for abdominal pain    Can we get a PVR as well, she has right iliac stenosis on CT  Diagnosis PAD    Thank you  ----- Message -----  From: Suraj Richardson MA  Sent: 7/5/2022   1:00 PM EDT  To: Veronica Hope MD, #    Pt was referred to you for abdominal pain unspecified. I was reviewing the testing she has had do not know exactly what it is they are referring her to you for. Is there any additional testing you would like, is this something you would like me to schedule?

## 2022-07-26 RX ORDER — AMLODIPINE BESYLATE 5 MG/1
TABLET ORAL
Qty: 30 TABLET | Refills: 5 | OUTPATIENT
Start: 2022-07-26

## 2022-07-26 NOTE — TELEPHONE ENCOUNTER
Called, no answer  Voicemail left.     Electronically signed by SONYA Ramon NP on 7/26/2022 at 3:32 PM

## 2022-07-27 DIAGNOSIS — C34.92 ADENOCARCINOMA OF LUNG, STAGE 4, LEFT (HCC): ICD-10-CM

## 2022-07-28 RX ORDER — LEVOTHYROXINE SODIUM 88 UG/1
TABLET ORAL
Qty: 30 TABLET | Refills: 5 | Status: SHIPPED | OUTPATIENT
Start: 2022-07-28

## 2022-07-28 RX ORDER — AMLODIPINE BESYLATE 5 MG/1
TABLET ORAL
Qty: 30 TABLET | Refills: 5 | Status: SHIPPED | OUTPATIENT
Start: 2022-07-28

## 2022-07-28 NOTE — TELEPHONE ENCOUNTER
Last visit: 06/14/2022  Last Med refill: 06/17/2022  Does patient have enough medication for 72 hours: No: completely out and leaving on vacation on 7/30/22    Next Visit Date:  Future Appointments   Date Time Provider Nash Akinsi   8/15/2022  4:00 PM STA CT SCAN RM 1 STAZ CT SCAN STA Radiolog   8/16/2022  3:45 PM Angélica Beckford MD [facility] [place]   8/30/2022  1:00 PM Danette Blair MD [address] [place] [address] 22   11/17/2022  3:45 PM Carola Clay MD Resp Spec [facility]   12/14/2022  1:15 PM Kat Meng  [address] Maintenance   Topic Date Due    Shingles vaccine (1 of 2) Never done    Cervical cancer screen  Never done    Breast cancer screen  Never done    Low dose CT lung screening  Never done    HIV screen  12/14/2022 (Originally 8/12/1975)    Pneumococcal 0-64 years Vaccine (1 - PCV) 12/14/2023 (Originally 8/12/1967)    COVID-19 Vaccine (1) 12/14/2024 (Originally 8/12/1966)    Hepatitis C screen  12/14/2024 (Originally 8/12/1979)    Flu vaccine (1) 09/01/2022    Depression Screen  06/14/2023    Lipids  04/13/2025    Colorectal Cancer Screen  05/30/2027    DTaP/Tdap/Td vaccine (2 - Td or Tdap) 05/03/2028    Hepatitis A vaccine  Aged Out    Hepatitis B vaccine  Aged Out    Hib vaccine  Aged Out    Meningococcal (ACWY) vaccine  Aged Out       Hemoglobin A1C (%)   Date Value   12/19/2019 5.6             ( goal A1C is < 7)   No results found for: LABMICR  LDL Cholesterol (mg/dL)   Date Value   04/13/2020        02/10/2020 150 (H)       (goal LDL is <100)   AST (U/L)   Date Value   07/20/2022 28     ALT (U/L)   Date Value   07/20/2022 22     BUN (mg/dL)   Date Value   07/20/2022 8     BP Readings from Last 3 Encounters:   07/20/22 134/87   07/19/22 129/87   07/08/22 119/76          (goal 120/80)    All Future Testing planned in [facility]  Lab Frequency Next Occurrence   CT CHEST WO CONTRAST Once 08/15/2022   ELLE DIGITAL SCREEN UNILATERAL LEFT Once 04/03/2022   CT CHEST W CONTRAST Once 05/03/2022   TSH With Reflex Ft4 Once 07/28/2022   XR ABDOMEN (2 VIEWS) Once 08/19/2022               Patient Active Problem List:     Panlobular emphysema ([address]     Essential hypertension     History of right breast cancer     Loculated pleural effusion     Hyponatremia     Coronary artery disease involving native coronary artery of native heart without angina pectoris     Tobacco abuse disorder     Alcohol abuse     Leukocytosis     Adenocarcinoma, lung, left (HCC)     Shortness of breath     Insomnia     Anxiety     Malignant neoplasm of lower lobe of right lung (HCC)     COPD with acute exacerbation ([address]

## 2022-07-29 ENCOUNTER — TELEPHONE (OUTPATIENT)
Dept: FAMILY MEDICINE CLINIC | Age: 62
End: 2022-07-29

## 2022-07-29 DIAGNOSIS — K59.09 CHRONIC CONSTIPATION: Primary | ICD-10-CM

## 2022-07-29 NOTE — TELEPHONE ENCOUNTER
Patient's  called requesting referral to [facility]. He stated patient is not currently happy with current GI and they would like to see a new one.     FX: [phone]      Referral faxed

## 2022-08-10 ENCOUNTER — TELEPHONE (OUTPATIENT)
Dept: RADIATION ONCOLOGY | Age: 62
End: 2022-08-10

## 2022-08-10 NOTE — TELEPHONE ENCOUNTER
Pt had recent CT chest on 7/8/22. Pt scheduled for 6 mo CT on 8/15/22, prior to follow up w/Dr. Lizette Arrington. Per Dr. Lizette Arrington, no need to repeat CT on 8/15/22, appt cancelled. I called pt to advise no need for CT, no answer, I did leave detailed messg, we will see pt on 8/16/22 for 6 mo f/u, no need for CT prior.

## 2022-08-16 ENCOUNTER — HOSPITAL ENCOUNTER (OUTPATIENT)
Dept: RADIATION ONCOLOGY | Age: 62
Discharge: HOME | End: 2022-08-16

## 2022-08-16 VITALS
SYSTOLIC BLOOD PRESSURE: 137 MMHG | TEMPERATURE: 97.4 F | WEIGHT: 122.8 LBS | DIASTOLIC BLOOD PRESSURE: 87 MMHG | HEART RATE: 101 BPM | RESPIRATION RATE: 16 BRPM | BODY MASS INDEX: 18.67 KG/M2

## 2022-08-16 DIAGNOSIS — C34.11 PRIMARY CANCER OF RIGHT UPPER LOBE OF LUNG (HCC): Primary | ICD-10-CM

## 2022-08-16 PROCEDURE — 99214 OFFICE O/P EST MOD 30 MIN: CPT | Performed by: RADIOLOGY

## 2022-08-16 PROCEDURE — 99212 OFFICE O/P EST SF 10 MIN: CPT | Performed by: RADIOLOGY

## 2022-08-16 ASSESSMENT — PAIN SCALES - GENERAL: PAINLEVEL_OUTOF10: 5

## 2022-08-16 ASSESSMENT — PAIN DESCRIPTION - DESCRIPTORS: DESCRIPTORS: HEAVINESS

## 2022-08-16 ASSESSMENT — PAIN DESCRIPTION - PAIN TYPE: TYPE: CHRONIC PAIN

## 2022-08-16 ASSESSMENT — PAIN DESCRIPTION - LOCATION: LOCATION: ABDOMEN

## 2022-08-17 NOTE — PROGRESS NOTES
[address]            Radiation Oncology          [address] [facility] [place]          [address]        Harsh Parrish: [phone]        F: [phone]       [facility] [place][note 2]8 [facility]       Radiation Oncology   [address] [facility], [address] [facility]       Harsh Parrish: [phone]       F: [phone]       [email or web address]      Date of Service: 2022     Location:  [facility],   [address] [facility] [place] [place] [address]   [phone]        [address] [place] [address] FOLLOW UP NOTE    Patient ID:   Roderick Guzman  : 1960   MRN: [id number]    DIAGNOSIS:  Cancer Staging  Malignant neoplasm of lower lobe of right lung [facility])  Staging form: Lung, AJCC 8th Edition  - Clinical stage from 2021: Stage IA3 (cT1c, cN0, cM0) - Signed by Albaro Santa MD on 10/18/2021    -s/p RLL SBRT 50Gy 22    INTERVAL HISTORY:   Roderick Guzman is a 65 y.o. Margurette Argueta female with a history of a right lower lobe lung cancer that was treated with SBRT completing approximately 9 months ago. Patient comes in today for routine follow his exam.  Overall she is doing well without any acute complaints. She denies any chest pain, shortness of breath, coughing, or skin irritation. She had a CT of the chest abdomen pelvis done on 2022 which showed a decrease in size of the right lung tumor with no other evidence of metastatic disease. She otherwise denies any headaches, dizziness, bony pain, swelling, or bleeding. Patient does have abdominal discomfort and pain as well as constipation for which she has been using Senokot and prune juice for. She has a new referral for GI doctor for evaluation next month.     MEDICATIONS:    Current Outpatient Medications:     levothyroxine (SYNTHROID) 88 MCG tablet, take 1 tablet by mouth once daily, Disp: 30 tablet, Rfl: 5    amLODIPine (NORVASC) 5 MG tablet, Take 1 tablet by mouth once daily, Disp: 30 tablet, Rfl: 5    magnesium citrate solution, Take 296 mLs by mouth once for 1 dose, Disp: 296 mL, Rfl: 0    ondansetron (ZOFRAN) 4 MG tablet, Take 1 tablet by mouth every 8 hours as needed for Nausea, Disp: 5 tablet, Rfl: 0    predniSONE (DELTASONE) 10 MG tablet, 4 tabs by mouth daily for 3 days, then 3 tabs daily for 3 days, then 2 tabs daily for 3 days, then 1 tab daily till gone., Disp: 30 tablet, Rfl: 0    tiotropium (SPIRIVA RESPIMAT) 2.5 MCG/ACT AERS inhaler, Inhale 2 puffs into the lungs daily, Disp: 1 each, Rfl: 3    tiotropium (SPIRIVA RESPIMAT) 2.5 MCG/ACT AERS inhaler, Inhale 2 puffs into the lungs daily, Disp: 2 each, Rfl: 0    Plecanatide 3 MG TABS, Take 1 tablet by mouth daily, Disp: 30 tablet, Rfl: 2    lubiprostone (AMITIZA) 8 MCG CAPS capsule, Take 1 capsule by mouth daily, Disp: 30 capsule, Rfl: 3    budesonide (PULMICORT) 0.5 MG/2ML nebulizer suspension, Take 2 mLs by nebulization 2 times daily, Disp: 60 each, Rfl: 3    senna (SENOKOT) 8.6 MG tablet, Take 1 tablet by mouth daily, Disp: , Rfl:     ipratropium-albuterol (DUONEB) 0.5-2.5 (3) MG/3ML SOLN nebulizer solution, Inhale 3 mLs into the lungs 4 times daily, Disp: 360 mL, Rfl: 3    albuterol sulfate  (90 Base) MCG/ACT inhaler, INHALE 2 PUFFS INTO THE LUNGS EVERY 4 HOURS AS NEEDED FOR WHEEZING OR SHORTNESS OF BREATH, Disp: 25.5 g, Rfl: 1    albuterol (PROVENTIL) (2.5 MG/3ML) 0.083% nebulizer solution, Take 3 mLs by nebulization every 6 hours as needed for Wheezing or Shortness of Breath, Disp: 120 each, Rfl: 3    folic acid (FOLVITE) 265 MCG tablet, Take 800 mcg by mouth daily, Disp: , Rfl:     triamcinolone (KENALOG) 0.1 % cream, Apply topically daily Apply topically once daily. , Disp: , Rfl:     pregabalin (LYRICA) 75 MG capsule, Take 1 capsule by mouth 3 times daily for 120 days. , Disp: 90 capsule, Rfl: 5    ALLERGIES:  Allergies   Allergen Reactions    Morphine     Robitussin (Alcohol Free) [Guaifenesin] Hives    Aspirin Hives and Rash    Penicillins Hives and Rash    Sulfa Antibiotics Hives and Rash         REVIEW OF SYSTEMS:    A full 14 point review of systems was performed and assessed and found to be negative except as noted above. PHYSICAL EXAMINATION:    CHAPERONE: Family/friend/companieon Present    ECO Asymptomatic    VITAL SIGNS: /87   Pulse (!) 101   Temp 97.4 °F (36.3 °C) (Oral)   Resp 16   Wt 122 lb 12.8 oz (55.7 kg)   BMI 18.67 kg/m²   GENERAL:  General appearance is that of a well-nourished, well-developed in no apparent distress. HEENT: Normocephalic, atraumatic, EOMI, moist mucosa, no erythema. NECK:  No adenopathy or a palpable thyroid mass, trachea is midline. LYMPHATICS: No cervical, supraclavicular adenopathy. HEART:  Normal rate and regular rhythm, normal heart sounds. LUNGS:  Pulmonary effort normal. Normal breath sounds. ABDOMEN:  Soft, nontender, non distended. EXTREMITIES:  No edema. No calf tenderness. MSK:  No spinal tenderness. Normal ROM. NEUROLOGICAL: Alert and oriented. Strength and sensation intact bilaterally. No focal deficits. PSYCH: Mood normal, behavior normal.  SKIN: No erythema, no desquamation. LABS:  WBC   Date Value Ref Range Status   2022 11.0 3.5 - 11.3 k/uL Final     Segs Absolute   Date Value Ref Range Status   2022 7.95 1.50 - 8.10 k/uL Final     Hemoglobin   Date Value Ref Range Status   2022 14.8 11.9 - 15.1 g/dL Final     Platelets   Date Value Ref Range Status   2022 298 138 - 453 k/uL Final     No results found for: , CEA  No results found for: PSA    IMAGIN22 CT Chest  Impression   Marked reduction in the known malignant right lower lobe cavitary nodule   indicating good response to therapy. Stable prominent left axillary and left lower chest wall lymph nodes. 22 CT C/A/P  Impression   1.  The primary lung mass in the right lower lobe is mildly decreased in size,   measuring 11 x 9 mm, previously measuring 13 x 11 mm.   2. No new or suspicious pulmonary nodules are identified. 3. No CT findings to suggest distant metastatic disease within the abdomen or   pelvis. 4. Emphysema. 5. Atherosclerosis including coronary artery involvement. 6. Other similar findings as above. ASSESSMENT AND PLAN:  Brayden Ulloa is a 65 y.o. female with a Cancer Staging  Malignant neoplasm of lower lobe of right lung [facility])  Staging form: Lung, AJCC 8th Edition  - Clinical stage from 9/13/2021: Stage IA3 (cT1c, cN0, cM0) - Signed by Ebenezer Shanks MD on 10/18/2021  . Patient comes in today for a follow-up visit approximately 9 months after completion of her SBRT treatment to her right lower lobe lung lesion. Overall patient has recovered well without any significant symptoms related to her lung cancer. Patient does have issues with constipation and abdominal discomfort. Patient is advised to follow-up with GI for evaluation for colonoscopy as she is overdue. We do encourage her to increase her fiber supplement in her diet and to try using prune juice alone to see if she can titrate her bowel movements better. We did review her CT scans show no abnormalities in the bowel and does show an improvement in her lung tumor from radiation. Patient will be advised to have another repeat CT chest done in 6 months for continued surveillance. Patient will follow up with us afterwards to review the results. Should she have any questions or concerns she can certainly see us sooner. Patient was counseled on smoking cessation advice was offered however she is not interested in any assistance at this time. Patient was in agreement with my recommendations. All questions were answered to their satisfaction. Patient was advised to contact us anytime should they have any questions or concerns.          Electronically signed by Ebenezer Shanks MD on 8/17/2022 at 1:08 PM        Medications Prescribed:   New Prescriptions No medications on file       Orders:   Orders Placed This Encounter   Procedures    CT CHEST WO CONTRAST       CC:  Patient Care Team:  Vinayak Mcgovern MD as PCP - General (Internal Medicine)  Vinayak Mcgovern MD as PCP - [facility]  Ora Dakin, MD as Consulting Physician (Gastroenterology)  Antonio Alcala MD as Consulting Physician (Radiation Oncology)  Sammie Guerrier MD as Consulting Physician (Hematology and Oncology)     Total time spent on this case on the day of encounter is 30 minutes. This time includes combination of one or more of the following - review of necessary tests, review of pertinent medical records from the EMR, performing medically appropriate examination and evaluation, counseling and educating the patient/family/caregiver, ordering necessary medical tests, procedures etc., documenting the clinical information in the electronic medical record, care coordination, referring and communicating with other health care providers and interpretation of results independently. This note is created with the assistance of a speech recognition program.  While intending to generate a document that actually reflects the content of the visit, the document can still have some errors including those of syntax and sound a like substitutions which may escape proof reading. It such instances, actual meaning can be extrapolated by contextual diversion.

## 2022-11-02 ENCOUNTER — OFFICE VISIT (OUTPATIENT)
Dept: FAMILY MEDICINE CLINIC | Age: 62
End: 2022-11-02

## 2022-11-02 VITALS
HEART RATE: 97 BPM | OXYGEN SATURATION: 95 % | HEIGHT: 68 IN | WEIGHT: 121 LBS | SYSTOLIC BLOOD PRESSURE: 128 MMHG | RESPIRATION RATE: 22 BRPM | BODY MASS INDEX: 18.34 KG/M2 | DIASTOLIC BLOOD PRESSURE: 78 MMHG

## 2022-11-02 DIAGNOSIS — B37.0 ORAL CANDIDIASIS: Primary | ICD-10-CM

## 2022-11-02 DIAGNOSIS — K59.09 CHRONIC CONSTIPATION: ICD-10-CM

## 2022-11-02 PROCEDURE — 3074F SYST BP LT 130 MM HG: CPT | Performed by: NURSE PRACTITIONER

## 2022-11-02 PROCEDURE — 3078F DIAST BP <80 MM HG: CPT | Performed by: NURSE PRACTITIONER

## 2022-11-02 PROCEDURE — 99213 OFFICE O/P EST LOW 20 MIN: CPT | Performed by: NURSE PRACTITIONER

## 2022-11-02 ASSESSMENT — ENCOUNTER SYMPTOMS: ABDOMINAL PAIN: 1

## 2022-11-02 NOTE — PATIENT INSTRUCTIONS
Mary Jay DO (Attending)  [phone] (Work)  [address]  [place]  ENT specialist, please make appointment

## 2022-11-02 NOTE — PROGRESS NOTES
Jennie Winn (:  1960) is a 65 y.o. female,Established patient, here for evaluation of the following chief complaint(s):  Dental Pain (States mouth gets really dry and there is a burning sensation. ) and Abdominal Pain         ASSESSMENT/PLAN:  1. Oral candidiasis  Comments:  Rx for nystatin. They will call ENT for follow up. Information provided in AVS. Smoking cessation advised. Orders:  -     nystatin (MYCOSTATIN) 220102 UNIT/ML suspension; Take 5 mLs by mouth 4 times daily for 10 days Retain in mouth as long as possible, Oral, 4 TIMES DAILY Starting Wed 2022, Until Sat 2022, For 10 days, Disp-200 mL, R-0, Normal  2. Chronic constipation  Comments:  Problem list printed today and provided to patient for medicaid application. Records release signed today in case of request from [facility]. Needs to follow up with GI specialist for colonoscopy. If unable to get medicaid they will call office for SDOH referral.     Patient Instructions   Joseline Glass DO (Attending)  [phone] (Work)  [address]  [place], [address] [facility]  ENT specialist, please make appointment      No follow-ups on file. Subjective   SUBJECTIVE/OBJECTIVE:  Presents with  for acute visit  Has lost 1lb since last OV in August    Reports mouth has been dry and burning for the last few weeks  Does not seem to be getting worse  Certain foods make mouth burning worse - ham, dried fruit  Nothing seems to make it better   Has full upper/lower dentures - has not seen a dentist in 4-5 years   Last saw ENT in  for sore throat   Continues to smoke, tongue does not seem more bothered after smoking     GI: Has been 10 months since she has had solid bowel movement  Chronic abdominal pain/bloating   Has GI ([facility]) doctor that wants to colonoscopy, unable to afford this  Trying to apply for medicaid but not getting anywhere with this.     Drinks prune juice daily, this gives her diarrhea   If she does not drink this she is chronically constipated   Only eats toast or applesauce at this point, anything heavier than this and bloating/abdominal pain is unbearable     Denies any other problems/concerns at this time     Abdominal Pain  Associated symptoms include constipation and diarrhea. Pertinent negatives include no nausea. Review of Systems   Constitutional:  Negative for activity change. HENT:  Positive for mouth sores. Negative for congestion, ear discharge, ear pain, nosebleeds, postnasal drip, rhinorrhea, sinus pain, sore throat, tinnitus, trouble swallowing and voice change. Respiratory:  Negative for cough. Gastrointestinal:  Positive for abdominal distention, abdominal pain, constipation and diarrhea. Negative for nausea and rectal pain. Objective   Physical Exam  Vitals and nursing note reviewed. Constitutional:       Appearance: Normal appearance. HENT:      Head: Normocephalic. Right Ear: Hearing normal.      Left Ear: Hearing normal.      Nose: Nose normal.      Mouth/Throat:      Mouth: Mucous membranes are moist.      Dentition: Has dentures. Tongue: Lesions present. Pharynx: Oropharynx is clear. Uvula midline. Eyes:      Extraocular Movements: Extraocular movements intact. Conjunctiva/sclera: Conjunctivae normal.      Pupils: Pupils are equal, round, and reactive to light. Cardiovascular:      Rate and Rhythm: Normal rate and regular rhythm. Pulses: Normal pulses. Heart sounds: Normal heart sounds. Pulmonary:      Effort: Pulmonary effort is normal.      Breath sounds: Normal breath sounds. Abdominal:      General: Abdomen is flat. Bowel sounds are normal.      Palpations: Abdomen is soft. Musculoskeletal:         General: Normal range of motion. Cervical back: Normal range of motion. Skin:     General: Skin is warm and dry. Capillary Refill: Capillary refill takes less than 2 seconds.    Neurological:      General: No focal deficit present. Mental Status: She is alert and oriented to person, place, and time. Mental status is at baseline. Psychiatric:         Mood and Affect: Mood normal.         Behavior: Behavior normal.         Thought Content: Thought content normal.         Judgment: Judgment normal.          Wt Readings from Last 3 Encounters:   11/02/22 121 lb (54.9 kg)   08/16/22 122 lb 12.8 oz (55.7 kg)   07/20/22 127 lb (57.6 kg)     Temp Readings from Last 3 Encounters:   08/16/22 97.4 °F (36.3 °C) (Oral)   07/20/22 97.8 °F (36.6 °C)   07/08/22 98.2 °F (36.8 °C) (Oral)     BP Readings from Last 3 Encounters:   11/02/22 128/78   08/16/22 137/87   07/20/22 134/87     Pulse Readings from Last 3 Encounters:   11/02/22 97   08/16/22 (!) 101   07/20/22 90         An electronic signature was used to authenticate this note.     --SONYA So - NP

## 2022-11-03 ASSESSMENT — ENCOUNTER SYMPTOMS
DIARRHEA: 1
NAUSEA: 0
CONSTIPATION: 1
TROUBLE SWALLOWING: 0
ABDOMINAL DISTENTION: 1
RECTAL PAIN: 0
RHINORRHEA: 0
COUGH: 0
VOICE CHANGE: 0
SORE THROAT: 0
SINUS PAIN: 0

## 2022-11-15 NOTE — TELEPHONE ENCOUNTER
Patient scheduled 10/15/21 with Dr. Frederick Bansal for follow up/sim. I spoke with  in regards to time change patient was to arrive at [facility] however time needed to be changed until 10am due to MD in a procedure. Negative

## 2022-11-16 ENCOUNTER — TELEPHONE (OUTPATIENT)
Dept: FAMILY MEDICINE CLINIC | Age: 62
End: 2022-11-16

## 2022-11-16 DIAGNOSIS — N39.41 URGE INCONTINENCE OF URINE: Primary | ICD-10-CM

## 2022-11-16 NOTE — TELEPHONE ENCOUNTER
Pt  called and states that since yesterday she is unable to control her bladder. He states that pt just has her normal abdomen pains and one time yesterday she vomited. Pt is eating and drinking normally. Pt told  that she get the urge to urinate, gets up to go and she is unable to hold it and just urinates. Spoke with Trina Hernandez in regards to collecting a urine, possible UTI? Pt  came in to get supplies to collect urine. Orders are pending.

## 2022-11-17 ENCOUNTER — HOSPITAL ENCOUNTER (OUTPATIENT)
Age: 62
Setting detail: SPECIMEN
Discharge: HOME | End: 2022-11-17

## 2022-11-17 DIAGNOSIS — N39.41 URGE INCONTINENCE OF URINE: ICD-10-CM

## 2022-11-18 LAB
BILIRUBIN URINE: NEGATIVE
CASTS UA: ABNORMAL /LPF (ref 0–8)
COLOR: YELLOW
CULTURE: NORMAL
EPITHELIAL CELLS UA: ABNORMAL /HPF (ref 0–5)
GLUCOSE URINE: NEGATIVE
KETONES, URINE: NEGATIVE
LEUKOCYTE ESTERASE, URINE: ABNORMAL
NITRITE, URINE: NEGATIVE
PH UA: 6.5 (ref 5–8)
PROTEIN UA: NEGATIVE
RBC UA: ABNORMAL /HPF (ref 0–4)
SPECIFIC GRAVITY UA: 1 (ref 1–1.03)
SPECIMEN DESCRIPTION: NORMAL
TURBIDITY: CLEAR
URINE HGB: NEGATIVE
UROBILINOGEN, URINE: NORMAL
WBC UA: ABNORMAL /HPF (ref 0–5)

## 2022-11-21 DIAGNOSIS — E87.1 HYPONATREMIA: Primary | ICD-10-CM

## 2023-01-20 DIAGNOSIS — C34.92 ADENOCARCINOMA OF LUNG, STAGE 4, LEFT (HCC): ICD-10-CM

## 2023-01-20 RX ORDER — AMLODIPINE BESYLATE 5 MG/1
TABLET ORAL
Qty: 30 TABLET | Refills: 5 | Status: SHIPPED | OUTPATIENT
Start: 2023-01-20

## 2023-01-20 RX ORDER — LEVOTHYROXINE SODIUM 88 UG/1
TABLET ORAL
Qty: 30 TABLET | Refills: 5 | Status: SHIPPED | OUTPATIENT
Start: 2023-01-20

## 2023-02-04 DIAGNOSIS — C34.92 ADENOCARCINOMA, LUNG, LEFT (HCC): ICD-10-CM

## 2023-02-04 DIAGNOSIS — J44.1 CHRONIC OBSTRUCTIVE PULMONARY DISEASE WITH ACUTE EXACERBATION (HCC): ICD-10-CM

## 2023-02-06 RX ORDER — ALBUTEROL SULFATE 90 UG/1
AEROSOL, METERED RESPIRATORY (INHALATION)
Qty: 25.5 G | Refills: 1 | Status: SHIPPED | OUTPATIENT
Start: 2023-02-06

## 2023-02-07 ENCOUNTER — TELEPHONE (OUTPATIENT)
Dept: ONCOLOGY | Age: 63
End: 2023-02-07

## 2023-02-07 NOTE — TELEPHONE ENCOUNTER
RECEIVED A MESSAGE FROM DR Viviana Arteaga ASKING WRITER TO CALL PATIENT AND ASK IF SHE IS INTERESTED IN SCHEDULING A F/U WITH DR Viviana Arteaga AND IF SO SCHEDULE AT [facility] WITH RAD/ONC. IF NOT DR Viviana Arteaga IS NOT GOING TO BE ABLE TO REFILL PT'S PRESCRIPTION'S. PT HAS NOT BEEN SEEN SINCE 09/20/21. PT STATES SHE WOULD LIKE AN APPOINTMENT. WRITER CALLED [facility] AND SPOKE TO Manju Garcia. ELBA HAS TO CONTACT DR Viviana Arteaga AND GET THE OK TO DOUBLE BOOK PT ON 02/21/23 (DAY OF RAD/ONC). ELBA WILL CALL PT ONCE SHE GETS THE OK FROM DR Viviana Arteaga.

## 2023-02-16 ENCOUNTER — HOSPITAL ENCOUNTER (OUTPATIENT)
Dept: CT IMAGING | Age: 63
Discharge: HOME | End: 2023-02-18

## 2023-02-16 DIAGNOSIS — C34.11 PRIMARY CANCER OF RIGHT UPPER LOBE OF LUNG (HCC): ICD-10-CM

## 2023-02-16 PROCEDURE — 71250 CT THORAX DX C-: CPT

## 2023-02-21 ENCOUNTER — OFFICE VISIT (OUTPATIENT)
Dept: ONCOLOGY | Age: 63
End: 2023-02-21

## 2023-02-21 ENCOUNTER — HOSPITAL ENCOUNTER (OUTPATIENT)
Dept: RADIATION ONCOLOGY | Age: 63
Discharge: HOME | End: 2023-02-21

## 2023-02-21 ENCOUNTER — TELEPHONE (OUTPATIENT)
Dept: ONCOLOGY | Age: 63
End: 2023-02-21

## 2023-02-21 VITALS
HEART RATE: 73 BPM | TEMPERATURE: 98.8 F | RESPIRATION RATE: 20 BRPM | BODY MASS INDEX: 17.93 KG/M2 | DIASTOLIC BLOOD PRESSURE: 85 MMHG | SYSTOLIC BLOOD PRESSURE: 137 MMHG | OXYGEN SATURATION: 94 % | WEIGHT: 117.9 LBS

## 2023-02-21 VITALS
HEART RATE: 73 BPM | OXYGEN SATURATION: 94 % | DIASTOLIC BLOOD PRESSURE: 85 MMHG | RESPIRATION RATE: 20 BRPM | WEIGHT: 117.9 LBS | BODY MASS INDEX: 17.93 KG/M2 | TEMPERATURE: 98.8 F | SYSTOLIC BLOOD PRESSURE: 137 MMHG

## 2023-02-21 DIAGNOSIS — C34.92 ADENOCARCINOMA, LUNG, LEFT (HCC): Primary | ICD-10-CM

## 2023-02-21 DIAGNOSIS — C34.31 MALIGNANT NEOPLASM OF LOWER LOBE OF RIGHT LUNG (HCC): Primary | ICD-10-CM

## 2023-02-21 PROCEDURE — 99212 OFFICE O/P EST SF 10 MIN: CPT | Performed by: RADIOLOGY

## 2023-02-21 PROCEDURE — 99214 OFFICE O/P EST MOD 30 MIN: CPT | Performed by: INTERNAL MEDICINE

## 2023-02-21 PROCEDURE — 3075F SYST BP GE 130 - 139MM HG: CPT | Performed by: INTERNAL MEDICINE

## 2023-02-21 PROCEDURE — 3079F DIAST BP 80-89 MM HG: CPT | Performed by: INTERNAL MEDICINE

## 2023-02-21 PROCEDURE — 99211 OFF/OP EST MAY X REQ PHY/QHP: CPT | Performed by: INTERNAL MEDICINE

## 2023-02-21 RX ORDER — TRIAMCINOLONE ACETONIDE 1 MG/G
CREAM TOPICAL DAILY
Qty: 80 G | Refills: 1 | Status: SHIPPED | OUTPATIENT
Start: 2023-02-21

## 2023-02-21 NOTE — PROGRESS NOTES
DIAGNOSIS:   Metastatic adenocarcinoma of the lung, stage IV  Malignant pleural effusion  Molecular testing negative for EGFR, ALK and ROS. Instead it shows Kras and CDK mutation,   History of breast cancer in 2010  CURRENT THERAPY:  Mastectomy and chemotherapy in 2010  Status post thoracocentesis x2  Pending study did not show any evidence of distant metastases other than the pleural effusion  Palliative chemotherapy with carboplatin, Alimta and Keytruda- started 01/20/2020  After 4 cycles of chemoimmunotherapy, chemotherapy will be dropped and she will be maintained on maintenance Keytruda   Due to side effects, the drug was held. Improvement of side effects, the plan is to switch to Opdivo started 08/2020  Patient wanted treatment hiatus in March/2021  S/P radiation  BRIEF CASE HISTORY:    The patient is a 63 y.o.  female who is admitted to the hospital for chief complaints of shortness of breath. Patient has history of breast cancer for which she underwent mastectomy in 2010 followed by chemotherapy. This was done in [place]. The  Patient recently moved to [facility] area about a year ago. Patient had a significant history of tobacco dependence. Patient started noticing worsening of shortness of breath and presented to the ER. Work-up done shows right-sided pleural effusion. CT scan showed multiloculated left-sided pleural effusion with compressive atelectasis in the lingula and majority of the left lower lobe and partial compression atelectasis of the left upper lobe. There was also underlying emphysema. There was a stable 1.8 cm adrenal mass likely adrenal adenoma which had been stable since November 2018. Patient underwent ultrasound guided thoracentesis.   Cytology of pleural fluid confirms adenocarcinoma consistent with lung primary  The patient was discharged home but she came back with worsening shortness of breath and was found to have significant pleural effusion that was tapped pathology showed malignant cells, adenocarcinoma of lung primary. .    We saw the patient in house, we arrange for staging PET CT scan and brain MRI which essentially showed no evidence of metastatic disease otherwise. The patient was diagnosed with stage IV lung cancer, we plan palliative chemotherapy, started 01/20/2020. She completed 4 cycles of chemoimmunotherapy and we plan to continue with maintenance Keytruda. Unfortunately, the [employer] was very poorly tolerated - decided to hold. Nivolumab started 08/03/2020. The patient decided to take treatment hiatus in May/2021. INTERIM HISTORY: Adelina Bright comes back today for a follow-up for lung cancer and to review CT   She continues to complain of abd pain , she has been through extensive workup without diagnosis    She was admitted to [facility] hospital CT scan was negative, she was supposed to see GI but she could not due to lack on insurance   Working on smoking cessation     Past Medical History:   has a past medical history of Alcohol abuse, Breast cancer ([facility] [address] Chronic diarrhea, COPD (chronic obstructive pulmonary disease) ([facility] [address] Essential hypertension, Lung cancer ([facility] [address] Pleural effusion, and Tobacco abuse. Past Surgical History:   has a past surgical history that includes Appendectomy; Mastectomy (Right); and Cholecystectomy. Family History: family history includes Breast Cancer in her mother; Cancer in her brother; Deep Vein Thrombosis in her brother; Prostate Cancer in her brother and father; Stroke in her mother. Social History:   reports that she has been smoking cigarettes. She has a 23.50 pack-year smoking history. She has never used smokeless tobacco. She reports current alcohol use of about 8.0 standard drinks per week. She reports that she does not use drugs.    Medications:    Reviewed in EPIC     Allergies:  Morphine, Robitussin (alcohol free) [guaifenesin], Aspirin, Penicillins, and Sulfa antibiotics    REVIEW OF SYSTEMS:   General: No fever or night sweats. Weight stable. ENT: No double, no tinnitus or hearing problem, no dysphagia; dry sinuses and xerostomia  Respiratory: No chest pain, no cough or hemoptysis, shortness of breath  Cardiovascular: Denies chest pain, PND or orthopnea, palpitations  Gastrointestinal: No nausea or vomiting, abdominal pain, diarrhea; + constipation and bloating, + abd pain. Genitourinary: Denies dysuria, hematuria, frequency, urgency or incontinence. Neurological: Denies headaches, decreased LOC, no sensory or motor focal deficits. Musculoskeletal: No arthralgia no back pain or joint swelling. +weakness and muscle fatigue; bilateral leg and knee pain - unchanged   Skin: No bleeding or rash  Psychiatric: No anxiety, no depression. Endocrine: No diabetes or thyroid disease. Hematologic: No bleeding, no adenopathy. PHYSICAL EXAM:      /85   Pulse 73   Temp 98.8 °F (37.1 °C) (Oral)   Resp 20   Wt 117 lb 14.4 oz (53.5 kg)   SpO2 94%   BMI 17.93 kg/m²    Temp (24hrs), Av.9 °F (36.6 °C), Min:97.9 °F (36.6 °C), Max:97.9 °F (36.6 °C)  Gen.  Exam, showed a well-appearing patient without evidence of distress or pain  HEENT, normocephalic and atraumatic, PERRLA extraocular muscles are intact  Neck showed no JVD no carotid bruit, no cervical adenopathy  Chest decreased air entry +wheezing with some rhonchi   Heart is regular without any murmur  Abdomen soft nontender no hepatosplenomegaly  Lower extremities showed no edema clubbing or cyanosis; tenderness in lateral compartment of left knee   Neurological examination was nonfocal, with intact cranial nerves  Skin  No rashes or bruising appreciated    REVIEW OF RADIOLOGICAL RESULTS:       REVIEW OF LABORATORY DATA:     Lab Results   Component Value Date    WBC 11.0 2022    HGB 14.8 2022    HCT 43.8 2022    MCV 92.6 2022     2022       Chemistry        Component Value Date/Time     (L) 2022 1925    K 3.8 07/20/2022 1925    CL 93 (L) 07/20/2022 1925    CO2 25 07/20/2022 1925    BUN 8 07/20/2022 1925    CREATININE 0.51 07/20/2022 1925        Component Value Date/Time    CALCIUM 10.2 07/20/2022 1925    ALKPHOS 91 07/20/2022 1925    AST 28 07/20/2022 1925    ALT 22 07/20/2022 1925    BILITOT 0.41 07/20/2022 1925        Lab Results   Component Value Date    TSH 7.94 (H) 06/14/2022         IMPRESSION:    History of breast cancer in 2010, status post mastectomy and chemotherapy  New diagnosis with lung cancer  Malignant pleural effusion stage IV disease  Chemotherapy with carboplatin, Alimta and Keytruda - started 01/20/2020  Weakness in both lower extremities  Constellation of symptoms including sore throat, sinus drainage, diffuse arthralgias, worsening fatigue and aches and pains. Differential is very broad but I am concerned about immunologic effect of Keytruda. Phyllistine Rota started 08/2020   Peripheral neuropathy in both lower extremities  Treatment hiatus March/2021  S/P radiation       PLAN:   Her CT shows response to radiation, no new sights of disease. I am refilling her skin cream   Needs GI workup . Unfortunately we are limited by her lack of insurance, we will see if we can refer her , or help her get coverage   I counseled her on smoking cessation and working towards half pack daily. We discussed ongoing surveillance. Return in 6 months.     Lesia Beltran MD  Hematologist/Medical Oncologist  [facility] hematology oncology physicians

## 2023-02-21 NOTE — PROGRESS NOTES
Jennie Winn  2/21/2023  4:25 PM      Vitals:    02/21/23 1615   BP: 137/85   Pulse: 73   Resp: 20   Temp: 98.8 °F (37.1 °C)   SpO2: 94%    : Wt Readings from Last 1 Encounters:   02/21/23 117 lb 14.4 oz (53.5 kg)                Current Outpatient Medications:     triamcinolone (KENALOG) 0.1 % cream, Apply topically daily Apply topically once daily. , Disp: 80 g, Rfl: 1    albuterol sulfate HFA (PROVENTIL;VENTOLIN;PROAIR) 108 (90 Base) MCG/ACT inhaler, inhale 2 puffs by mouth and INTO THE LUNGS every 4 hours if needed for cough shortness of breath or wheezing, Disp: 25.5 g, Rfl: 1    levothyroxine (SYNTHROID) 88 MCG tablet, take 1 tablet by mouth once daily, Disp: 30 tablet, Rfl: 5    amLODIPine (NORVASC) 5 MG tablet, take 1 tablet by mouth once daily, Disp: 30 tablet, Rfl: 5    budesonide (PULMICORT) 0.5 MG/2ML nebulizer suspension, Take 2 mLs by nebulization 2 times daily, Disp: 60 each, Rfl: 3    ipratropium-albuterol (DUONEB) 0.5-2.5 (3) MG/3ML SOLN nebulizer solution, Inhale 3 mLs into the lungs 4 times daily, Disp: 360 mL, Rfl: 3    albuterol (PROVENTIL) (2.5 MG/3ML) 0.083% nebulizer solution, Take 3 mLs by nebulization every 6 hours as needed for Wheezing or Shortness of Breath, Disp: 120 each, Rfl: 3    folic acid (FOLVITE) 802 MCG tablet, Take 800 mcg by mouth daily, Disp: , Rfl:     Immunizations:    Influenza status:    []   Current   [x]   Patient declined    Pneumococcal status:  []   Current  [x]   Patient declined  Covid status:   []  Dose #1:                     []  Dose #2:               [x]   Patient declined    Smoking Status:    [x] Smoker - PPD:  10 cigarettes/day   [] Nonsmoker - Quit Date:               [] Never a smoker      Cancer Screening:  Colonoscopy   [] Current       [x] Not current   [] Not current, but scheduled   [] NA  Mammogram   [] Current       [x] Not current   [] Not current, but scheduled   [] NA  Prostate           [] Current       [] Not current   [] Not current, but scheduled   [x] NA  PAP/Pelvic      [] Current       [x] Not current   [] Not current, but scheduled   [] NA  Skin                 [] Current       [x] Not current   [] Not current, but scheduled   [] NA     Hormone:  Lupron []   Last dose given:           Next dose due:   Eligard []   Last dose given:           Next dose due:   Aromatase Inhibitors []   Medication name:   N/A:  [x]           FALLS RISK SCREEN  Instructions:  Assess the patient and enter the appropriate indicators that are present for fall risk identification. Total the numbers entered and assign a fall risk score from Table 2.  Reassess patient at a minimum every 12 weeks or with status change. Assessment   Date  2/21/2023     1. Mental Ability: confusion/cognitively impaired 0     2. Elimination Issues: incontinence, frequency 0       3. Ambulatory: use of assistive devices (walker, cane, off-loading devices),        attached to equipment (IV pole, oxygen) 0     4. Sensory Limitations: dizziness, vertigo, impaired vision 0     5. Age less than 65        0     6. Age 72 or greater 0     7. Medication: diuretics, strong analgesics, hypnotics, sedatives,        antihypertensive agents 3   8. Falls:  recent history of falls within the last 3 months (not to include slipping or        tripping) 0   TOTAL 3    If score of 4 or greater was education given? N/A           TABLE 2   Risk Score Risk Level Plan of Care   0-3 Little or  No Risk 1. Provide assistance as indicated for ambulation activities  2. Reorient confused/cognitively impaired patient  3. Chair/bed in low position, stretcher/bed with siderails up except when performing patient care activities  5. Educate patient/family/caregiver on falls prevention  6.  Reassess in 12 weeks or with any noted change in patient condition which places them at a risk for a fall   4-6 Moderate Risk 1. Provide assistance as indicated for ambulation activities  2.   Reorient confused/cognitively impaired patient  3. Chair/bed in low position, stretcher/bed with siderails up except when performing patient care activities  4. Educate patient/family/caregiver on falls prevention     7 or   Higher High Risk 1. Place patient in easily observable treatment room  2. Patient attended at all times by family member or staff  3. Provide assistance as indicated for ambulation activities  4. Reorient confused/cognitively impaired patient  5. Chair/bed in low position, stretcher/bed with siderails up except when performing patient care activities  6. Educate patient/family/caregiver on falls prevention         PLAN: Patient is seen today in follow up. She denies any new shortness of breath or cough. Denies other concerns. She is smoking 10 cigarettes per day and states she has cut back on her number of cigarettes. Dr. Mago Aguiar to review recent CT chest results with patient. Patient to return for follow up in 6 months with a CT Chest prior.     Hannah Nur RN

## 2023-02-22 ENCOUNTER — TELEPHONE (OUTPATIENT)
Dept: ONCOLOGY | Age: 63
End: 2023-02-22

## 2023-02-22 NOTE — TELEPHONE ENCOUNTER
received referral from Radiation Oncologist stating patient uninsured and trying to get colonoscopy.  called patient and left a message. Patient has been uninsured for many years and is over income for Medicaid. Patient has previously filled out [employer] forms.

## 2023-02-23 NOTE — PROGRESS NOTES
[address]            Radiation Oncology          [address] [facility]          [place], [address]        Wilfr[place] Campa: [phone]        F: [phone]       [facility]       Radiation Oncology   [address] [facility], [address] [facility]       Wilfrido Campa: [phone]       F: [phone]       [email or web address]      Date of Service: 2023     Location:  [address],   [address] [facility], [place]   [phone]        [address] [place] [address] FOLLOW UP NOTE    Patient ID:   Fifi Dubon  : 1960   MRN: [id number]    DIAGNOSIS:  Cancer Staging  Malignant neoplasm of lower lobe of right lung [facility])  Staging form: Lung, AJCC 8th Edition  - Clinical stage from 2021: Stage IA3 (cT1c, cN0, cM0) - Signed by Lul Villa MD on 10/18/2021    -s/p RLL SBRT 50Gy 21    INTERVAL HISTORY:   Fifi Dubon is a 65 y.o. Lavanda Candle female with a history of a right lower lobe lung cancer that was treated with SBRT completing approximately 15 months ago. Patient comes in today for routine follow his exam.  Overall she is doing well without any acute complaints. She denies any chest pain, shortness of breath, coughing, or skin irritation. She had a CT of the chest on 2023 which showed further decrease in the size of the right upper lobe lung nodule with no evidence of metastasis. She otherwise denies any headaches, dizziness, bony pain, swelling, or bleeding. Patient does have abdominal discomfort and pain as well as constipation for which she has been using Senokot and prune juice. She unfortunately cannot get a colonoscopy due to lack of insurance. MEDICATIONS:    Current Outpatient Medications:     triamcinolone (KENALOG) 0.1 % cream, Apply topically daily Apply topically once daily. , Disp: 80 g, Rfl: 1    albuterol sulfate HFA (PROVENTIL;VENTOLIN;PROAIR) 108 (90 Base) MCG/ACT inhaler, inhale 2 puffs by mouth and INTO THE LUNGS every 4 hours if needed for cough shortness of breath or wheezing, Disp: 25.5 g, Rfl: 1    levothyroxine (SYNTHROID) 88 MCG tablet, take 1 tablet by mouth once daily, Disp: 30 tablet, Rfl: 5    amLODIPine (NORVASC) 5 MG tablet, take 1 tablet by mouth once daily, Disp: 30 tablet, Rfl: 5    budesonide (PULMICORT) 0.5 MG/2ML nebulizer suspension, Take 2 mLs by nebulization 2 times daily, Disp: 60 each, Rfl: 3    ipratropium-albuterol (DUONEB) 0.5-2.5 (3) MG/3ML SOLN nebulizer solution, Inhale 3 mLs into the lungs 4 times daily, Disp: 360 mL, Rfl: 3    albuterol (PROVENTIL) (2.5 MG/3ML) 0.083% nebulizer solution, Take 3 mLs by nebulization every 6 hours as needed for Wheezing or Shortness of Breath, Disp: 120 each, Rfl: 3    folic acid (FOLVITE) 105 MCG tablet, Take 800 mcg by mouth daily, Disp: , Rfl:     ALLERGIES:  Allergies   Allergen Reactions    Morphine     Robitussin (Alcohol Free) [Guaifenesin] Hives    Aspirin Hives and Rash    Penicillins Hives and Rash    Sulfa Antibiotics Hives and Rash         REVIEW OF SYSTEMS:    A full 14 point review of systems was performed and assessed and found to be negative except as noted above. PHYSICAL EXAMINATION:    CHAPERONE: Family/friend/companieon Present    ECO Asymptomatic    VITAL SIGNS: /85   Pulse 73   Temp 98.8 °F (37.1 °C) (Oral)   Resp 20   Wt 117 lb 14.4 oz (53.5 kg)   SpO2 94%   BMI 17.93 kg/m²   GENERAL:  General appearance is that of a well-nourished, well-developed in no apparent distress. HEENT: Normocephalic, atraumatic, EOMI, moist mucosa, no erythema. NECK:  No adenopathy or a palpable thyroid mass, trachea is midline. LYMPHATICS: No cervical, supraclavicular adenopathy. HEART:  Normal rate and regular rhythm, normal heart sounds. LUNGS:  Pulmonary effort normal. Normal breath sounds. ABDOMEN:  Soft, nontender, non distended. EXTREMITIES:  No edema. No calf tenderness.   MSK:  No spinal tenderness. Normal ROM. NEUROLOGICAL: Alert and oriented. Strength and sensation intact bilaterally. No focal deficits. PSYCH: Mood normal, behavior normal.  SKIN: No erythema, no desquamation. LABS:  WBC   Date Value Ref Range Status   2022 11.0 3.5 - 11.3 k/uL Final     Segs Absolute   Date Value Ref Range Status   2022 7.95 1.50 - 8.10 k/uL Final     Hemoglobin   Date Value Ref Range Status   2022 14.8 11.9 - 15.1 g/dL Final     Platelets   Date Value Ref Range Status   2022 298 138 - 453 k/uL Final     No results found for: , CEA  No results found for: PSA    IMAGIN22 CT Chest  Impression   Marked reduction in the known malignant right lower lobe cavitary nodule   indicating good response to therapy. Stable prominent left axillary and left lower chest wall lymph nodes. 22 CT C/A/P  Impression   1. The primary lung mass in the right lower lobe is mildly decreased in size,   measuring 11 x 9 mm, previously measuring 13 x 11 mm.   2. No new or suspicious pulmonary nodules are identified. 3. No CT findings to suggest distant metastatic disease within the abdomen or   pelvis. 4. Emphysema. 5. Atherosclerosis including coronary artery involvement. 6. Other similar findings as above. 23 CT  Chest  Impression   1. Continued decrease in size of a right upper lobe pulmonary nodule now 7.8   x 11 mm previously 8.8 x 11 mm.       2.  No milagros mediastinal adenopathy. 3.  No new nodules. 4.  Stable right adrenal mass with fatty Hounsfield numbers and prior workup   with PET-CT imaging, not metabolically active, consistent with benign lipid   rich adenoma.        ASSESSMENT AND PLAN:  Natalia Roca is a 65 y.o. female with a Cancer Staging  Malignant neoplasm of lower lobe of right lung [facility])  Staging form: Lung, AJCC 8th Edition  - Clinical stage from 2021: Stage IA3 (cT1c, cN0, cM0) - Signed by Melody Chicas MD on 10/18/2021  . Patient comes in today for a follow-up visit approximately 15 months after completion of her SBRT treatment to her right lower lobe lung lesion. Overall patient has recovered well without any significant symptoms related to her lung cancer. Patient does have issues with constipation and abdominal discomfort. Patient is advised to follow-up with GI for evaluation for colonoscopy and will be referred to our  for help with insurance coverage. We do encourage her to increase her fiber supplement in her diet and to try using MiraLAX as its available OTC. We did review her CT scans show no abnormalities in the bowel and does show an improvement in her lung tumor from radiation. Patient will be advised to have another repeat CT chest done in 6 months for continued surveillance. Patient will follow up with us afterwards to review the results. Should she have any questions or concerns she can certainly see us sooner. Patient was counseled on smoking cessation advice was offered however she is not interested in any assistance at this time. Patient was in agreement with my recommendations. All questions were answered to their satisfaction. Patient was advised to contact us anytime should they have any questions or concerns. Electronically signed by Anatoly Cruz MD on 2/23/2023 at 6:01 PM        Medications Prescribed:   New Prescriptions    No medications on file       Orders:   Orders Placed This Encounter   Procedures    CT CHEST WO CONTRAST       CC:  Patient Care Team:  Kim Lorenzo MD as PCP - General (Internal Medicine)  Kim Lorenzo MD as PCP - Empaneled Provider  Manjeet Scott MD as Consulting Physician (Gastroenterology)  Jayla Miranda MD as Consulting Physician (Hematology and Oncology)     Total time spent on this case on the day of encounter is 30 minutes.  This time includes combination of one or more of the following - review of necessary tests, review of pertinent medical records from the EMR, performing medically appropriate examination and evaluation, counseling and educating the patient/family/caregiver, ordering necessary medical tests, procedures etc., documenting the clinical information in the electronic medical record, care coordination, referring and communicating with other health care providers and interpretation of results independently. This note is created with the assistance of a speech recognition program.  While intending to generate a document that actually reflects the content of the visit, the document can still have some errors including those of syntax and sound a like substitutions which may escape proof reading. It such instances, actual meaning can be extrapolated by contextual diversion.

## 2023-02-24 ENCOUNTER — TELEPHONE (OUTPATIENT)
Dept: ONCOLOGY | Age: 63
End: 2023-02-24

## 2023-02-24 NOTE — TELEPHONE ENCOUNTER
spoke with patient's spouse. Patient not current with [employer]. Paperwork sent to patient. Patient over income for Medicaid and out of open enrollment period.  encouraged patient and spouse to sign up for insurance next enrollment period.

## 2023-06-29 DIAGNOSIS — J44.1 CHRONIC OBSTRUCTIVE PULMONARY DISEASE WITH ACUTE EXACERBATION (HCC): ICD-10-CM

## 2023-06-29 NOTE — TELEPHONE ENCOUNTER
emphysema ([address])     Essential hypertension     History of right breast cancer     Loculated pleural effusion     Hyponatremia     Coronary artery disease involving native coronary artery of native heart without angina pectoris     Tobacco abuse disorder     Alcohol abuse     Leukocytosis     Adenocarcinoma, lung, left (HCC)     Shortness of breath     Insomnia     Anxiety     Malignant neoplasm of lower lobe of right lung (HCC)     COPD with acute exacerbation ([address])

## 2023-06-30 DIAGNOSIS — J44.1 CHRONIC OBSTRUCTIVE PULMONARY DISEASE WITH ACUTE EXACERBATION (HCC): ICD-10-CM

## 2023-06-30 DIAGNOSIS — C34.92 ADENOCARCINOMA, LUNG, LEFT (HCC): ICD-10-CM

## 2023-06-30 RX ORDER — ALBUTEROL SULFATE 2.5 MG/3ML
SOLUTION RESPIRATORY (INHALATION)
Qty: 300 ML | Refills: 2 | Status: SHIPPED | OUTPATIENT
Start: 2023-06-30

## 2023-07-01 RX ORDER — ALBUTEROL SULFATE 90 UG/1
AEROSOL, METERED RESPIRATORY (INHALATION)
Qty: 25.5 G | Refills: 1 | Status: SHIPPED | OUTPATIENT
Start: 2023-07-01

## 2023-07-19 DIAGNOSIS — C34.92 ADENOCARCINOMA OF LUNG, STAGE 4, LEFT (HCC): ICD-10-CM

## 2023-07-19 RX ORDER — LEVOTHYROXINE SODIUM 88 UG/1
TABLET ORAL
Qty: 30 TABLET | Refills: 5 | Status: SHIPPED | OUTPATIENT
Start: 2023-07-19

## 2023-07-19 NOTE — TELEPHONE ENCOUNTER
Last visit: 11/02/2022  Last Med refill: 06/19/2023  Does patient have enough medication for 72 hours: No:     Next Visit Date:  Future Appointments   Date Time Provider [address]   8/21/2023  1:00 PM STA CT SCAN RM 1 STAZ CT SCAN STA Radiolog   8/29/2023  2:45 PM SCHEDULE, [facility] RAD ONC NURSE [facility]   8/29/2023  3:30 PM Angelica Beltran MD [address] Maintenance   Topic Date Due    HIV screen  Never done    Shingles vaccine (1 of 2) Never done    Cervical cancer screen  Never done    Breast cancer screen  Never done    Low dose CT lung screening &/or counseling  Never done    Depression Screen  06/14/2023    Pneumococcal 0-64 years Vaccine (1 - PCV) 12/14/2023 (Originally 8/12/1966)    COVID-19 Vaccine (1) 12/14/2024 (Originally 2/12/1961)    Hepatitis C screen  12/14/2024 (Originally 8/12/1978)    Flu vaccine (1) 08/01/2023    Lipids  04/13/2025    Colorectal Cancer Screen  05/30/2027    DTaP/Tdap/Td vaccine (2 - Td or Tdap) 05/03/2028    Hepatitis A vaccine  Aged Out    Hib vaccine  Aged Out    Meningococcal (ACWY) vaccine  Aged Out       Hemoglobin A1C (%)   Date Value   12/19/2019 5.6             ( goal A1C is < 7)   No results found for: LABMICR  LDL Cholesterol (mg/dL)   Date Value   04/13/2020        02/10/2020 150 (H)       (goal LDL is <100)   AST (U/L)   Date Value   07/20/2022 28     ALT (U/L)   Date Value   07/20/2022 22     BUN (mg/dL)   Date Value   07/20/2022 8     BP Readings from Last 3 Encounters:   02/21/23 137/85   02/21/23 137/85   11/02/22 128/78          (goal 120/80)    All Future Testing planned in [facility]  Lab Frequency Next Occurrence   XR ABDOMEN (2 VIEWS) Once 49/81/3035   Basic Metabolic Panel Once 51/71/7633   Sodium, Urine, Random Once 11/21/2022   Osmolality, Urine Once 11/21/2022   CT CHEST WO CONTRAST Once 08/21/2023               Patient Active Problem List:     Panlobular emphysema [facility])     Essential hypertension

## 2023-07-20 RX ORDER — AMLODIPINE BESYLATE 5 MG/1
TABLET ORAL
Qty: 30 TABLET | Refills: 5 | Status: SHIPPED | OUTPATIENT
Start: 2023-07-20

## 2023-08-21 ENCOUNTER — HOSPITAL ENCOUNTER (OUTPATIENT)
Dept: CT IMAGING | Age: 63
Discharge: HOME | End: 2023-08-23
Attending: RADIOLOGY

## 2023-08-21 DIAGNOSIS — C34.31 MALIGNANT NEOPLASM OF LOWER LOBE OF RIGHT LUNG (HCC): ICD-10-CM

## 2023-08-21 PROCEDURE — 71250 CT THORAX DX C-: CPT

## 2023-08-29 ENCOUNTER — OFFICE VISIT (OUTPATIENT)
Dept: ONCOLOGY | Age: 63
End: 2023-08-29

## 2023-08-29 ENCOUNTER — TELEPHONE (OUTPATIENT)
Dept: ONCOLOGY | Age: 63
End: 2023-08-29

## 2023-08-29 ENCOUNTER — HOSPITAL ENCOUNTER (OUTPATIENT)
Dept: RADIATION ONCOLOGY | Age: 63
Discharge: HOME | End: 2023-08-29

## 2023-08-29 VITALS
HEART RATE: 94 BPM | SYSTOLIC BLOOD PRESSURE: 138 MMHG | OXYGEN SATURATION: 93 % | RESPIRATION RATE: 18 BRPM | DIASTOLIC BLOOD PRESSURE: 85 MMHG | WEIGHT: 114.3 LBS | TEMPERATURE: 97.6 F | BODY MASS INDEX: 17.38 KG/M2

## 2023-08-29 VITALS
DIASTOLIC BLOOD PRESSURE: 91 MMHG | HEART RATE: 94 BPM | OXYGEN SATURATION: 93 % | BODY MASS INDEX: 17.36 KG/M2 | TEMPERATURE: 97.6 F | WEIGHT: 114.2 LBS | SYSTOLIC BLOOD PRESSURE: 150 MMHG | RESPIRATION RATE: 18 BRPM

## 2023-08-29 DIAGNOSIS — C34.92 ADENOCARCINOMA OF LUNG, STAGE 4, LEFT (HCC): ICD-10-CM

## 2023-08-29 DIAGNOSIS — C34.92 ADENOCARCINOMA, LUNG, LEFT (HCC): Primary | ICD-10-CM

## 2023-08-29 DIAGNOSIS — C34.31 MALIGNANT NEOPLASM OF LOWER LOBE OF RIGHT LUNG (HCC): Primary | ICD-10-CM

## 2023-08-29 DIAGNOSIS — J43.1 PANLOBULAR EMPHYSEMA (HCC): ICD-10-CM

## 2023-08-29 DIAGNOSIS — Z72.0 TOBACCO ABUSE DISORDER: Chronic | ICD-10-CM

## 2023-08-29 DIAGNOSIS — J44.1 CHRONIC OBSTRUCTIVE PULMONARY DISEASE WITH ACUTE EXACERBATION (HCC): ICD-10-CM

## 2023-08-29 DIAGNOSIS — E03.2 HYPOTHYROIDISM DUE TO MEDICATION: ICD-10-CM

## 2023-08-29 PROCEDURE — 99214 OFFICE O/P EST MOD 30 MIN: CPT | Performed by: INTERNAL MEDICINE

## 2023-08-29 PROCEDURE — 3074F SYST BP LT 130 MM HG: CPT | Performed by: INTERNAL MEDICINE

## 2023-08-29 PROCEDURE — 99212 OFFICE O/P EST SF 10 MIN: CPT | Performed by: RADIOLOGY

## 2023-08-29 PROCEDURE — 99211 OFF/OP EST MAY X REQ PHY/QHP: CPT | Performed by: INTERNAL MEDICINE

## 2023-08-29 PROCEDURE — 3078F DIAST BP <80 MM HG: CPT | Performed by: INTERNAL MEDICINE

## 2023-08-29 RX ORDER — LEVOTHYROXINE SODIUM 88 UG/1
88 TABLET ORAL DAILY
Qty: 30 TABLET | Refills: 5 | Status: SHIPPED | OUTPATIENT
Start: 2023-08-29

## 2023-08-29 RX ORDER — AMLODIPINE BESYLATE 5 MG/1
5 TABLET ORAL DAILY
Qty: 30 TABLET | Refills: 5 | Status: SHIPPED | OUTPATIENT
Start: 2023-08-29

## 2023-08-29 RX ORDER — METHYLPREDNISOLONE 4 MG/1
TABLET ORAL
Qty: 1 KIT | Refills: 1 | Status: SHIPPED | OUTPATIENT
Start: 2023-08-29

## 2023-08-29 RX ORDER — TRIAMCINOLONE ACETONIDE 1 MG/G
CREAM TOPICAL DAILY
Qty: 80 G | Refills: 1 | Status: SHIPPED | OUTPATIENT
Start: 2023-08-29

## 2023-08-29 RX ORDER — AZITHROMYCIN 500 MG/1
500 TABLET, FILM COATED ORAL DAILY
Qty: 5 TABLET | Refills: 0 | Status: SHIPPED | OUTPATIENT
Start: 2023-08-29 | End: 2023-09-03

## 2023-08-29 ASSESSMENT — PAIN SCALES - GENERAL: PAINLEVEL_OUTOF10: 6

## 2023-08-29 ASSESSMENT — PAIN DESCRIPTION - LOCATION: LOCATION: GENERALIZED

## 2023-08-29 NOTE — PROGRESS NOTES
(L) 07/20/2022 1925    K 3.8 07/20/2022 1925    CL 93 (L) 07/20/2022 1925    CO2 25 07/20/2022 1925    BUN 8 07/20/2022 1925    CREATININE 0.51 07/20/2022 1925        Component Value Date/Time    CALCIUM 10.2 07/20/2022 1925    ALKPHOS 91 07/20/2022 1925    AST 28 07/20/2022 1925    ALT 22 07/20/2022 1925    BILITOT 0.41 07/20/2022 1925        Lab Results   Component Value Date    TSH 7.94 (H) 06/14/2022         IMPRESSION:    History of breast cancer in 2010, status post mastectomy and chemotherapy  New diagnosis with lung cancer  Malignant pleural effusion stage IV disease  Chemotherapy with carboplatin, Alimta and Keytruda - started 01/20/2020  Weakness in both lower extremities  Constellation of symptoms including sore throat, sinus drainage, diffuse arthralgias, worsening fatigue and aches and pains. Differential is very broad but I am concerned about immunologic effect of Keytruda. Sangeeta Hilton started 08/2020   Peripheral neuropathy in both lower extremities  Treatment hiatus March/2021  S/P radiation 11/2021  COPD exacerbation      PLAN:   Her CT shows response to radiation, no new sights of disease. Seem that she has COPD exacerbation, will prescribe steroids and antibiotics to help. I am refilling her skin cream   I found out that she is not following with her primary care physician. I advised her to establish care with primary care. Meanwhile we will refill her blood pressure medication and thyroid medication. TSH has not been checked for more than a year and we will get that checked. I counseled her on smoking cessation and working towards half pack daily. We discussed ongoing surveillance. Return in 6 months. If she is established by primary care by then, then no further work-up from oncology is needed.     Luda Beltran MD  Hematologist/Medical Oncologist  [facility] hematology oncology physicians

## 2023-08-29 NOTE — TELEPHONE ENCOUNTER
AVS From 8/29/23      RV In 6 months, please coordinate with rad onc     RV Scheduled for 3/12/24 @ 3:300pm as a dual appt with RO    PT was given AVS and appt schedule    Electronically signed by Dolores Parmar on 8/29/2023 at 4:06 PM

## 2023-11-02 ENCOUNTER — TELEPHONE (OUTPATIENT)
Dept: FAMILY MEDICINE CLINIC | Age: 63
End: 2023-11-02

## 2023-11-02 NOTE — TELEPHONE ENCOUNTER
Patient has not been seen since 11/2/22. Called patient and  answered. Asked if patient is still seeing Dr. Quinten Carias since she has not been seen in a year. He stated he will talk with her and call to make an appt.

## 2023-11-08 NOTE — TELEPHONE ENCOUNTER
----- Message from Kirby Willis MD sent at 11/7/2023  5:03 PM CST -----  Please notify patient normal gastric emptying scan no change in plan of care.   Noted thank you

## 2024-02-29 ENCOUNTER — HOSPITAL ENCOUNTER (OUTPATIENT)
Dept: CT IMAGING | Age: 64
Discharge: HOME | End: 2024-03-02
Attending: RADIOLOGY

## 2024-02-29 DIAGNOSIS — C34.31 MALIGNANT NEOPLASM OF LOWER LOBE OF RIGHT LUNG (HCC): ICD-10-CM

## 2024-02-29 PROCEDURE — 71250 CT THORAX DX C-: CPT

## 2024-03-12 ENCOUNTER — TELEPHONE (OUTPATIENT)
Dept: ONCOLOGY | Age: 64
End: 2024-03-12

## 2024-03-12 ENCOUNTER — OFFICE VISIT (OUTPATIENT)
Dept: ONCOLOGY | Age: 64
End: 2024-03-12

## 2024-03-12 ENCOUNTER — HOSPITAL ENCOUNTER (OUTPATIENT)
Dept: RADIATION ONCOLOGY | Age: 64
Discharge: HOME | End: 2024-03-12

## 2024-03-12 VITALS
SYSTOLIC BLOOD PRESSURE: 148 MMHG | DIASTOLIC BLOOD PRESSURE: 93 MMHG | OXYGEN SATURATION: 93 % | TEMPERATURE: 97.9 F | RESPIRATION RATE: 18 BRPM | BODY MASS INDEX: 16.31 KG/M2 | WEIGHT: 107.3 LBS | HEART RATE: 83 BPM

## 2024-03-12 VITALS
BODY MASS INDEX: 16.31 KG/M2 | DIASTOLIC BLOOD PRESSURE: 93 MMHG | WEIGHT: 107.3 LBS | SYSTOLIC BLOOD PRESSURE: 148 MMHG | HEART RATE: 83 BPM | TEMPERATURE: 97.9 F | RESPIRATION RATE: 18 BRPM

## 2024-03-12 DIAGNOSIS — C34.31 MALIGNANT NEOPLASM OF LOWER LOBE OF RIGHT LUNG (HCC): Primary | ICD-10-CM

## 2024-03-12 DIAGNOSIS — C34.92 ADENOCARCINOMA, LUNG, LEFT (HCC): Primary | ICD-10-CM

## 2024-03-12 DIAGNOSIS — J44.1 CHRONIC OBSTRUCTIVE PULMONARY DISEASE WITH ACUTE EXACERBATION (HCC): ICD-10-CM

## 2024-03-12 DIAGNOSIS — J44.1 COPD WITH ACUTE EXACERBATION (HCC): ICD-10-CM

## 2024-03-12 PROCEDURE — 99214 OFFICE O/P EST MOD 30 MIN: CPT | Performed by: INTERNAL MEDICINE

## 2024-03-12 PROCEDURE — 3078F DIAST BP <80 MM HG: CPT | Performed by: INTERNAL MEDICINE

## 2024-03-12 PROCEDURE — 99211 OFF/OP EST MAY X REQ PHY/QHP: CPT | Performed by: INTERNAL MEDICINE

## 2024-03-12 PROCEDURE — 3077F SYST BP >= 140 MM HG: CPT | Performed by: INTERNAL MEDICINE

## 2024-03-12 RX ORDER — IPRATROPIUM BROMIDE AND ALBUTEROL SULFATE 2.5; .5 MG/3ML; MG/3ML
1 SOLUTION RESPIRATORY (INHALATION) 4 TIMES DAILY
Qty: 360 ML | Refills: 3 | Status: SHIPPED | OUTPATIENT
Start: 2024-03-12

## 2024-03-12 RX ORDER — HYDROCODONE BITARTRATE AND ACETAMINOPHEN 5; 325 MG/1; MG/1
1 TABLET ORAL EVERY 6 HOURS PRN
Qty: 28 TABLET | Refills: 0 | Status: SHIPPED | OUTPATIENT
Start: 2024-03-12 | End: 2024-03-19

## 2024-03-12 RX ORDER — ALBUTEROL SULFATE 90 UG/1
AEROSOL, METERED RESPIRATORY (INHALATION)
Qty: 25.5 G | Refills: 1 | Status: SHIPPED | OUTPATIENT
Start: 2024-03-12

## 2024-03-12 RX ORDER — ALBUTEROL SULFATE 2.5 MG/3ML
SOLUTION RESPIRATORY (INHALATION)
Qty: 300 ML | Refills: 2 | Status: SHIPPED | OUTPATIENT
Start: 2024-03-12

## 2024-03-12 RX ORDER — BUDESONIDE 0.5 MG/2ML
500 INHALANT ORAL 2 TIMES DAILY
Qty: 60 EACH | Refills: 3 | Status: SHIPPED | OUTPATIENT
Start: 2024-03-12

## 2024-03-12 NOTE — PROGRESS NOTES
ongoing surveillance.  Return in 6 months.  If she is established by primary care by then, then no further work-up from oncology is needed.    Oumar Beltran MD  Hematologist/Medical Oncologist  [facility] oncology physicians

## 2024-03-15 NOTE — PROGRESS NOTES
impaired vision 0     5.  Age less than 65        0     6.  Age 65 or greater 0     7.  Medication: diuretics, strong analgesics, hypnotics, sedatives,        antihypertensive agents 3   8.  Falls:  recent history of falls within the last 3 months (not to include slipping or        tripping) 0   TOTAL 3               TABLE 2   Risk Score Risk Level Plan of Care   0-3 Little or  No Risk 1.  Provide assistance as indicated for ambulation activities  2.  Reorient confused/cognitively impaired patient  3.  Chair/bed in low position, stretcher/bed with siderails up except when performing patient care activities  5.  Educate patient/family/caregiver on falls prevention  6.  Reassess in 12 weeks or with any noted change in patient condition which places them at a risk for a fall   4-6 Moderate Risk 1.  Provide assistance as indicated for ambulation activities  2.  Reorient confused/cognitively impaired patient  3.  Chair/bed in low position, stretcher/bed with siderails up except when performing patient care activities  4.  Educate patient/family/caregiver on falls prevention     7 or   Higher High Risk 1.  Place patient in easily observable treatment room  2.  Patient attended at all times by family member or staff  3.  Provide assistance as indicated for ambulation activities  4.  Reorient confused/cognitively impaired patient  5.  Chair/bed in low position, stretcher/bed with siderails up except when performing patient care activities  6.  Educate patient/family/caregiver on falls prevention         PLAN: Patient is seen today in follow up.  She denies shortness of breath and states occasional cough-neither respiratory symptom is bothersome.  She continues to smoke about 1/2 a pack of cigarettes/day.  Dr. Mccormack reviewing results of CT Chest with patient.  Per Dr. Mccormack, pt to return in 6 months with a CT chest prior.          Jigna Alvarez, RN           
WO CONTRAST       CC:  Patient Care Team:  Kat Spence MD as PCP - General (Internal Medicine)  Kat Spence MD as PCP - Empaneled Provider  Garfield Cai MD as Consulting Physician (Gastroenterology)  Oumar Beltran MD as Consulting Physician (Hematology and Oncology)     Total time spent on this case on the day of encounter is 30 minutes. This time includes combination of one or more of the following - review of necessary tests, review of pertinent medical records from the EMR, performing medically appropriate examination and evaluation, counseling and educating the patient/family/caregiver, ordering necessary medical tests, procedures etc., documenting the clinical information in the electronic medical record, care coordination, referring and communicating with other health care providers and interpretation of results independently. This note is created with the assistance of a speech recognition program.  While intending to generate a document that actually reflects the content of the visit, the document can still have some errors including those of syntax and sound a like substitutions which may escape proof reading.  It such instances, actual meaning can be extrapolated by contextual diversion.

## 2024-04-04 ENCOUNTER — TELEPHONE (OUTPATIENT)
Dept: INFUSION THERAPY | Age: 64
End: 2024-04-04

## 2024-04-04 NOTE — TELEPHONE ENCOUNTER
Patients spouse Jose called stating that patient had hurt her knee and at her wellness check with Dr. Mishra he said that if it didn't get better that he would order an xray.  Jose said thatt it isn't better.  He would like a callback at [phone] if this is ordered.

## 2024-06-12 DIAGNOSIS — C34.92 ADENOCARCINOMA OF LUNG, STAGE 4, LEFT (HCC): ICD-10-CM

## 2024-06-12 RX ORDER — AMLODIPINE BESYLATE 5 MG/1
5 TABLET ORAL DAILY
Qty: 30 TABLET | Refills: 5 | Status: SHIPPED | OUTPATIENT
Start: 2024-06-12

## 2024-06-12 RX ORDER — LEVOTHYROXINE SODIUM 88 UG/1
88 TABLET ORAL DAILY
Qty: 30 TABLET | Refills: 5 | Status: SHIPPED | OUTPATIENT
Start: 2024-06-12

## 2024-06-13 ENCOUNTER — HOSPITAL ENCOUNTER (OUTPATIENT)
Dept: GENERAL RADIOLOGY | Age: 64
Discharge: HOME | End: 2024-06-15
Payer: COMMERCIAL

## 2024-06-13 ENCOUNTER — HOSPITAL ENCOUNTER (OUTPATIENT)
Age: 64
Discharge: HOME | End: 2024-06-15
Payer: COMMERCIAL

## 2024-06-13 ENCOUNTER — HOSPITAL ENCOUNTER (OUTPATIENT)
Age: 64
Discharge: HOME | End: 2024-06-13
Payer: COMMERCIAL

## 2024-06-13 DIAGNOSIS — G89.29 CHRONIC PAIN OF BOTH KNEES: ICD-10-CM

## 2024-06-13 DIAGNOSIS — M25.562 CHRONIC PAIN OF BOTH KNEES: ICD-10-CM

## 2024-06-13 DIAGNOSIS — M25.561 CHRONIC PAIN OF BOTH KNEES: ICD-10-CM

## 2024-06-13 PROCEDURE — 73562 X-RAY EXAM OF KNEE 3: CPT

## 2024-07-08 DIAGNOSIS — Z79.891 ENCOUNTER FOR MONITORING OPIOID MAINTENANCE THERAPY: Primary | ICD-10-CM

## 2024-07-08 DIAGNOSIS — Z51.81 ENCOUNTER FOR MONITORING OPIOID MAINTENANCE THERAPY: Primary | ICD-10-CM

## 2024-07-08 DIAGNOSIS — C34.92 ADENOCARCINOMA, LUNG, LEFT (HCC): ICD-10-CM

## 2024-07-08 DIAGNOSIS — J44.1 CHRONIC OBSTRUCTIVE PULMONARY DISEASE WITH ACUTE EXACERBATION (HCC): ICD-10-CM

## 2024-07-08 DIAGNOSIS — J44.1 COPD WITH ACUTE EXACERBATION (HCC): ICD-10-CM

## 2024-07-08 RX ORDER — HYDROCODONE BITARTRATE AND ACETAMINOPHEN 5; 325 MG/1; MG/1
1 TABLET ORAL EVERY 8 HOURS PRN
Qty: 28 TABLET | Refills: 0 | Status: SHIPPED | OUTPATIENT
Start: 2024-07-08 | End: 2024-08-07

## 2024-09-03 ENCOUNTER — HOSPITAL ENCOUNTER (OUTPATIENT)
Dept: CT IMAGING | Age: 64
Discharge: HOME | End: 2024-09-05
Attending: RADIOLOGY

## 2024-09-03 DIAGNOSIS — C34.31 MALIGNANT NEOPLASM OF LOWER LOBE OF RIGHT LUNG (HCC): ICD-10-CM

## 2024-09-03 PROCEDURE — 71250 CT THORAX DX C-: CPT

## 2024-09-10 ENCOUNTER — HOSPITAL ENCOUNTER (OUTPATIENT)
Dept: RADIATION ONCOLOGY | Age: 64
Discharge: HOME | End: 2024-09-10

## 2024-09-10 ENCOUNTER — OFFICE VISIT (OUTPATIENT)
Dept: ONCOLOGY | Age: 64
End: 2024-09-10

## 2024-09-10 ENCOUNTER — HOSPITAL ENCOUNTER (OUTPATIENT)
Age: 64
Discharge: HOME | End: 2024-09-10

## 2024-09-10 VITALS
HEART RATE: 93 BPM | BODY MASS INDEX: 15.27 KG/M2 | WEIGHT: 100.4 LBS | DIASTOLIC BLOOD PRESSURE: 80 MMHG | TEMPERATURE: 97.5 F | RESPIRATION RATE: 18 BRPM | SYSTOLIC BLOOD PRESSURE: 124 MMHG | OXYGEN SATURATION: 94 %

## 2024-09-10 VITALS
BODY MASS INDEX: 15.27 KG/M2 | DIASTOLIC BLOOD PRESSURE: 80 MMHG | OXYGEN SATURATION: 94 % | RESPIRATION RATE: 18 BRPM | WEIGHT: 100.4 LBS | HEART RATE: 93 BPM | SYSTOLIC BLOOD PRESSURE: 124 MMHG | TEMPERATURE: 97.5 F

## 2024-09-10 DIAGNOSIS — C34.31 MALIGNANT NEOPLASM OF LOWER LOBE OF RIGHT LUNG (HCC): Primary | ICD-10-CM

## 2024-09-10 DIAGNOSIS — Z79.891 ENCOUNTER FOR MONITORING OPIOID MAINTENANCE THERAPY: ICD-10-CM

## 2024-09-10 DIAGNOSIS — Z51.81 ENCOUNTER FOR MONITORING OPIOID MAINTENANCE THERAPY: ICD-10-CM

## 2024-09-10 DIAGNOSIS — E03.2 HYPOTHYROIDISM DUE TO MEDICATION: ICD-10-CM

## 2024-09-10 DIAGNOSIS — J43.1 PANLOBULAR EMPHYSEMA (HCC): ICD-10-CM

## 2024-09-10 DIAGNOSIS — C34.92 ADENOCARCINOMA, LUNG, LEFT (HCC): Primary | ICD-10-CM

## 2024-09-10 DIAGNOSIS — Z72.0 TOBACCO ABUSE DISORDER: ICD-10-CM

## 2024-09-10 PROCEDURE — 99214 OFFICE O/P EST MOD 30 MIN: CPT | Performed by: INTERNAL MEDICINE

## 2024-09-10 PROCEDURE — 3074F SYST BP LT 130 MM HG: CPT | Performed by: INTERNAL MEDICINE

## 2024-09-10 PROCEDURE — 99211 OFF/OP EST MAY X REQ PHY/QHP: CPT | Performed by: INTERNAL MEDICINE

## 2024-09-10 PROCEDURE — 99212 OFFICE O/P EST SF 10 MIN: CPT | Performed by: RADIOLOGY

## 2024-09-10 PROCEDURE — 3079F DIAST BP 80-89 MM HG: CPT | Performed by: INTERNAL MEDICINE

## 2024-09-10 RX ORDER — HYDROCODONE BITARTRATE AND ACETAMINOPHEN 5; 325 MG/1; MG/1
1 TABLET ORAL EVERY 6 HOURS PRN
Qty: 28 TABLET | Refills: 0 | Status: SHIPPED | OUTPATIENT
Start: 2024-09-10 | End: 2024-09-17

## 2024-09-19 ENCOUNTER — TELEPHONE (OUTPATIENT)
Dept: GASTROENTEROLOGY | Age: 64
End: 2024-09-19

## 2024-09-25 DIAGNOSIS — J44.1 CHRONIC OBSTRUCTIVE PULMONARY DISEASE WITH ACUTE EXACERBATION (HCC): ICD-10-CM

## 2024-09-25 DIAGNOSIS — C34.92 ADENOCARCINOMA OF LUNG, STAGE 4, LEFT (HCC): ICD-10-CM

## 2024-09-25 DIAGNOSIS — J44.1 COPD WITH ACUTE EXACERBATION (HCC): ICD-10-CM

## 2024-09-25 DIAGNOSIS — C34.92 ADENOCARCINOMA, LUNG, LEFT (HCC): ICD-10-CM

## 2024-09-25 RX ORDER — ALBUTEROL SULFATE 0.83 MG/ML
SOLUTION RESPIRATORY (INHALATION)
Qty: 300 ML | Refills: 2 | Status: SHIPPED | OUTPATIENT
Start: 2024-09-25

## 2024-09-25 RX ORDER — FOLIC ACID 0.8 MG
800 TABLET ORAL DAILY
Qty: 30 TABLET | Refills: 2 | Status: SHIPPED | OUTPATIENT
Start: 2024-09-25

## 2024-09-25 RX ORDER — ALBUTEROL SULFATE 90 UG/1
INHALANT RESPIRATORY (INHALATION)
Qty: 25.5 G | Refills: 1 | Status: SHIPPED | OUTPATIENT
Start: 2024-09-25

## 2024-09-25 RX ORDER — AMLODIPINE BESYLATE 5 MG/1
5 TABLET ORAL DAILY
Qty: 30 TABLET | Refills: 5 | Status: SHIPPED | OUTPATIENT
Start: 2024-09-25

## 2024-09-25 RX ORDER — IPRATROPIUM BROMIDE AND ALBUTEROL SULFATE 2.5; .5 MG/3ML; MG/3ML
1 SOLUTION RESPIRATORY (INHALATION) 4 TIMES DAILY
Qty: 360 ML | Refills: 3 | Status: SHIPPED | OUTPATIENT
Start: 2024-09-25

## 2024-09-25 RX ORDER — LEVOTHYROXINE SODIUM 88 UG/1
88 TABLET ORAL DAILY
Qty: 30 TABLET | Refills: 5 | Status: SHIPPED | OUTPATIENT
Start: 2024-09-25

## 2024-09-25 RX ORDER — BUDESONIDE 0.5 MG/2ML
500 INHALANT ORAL 2 TIMES DAILY
Qty: 60 EACH | Refills: 3 | Status: SHIPPED | OUTPATIENT
Start: 2024-09-25

## 2024-11-13 DIAGNOSIS — C34.92 ADENOCARCINOMA, LUNG, LEFT (HCC): ICD-10-CM

## 2024-11-13 DIAGNOSIS — J43.1 PANLOBULAR EMPHYSEMA (HCC): ICD-10-CM

## 2024-11-13 DIAGNOSIS — Z72.0 TOBACCO ABUSE DISORDER: ICD-10-CM

## 2024-11-13 DIAGNOSIS — E03.2 HYPOTHYROIDISM DUE TO MEDICATION: ICD-10-CM

## 2024-11-13 RX ORDER — TRIAMCINOLONE ACETONIDE 1 MG/G
CREAM TOPICAL DAILY
Qty: 80 G | Refills: 1 | Status: SHIPPED | OUTPATIENT
Start: 2024-11-13

## 2024-11-14 RX ORDER — HYDROCODONE BITARTRATE AND ACETAMINOPHEN 5; 325 MG/1; MG/1
1 TABLET ORAL EVERY 6 HOURS PRN
Qty: 120 TABLET | Refills: 0 | Status: SHIPPED | OUTPATIENT
Start: 2024-11-14 | End: 2024-12-14

## 2024-12-03 ENCOUNTER — TELEPHONE (OUTPATIENT)
Dept: INFUSION THERAPY | Age: 64
End: 2024-12-03

## 2024-12-03 NOTE — TELEPHONE ENCOUNTER
Pt's spouse called stating Dr. Mishra usually prescribes an antibiotic and steroid for pt \"this time of year\". Writer asked if pt was having any s/s of infection; he states pt is starting to get congested and not feel well, and if this goes on too long, she usually ends up in the hospital. He is asking for medication now. Will route to Dr. Mishra to review.

## 2024-12-06 RX ORDER — METHYLPREDNISOLONE 4 MG/1
TABLET ORAL
Qty: 1 KIT | Refills: 0 | Status: SHIPPED | OUTPATIENT
Start: 2024-12-06

## 2024-12-06 RX ORDER — AZITHROMYCIN 500 MG/1
500 TABLET, FILM COATED ORAL DAILY
Qty: 5 TABLET | Refills: 0 | Status: SHIPPED | OUTPATIENT
Start: 2024-12-06 | End: 2024-12-11

## 2025-01-07 ENCOUNTER — OFFICE VISIT (OUTPATIENT)
Dept: FAMILY MEDICINE CLINIC | Age: 65
End: 2025-01-07
Payer: COMMERCIAL

## 2025-01-07 VITALS
DIASTOLIC BLOOD PRESSURE: 80 MMHG | WEIGHT: 101 LBS | BODY MASS INDEX: 15.36 KG/M2 | OXYGEN SATURATION: 97 % | HEART RATE: 92 BPM | TEMPERATURE: 98.2 F | SYSTOLIC BLOOD PRESSURE: 126 MMHG

## 2025-01-07 DIAGNOSIS — M25.562 CHRONIC PAIN OF LEFT KNEE: Primary | ICD-10-CM

## 2025-01-07 DIAGNOSIS — Z12.31 ENCOUNTER FOR SCREENING MAMMOGRAM FOR BREAST CANCER: ICD-10-CM

## 2025-01-07 DIAGNOSIS — R10.9 CHRONIC ABDOMINAL PAIN: ICD-10-CM

## 2025-01-07 DIAGNOSIS — C34.92 ADENOCARCINOMA, LUNG, LEFT (HCC): ICD-10-CM

## 2025-01-07 DIAGNOSIS — G89.29 CHRONIC ABDOMINAL PAIN: ICD-10-CM

## 2025-01-07 DIAGNOSIS — G89.29 CHRONIC PAIN OF LEFT KNEE: Primary | ICD-10-CM

## 2025-01-07 DIAGNOSIS — J06.9 VIRAL URI: ICD-10-CM

## 2025-01-07 DIAGNOSIS — Z12.11 COLON CANCER SCREENING: ICD-10-CM

## 2025-01-07 PROCEDURE — 99214 OFFICE O/P EST MOD 30 MIN: CPT | Performed by: STUDENT IN AN ORGANIZED HEALTH CARE EDUCATION/TRAINING PROGRAM

## 2025-01-07 PROCEDURE — 3074F SYST BP LT 130 MM HG: CPT | Performed by: STUDENT IN AN ORGANIZED HEALTH CARE EDUCATION/TRAINING PROGRAM

## 2025-01-07 PROCEDURE — 3079F DIAST BP 80-89 MM HG: CPT | Performed by: STUDENT IN AN ORGANIZED HEALTH CARE EDUCATION/TRAINING PROGRAM

## 2025-01-07 RX ORDER — PREDNISONE 10 MG/1
10 TABLET ORAL 2 TIMES DAILY
Qty: 10 TABLET | Refills: 0 | Status: SHIPPED | OUTPATIENT
Start: 2025-01-07 | End: 2025-01-12

## 2025-01-07 RX ORDER — BENZONATATE 100 MG/1
100 CAPSULE ORAL EVERY 6 HOURS PRN
Qty: 30 CAPSULE | Refills: 0 | Status: SHIPPED | OUTPATIENT
Start: 2025-01-07 | End: 2025-01-17

## 2025-01-07 RX ORDER — AZITHROMYCIN 250 MG/1
TABLET, FILM COATED ORAL
Qty: 1 PACKET | Refills: 0 | Status: SHIPPED | OUTPATIENT
Start: 2025-01-07

## 2025-01-07 SDOH — ECONOMIC STABILITY: INCOME INSECURITY: HOW HARD IS IT FOR YOU TO PAY FOR THE VERY BASICS LIKE FOOD, HOUSING, MEDICAL CARE, AND HEATING?: NOT VERY HARD

## 2025-01-07 SDOH — ECONOMIC STABILITY: FOOD INSECURITY: WITHIN THE PAST 12 MONTHS, YOU WORRIED THAT YOUR FOOD WOULD RUN OUT BEFORE YOU GOT MONEY TO BUY MORE.: NEVER TRUE

## 2025-01-07 SDOH — ECONOMIC STABILITY: FOOD INSECURITY: WITHIN THE PAST 12 MONTHS, THE FOOD YOU BOUGHT JUST DIDN'T LAST AND YOU DIDN'T HAVE MONEY TO GET MORE.: NEVER TRUE

## 2025-01-07 ASSESSMENT — PATIENT HEALTH QUESTIONNAIRE - PHQ9
SUM OF ALL RESPONSES TO PHQ9 QUESTIONS 1 & 2: 0
SUM OF ALL RESPONSES TO PHQ QUESTIONS 1-9: 0
2. FEELING DOWN, DEPRESSED OR HOPELESS: NOT AT ALL
1. LITTLE INTEREST OR PLEASURE IN DOING THINGS: NOT AT ALL

## 2025-01-07 NOTE — PROGRESS NOTES
Nohemi Durand (:  1960) is a 64 y.o. female,Established patient, here for evaluation of the following chief complaint(s):  Knee Pain (LT knee), GI Problem (Has been on going for a while, she has tried to fet into Dr Tucker), Cough (She states the cough has been for a few weeks, it is a wet cough but unable to bring the mucus up/She has dry mouth and throat irritation also), and Health Maintenance (addressed)         Assessment & Plan  Chronic pain of left knee   Worsening pain of left knee XR done in  bilateral trace knee joint effusions, but no radiographic evidence of any abnormality  Difficulty with putting weight on knee and positive valgus stress test on exam   Will order MRI, per patient pain is worse at night,  patient is a cancer patient would benefit from pain management   S/p katruda which had worsened the pain for patient, was stopped by oncologist     Orders:    MRI KNEE LEFT WO CONTRAST; Future    [facility] - Wilder Salazar MD, Pain Management, [place]    Chronic abdominal pain   Patient was referred to GI per her oncologist has not been able to get in   Will refer to GI, bloating which has not improved ongoing for 6 years  Along with constipation which has not improved with miralax, or other laxatives   Has many laxatives at home  Will refer, patient also due for colonoscopy, order put in     Orders:    [facility], [facility]    Viral URI   Ongoing for a few weeks now without improvement, per patient cough has got worse, patient does have COPD and wheezing noted on exam   Will provide medrol dose pack, zpack and tessalon perles to help with symptoms due to patient history          Adenocarcinoma, lung, left (HCC)   Currently following up with oncology   Recently had a negative CT chest per oncology note, will be repeating CT chest in 6 months from September   Patient is currently still smoking, advised against it  Has tried different things to help her quit without success

## 2025-01-09 ASSESSMENT — ENCOUNTER SYMPTOMS
ABDOMINAL PAIN: 1
ABDOMINAL DISTENTION: 1
CONSTIPATION: 1
COUGH: 1

## 2025-01-09 NOTE — ASSESSMENT & PLAN NOTE
Currently following up with oncology   Recently had a negative CT chest per oncology note, will be repeating CT chest in 6 months from September   Patient is currently still smoking, advised against it  Has tried different things to help her quit without success

## 2025-01-23 ENCOUNTER — HOSPITAL ENCOUNTER (OUTPATIENT)
Dept: MAMMOGRAPHY | Age: 65
Discharge: HOME | End: 2025-01-25
Attending: STUDENT IN AN ORGANIZED HEALTH CARE EDUCATION/TRAINING PROGRAM
Payer: COMMERCIAL

## 2025-01-23 ENCOUNTER — HOSPITAL ENCOUNTER (OUTPATIENT)
Dept: MRI IMAGING | Age: 65
Discharge: HOME | End: 2025-01-25
Attending: STUDENT IN AN ORGANIZED HEALTH CARE EDUCATION/TRAINING PROGRAM
Payer: COMMERCIAL

## 2025-01-23 DIAGNOSIS — M25.562 CHRONIC PAIN OF LEFT KNEE: ICD-10-CM

## 2025-01-23 DIAGNOSIS — Z12.31 ENCOUNTER FOR SCREENING MAMMOGRAM FOR BREAST CANCER: ICD-10-CM

## 2025-01-23 DIAGNOSIS — M25.562 CHRONIC PAIN OF LEFT KNEE: Primary | ICD-10-CM

## 2025-01-23 DIAGNOSIS — G89.29 CHRONIC PAIN OF LEFT KNEE: ICD-10-CM

## 2025-01-23 DIAGNOSIS — G89.29 CHRONIC PAIN OF LEFT KNEE: Primary | ICD-10-CM

## 2025-01-23 PROCEDURE — 73721 MRI JNT OF LWR EXTRE W/O DYE: CPT

## 2025-01-23 PROCEDURE — 77063 BREAST TOMOSYNTHESIS BI: CPT

## 2025-01-23 NOTE — RESULT ENCOUNTER NOTE
Can we let the patient know that her MRI of her left knee did come back showing tendon degeneration and fluid around her knee, I did put in order for referral for orthopedics so she can follow up with them for further assessment and plan.

## 2025-01-24 ENCOUNTER — TELEPHONE (OUTPATIENT)
Dept: FAMILY MEDICINE CLINIC | Age: 65
End: 2025-01-24

## 2025-01-24 DIAGNOSIS — R92.8 ABNORMAL MAMMOGRAM OF LEFT BREAST: Primary | ICD-10-CM

## 2025-01-24 DIAGNOSIS — M25.562 CHRONIC PAIN OF LEFT KNEE: Primary | ICD-10-CM

## 2025-01-24 DIAGNOSIS — G89.29 CHRONIC PAIN OF LEFT KNEE: Primary | ICD-10-CM

## 2025-01-24 NOTE — TELEPHONE ENCOUNTER
Pt  called and states the ortho doctor that pt was referred to is a spine surgeon. He would like to know who else she can be referred to for her knee.

## 2025-01-24 NOTE — RESULT ENCOUNTER NOTE
Are we able to let her know that on her mammogram there is asymmetry on her left breast that just needs evaluation with further imaging, there are two tests that we need her to obtain which is a more specific mammogram and an ultrasound, orders already put in, if we can please have her get them done, I appreciate it

## 2025-01-24 NOTE — PROGRESS NOTES
Left breast asymmetry noted at 12 oclock recommended diagnositc mammograp spot tomosynthesis and Ultrasound, order put in for patient

## 2025-01-28 ENCOUNTER — TELEPHONE (OUTPATIENT)
Dept: FAMILY MEDICINE CLINIC | Age: 65
End: 2025-01-28

## 2025-01-28 DIAGNOSIS — M25.562 CHRONIC PAIN OF LEFT KNEE: Primary | ICD-10-CM

## 2025-01-28 DIAGNOSIS — G89.29 CHRONIC PAIN OF LEFT KNEE: Primary | ICD-10-CM

## 2025-01-28 NOTE — TELEPHONE ENCOUNTER
Patient's  called stating Dr. Toledo cannot get her in until the end of February. He is asking for a new referral.  Referral placed.

## 2025-02-11 ENCOUNTER — OFFICE VISIT (OUTPATIENT)
Dept: FAMILY MEDICINE CLINIC | Age: 65
End: 2025-02-11
Payer: COMMERCIAL

## 2025-02-11 VITALS
SYSTOLIC BLOOD PRESSURE: 126 MMHG | OXYGEN SATURATION: 96 % | TEMPERATURE: 98.1 F | WEIGHT: 101.2 LBS | DIASTOLIC BLOOD PRESSURE: 84 MMHG | BODY MASS INDEX: 15.34 KG/M2 | HEART RATE: 88 BPM | HEIGHT: 68 IN

## 2025-02-11 DIAGNOSIS — E03.9 ACQUIRED HYPOTHYROIDISM: ICD-10-CM

## 2025-02-11 DIAGNOSIS — E87.1 HYPONATREMIA: ICD-10-CM

## 2025-02-11 DIAGNOSIS — I10 ESSENTIAL HYPERTENSION: Chronic | ICD-10-CM

## 2025-02-11 DIAGNOSIS — E78.00 HYPERCHOLESTEROLEMIA: ICD-10-CM

## 2025-02-11 DIAGNOSIS — Z13.0 SCREENING, ANEMIA, DEFICIENCY, IRON: ICD-10-CM

## 2025-02-11 DIAGNOSIS — T45.1X5A CHEMOTHERAPY-INDUCED PERIPHERAL NEUROPATHY: ICD-10-CM

## 2025-02-11 DIAGNOSIS — J44.1 CHRONIC OBSTRUCTIVE PULMONARY DISEASE WITH ACUTE EXACERBATION (HCC): Primary | ICD-10-CM

## 2025-02-11 DIAGNOSIS — M67.864 TENDINOSIS OF LEFT KNEE: ICD-10-CM

## 2025-02-11 DIAGNOSIS — G62.0 CHEMOTHERAPY-INDUCED PERIPHERAL NEUROPATHY: ICD-10-CM

## 2025-02-11 DIAGNOSIS — M87.88 KNEE PAIN WITH AVASCULAR NECROSIS DETERMINED BY X-RAY (HCC): ICD-10-CM

## 2025-02-11 DIAGNOSIS — J43.1 PANLOBULAR EMPHYSEMA (HCC): ICD-10-CM

## 2025-02-11 DIAGNOSIS — C34.92 ADENOCARCINOMA, LUNG, LEFT (HCC): ICD-10-CM

## 2025-02-11 DIAGNOSIS — C34.92 ADENOCARCINOMA OF LUNG, STAGE 4, LEFT (HCC): ICD-10-CM

## 2025-02-11 PROCEDURE — 99214 OFFICE O/P EST MOD 30 MIN: CPT | Performed by: INTERNAL MEDICINE

## 2025-02-11 PROCEDURE — 3074F SYST BP LT 130 MM HG: CPT | Performed by: INTERNAL MEDICINE

## 2025-02-11 PROCEDURE — 3079F DIAST BP 80-89 MM HG: CPT | Performed by: INTERNAL MEDICINE

## 2025-02-11 RX ORDER — LEVOTHYROXINE SODIUM 88 UG/1
88 TABLET ORAL DAILY
Qty: 30 TABLET | Refills: 5 | Status: SHIPPED | OUTPATIENT
Start: 2025-02-11

## 2025-02-11 RX ORDER — AMLODIPINE BESYLATE 5 MG/1
5 TABLET ORAL DAILY
Qty: 30 TABLET | Refills: 5 | Status: SHIPPED | OUTPATIENT
Start: 2025-02-11

## 2025-02-11 RX ORDER — HYDROCODONE BITARTRATE AND ACETAMINOPHEN 5; 325 MG/1; MG/1
1 TABLET ORAL DAILY PRN
Qty: 30 TABLET | Refills: 0 | Status: SHIPPED | OUTPATIENT
Start: 2025-02-11 | End: 2025-03-13

## 2025-02-11 RX ORDER — FLUTICASONE FUROATE, UMECLIDINIUM BROMIDE AND VILANTEROL TRIFENATATE 100; 62.5; 25 UG/1; UG/1; UG/1
1 POWDER RESPIRATORY (INHALATION) DAILY
Qty: 1 EACH | Refills: 5 | Status: SHIPPED | OUTPATIENT
Start: 2025-02-11

## 2025-02-11 RX ORDER — HYDROCODONE BITARTRATE AND ACETAMINOPHEN 5; 325 MG/1; MG/1
1 TABLET ORAL EVERY 6 HOURS PRN
COMMUNITY

## 2025-02-11 RX ORDER — GABAPENTIN 100 MG/1
100 CAPSULE ORAL NIGHTLY
Qty: 30 CAPSULE | Refills: 0 | Status: SHIPPED | OUTPATIENT
Start: 2025-02-11 | End: 2025-03-13

## 2025-02-11 RX ORDER — CELECOXIB 100 MG/1
100 CAPSULE ORAL DAILY
Qty: 60 CAPSULE | Refills: 3 | Status: SHIPPED | OUTPATIENT
Start: 2025-02-11

## 2025-02-11 SDOH — ECONOMIC STABILITY: FOOD INSECURITY: WITHIN THE PAST 12 MONTHS, YOU WORRIED THAT YOUR FOOD WOULD RUN OUT BEFORE YOU GOT MONEY TO BUY MORE.: NEVER TRUE

## 2025-02-11 SDOH — ECONOMIC STABILITY: FOOD INSECURITY: WITHIN THE PAST 12 MONTHS, THE FOOD YOU BOUGHT JUST DIDN'T LAST AND YOU DIDN'T HAVE MONEY TO GET MORE.: NEVER TRUE

## 2025-02-11 NOTE — PROGRESS NOTES
Medium Adult)   Pulse 88   Temp 98.1 °F (36.7 °C)   Ht 1.727 m (5' 8\")   Wt 45.9 kg (101 lb 3.2 oz)   SpO2 96%   BMI 15.39 kg/m²   Physical Exam  Vitals and nursing note reviewed.   Constitutional:       General: She is not in acute distress.     Appearance: She is well-developed. She is not ill-appearing.   Eyes:      General: Lids are normal. Vision grossly intact.   Cardiovascular:      Rate and Rhythm: Normal rate and regular rhythm.      Heart sounds: Normal heart sounds, S1 normal and S2 normal. No murmur heard.     No friction rub. No gallop.   Pulmonary:      Effort: Pulmonary effort is normal. No respiratory distress.      Breath sounds: Normal breath sounds. No wheezing.   Abdominal:      General: Bowel sounds are normal.      Palpations: Abdomen is soft. There is no mass.      Tenderness: There is no abdominal tenderness. There is no guarding.   Musculoskeletal:         General: Normal range of motion.   Skin:     General: Skin is warm and dry.      Capillary Refill: Capillary refill takes less than 2 seconds.   Neurological:      General: No focal deficit present.      Mental Status: She is alert and oriented to person, place, and time.           Data Review     Controlled Substance Monitoring:    Acute and Chronic Pain Monitoring:   RX Monitoring Periodic Controlled Substance Monitoring   2/11/2025   4:29 PM No signs of potential drug abuse or diversion identified.;Possible medication side effects, risk of tolerance/dependence & alternative treatments discussed.;Assessed functional status (ability to engage in work or other purposeful activities, the pain intensity and its interference with activities of daily living, quality of family life and social activities, and the physical activity);Obtaining appropriate analgesic effect of treatment.           Health Maintenance Due   Topic Date Due    Pneumococcal 50+ years Vaccine (1 of 2 - PCV) Never done    Cervical cancer screen  Never done

## 2025-02-12 ENCOUNTER — PATIENT MESSAGE (OUTPATIENT)
Dept: FAMILY MEDICINE CLINIC | Age: 65
End: 2025-02-12

## 2025-02-12 DIAGNOSIS — G89.29 CHRONIC PAIN OF LEFT KNEE: Primary | ICD-10-CM

## 2025-02-12 DIAGNOSIS — M25.562 CHRONIC PAIN OF LEFT KNEE: Primary | ICD-10-CM

## 2025-02-18 ASSESSMENT — ENCOUNTER SYMPTOMS
SHORTNESS OF BREATH: 0
NAUSEA: 0
CONSTIPATION: 0
CHOKING: 0
WHEEZING: 0
DIARRHEA: 0
ABDOMINAL PAIN: 0
VOMITING: 0
CHEST TIGHTNESS: 0
ANAL BLEEDING: 0
COUGH: 0
BLOOD IN STOOL: 0

## 2025-02-18 ASSESSMENT — VISUAL ACUITY: OU: 1

## 2025-02-27 ENCOUNTER — HOSPITAL ENCOUNTER (OUTPATIENT)
Age: 65
Setting detail: SPECIMEN
Discharge: HOME | End: 2025-02-27

## 2025-02-27 DIAGNOSIS — I10 ESSENTIAL HYPERTENSION: Chronic | ICD-10-CM

## 2025-02-27 DIAGNOSIS — Z13.0 SCREENING, ANEMIA, DEFICIENCY, IRON: ICD-10-CM

## 2025-02-27 DIAGNOSIS — E03.9 ACQUIRED HYPOTHYROIDISM: ICD-10-CM

## 2025-02-27 DIAGNOSIS — E87.1 HYPONATREMIA: ICD-10-CM

## 2025-02-27 DIAGNOSIS — E78.00 HYPERCHOLESTEROLEMIA: ICD-10-CM

## 2025-02-27 LAB
ALBUMIN SERPL-MCNC: 4.8 G/DL (ref 3.5–5.2)
ALBUMIN/GLOB SERPL: 1.6 {RATIO} (ref 1–2.5)
ALP SERPL-CCNC: 67 U/L (ref 35–104)
ALT SERPL-CCNC: 20 U/L (ref 10–35)
ANION GAP SERPL CALCULATED.3IONS-SCNC: 13 MMOL/L (ref 9–16)
AST SERPL-CCNC: 28 U/L (ref 10–35)
BASOPHILS # BLD: 0.06 K/UL (ref 0–0.2)
BASOPHILS NFR BLD: 1 % (ref 0–2)
BILIRUB SERPL-MCNC: <0.2 MG/DL (ref 0–1.2)
BUN SERPL-MCNC: 18 MG/DL (ref 8–23)
CALCIUM SERPL-MCNC: 11.1 MG/DL (ref 8.6–10.4)
CHLORIDE SERPL-SCNC: 94 MMOL/L (ref 98–107)
CHOLEST SERPL-MCNC: 221 MG/DL (ref 0–199)
CHOLESTEROL/HDL RATIO: 3.1
CO2 SERPL-SCNC: 25 MMOL/L (ref 20–31)
CREAT SERPL-MCNC: 0.5 MG/DL (ref 0.6–0.9)
EOSINOPHIL # BLD: 0.04 K/UL (ref 0–0.44)
EOSINOPHILS RELATIVE PERCENT: 1 % (ref 1–4)
ERYTHROCYTE [DISTWIDTH] IN BLOOD BY AUTOMATED COUNT: 13.8 % (ref 11.8–14.4)
GFR, ESTIMATED: >90 ML/MIN/1.73M2
GLUCOSE SERPL-MCNC: 92 MG/DL (ref 74–99)
HCT VFR BLD AUTO: 39.7 % (ref 36.3–47.1)
HDLC SERPL-MCNC: 71 MG/DL
HGB BLD-MCNC: 13.4 G/DL (ref 11.9–15.1)
IMM GRANULOCYTES # BLD AUTO: <0.03 K/UL (ref 0–0.3)
IMM GRANULOCYTES NFR BLD: 0 %
LDLC SERPL CALC-MCNC: 120 MG/DL (ref 0–100)
LYMPHOCYTES NFR BLD: 2.41 K/UL (ref 1.1–3.7)
LYMPHOCYTES RELATIVE PERCENT: 32 % (ref 24–43)
MCH RBC QN AUTO: 31.8 PG (ref 25.2–33.5)
MCHC RBC AUTO-ENTMCNC: 33.8 G/DL (ref 28.4–34.8)
MCV RBC AUTO: 94.3 FL (ref 82.6–102.9)
MONOCYTES NFR BLD: 0.95 K/UL (ref 0.1–1.2)
MONOCYTES NFR BLD: 13 % (ref 3–12)
NEUTROPHILS NFR BLD: 53 % (ref 36–65)
NEUTS SEG NFR BLD: 4.03 K/UL (ref 1.5–8.1)
NRBC BLD-RTO: 0 PER 100 WBC
PLATELET # BLD AUTO: 289 K/UL (ref 138–453)
PMV BLD AUTO: 9.1 FL (ref 8.1–13.5)
POTASSIUM SERPL-SCNC: 4.4 MMOL/L (ref 3.7–5.3)
PROT SERPL-MCNC: 7.8 G/DL (ref 6.6–8.7)
RBC # BLD AUTO: 4.21 M/UL (ref 3.95–5.11)
SODIUM SERPL-SCNC: 132 MMOL/L (ref 136–145)
TRIGL SERPL-MCNC: 150 MG/DL (ref 0–149)
TSH SERPL DL<=0.05 MIU/L-ACNC: 2.63 UIU/ML (ref 0.27–4.2)
VLDLC SERPL CALC-MCNC: 30 MG/DL (ref 1–30)
WBC OTHER # BLD: 7.5 K/UL (ref 3.5–11.3)

## 2025-03-07 ENCOUNTER — HOSPITAL ENCOUNTER (OUTPATIENT)
Dept: CT IMAGING | Age: 65
Discharge: HOME | End: 2025-03-09
Attending: RADIOLOGY
Payer: COMMERCIAL

## 2025-03-07 DIAGNOSIS — C34.31 MALIGNANT NEOPLASM OF LOWER LOBE OF RIGHT LUNG (HCC): ICD-10-CM

## 2025-03-07 PROCEDURE — 71250 CT THORAX DX C-: CPT

## 2025-03-10 DIAGNOSIS — T45.1X5A CHEMOTHERAPY-INDUCED PERIPHERAL NEUROPATHY: ICD-10-CM

## 2025-03-10 DIAGNOSIS — G62.0 CHEMOTHERAPY-INDUCED PERIPHERAL NEUROPATHY: ICD-10-CM

## 2025-03-11 ENCOUNTER — HOSPITAL ENCOUNTER (OUTPATIENT)
Dept: RADIATION ONCOLOGY | Age: 65
Discharge: HOME | End: 2025-03-11
Payer: COMMERCIAL

## 2025-03-11 VITALS
RESPIRATION RATE: 18 BRPM | DIASTOLIC BLOOD PRESSURE: 77 MMHG | HEART RATE: 96 BPM | BODY MASS INDEX: 15.29 KG/M2 | WEIGHT: 100.53 LBS | TEMPERATURE: 97.5 F | OXYGEN SATURATION: 93 % | SYSTOLIC BLOOD PRESSURE: 132 MMHG

## 2025-03-11 DIAGNOSIS — C34.31 MALIGNANT NEOPLASM OF LOWER LOBE OF RIGHT LUNG (HCC): Primary | ICD-10-CM

## 2025-03-11 PROCEDURE — 99212 OFFICE O/P EST SF 10 MIN: CPT | Performed by: RADIOLOGY

## 2025-03-11 NOTE — TELEPHONE ENCOUNTER
Last visit: 02/11/2025  Last Med refill: 02/11/2025  Does patient have enough medication for 72 hours: Yes    Next Visit Date:  Future Appointments   Date Time Provider Department Center   3/11/2025  1:30 PM SCHEDULE, BAYRON ABRAHAM RAD ONC NURSE [facility]   3/11/2025  2:00 PM Oumar Beltran MD [facility] CANCER [facility]   3/17/2025  2:00 PM Jerry Tucker MD [facility] GI [facility]   5/12/2025  2:45 PM Kat Spence MD [facility] ECC DEP       Health Maintenance   Topic Date Due    Pneumococcal 50+ years Vaccine (1 of 2 - PCV) Never done    Cervical cancer screen  Never done    Shingles vaccine (1 of 2) 12/26/2025 (Originally 8/12/2011)    COVID-19 Vaccine (1 - 2024-25 season) 12/26/2025 (Originally 9/1/2024)    Respiratory Syncytial Virus (RSV) Pregnant or age 60 yrs+ (1 - Risk 60-74 years 1-dose series) 12/26/2025 (Originally 8/12/2021)    Flu vaccine (1) 01/07/2026 (Originally 8/1/2024)    Depression Screen  01/07/2026    Breast cancer screen  01/23/2026    Colorectal Cancer Screen  05/30/2027    DTaP/Tdap/Td vaccine (2 - Td or Tdap) 05/03/2028    Lipids  02/27/2030    Hepatitis C screen  Completed    Hepatitis A vaccine  Aged Out    Hepatitis B vaccine  Aged Out    Hib vaccine  Aged Out    Polio vaccine  Aged Out    Meningococcal (ACWY) vaccine  Aged Out    Meningococcal B vaccine  Aged Out    HIV screen  Discontinued    Lung Cancer Screening &/or Counseling  Discontinued       Hemoglobin A1C (%)   Date Value   12/19/2019 5.6             ( goal A1C is < 7)   No components found for: \"LABMICR\"  No components found for: \"LDLCHOLESTEROL\", \"LDLCALC\"    (goal LDL is <100)   AST (U/L)   Date Value   02/27/2025 28     ALT (U/L)   Date Value   02/27/2025 20     BUN (mg/dL)   Date Value   02/27/2025 18     BP Readings from Last 3 Encounters:   02/11/25 126/84   01/07/25 126/80   09/10/24 124/80          (goal 120/80)    All Future Testing planned in [facility]  Lab Frequency Next Occurrence

## 2025-03-11 NOTE — PROGRESS NOTES
Nohemi Durand  3/11/2025  1:41 PM      Vitals:    03/11/25 1328   BP: 132/77   Pulse: 96   Resp: 18   Temp: 97.5 °F (36.4 °C)   SpO2: 93%      :     Pain Assessment: None - Denies Pain          Wt Readings from Last 1 Encounters:   03/11/25 45.6 kg (100 lb 8.5 oz)                Current Outpatient Medications:     HYDROcodone-acetaminophen (NORCO) 5-325 MG per tablet, Take 1 tablet by mouth every 6 hours as needed., Disp: , Rfl:     fluticasone-umeclidin-vilant (TRELEGY ELLIPTA) 100-62.5-25 MCG/ACT AEPB inhaler, Inhale 1 puff into the lungs daily, Disp: 1 each, Rfl: 5    levothyroxine (SYNTHROID) 88 MCG tablet, Take 1 tablet by mouth daily, Disp: 30 tablet, Rfl: 5    amLODIPine (NORVASC) 5 MG tablet, Take 1 tablet by mouth daily, Disp: 30 tablet, Rfl: 5    celecoxib (CELEBREX) 100 MG capsule, Take 1 capsule by mouth daily, Disp: 60 capsule, Rfl: 3    HYDROcodone-acetaminophen (NORCO) 5-325 MG per tablet, Take 1 tablet by mouth daily as needed for Pain for up to 30 days. Max Daily Amount: 1 tablet, Disp: 30 tablet, Rfl: 0    gabapentin (NEURONTIN) 100 MG capsule, Take 1 capsule by mouth nightly for 30 days. Intended supply: 30 days, Disp: 30 capsule, Rfl: 0    triamcinolone (KENALOG) 0.1 % cream, Apply topically daily Apply topically once daily., Disp: 80 g, Rfl: 1    albuterol (PROVENTIL) (2.5 MG/3ML) 0.083% nebulizer solution, inhale contents of 1 vial ( 3 milliliters ) in nebulizer by mouth and INTO THE LUNGS every 6 hours if needed, Disp: 300 mL, Rfl: 2    albuterol sulfate HFA (PROVENTIL;VENTOLIN;PROAIR) 108 (90 Base) MCG/ACT inhaler, Use 1-2 puffs every 6 hours as needed, Disp: 25.5 g, Rfl: 1    budesonide (PULMICORT) 0.5 MG/2ML nebulizer suspension, Take 2 mLs by nebulization 2 times daily, Disp: 60 each, Rfl: 3    diclofenac sodium (VOLTAREN) 1 % GEL, Apply 2 g topically 2 times daily as needed for Pain, Disp: 150 g, Rfl: 0    folic acid (FOLVITE) 800 MCG tablet, Take 1 tablet by mouth daily, Disp: 30

## 2025-03-12 RX ORDER — GABAPENTIN 100 MG/1
100 CAPSULE ORAL NIGHTLY
Qty: 30 CAPSULE | Refills: 2 | Status: SHIPPED | OUTPATIENT
Start: 2025-03-12 | End: 2025-06-10

## 2025-03-17 ENCOUNTER — OFFICE VISIT (OUTPATIENT)
Dept: GASTROENTEROLOGY | Age: 65
End: 2025-03-17
Payer: COMMERCIAL

## 2025-03-17 ENCOUNTER — TELEPHONE (OUTPATIENT)
Dept: GASTROENTEROLOGY | Age: 65
End: 2025-03-17

## 2025-03-17 ENCOUNTER — PREP FOR PROCEDURE (OUTPATIENT)
Dept: GASTROENTEROLOGY | Age: 65
End: 2025-03-17

## 2025-03-17 VITALS
HEART RATE: 90 BPM | BODY MASS INDEX: 15.31 KG/M2 | RESPIRATION RATE: 19 BRPM | SYSTOLIC BLOOD PRESSURE: 113 MMHG | WEIGHT: 101 LBS | TEMPERATURE: 98.6 F | HEIGHT: 68 IN | DIASTOLIC BLOOD PRESSURE: 68 MMHG

## 2025-03-17 DIAGNOSIS — Z12.11 COLON CANCER SCREENING: Primary | ICD-10-CM

## 2025-03-17 DIAGNOSIS — R19.4 ALTERED BOWEL HABITS: ICD-10-CM

## 2025-03-17 DIAGNOSIS — Z12.11 COLON CANCER SCREENING: ICD-10-CM

## 2025-03-17 DIAGNOSIS — Z80.0 FAMILY HISTORY OF COLON CANCER: ICD-10-CM

## 2025-03-17 PROCEDURE — 99214 OFFICE O/P EST MOD 30 MIN: CPT | Performed by: INTERNAL MEDICINE

## 2025-03-17 PROCEDURE — 3074F SYST BP LT 130 MM HG: CPT | Performed by: INTERNAL MEDICINE

## 2025-03-17 PROCEDURE — 3078F DIAST BP <80 MM HG: CPT | Performed by: INTERNAL MEDICINE

## 2025-03-17 RX ORDER — POLYETHYLENE GLYCOL 3350, SODIUM CHLORIDE, SODIUM BICARBONATE, POTASSIUM CHLORIDE 420; 11.2; 5.72; 1.48 G/4L; G/4L; G/4L; G/4L
POWDER, FOR SOLUTION ORAL
Qty: 2 EACH | Refills: 0 | Status: SHIPPED | OUTPATIENT
Start: 2025-03-17

## 2025-03-17 ASSESSMENT — ENCOUNTER SYMPTOMS
ANAL BLEEDING: 0
NAUSEA: 0
ABDOMINAL DISTENTION: 1
CONSTIPATION: 1
ABDOMINAL PAIN: 1
VOMITING: 0
TROUBLE SWALLOWING: 0
BLOOD IN STOOL: 1
RECTAL PAIN: 0
CHOKING: 0
DIARRHEA: 1
SORE THROAT: 0
COUGH: 0
WHEEZING: 0

## 2025-03-17 NOTE — TELEPHONE ENCOUNTER
Pt saw Dr. Tucker today in clinic. Colonoscopy ordered. 2-Day Bowel Prep per Dr. Tucker. Pt would like procedure in the afternoon. Pt does not take blood thinners or GLP-1 medications.    Procedure scheduled/Dr Tucker  Procedure: Colonoscopy (Screening)  Dx:  Colon cancer screening  Date: Monday 03/31/25  Time: 2:30 pm/Arrive at 12:30 pm  Hospital: [facility]: Surgery Entrance, back of hospital  PAT Phone Call: Thursday 03/20/25 at 8:45 am  Bowel Prep instructions given: Nulytely 2-Day Split-Bowel Prep  In office/via phone:  in office  Clearance needed: N/A  GLP-1: N/A    Pt informed they will receive a PAT Phone Call from a [facility] PAT Nurse 1-2 weeks prior to procedure. Pt informed they must complete PAT Call or procedure may be cancelled.     Pt informed it is required they must have a /responsible adult that takes them to their procedure, stays at the hospital (before, during, and after procedure), and drives pt home. Pt informed /responsible adult must stay inside the hospital during their procedure. Pt voiced understanding of  protocol for procedure.

## 2025-03-17 NOTE — PROGRESS NOTES
Reason for Referral:       Anna Costello MD  [address] [place] [address]    Chief Complaint   Patient presents with    New Patient    Abdominal Pain           HISTORY OF PRESENT ILLNESS: Ms.Patricia WILDER Durand is a 64 y.o. female with a past history remarkable for breast cancer diagnosed 15 years ago, history of stage IV lung cancer, history of frontal lobe meningioma, hypertension, hypothyroidism , who presents today with complaints of altered bowel habits.  The patient was previously seen by Dr. Cai and Dr. Mcnally.  She reports having altered bowel habits for a long time.  She had a procedure done with a lung cancer and following the removal of the catheter she feels that they may have caused some trauma in her abdomen that has resulted in altered bowel habits.  She previously was given Trulance, Linzess, Amitiza, MiraLAX without much relief.  She is currently having no significant issues with her bowel habits.  She does take occasional prune juice that helps her go.  Her last colonoscopy was 8 years ago.  She does have a family history of colon cancer in her mother.  She is due for a colonoscopy.      Previous Endoscopies    Colonoscopy 2017:  Rectal polyp removed    Previous GI workup   Barium enema 2022:  IMPRESSION:  1. Mild colonic diverticulosis.  2. No suspicious filling defect or constricting mass lesion evident.  3. Relatively featureless pattern of the rectosigmoid colon which could be  related to sequela of a prior colitis or inflammatory bowel disease.  4. Limitations of the exam as detailed above especially due to overlap in the  rectosigmoid colon and suboptimal valuation of contrast especially for  evaluation of the ascending colon and cecum.  Otherwise, unremarkable  double-contrast barium enema.    Past Medical,Family, and Social History reviewed and does contribute to the patient presentingcondition.    Patient's PMH/PSH,SH,PSYCH Hx, MEDs, ALLERGIES, and ROS were all reviewed and

## 2025-03-24 ENCOUNTER — PATIENT MESSAGE (OUTPATIENT)
Dept: GASTROENTEROLOGY | Age: 65
End: 2025-03-24

## 2025-03-25 ENCOUNTER — HOSPITAL ENCOUNTER (OUTPATIENT)
Dept: PREADMISSION TESTING | Age: 65
Discharge: HOME | End: 2025-03-29

## 2025-03-25 VITALS — BODY MASS INDEX: 15.31 KG/M2 | WEIGHT: 101 LBS | HEIGHT: 68 IN

## 2025-03-25 NOTE — PROGRESS NOTES
Pre-op Instructions For Out-Patient Endoscopy Surgery    Medication Instructions:  Please stop herbs and any supplements now (includes vitamins and minerals).    For these medications:  Dulaglutide (Trulicity), Exenatide (Byetta and Bydureon, Liraglutide (Victoza), Lixisenatide (Adlyxin), Semaglutide (Ozempic and Rybelsus), Tirzepatide (Mounjaro, Zepbound)- Stop 1 week prior if taking weekly or 1 day prior if taking every 12 hours or daily.     Please contact your surgeon and prescribing physician for pre-op instructions for any blood thinners. STOP CELECOXIB AS DIRECTED    If you have inhalers/aerosol treatments at home, please use them the morning of your surgery and bring the inhalers with you to the hospital.    Please take the following medications the morning of your surgery with a sip of water:    Inhalers, Amlodipine, and Levothyroxine    Surgery Instructions:  After midnight before surgery:  Do not eat or drink anything, including water, mints, gum, and hard candy.  You may brush your teeth without swallowing.  No smoking, chewing tobacco, or street drugs.     ** Please Follow Bowel Prep instructions if given by surgeon's office**    Please shower or bathe before surgery.       Please do not wear any cologne, lotion, powder, jewelry, piercings, perfume, makeup, nail polish, hair accessories, or hair spray on the day of surgery.  Wear loose comfortable clothing.    Leave your valuables at home but bring a payment source for any after-surgery prescriptions you plan to fill at [facility].  Bring a storage case for any glasses/contacts.    An adult who is responsible for you MUST drive you home and should be with you for the first 24 hours after surgery.     The Day of Surgery:  Arrive at [facility] Surgery Entrance at the time directed by your surgeon and check in at the desk.     If you have a living will or healthcare power of , please bring a copy.    You will be taken to

## 2025-03-27 ENCOUNTER — HOSPITAL ENCOUNTER (OUTPATIENT)
Dept: PREADMISSION TESTING | Age: 65
Discharge: HOME | End: 2025-03-31

## 2025-03-27 NOTE — PRE-PROCEDURE INSTRUCTIONS
No answer, left message ?                             Unable to leave message ?    When were you told to arrive at hospital ?  -1230    Do you have a  ?-YES    Are you on any blood thinners ?  -NONE                   If yes when did you stop taking ?    Do you have your prep Rx filled and instruction ?-YES      Nothing to eat the day before , only clear liquids.-INSTRUCTED ON 2 DAY CLEAR LIQUID DIET    Are you experiencing any covid symptoms ? -NONE    Do you have any infections or rash we should be aware of ?-NONE      Do you have the Hibiclens soap to use the night before and the morning of surgery ?-N/A    Nothing to eat or drink after midnight, only a sip of water to take any medication instructed to take the night before.-INSTRUCTED    Wear comfortable clothing, leave any valuables at home, remove any jewelry and body piercing .-INSTRUCTED

## 2025-03-28 ENCOUNTER — ANESTHESIA EVENT (OUTPATIENT)
Dept: ENDOSCOPY | Age: 65
End: 2025-03-28
Payer: COMMERCIAL

## 2025-03-31 ENCOUNTER — ANESTHESIA (OUTPATIENT)
Dept: ENDOSCOPY | Age: 65
End: 2025-03-31
Payer: COMMERCIAL

## 2025-03-31 ENCOUNTER — HOSPITAL ENCOUNTER (OUTPATIENT)
Age: 65
Setting detail: OUTPATIENT SURGERY
Discharge: HOME | End: 2025-03-31
Attending: INTERNAL MEDICINE | Admitting: INTERNAL MEDICINE
Payer: COMMERCIAL

## 2025-03-31 VITALS
WEIGHT: 101 LBS | HEART RATE: 73 BPM | RESPIRATION RATE: 20 BRPM | TEMPERATURE: 97.3 F | OXYGEN SATURATION: 93 % | DIASTOLIC BLOOD PRESSURE: 89 MMHG | HEIGHT: 68 IN | SYSTOLIC BLOOD PRESSURE: 149 MMHG | BODY MASS INDEX: 15.31 KG/M2

## 2025-03-31 DIAGNOSIS — G89.29 CHRONIC PAIN OF LEFT KNEE: Primary | ICD-10-CM

## 2025-03-31 DIAGNOSIS — Z12.11 COLON CANCER SCREENING: ICD-10-CM

## 2025-03-31 DIAGNOSIS — M25.562 CHRONIC PAIN OF LEFT KNEE: Primary | ICD-10-CM

## 2025-03-31 PROCEDURE — 2709999900 HC NON-CHARGEABLE SUPPLY: Performed by: INTERNAL MEDICINE

## 2025-03-31 PROCEDURE — 88305 TISSUE EXAM BY PATHOLOGIST: CPT

## 2025-03-31 PROCEDURE — 3609010600 HC COLONOSCOPY POLYPECTOMY SNARE/COLD BIOPSY: Performed by: INTERNAL MEDICINE

## 2025-03-31 PROCEDURE — 3700000000 HC ANESTHESIA ATTENDED CARE: Performed by: INTERNAL MEDICINE

## 2025-03-31 PROCEDURE — 3700000001 HC ADD 15 MINUTES (ANESTHESIA): Performed by: INTERNAL MEDICINE

## 2025-03-31 PROCEDURE — 7100000011 HC PHASE II RECOVERY - ADDTL 15 MIN: Performed by: INTERNAL MEDICINE

## 2025-03-31 PROCEDURE — 7100000010 HC PHASE II RECOVERY - FIRST 15 MIN: Performed by: INTERNAL MEDICINE

## 2025-03-31 PROCEDURE — 6360000002 HC RX W HCPCS: Performed by: NURSE ANESTHETIST, CERTIFIED REGISTERED

## 2025-03-31 PROCEDURE — 2580000003 HC RX 258: Performed by: ANESTHESIOLOGY

## 2025-03-31 RX ORDER — SODIUM CHLORIDE, SODIUM LACTATE, POTASSIUM CHLORIDE, CALCIUM CHLORIDE 600; 310; 30; 20 MG/100ML; MG/100ML; MG/100ML; MG/100ML
INJECTION, SOLUTION INTRAVENOUS CONTINUOUS
Status: DISCONTINUED | OUTPATIENT
Start: 2025-03-31 | End: 2025-03-31 | Stop reason: HOSPADM

## 2025-03-31 RX ORDER — PROPOFOL 10 MG/ML
INJECTION, EMULSION INTRAVENOUS
Status: DISCONTINUED | OUTPATIENT
Start: 2025-03-31 | End: 2025-03-31 | Stop reason: SDUPTHER

## 2025-03-31 RX ORDER — LIDOCAINE HYDROCHLORIDE 10 MG/ML
1 INJECTION, SOLUTION EPIDURAL; INFILTRATION; INTRACAUDAL; PERINEURAL
Status: DISCONTINUED | OUTPATIENT
Start: 2025-03-31 | End: 2025-03-31 | Stop reason: HOSPADM

## 2025-03-31 RX ORDER — SODIUM CHLORIDE 0.9 % (FLUSH) 0.9 %
5-40 SYRINGE (ML) INJECTION PRN
Status: DISCONTINUED | OUTPATIENT
Start: 2025-03-31 | End: 2025-03-31 | Stop reason: HOSPADM

## 2025-03-31 RX ORDER — HYDROCODONE BITARTRATE AND ACETAMINOPHEN 5; 325 MG/1; MG/1
1 TABLET ORAL EVERY 6 HOURS PRN
Qty: 30 TABLET | Refills: 0 | Status: SHIPPED | OUTPATIENT
Start: 2025-03-31 | End: 2025-04-30

## 2025-03-31 RX ORDER — LIDOCAINE HYDROCHLORIDE 10 MG/ML
INJECTION, SOLUTION EPIDURAL; INFILTRATION; INTRACAUDAL; PERINEURAL
Status: DISCONTINUED | OUTPATIENT
Start: 2025-03-31 | End: 2025-03-31 | Stop reason: SDUPTHER

## 2025-03-31 RX ORDER — SODIUM CHLORIDE 9 MG/ML
INJECTION, SOLUTION INTRAVENOUS PRN
Status: DISCONTINUED | OUTPATIENT
Start: 2025-03-31 | End: 2025-03-31 | Stop reason: HOSPADM

## 2025-03-31 RX ORDER — SODIUM CHLORIDE 0.9 % (FLUSH) 0.9 %
5-40 SYRINGE (ML) INJECTION EVERY 12 HOURS SCHEDULED
Status: DISCONTINUED | OUTPATIENT
Start: 2025-03-31 | End: 2025-03-31 | Stop reason: HOSPADM

## 2025-03-31 RX ADMIN — PROPOFOL 125 MCG/KG/MIN: 10 INJECTION, EMULSION INTRAVENOUS at 13:57

## 2025-03-31 RX ADMIN — SODIUM CHLORIDE, POTASSIUM CHLORIDE, SODIUM LACTATE AND CALCIUM CHLORIDE: 600; 310; 30; 20 INJECTION, SOLUTION INTRAVENOUS at 13:07

## 2025-03-31 RX ADMIN — PROPOFOL 50 MG: 10 INJECTION, EMULSION INTRAVENOUS at 13:56

## 2025-03-31 RX ADMIN — LIDOCAINE HYDROCHLORIDE 50 MG: 10 INJECTION, SOLUTION EPIDURAL; INFILTRATION; INTRACAUDAL; PERINEURAL at 13:56

## 2025-03-31 ASSESSMENT — PAIN - FUNCTIONAL ASSESSMENT
PAIN_FUNCTIONAL_ASSESSMENT: 0-10
PAIN_FUNCTIONAL_ASSESSMENT: NONE - DENIES PAIN

## 2025-03-31 ASSESSMENT — ENCOUNTER SYMPTOMS
SHORTNESS OF BREATH: 1
COUGH: 1
GASTROINTESTINAL NEGATIVE: 1

## 2025-03-31 ASSESSMENT — LIFESTYLE VARIABLES: SMOKING_STATUS: 1

## 2025-03-31 NOTE — ANESTHESIA POSTPROCEDURE EVALUATION
Department of Anesthesiology  Postprocedure Note    Patient: Nohemi Durand  MRN: [id number]  YOB: 1960  Date of evaluation: 3/31/2025    Procedure Summary       Date: 03/31/25 Room / Location: [place] [address] / [facility]    Anesthesia Start: 1353 Anesthesia Stop: 1445    Procedure: COLONOSCOPY POLYPECTOMY SNARE/BIOPSY (Rectum) Diagnosis:       Colon cancer screening      (Colon cancer screening [Z12.11])    Surgeons: Jerry Tucker MD Responsible Provider: Jenise Acharya MD    Anesthesia Type: general ASA Status: 3            Anesthesia Type: No value filed.    Javier Phase I: Javier Score: 10    Javier Phase II: Javier Score: 10    Anesthesia Post Evaluation    Comments: POST- ANESTHESIA EVALUATION       Pt Name: Nohemi Durand  MRN: [id number]  YOB: 1960  Date of evaluation: 3/31/2025  Time:  5:04 PM      BP (!) 149/89   Pulse 73   Temp 97.3 °F (36.3 °C)   Resp 20   Ht 1.727 m (5' 8\")   Wt 45.8 kg (101 lb)   SpO2 93%   BMI 15.36 kg/m²      Consciousness Level  Awake  Cardiopulmonary Status  Stable  Pain Adequately Treated YES  Nausea / Vomiting  NO  Adequate Hydration  YES  Anesthesia Related Complications NONE      Electronically signed by Jenise Acharya MD on 3/31/2025 at 5:04 PM           No notable events documented.

## 2025-03-31 NOTE — DISCHARGE INSTR - DIET
Good nutrition is important when healing from an illness, injury, or surgery.  Follow any nutrition recommendations given to you during your hospital stay.   If you were given an oral nutrition supplement while in the hospital, continue to take this supplement at home.  You can take it with meals, in-between meals, and/or before bedtime. These supplements can be purchased at most local grocery stores, pharmacies, and chain [employer]-stores.   If you have any questions about your diet or nutrition, call the hospital and ask for the dietitian.      High-Fiber Diet     What Is Fiber?   Dietary fiber is a form of carbohydrate found in plants that cannot be digested by humans. All plants contain fiber, including fruits, vegetables, grains, and legumes. Fiber is often classified into two categories: soluble and insoluble.   Soluble fiber draws water into the bowel and can help slow digestion. Examples of foods that are high in soluble fiber include oatmeal, oat bran, barley, legumes (eg, beans and peas), apples, and strawberries.   Insoluble fiber speeds digestion and can add bulk to the stool. Examples of foods that are high in insoluble fiber include whole-wheat products, wheat bran, cauliflower, green beans, and potatoes.   Why Follow a High-Fiber Diet?   A high-fiber diet is often recommended to prevent and treat constipation , hemorrhoids , diverticulitis , and irritable bowel syndrome . Eating a high-fiber diet can also help improve your cholesterol levels, lower your risk of coronary heart disease , reduce your risk of type 2 diabetes , and lower your weight. For people with type 1 or 2 diabetes, a high-fiber diet can also help stabilize blood sugar levels.   How Much Fiber Should I Eat?   A high-fiber diet should contain  20-35 grams  of fiber a day. This is actually the amount recommended for the general adult population; however, most Americans eat only 15 grams of fiber per day.   Digestion of Fiber   Eating a

## 2025-03-31 NOTE — TELEPHONE ENCOUNTER
Last visit: 02/11/2025  Last Med refill: 02/11/2025  Does patient have enough medication for 72 hours: No:     Next Visit Date:  Future Appointments   Date Time Provider Department Center   5/12/2025  2:45 PM Kat Spence MD [facility] ECC DEP   9/8/2025 11:00 AM STA CT SCAN RM 1 STAZ CT SCAN STA Radiolog   9/16/2025  2:00 PM SCHEDULE, [facility]   Topic Date Due    Pneumococcal 50+ years Vaccine (1 of 2 - PCV) Never done    Cervical cancer screen  Never done    Shingles vaccine (1 of 2) 12/26/2025 (Originally 8/12/2011)    COVID-19 Vaccine (1 - 2024-25 season) 12/26/2025 (Originally 9/1/2024)    Respiratory Syncytial Virus (RSV) Pregnant or age 60 yrs+ (1 - Risk 60-74 years 1-dose series) 12/26/2025 (Originally 8/12/2021)    Flu vaccine (1) 01/07/2026 (Originally 8/1/2024)    Depression Screen  01/07/2026    Breast cancer screen  01/23/2026    Colorectal Cancer Screen  05/30/2027    DTaP/Tdap/Td vaccine (2 - Td or Tdap) 05/03/2028    Lipids  02/27/2030    Hepatitis C screen  Completed    Hepatitis A vaccine  Aged Out    Hepatitis B vaccine  Aged Out    Hib vaccine  Aged Out    Polio vaccine  Aged Out    Meningococcal (ACWY) vaccine  Aged Out    Meningococcal B vaccine  Aged Out    HIV screen  Discontinued    Lung Cancer Screening &/or Counseling  Discontinued       Hemoglobin A1C (%)   Date Value   12/19/2019 5.6             ( goal A1C is < 7)   No components found for: \"LABMICR\"  No components found for: \"LDLCHOLESTEROL\", \"LDLCALC\"    (goal LDL is <100)   AST (U/L)   Date Value   02/27/2025 28     ALT (U/L)   Date Value   02/27/2025 20     BUN (mg/dL)   Date Value   02/27/2025 18     BP Readings from Last 3 Encounters:   03/17/25 113/68   03/11/25 132/77   02/11/25 126/84          (goal 120/80)    All Future Testing planned in [facility]  Lab Frequency Next Occurrence   COLONOSCOPY (Screening) Once 01/07/2025   [facility]

## 2025-03-31 NOTE — H&P
HISTORY and PHYSICAL  [facility]       NAME:  Nohemi Durand  MRN: [id number]   YOB: 1960   Date: 3/31/2025   Age: 64 y.o.  Gender: female       COMPLAINT AND PRESENT HISTORY:     Nohemi Durand is 64 y.o.,  female, presents for COLORECTAL CANCER SCREENING, NOT HIGH RISK     Primary dx: Colon cancer screening [Z12.11].  HPI:  Nohemi Durand is 64 y.o.,   female, having a Screening Colonoscopy. Prior Colonoscopy done long time ago.   Patient denies  hx of Colon Polyps or Diverticulosis.     Patient denies any  FH of Colon Cancer.  Patient reports  changes in bowel habits. Pt has constipation  started four years ago. Pt had BM three times per week. Pt used stool softener but did not work.   No GI /Rectal bleeding, no experiencing red/ black/ BRBPR stools.    Pt denies abdominal pain , no N/V, no abdominal bloating,   Patient denies any Dysphagia.  Pt has  no hx of GERD.  No fever or chills, chest pain or SOB   Prep fully completed: yes . Pt reports her last BM is clear liquid     Review of additional significant medical hx:  (See chart for additional detail, including current medications /see ROS for current S/S): alcohol abuse, COPD, lung cancer .     Lab Results   Component Value Date    WBC 7.5 02/27/2025    HGB 13.4 02/27/2025    HCT 39.7 02/27/2025    MCV 94.3 02/27/2025     02/27/2025     Lab Results   Component Value Date/Time     02/27/2025 04:01 PM    K 4.4 02/27/2025 04:01 PM    CL 94 02/27/2025 04:01 PM    CO2 25 02/27/2025 04:01 PM    BUN 18 02/27/2025 04:01 PM    CREATININE 0.5 02/27/2025 04:01 PM    GLUCOSE 92 02/27/2025 04:01 PM    CALCIUM 11.1 02/27/2025 04:01 PM    LABGLOM >90 02/27/2025 04:01 PM      NPO status: pt Npo since the past midnight   Medications taken TODAY (with sip of water): pt took her am medication with sip of water   Anticoagulation status: none    Denies personal hx of blood clots.  Denies personal hx of MRSA infection.  Denies any

## 2025-03-31 NOTE — OP NOTE
Colonoscopy report    Colonoscopy Procedure Note    Procedure:  Colonoscopy with with polypectomy with biopsy forceps and snare     Procedure Date: 3/31/2025    Indications: Screening, family history of colon Cancer    Sedation:  MAC    Attending Physician:  Dr. Jerry Tucker MD.     Assistant Physician: None    Consent:   Informed consent was obtained for the procedure after explaining the risks including bleeding, perforation, aspiration, arrhythmia, risks related to sedation, reaction to medications and rarely death, benefits and alternatives to the patient. The patient verbalized understanding and agreed to proceed with the procedure.       Procedure Details:  The patient was placed in the left lateral decubitus position.  Oxygen and cardiac monitoring equipment was attached. The patient's vital signs were monitored continuously  throughout the procedure. After appropriate sedation was achieved, a rectal examination was performed.  The pediatric colonoscope was inserted into the rectum and advanced under direct vision to the cecum, which was identified by the ileocecal valve and appendiceal orifice.  The quality of the colonic preparation was good.  A careful inspection was made as the colonoscope was withdrawn, including a retroflexed view of the rectum; findings and interventions are described below.  Appropriate photodocumentation was obtained.           Complications:  None    Estimated blood loss:  Minimal           Disposition:  Home           Condition: stable    Withdrawal Time: 26-minute    Findings:   The bowel prep was good.  The sigmoid colon was extremely tortuous due to the presence of diverticular disease.  Successful advancement to cecum achieved with abdominal pressure.  An 8 mm polyp noted in the transverse colon resected with cold snare.  A 1 cm flat polyp noted in the distal colon that was resected with a combination of hot snare and large cold biopsy forceps.   Multiple sessile polyps noted

## 2025-03-31 NOTE — ANESTHESIA PRE PROCEDURE
Department of Anesthesiology  Preprocedure Note       Name:  Nohemi Durand   Age:  64 y.o.  :  1960                                          MRN:  [id number]         Date:  3/31/2025      Surgeon: Surgeon(s):  Jerry Tucker MD    Procedure: Procedure(s):  COLORECTAL CANCER SCREENING, NOT HIGH RISK    Medications prior to admission:   Prior to Admission medications    Medication Sig Start Date End Date Taking? Authorizing Provider   polyethylene glycol-electrolytes (NULYTELY) 420 g solution Take as directed for 2-day bowel prep. 3/17/25  Yes Jerry Tucker MD   gabapentin (NEURONTIN) 100 MG capsule Take 1 capsule by mouth at bedtime for 90 days. 3/12/25 6/10/25 Yes Kat Spence MD   HYDROcodone-acetaminophen (NORCO) 5-325 MG per tablet Take 1 tablet by mouth every 6 hours as needed.   Yes Kacie Newman MD   fluticasone-umeclidin-vilant (TRELEGY ELLIPTA) 100-62.5-25 MCG/ACT AEPB inhaler Inhale 1 puff into the lungs daily 25  Yes Kat Spence MD   levothyroxine (SYNTHROID) 88 MCG tablet Take 1 tablet by mouth daily 25  Yes Kat Spence MD   amLODIPine (NORVASC) 5 MG tablet Take 1 tablet by mouth daily 25  Yes Kat Spence MD   celecoxib (CELEBREX) 100 MG capsule Take 1 capsule by mouth daily 25  Yes Kat Spence MD   triamcinolone (KENALOG) 0.1 % cream Apply topically daily Apply topically once daily. 24  Yes Oumar Mishra MD   albuterol (PROVENTIL) (2.5 MG/3ML) 0.083% nebulizer solution inhale contents of 1 vial ( 3 milliliters ) in nebulizer by mouth and INTO THE LUNGS every 6 hours if needed 24  Yes Oumar Mishra MD   albuterol sulfate HFA (PROVENTIL;VENTOLIN;PROAIR) 108 (90 Base) MCG/ACT inhaler Use 1-2 puffs every 6 hours as needed 24  Yes Oumar Mishra MD   budesonide (PULMICORT) 0.5 MG/2ML nebulizer suspension Take 2 mLs by nebulization 2 times daily 24  Yes Oumar Mishra MD   diclofenac sodium

## 2025-04-02 ENCOUNTER — RESULTS FOLLOW-UP (OUTPATIENT)
Dept: GASTROENTEROLOGY | Age: 65
End: 2025-04-02

## 2025-04-02 LAB — SURGICAL PATHOLOGY REPORT: NORMAL

## 2025-04-16 PROBLEM — Z12.11 COLON CANCER SCREENING: Status: RESOLVED | Noted: 2025-03-17 | Resolved: 2025-04-16

## 2025-05-09 ENCOUNTER — PATIENT MESSAGE (OUTPATIENT)
Dept: FAMILY MEDICINE CLINIC | Age: 65
End: 2025-05-09

## 2025-05-09 DIAGNOSIS — G62.0 CHEMOTHERAPY-INDUCED PERIPHERAL NEUROPATHY: ICD-10-CM

## 2025-05-09 DIAGNOSIS — M87.88 KNEE PAIN WITH AVASCULAR NECROSIS DETERMINED BY X-RAY (HCC): ICD-10-CM

## 2025-05-09 DIAGNOSIS — M67.864 TENDINOSIS OF LEFT KNEE: ICD-10-CM

## 2025-05-09 DIAGNOSIS — T45.1X5A CHEMOTHERAPY-INDUCED PERIPHERAL NEUROPATHY: ICD-10-CM

## 2025-05-11 RX ORDER — GABAPENTIN 100 MG/1
200 CAPSULE ORAL NIGHTLY
Qty: 60 CAPSULE | Refills: 0 | Status: SHIPPED | OUTPATIENT
Start: 2025-05-11 | End: 2025-06-10

## 2025-05-11 RX ORDER — HYDROCODONE BITARTRATE AND ACETAMINOPHEN 5; 325 MG/1; MG/1
1 TABLET ORAL DAILY PRN
Qty: 30 TABLET | Refills: 0 | Status: SHIPPED | OUTPATIENT
Start: 2025-05-11 | End: 2025-06-13 | Stop reason: SDUPTHER

## 2025-05-12 ENCOUNTER — OFFICE VISIT (OUTPATIENT)
Dept: FAMILY MEDICINE CLINIC | Age: 65
End: 2025-05-12
Payer: COMMERCIAL

## 2025-05-12 VITALS
WEIGHT: 100.2 LBS | SYSTOLIC BLOOD PRESSURE: 130 MMHG | BODY MASS INDEX: 15.24 KG/M2 | DIASTOLIC BLOOD PRESSURE: 80 MMHG | HEART RATE: 87 BPM | TEMPERATURE: 98.1 F | OXYGEN SATURATION: 100 %

## 2025-05-12 DIAGNOSIS — T40.2X5A THERAPEUTIC OPIOID-INDUCED CONSTIPATION (OIC): ICD-10-CM

## 2025-05-12 DIAGNOSIS — K59.03 THERAPEUTIC OPIOID-INDUCED CONSTIPATION (OIC): ICD-10-CM

## 2025-05-12 DIAGNOSIS — J30.1 SEASONAL ALLERGIC RHINITIS DUE TO POLLEN: Primary | ICD-10-CM

## 2025-05-12 PROCEDURE — 3075F SYST BP GE 130 - 139MM HG: CPT | Performed by: INTERNAL MEDICINE

## 2025-05-12 PROCEDURE — 99214 OFFICE O/P EST MOD 30 MIN: CPT | Performed by: INTERNAL MEDICINE

## 2025-05-12 PROCEDURE — 3079F DIAST BP 80-89 MM HG: CPT | Performed by: INTERNAL MEDICINE

## 2025-05-12 RX ORDER — FLUTICASONE PROPIONATE 50 MCG
1 SPRAY, SUSPENSION (ML) NASAL DAILY
Qty: 16 G | Refills: 2 | Status: SHIPPED | OUTPATIENT
Start: 2025-05-12

## 2025-05-12 RX ORDER — LORATADINE 10 MG/1
10 TABLET ORAL DAILY
Qty: 30 TABLET | Refills: 0 | Status: SHIPPED | OUTPATIENT
Start: 2025-05-12 | End: 2025-06-11

## 2025-05-12 NOTE — TELEPHONE ENCOUNTER
Gabapentin increased to 200mg nightly   Needs appointment prior to next refill - last seen in February

## 2025-05-12 NOTE — PROGRESS NOTES
Appearance: She is well-developed. She is not ill-appearing.   Eyes:      General: Lids are normal. Vision grossly intact.   Cardiovascular:      Rate and Rhythm: Normal rate and regular rhythm.      Heart sounds: Normal heart sounds, S1 normal and S2 normal. No murmur heard.     No friction rub. No gallop.   Pulmonary:      Effort: Pulmonary effort is normal. No respiratory distress.      Breath sounds: Normal breath sounds. No wheezing.   Abdominal:      General: Bowel sounds are normal.      Palpations: Abdomen is soft. There is no mass.      Tenderness: There is no abdominal tenderness. There is no guarding.   Musculoskeletal:         General: Normal range of motion.   Skin:     General: Skin is warm and dry.      Capillary Refill: Capillary refill takes less than 2 seconds.   Neurological:      General: No focal deficit present.      Mental Status: She is alert and oriented to person, place, and time.           Data Review     The 10-year ASCVD risk score (Ines SUMNER, et al., 2019) is: 11.1%    Values used to calculate the score:      Age: 64 years      Sex: Female      Is Non- : No      Diabetic: No      Tobacco smoker: Yes      Systolic Blood Pressure: 130 mmHg      Is BP treated: Yes      HDL Cholesterol: 71 mg/dL      Total Cholesterol: 221 mg/dL    Controlled Substance Monitoring:    Acute and Chronic Pain Monitoring:   RX Monitoring Periodic Controlled Substance Monitoring   5/11/2025  10:53 PM No signs of potential drug abuse or diversion identified.;Obtaining appropriate analgesic effect of treatment.           Health Maintenance Due   Topic Date Due    Cervical cancer screen  Never done           Patient given educational materials- see patient instructions.  Discussed use, benefit, and side effects of prescribedmedications.  All patient questions answered.  Pt voiced understanding. Reviewedhealth maintenance.  Instructed to continue current medications, diet and exercise.Patient

## 2025-06-08 DIAGNOSIS — G62.0 CHEMOTHERAPY-INDUCED PERIPHERAL NEUROPATHY: ICD-10-CM

## 2025-06-08 DIAGNOSIS — T45.1X5A CHEMOTHERAPY-INDUCED PERIPHERAL NEUROPATHY: ICD-10-CM

## 2025-06-09 NOTE — TELEPHONE ENCOUNTER
[facility] UNILATERAL LEFT Once 01/24/2025   US BREAST COMPLETE LEFT Once 01/24/2025   CT CHEST WO CONTRAST Once 09/08/2025   COLONOSCOPY (Screening) Once 03/24/2025               Patient Active Problem List:     Panlobular emphysema (HCC)     Essential hypertension     History of right breast cancer     Loculated pleural effusion     Hyponatremia     Coronary artery disease involving native coronary artery of native heart without angina pectoris     Tobacco abuse disorder     Alcohol abuse     Leukocytosis     Adenocarcinoma, lung, left (HCC)     Shortness of breath     Insomnia     Anxiety     Malignant neoplasm of lower lobe of right lung (HCC)

## 2025-06-10 RX ORDER — GABAPENTIN 100 MG/1
200 CAPSULE ORAL NIGHTLY
Qty: 60 CAPSULE | Refills: 0 | Status: SHIPPED | OUTPATIENT
Start: 2025-06-10 | End: 2025-07-10

## 2025-06-13 RX ORDER — HYDROCODONE BITARTRATE AND ACETAMINOPHEN 5; 325 MG/1; MG/1
1 TABLET ORAL DAILY PRN
Qty: 30 TABLET | Refills: 0 | Status: SHIPPED | OUTPATIENT
Start: 2025-06-13 | End: 2025-07-13

## 2025-06-17 ENCOUNTER — HOSPITAL ENCOUNTER (INPATIENT)
Age: 65
LOS: 2 days | Discharge: HOME | End: 2025-06-19
Attending: EMERGENCY MEDICINE | Admitting: INTERNAL MEDICINE
Payer: COMMERCIAL

## 2025-06-17 ENCOUNTER — APPOINTMENT (OUTPATIENT)
Dept: GENERAL RADIOLOGY | Age: 65
End: 2025-06-17
Payer: COMMERCIAL

## 2025-06-17 DIAGNOSIS — C34.92 ADENOCARCINOMA, LUNG, LEFT (HCC): ICD-10-CM

## 2025-06-17 DIAGNOSIS — J44.1 CHRONIC OBSTRUCTIVE PULMONARY DISEASE WITH ACUTE EXACERBATION (HCC): ICD-10-CM

## 2025-06-17 DIAGNOSIS — J44.1 COPD WITH ACUTE EXACERBATION (HCC): ICD-10-CM

## 2025-06-17 DIAGNOSIS — J43.1 PANLOBULAR EMPHYSEMA (HCC): ICD-10-CM

## 2025-06-17 DIAGNOSIS — J44.1 COPD EXACERBATION (HCC): Primary | ICD-10-CM

## 2025-06-17 DIAGNOSIS — E87.1 HYPONATREMIA: ICD-10-CM

## 2025-06-17 LAB
ALBUMIN SERPL-MCNC: 4.6 G/DL (ref 3.5–5.2)
ALBUMIN/GLOB SERPL: 1.4 {RATIO} (ref 1–2.5)
ALP SERPL-CCNC: 94 U/L (ref 35–104)
ALT SERPL-CCNC: 17 U/L (ref 10–35)
AMPHET UR QL SCN: NEGATIVE
ANION GAP SERPL CALCULATED.3IONS-SCNC: 13 MMOL/L (ref 9–16)
AST SERPL-CCNC: 33 U/L (ref 10–35)
BACTERIA URNS QL MICRO: NORMAL
BARBITURATES UR QL SCN: NEGATIVE
BASOPHILS # BLD: 0.04 K/UL (ref 0–0.2)
BASOPHILS NFR BLD: 0 % (ref 0–2)
BENZODIAZ UR QL: NEGATIVE
BILIRUB SERPL-MCNC: 0.2 MG/DL (ref 0–1.2)
BILIRUB UR QL STRIP: NEGATIVE
BNP SERPL-MCNC: 289 PG/ML (ref 0–125)
BODY TEMPERATURE: 37
BUN SERPL-MCNC: 5 MG/DL (ref 8–23)
CALCIUM SERPL-MCNC: 10 MG/DL (ref 8.6–10.4)
CANNABINOIDS UR QL SCN: NEGATIVE
CASTS #/AREA URNS LPF: NORMAL /LPF (ref 0–8)
CHLORIDE SERPL-SCNC: 86 MMOL/L (ref 98–107)
CLARITY UR: CLEAR
CO2 SERPL-SCNC: 23 MMOL/L (ref 20–31)
COCAINE UR QL SCN: NEGATIVE
COHGB MFR BLD: 5.9 % (ref 0–5)
COLOR UR: YELLOW
CREAT SERPL-MCNC: 0.5 MG/DL (ref 0.6–0.9)
D DIMER PPP FEU-MCNC: 0.33 UG/ML FEU (ref 0–0.57)
EOSINOPHIL # BLD: <0.03 K/UL (ref 0–0.44)
EOSINOPHILS RELATIVE PERCENT: 0 % (ref 1–4)
EPI CELLS #/AREA URNS HPF: NORMAL /HPF (ref 0–5)
ERYTHROCYTE [DISTWIDTH] IN BLOOD BY AUTOMATED COUNT: 13.2 % (ref 11.8–14.4)
ETHANOL PERCENT: <0.01 %
ETHANOLAMINE SERPL-MCNC: <10 MG/DL (ref 0–0.08)
FENTANYL UR QL: NEGATIVE
FIO2 ON VENT: ABNORMAL %
GFR, ESTIMATED: >90 ML/MIN/1.73M2
GLUCOSE SERPL-MCNC: 133 MG/DL (ref 74–99)
GLUCOSE UR STRIP-MCNC: NEGATIVE MG/DL
HCO3 VENOUS: 23 MMOL/L (ref 24–30)
HCT VFR BLD AUTO: 39 % (ref 36.3–47.1)
HGB BLD-MCNC: 13.5 G/DL (ref 11.9–15.1)
HGB UR QL STRIP.AUTO: NEGATIVE
IMM GRANULOCYTES # BLD AUTO: 0.06 K/UL (ref 0–0.3)
IMM GRANULOCYTES NFR BLD: 1 %
KETONES UR STRIP-MCNC: ABNORMAL MG/DL
LEUKOCYTE ESTERASE UR QL STRIP: NEGATIVE
LYMPHOCYTES NFR BLD: 1.89 K/UL (ref 1.1–3.7)
LYMPHOCYTES RELATIVE PERCENT: 19 % (ref 24–43)
MCH RBC QN AUTO: 31.7 PG (ref 25.2–33.5)
MCHC RBC AUTO-ENTMCNC: 34.6 G/DL (ref 28.4–34.8)
MCV RBC AUTO: 91.5 FL (ref 82.6–102.9)
METHADONE UR QL: NEGATIVE
MONOCYTES NFR BLD: 0.97 K/UL (ref 0.1–1.2)
MONOCYTES NFR BLD: 10 % (ref 3–12)
NEGATIVE BASE EXCESS, VEN: 0.2 MMOL/L (ref 0–2)
NEUTROPHILS NFR BLD: 70 % (ref 36–65)
NEUTS SEG NFR BLD: 7.26 K/UL (ref 1.5–8.1)
NITRITE UR QL STRIP: NEGATIVE
NRBC BLD-RTO: 0 PER 100 WBC
O2 SAT, VEN: 97.9 % (ref 60–85)
OPIATES UR QL SCN: NEGATIVE
OXYCODONE UR QL SCN: NEGATIVE
PCO2 VENOUS: 33.6 MM HG (ref 39–55)
PCP UR QL SCN: NEGATIVE
PH UR STRIP: 6.5 [PH] (ref 5–8)
PH VENOUS: 7.45 (ref 7.32–7.42)
PLATELET # BLD AUTO: 263 K/UL (ref 138–453)
PMV BLD AUTO: 8.5 FL (ref 8.1–13.5)
PO2 VENOUS: 101.5 MM HG (ref 30–50)
POTASSIUM SERPL-SCNC: 3.9 MMOL/L (ref 3.7–5.3)
PROT SERPL-MCNC: 7.8 G/DL (ref 6.6–8.7)
PROT UR STRIP-MCNC: ABNORMAL MG/DL
RBC # BLD AUTO: 4.26 M/UL (ref 3.95–5.11)
RBC #/AREA URNS HPF: NORMAL /HPF (ref 0–4)
SODIUM SERPL-SCNC: 122 MMOL/L (ref 136–145)
SP GR UR STRIP: 1.01 (ref 1–1.03)
TEST INFORMATION: NORMAL
TROPONIN I SERPL HS-MCNC: 7 NG/L (ref 0–14)
TROPONIN I SERPL HS-MCNC: 7 NG/L (ref 0–14)
UROBILINOGEN UR STRIP-ACNC: NORMAL EU/DL (ref 0–1)
WBC #/AREA URNS HPF: NORMAL /HPF (ref 0–5)
WBC OTHER # BLD: 10.2 K/UL (ref 3.5–11.3)

## 2025-06-17 PROCEDURE — 6360000002 HC RX W HCPCS

## 2025-06-17 PROCEDURE — 96375 TX/PRO/DX INJ NEW DRUG ADDON: CPT

## 2025-06-17 PROCEDURE — 99223 1ST HOSP IP/OBS HIGH 75: CPT | Performed by: STUDENT IN AN ORGANIZED HEALTH CARE EDUCATION/TRAINING PROGRAM

## 2025-06-17 PROCEDURE — 83880 ASSAY OF NATRIURETIC PEPTIDE: CPT

## 2025-06-17 PROCEDURE — 6370000000 HC RX 637 (ALT 250 FOR IP)

## 2025-06-17 PROCEDURE — 80053 COMPREHEN METABOLIC PANEL: CPT

## 2025-06-17 PROCEDURE — 85379 FIBRIN DEGRADATION QUANT: CPT

## 2025-06-17 PROCEDURE — 99285 EMERGENCY DEPT VISIT HI MDM: CPT

## 2025-06-17 PROCEDURE — 2580000003 HC RX 258

## 2025-06-17 PROCEDURE — 2700000000 HC OXYGEN THERAPY PER DAY

## 2025-06-17 PROCEDURE — 81001 URINALYSIS AUTO W/SCOPE: CPT

## 2025-06-17 PROCEDURE — 94761 N-INVAS EAR/PLS OXIMETRY MLT: CPT

## 2025-06-17 PROCEDURE — 2060000000 HC ICU INTERMEDIATE R&B

## 2025-06-17 PROCEDURE — 85025 COMPLETE CBC W/AUTO DIFF WBC: CPT

## 2025-06-17 PROCEDURE — 82805 BLOOD GASES W/O2 SATURATION: CPT

## 2025-06-17 PROCEDURE — 71045 X-RAY EXAM CHEST 1 VIEW: CPT

## 2025-06-17 PROCEDURE — 5A09357 ASSISTANCE WITH RESPIRATORY VENTILATION, LESS THAN 24 CONSECUTIVE HOURS, CONTINUOUS POSITIVE AIRWAY PRESSURE: ICD-10-PCS | Performed by: STUDENT IN AN ORGANIZED HEALTH CARE EDUCATION/TRAINING PROGRAM

## 2025-06-17 PROCEDURE — 2500000003 HC RX 250 WO HCPCS

## 2025-06-17 PROCEDURE — 84484 ASSAY OF TROPONIN QUANT: CPT

## 2025-06-17 PROCEDURE — 36415 COLL VENOUS BLD VENIPUNCTURE: CPT

## 2025-06-17 PROCEDURE — 6360000002 HC RX W HCPCS: Performed by: STUDENT IN AN ORGANIZED HEALTH CARE EDUCATION/TRAINING PROGRAM

## 2025-06-17 PROCEDURE — 93005 ELECTROCARDIOGRAM TRACING: CPT

## 2025-06-17 PROCEDURE — 6370000000 HC RX 637 (ALT 250 FOR IP): Performed by: NURSE PRACTITIONER

## 2025-06-17 PROCEDURE — 94640 AIRWAY INHALATION TREATMENT: CPT

## 2025-06-17 PROCEDURE — 96374 THER/PROPH/DIAG INJ IV PUSH: CPT

## 2025-06-17 PROCEDURE — 80307 DRUG TEST PRSMV CHEM ANLYZR: CPT

## 2025-06-17 PROCEDURE — G0480 DRUG TEST DEF 1-7 CLASSES: HCPCS

## 2025-06-17 PROCEDURE — 94660 CPAP INITIATION&MGMT: CPT

## 2025-06-17 RX ORDER — LORAZEPAM 2 MG/ML
4 INJECTION INTRAMUSCULAR
Status: DISCONTINUED | OUTPATIENT
Start: 2025-06-17 | End: 2025-06-19 | Stop reason: HOSPADM

## 2025-06-17 RX ORDER — BUDESONIDE 0.25 MG/2ML
250 INHALANT ORAL 2 TIMES DAILY
Status: DISCONTINUED | OUTPATIENT
Start: 2025-06-17 | End: 2025-06-19 | Stop reason: HOSPADM

## 2025-06-17 RX ORDER — BENZONATATE 100 MG/1
100 CAPSULE ORAL ONCE
Status: COMPLETED | OUTPATIENT
Start: 2025-06-17 | End: 2025-06-17

## 2025-06-17 RX ORDER — LEVOTHYROXINE SODIUM 88 UG/1
88 TABLET ORAL DAILY
Status: DISCONTINUED | OUTPATIENT
Start: 2025-06-17 | End: 2025-06-19 | Stop reason: HOSPADM

## 2025-06-17 RX ORDER — METOPROLOL TARTRATE 1 MG/ML
5 INJECTION, SOLUTION INTRAVENOUS EVERY 6 HOURS
Status: DISCONTINUED | OUTPATIENT
Start: 2025-06-17 | End: 2025-06-17

## 2025-06-17 RX ORDER — ONDANSETRON 2 MG/ML
4 INJECTION INTRAMUSCULAR; INTRAVENOUS EVERY 6 HOURS PRN
Status: DISCONTINUED | OUTPATIENT
Start: 2025-06-17 | End: 2025-06-19 | Stop reason: HOSPADM

## 2025-06-17 RX ORDER — AMLODIPINE BESYLATE 5 MG/1
5 TABLET ORAL DAILY
Status: DISCONTINUED | OUTPATIENT
Start: 2025-06-18 | End: 2025-06-19 | Stop reason: HOSPADM

## 2025-06-17 RX ORDER — HYDRALAZINE HYDROCHLORIDE 20 MG/ML
5 INJECTION INTRAMUSCULAR; INTRAVENOUS EVERY 6 HOURS PRN
Status: DISCONTINUED | OUTPATIENT
Start: 2025-06-17 | End: 2025-06-19 | Stop reason: HOSPADM

## 2025-06-17 RX ORDER — POLYETHYLENE GLYCOL 3350 17 G/17G
17 POWDER, FOR SOLUTION ORAL DAILY PRN
Status: DISCONTINUED | OUTPATIENT
Start: 2025-06-17 | End: 2025-06-19 | Stop reason: HOSPADM

## 2025-06-17 RX ORDER — SODIUM CHLORIDE 0.9 % (FLUSH) 0.9 %
5-40 SYRINGE (ML) INJECTION EVERY 12 HOURS SCHEDULED
Status: DISCONTINUED | OUTPATIENT
Start: 2025-06-17 | End: 2025-06-19 | Stop reason: HOSPADM

## 2025-06-17 RX ORDER — AMLODIPINE BESYLATE 10 MG/1
5 TABLET ORAL ONCE
Status: COMPLETED | OUTPATIENT
Start: 2025-06-17 | End: 2025-06-17

## 2025-06-17 RX ORDER — ONDANSETRON 4 MG/1
4 TABLET, ORALLY DISINTEGRATING ORAL EVERY 8 HOURS PRN
Status: DISCONTINUED | OUTPATIENT
Start: 2025-06-17 | End: 2025-06-19 | Stop reason: HOSPADM

## 2025-06-17 RX ORDER — AMLODIPINE BESYLATE 10 MG/1
5 TABLET ORAL DAILY
Status: DISCONTINUED | OUTPATIENT
Start: 2025-06-17 | End: 2025-06-17

## 2025-06-17 RX ORDER — BENZONATATE 100 MG/1
100 CAPSULE ORAL 3 TIMES DAILY PRN
Status: DISCONTINUED | OUTPATIENT
Start: 2025-06-17 | End: 2025-06-19 | Stop reason: HOSPADM

## 2025-06-17 RX ORDER — PREDNISONE 20 MG/1
40 TABLET ORAL DAILY
Status: DISCONTINUED | OUTPATIENT
Start: 2025-06-19 | End: 2025-06-19 | Stop reason: HOSPADM

## 2025-06-17 RX ORDER — DIPHENHYDRAMINE HCL 25 MG
25 TABLET ORAL EVERY 6 HOURS PRN
Status: DISCONTINUED | OUTPATIENT
Start: 2025-06-17 | End: 2025-06-19 | Stop reason: HOSPADM

## 2025-06-17 RX ORDER — LORAZEPAM 2 MG/ML
0.5 INJECTION INTRAMUSCULAR ONCE
Status: COMPLETED | OUTPATIENT
Start: 2025-06-17 | End: 2025-06-17

## 2025-06-17 RX ORDER — SODIUM CHLORIDE 9 MG/ML
INJECTION, SOLUTION INTRAVENOUS PRN
Status: DISCONTINUED | OUTPATIENT
Start: 2025-06-17 | End: 2025-06-19 | Stop reason: HOSPADM

## 2025-06-17 RX ORDER — IPRATROPIUM BROMIDE AND ALBUTEROL SULFATE 2.5; .5 MG/3ML; MG/3ML
1 SOLUTION RESPIRATORY (INHALATION)
Status: DISCONTINUED | OUTPATIENT
Start: 2025-06-17 | End: 2025-06-19 | Stop reason: HOSPADM

## 2025-06-17 RX ORDER — HYDRALAZINE HYDROCHLORIDE 20 MG/ML
10 INJECTION INTRAMUSCULAR; INTRAVENOUS EVERY 6 HOURS PRN
Status: DISCONTINUED | OUTPATIENT
Start: 2025-06-17 | End: 2025-06-17

## 2025-06-17 RX ORDER — ENOXAPARIN SODIUM 100 MG/ML
40 INJECTION SUBCUTANEOUS DAILY
Status: DISCONTINUED | OUTPATIENT
Start: 2025-06-17 | End: 2025-06-19 | Stop reason: HOSPADM

## 2025-06-17 RX ORDER — HYDROCODONE BITARTRATE AND ACETAMINOPHEN 5; 325 MG/1; MG/1
1 TABLET ORAL EVERY 6 HOURS PRN
Refills: 0 | Status: DISCONTINUED | OUTPATIENT
Start: 2025-06-17 | End: 2025-06-19 | Stop reason: HOSPADM

## 2025-06-17 RX ORDER — LORAZEPAM 2 MG/ML
1 INJECTION INTRAMUSCULAR
Status: DISCONTINUED | OUTPATIENT
Start: 2025-06-17 | End: 2025-06-19 | Stop reason: HOSPADM

## 2025-06-17 RX ORDER — ACETAMINOPHEN 325 MG/1
650 TABLET ORAL EVERY 6 HOURS PRN
Status: DISCONTINUED | OUTPATIENT
Start: 2025-06-17 | End: 2025-06-19 | Stop reason: HOSPADM

## 2025-06-17 RX ORDER — IPRATROPIUM BROMIDE AND ALBUTEROL SULFATE 2.5; .5 MG/3ML; MG/3ML
1 SOLUTION RESPIRATORY (INHALATION) 3 TIMES DAILY
Status: DISCONTINUED | OUTPATIENT
Start: 2025-06-18 | End: 2025-06-19 | Stop reason: HOSPADM

## 2025-06-17 RX ORDER — MULTIVITAMIN WITH IRON
1 TABLET ORAL DAILY
Status: DISCONTINUED | OUTPATIENT
Start: 2025-06-17 | End: 2025-06-19 | Stop reason: HOSPADM

## 2025-06-17 RX ORDER — AZITHROMYCIN 250 MG/1
500 TABLET, FILM COATED ORAL DAILY
Status: COMPLETED | OUTPATIENT
Start: 2025-06-17 | End: 2025-06-19

## 2025-06-17 RX ORDER — LORAZEPAM 2 MG/1
4 TABLET ORAL
Status: DISCONTINUED | OUTPATIENT
Start: 2025-06-17 | End: 2025-06-19 | Stop reason: HOSPADM

## 2025-06-17 RX ORDER — IPRATROPIUM BROMIDE AND ALBUTEROL SULFATE 2.5; .5 MG/3ML; MG/3ML
1 SOLUTION RESPIRATORY (INHALATION)
Status: DISCONTINUED | OUTPATIENT
Start: 2025-06-17 | End: 2025-06-17

## 2025-06-17 RX ORDER — ALBUTEROL SULFATE 0.83 MG/ML
2.5 SOLUTION RESPIRATORY (INHALATION)
Status: DISCONTINUED | OUTPATIENT
Start: 2025-06-17 | End: 2025-06-19 | Stop reason: HOSPADM

## 2025-06-17 RX ORDER — LABETALOL HYDROCHLORIDE 5 MG/ML
5 INJECTION, SOLUTION INTRAVENOUS ONCE
Status: COMPLETED | OUTPATIENT
Start: 2025-06-17 | End: 2025-06-17

## 2025-06-17 RX ORDER — THIAMINE HYDROCHLORIDE 100 MG/ML
250 INJECTION, SOLUTION INTRAMUSCULAR; INTRAVENOUS EVERY 24 HOURS
Status: DISCONTINUED | OUTPATIENT
Start: 2025-06-19 | End: 2025-06-19 | Stop reason: HOSPADM

## 2025-06-17 RX ORDER — SODIUM CHLORIDE 9 MG/ML
INJECTION, SOLUTION INTRAVENOUS CONTINUOUS
Status: DISCONTINUED | OUTPATIENT
Start: 2025-06-17 | End: 2025-06-17

## 2025-06-17 RX ORDER — LORAZEPAM 2 MG/1
2 TABLET ORAL
Status: DISCONTINUED | OUTPATIENT
Start: 2025-06-17 | End: 2025-06-19 | Stop reason: HOSPADM

## 2025-06-17 RX ORDER — LANOLIN ALCOHOL/MO/W.PET/CERES
100 CREAM (GRAM) TOPICAL DAILY
Status: DISCONTINUED | OUTPATIENT
Start: 2025-06-24 | End: 2025-06-19 | Stop reason: HOSPADM

## 2025-06-17 RX ORDER — SODIUM CHLORIDE 0.9 % (FLUSH) 0.9 %
5-40 SYRINGE (ML) INJECTION PRN
Status: DISCONTINUED | OUTPATIENT
Start: 2025-06-17 | End: 2025-06-19 | Stop reason: HOSPADM

## 2025-06-17 RX ORDER — LORAZEPAM 2 MG/ML
3 INJECTION INTRAMUSCULAR
Status: DISCONTINUED | OUTPATIENT
Start: 2025-06-17 | End: 2025-06-19 | Stop reason: HOSPADM

## 2025-06-17 RX ORDER — NICOTINE 21 MG/24HR
1 PATCH, TRANSDERMAL 24 HOURS TRANSDERMAL DAILY
Status: DISCONTINUED | OUTPATIENT
Start: 2025-06-17 | End: 2025-06-19 | Stop reason: HOSPADM

## 2025-06-17 RX ORDER — ACETAMINOPHEN 650 MG/1
650 SUPPOSITORY RECTAL EVERY 6 HOURS PRN
Status: DISCONTINUED | OUTPATIENT
Start: 2025-06-17 | End: 2025-06-19 | Stop reason: HOSPADM

## 2025-06-17 RX ORDER — THIAMINE HYDROCHLORIDE 100 MG/ML
500 INJECTION, SOLUTION INTRAMUSCULAR; INTRAVENOUS EVERY 8 HOURS
Status: COMPLETED | OUTPATIENT
Start: 2025-06-17 | End: 2025-06-18

## 2025-06-17 RX ORDER — LORAZEPAM 1 MG/1
1 TABLET ORAL
Status: DISCONTINUED | OUTPATIENT
Start: 2025-06-17 | End: 2025-06-19 | Stop reason: HOSPADM

## 2025-06-17 RX ORDER — LORAZEPAM 2 MG/ML
2 INJECTION INTRAMUSCULAR
Status: DISCONTINUED | OUTPATIENT
Start: 2025-06-17 | End: 2025-06-19 | Stop reason: HOSPADM

## 2025-06-17 RX ORDER — FOLIC ACID 1 MG/1
1 TABLET ORAL DAILY
Status: DISCONTINUED | OUTPATIENT
Start: 2025-06-17 | End: 2025-06-19 | Stop reason: HOSPADM

## 2025-06-17 RX ADMIN — WATER 1000 MG: 1 INJECTION INTRAMUSCULAR; INTRAVENOUS; SUBCUTANEOUS at 04:00

## 2025-06-17 RX ADMIN — THIAMINE HYDROCHLORIDE 500 MG: 100 INJECTION, SOLUTION INTRAMUSCULAR; INTRAVENOUS at 12:56

## 2025-06-17 RX ADMIN — Medication 5 MG: at 01:56

## 2025-06-17 RX ADMIN — FOLIC ACID 1 MG: 1 TABLET ORAL at 07:57

## 2025-06-17 RX ADMIN — ACETAMINOPHEN 650 MG: 325 TABLET ORAL at 12:53

## 2025-06-17 RX ADMIN — SODIUM CHLORIDE, PRESERVATIVE FREE 10 ML: 5 INJECTION INTRAVENOUS at 07:57

## 2025-06-17 RX ADMIN — Medication 0.5 MG: at 01:46

## 2025-06-17 RX ADMIN — METOPROLOL TARTRATE 5 MG: 5 INJECTION INTRAVENOUS at 06:00

## 2025-06-17 RX ADMIN — THIAMINE HYDROCHLORIDE 500 MG: 100 INJECTION, SOLUTION INTRAMUSCULAR; INTRAVENOUS at 21:38

## 2025-06-17 RX ADMIN — IPRATROPIUM BROMIDE AND ALBUTEROL SULFATE 1 DOSE: .5; 2.5 SOLUTION RESPIRATORY (INHALATION) at 20:37

## 2025-06-17 RX ADMIN — THERA TABS 1 TABLET: TAB at 07:57

## 2025-06-17 RX ADMIN — IPRATROPIUM BROMIDE AND ALBUTEROL SULFATE 1 DOSE: .5; 3 SOLUTION RESPIRATORY (INHALATION) at 01:35

## 2025-06-17 RX ADMIN — LEVOTHYROXINE SODIUM 88 MCG: 0.09 TABLET ORAL at 12:45

## 2025-06-17 RX ADMIN — THIAMINE HYDROCHLORIDE 500 MG: 100 INJECTION, SOLUTION INTRAMUSCULAR; INTRAVENOUS at 04:01

## 2025-06-17 RX ADMIN — IPRATROPIUM BROMIDE AND ALBUTEROL SULFATE 1 DOSE: .5; 2.5 SOLUTION RESPIRATORY (INHALATION) at 11:08

## 2025-06-17 RX ADMIN — SODIUM CHLORIDE: 0.9 INJECTION, SOLUTION INTRAVENOUS at 06:00

## 2025-06-17 RX ADMIN — METHYLPREDNISOLONE SODIUM SUCCINATE 40 MG: 40 INJECTION, POWDER, LYOPHILIZED, FOR SOLUTION INTRAMUSCULAR; INTRAVENOUS at 06:01

## 2025-06-17 RX ADMIN — ENOXAPARIN SODIUM 40 MG: 100 INJECTION SUBCUTANEOUS at 07:57

## 2025-06-17 RX ADMIN — METHYLPREDNISOLONE SODIUM SUCCINATE 40 MG: 40 INJECTION, POWDER, LYOPHILIZED, FOR SOLUTION INTRAMUSCULAR; INTRAVENOUS at 21:37

## 2025-06-17 RX ADMIN — METHYLPREDNISOLONE SODIUM SUCCINATE 40 MG: 40 INJECTION, POWDER, LYOPHILIZED, FOR SOLUTION INTRAMUSCULAR; INTRAVENOUS at 15:15

## 2025-06-17 RX ADMIN — BENZONATATE 100 MG: 100 CAPSULE ORAL at 02:49

## 2025-06-17 RX ADMIN — IPRATROPIUM BROMIDE AND ALBUTEROL SULFATE 1 DOSE: .5; 2.5 SOLUTION RESPIRATORY (INHALATION) at 07:46

## 2025-06-17 RX ADMIN — SODIUM CHLORIDE, PRESERVATIVE FREE 10 ML: 5 INJECTION INTRAVENOUS at 21:40

## 2025-06-17 RX ADMIN — AMLODIPINE BESYLATE 5 MG: 10 TABLET ORAL at 03:21

## 2025-06-17 RX ADMIN — METHYLPREDNISOLONE SODIUM SUCCINATE 40 MG: 40 INJECTION, POWDER, LYOPHILIZED, FOR SOLUTION INTRAMUSCULAR; INTRAVENOUS at 07:56

## 2025-06-17 RX ADMIN — HYDROCODONE BITARTRATE AND ACETAMINOPHEN 1 TABLET: 5; 325 TABLET ORAL at 22:56

## 2025-06-17 RX ADMIN — BENZONATATE 100 MG: 100 CAPSULE ORAL at 22:58

## 2025-06-17 RX ADMIN — BUDESONIDE 250 MCG: 0.25 SUSPENSION RESPIRATORY (INHALATION) at 07:46

## 2025-06-17 RX ADMIN — AZITHROMYCIN DIHYDRATE 500 MG: 250 TABLET ORAL at 07:57

## 2025-06-17 RX ADMIN — IPRATROPIUM BROMIDE AND ALBUTEROL SULFATE 1 DOSE: .5; 2.5 SOLUTION RESPIRATORY (INHALATION) at 04:37

## 2025-06-17 RX ADMIN — BUDESONIDE 250 MCG: 0.25 SUSPENSION RESPIRATORY (INHALATION) at 21:00

## 2025-06-17 RX ADMIN — IPRATROPIUM BROMIDE AND ALBUTEROL SULFATE 1 DOSE: .5; 2.5 SOLUTION RESPIRATORY (INHALATION) at 15:19

## 2025-06-17 ASSESSMENT — PAIN SCALES - GENERAL
PAINLEVEL_OUTOF10: 3
PAINLEVEL_OUTOF10: 8

## 2025-06-17 NOTE — H&P
or splenomegaly  Neurologic: There are no new focal motor or sensory deficits, normal muscle tone and bulk, no abnormal sensation, normal speech, cranial nerves II through XII grossly intact  Skin: No gross lesions, rashes, bruising or bleeding on exposed skin area  Extremities: peripheral pulses palpable, no pedal edema or calf pain with palpation  Psych: normal affect    Investigations:      Laboratory Testing:  Recent Results (from the past 24 hours)   D-Dimer, Quantitative    Collection Time: 06/17/25  1:36 AM   Result Value Ref Range    D-Dimer, Quant 0.33 0.00 - 0.57 ug/mL FEU   CBC with Auto Differential    Collection Time: 06/17/25  1:36 AM   Result Value Ref Range    WBC 10.2 3.5 - 11.3 k/uL    RBC 4.26 3.95 - 5.11 m/uL    Hemoglobin 13.5 11.9 - 15.1 g/dL    Hematocrit 39.0 36.3 - 47.1 %    MCV 91.5 82.6 - 102.9 fL    MCH 31.7 25.2 - 33.5 pg    MCHC 34.6 28.4 - 34.8 g/dL    RDW 13.2 11.8 - 14.4 %    Platelets 263 138 - 453 k/uL    MPV 8.5 8.1 - 13.5 fL    NRBC Automated 0.0 0.0 per 100 WBC    Neutrophils % 70 (H) 36 - 65 %    Lymphocytes % 19 (L) 24 - 43 %    Monocytes % 10 3 - 12 %    Eosinophils % 0 (L) 1 - 4 %    Basophils % 0 0 - 2 %    Immature Granulocytes % 1 (H) 0 %    Neutrophils Absolute 7.26 1.50 - 8.10 k/uL    Lymphocytes Absolute 1.89 1.10 - 3.70 k/uL    Monocytes Absolute 0.97 0.10 - 1.20 k/uL    Eosinophils Absolute <0.03 0.00 - 0.44 k/uL    Basophils Absolute 0.04 0.00 - 0.20 k/uL    Immature Granulocytes Absolute 0.06 0.00 - 0.30 k/uL   CMP    Collection Time: 06/17/25  1:36 AM   Result Value Ref Range    Sodium 122 (L) 136 - 145 mmol/L    Potassium 3.9 3.7 - 5.3 mmol/L    Chloride 86 (L) 98 - 107 mmol/L    CO2 23 20 - 31 mmol/L    Anion Gap 13 9 - 16 mmol/L    Glucose 133 (H) 74 - 99 mg/dL    BUN 5 (L) 8 - 23 mg/dL    Creatinine 0.5 (L) 0.6 - 0.9 mg/dL    Est, Glom Filt Rate >90 >60 mL/min/1.73m2    Calcium 10.0 8.6 - 10.4 mg/dL    Total Protein 7.8 6.6 - 8.7 g/dL    Albumin 4.6 3.5 - 5.2  g/dL    Albumin/Globulin Ratio 1.4 1.0 - 2.5    Total Bilirubin 0.2 0.0 - 1.2 mg/dL    Alkaline Phosphatase 94 35 - 104 U/L    ALT 17 10 - 35 U/L    AST 33 10 - 35 U/L   Brain Natriuretic Peptide    Collection Time: 06/17/25  1:36 AM   Result Value Ref Range    NT Pro- (H) 0 - 125 pg/mL   BLOOD GAS, VENOUS    Collection Time: 06/17/25  1:36 AM   Result Value Ref Range    pH, Cole 7.454 (H) 7.320 - 7.420    pCO2, Cole 33.6 (L) 39 - 55 mm Hg    PO2, Cole 101.5 (H) 30 - 50 mm Hg    HCO3, Venous 23.0 (L) 24 - 30 mmol/L    Negative Base Excess, Cole 0.2 0.0 - 2.0 mmol/L    O2 Sat, Cole 97.9 (H) 60.0 - 85.0 %    Carboxyhemoglobin 5.9 (H) 0 - 5 %    Pt Temp 37.0     FIO2 INFORMATION NOT PROVIDED    Troponin    Collection Time: 06/17/25  1:36 AM   Result Value Ref Range    Troponin, High Sensitivity 7 0 - 14 ng/L   Troponin    Collection Time: 06/17/25  2:23 AM   Result Value Ref Range    Troponin, High Sensitivity 7 0 - 14 ng/L   Ethanol    Collection Time: 06/17/25  2:23 AM   Result Value Ref Range    Ethanol Lvl <10 <10 mg/dL    Ethanol percent <0.010 <0.010 %   Urinalysis with Reflex to Culture    Collection Time: 06/17/25  3:22 AM    Specimen: Urine, clean catch   Result Value Ref Range    Color, UA Yellow Yellow    Turbidity UA Clear Clear    Glucose, Ur NEGATIVE NEGATIVE mg/dL    Bilirubin, Urine NEGATIVE NEGATIVE    Ketones, Urine TRACE (A) NEGATIVE mg/dL    Specific Gravity, UA 1.009 1.005 - 1.030    Urine Hgb NEGATIVE NEGATIVE    pH, Urine 6.5 5.0 - 8.0    Protein, UA TRACE (A) NEGATIVE mg/dL    Urobilinogen, Urine Normal 0.0 - 1.0 EU/dL    Nitrite, Urine NEGATIVE NEGATIVE    Leukocyte Esterase, Urine NEGATIVE NEGATIVE   DRUG SCREEN MULTI URINE    Collection Time: 06/17/25  3:22 AM   Result Value Ref Range    Amphetamine Screen, Ur NEGATIVE NEGATIVE    Barbiturate Screen, Ur NEGATIVE NEGATIVE    Benzodiazepine Screen, Urine NEGATIVE NEGATIVE    Cocaine Metabolite, Urine NEGATIVE NEGATIVE    Methadone Screen,

## 2025-06-17 NOTE — ED NOTES
ED to inpatient nurses report      Chief Complaint:  Chief Complaint   Patient presents with    Respiratory Distress     Present to ED from: home    MOA:     LOC: alert and orientated to name, place, date  Mobility: Independent  Oxygen Baseline: RA    Current needs required: 4L NC   Pending ED orders: none  Present condition: stable, alert    Why did the patient come to the ED? Resp distress  What is the plan? Admission, IV steroids/abx  Any procedures or intervention occur? EKG, labs, meds, consults, x-ray, CT, VS  Any safety concerns??    Mental Status:  Level of Consciousness: Alert (0)    Psych Assessment:   Psychosocial  Psychosocial (WDL): Within Defined Limits  Vital signs   Vitals:    06/17/25 0347 06/17/25 0430 06/17/25 0543 06/17/25 0614   BP:  (!) 157/84 (!) 165/76 (!) 165/76   Pulse: 91 92 88 83   Resp: 23 24 27 26   Temp:       TempSrc:       SpO2: 95% 96% 98% 99%        Vitals:  Patient Vitals for the past 24 hrs:   BP Temp Temp src Pulse Resp SpO2   06/17/25 0614 (!) 165/76 -- -- 83 26 99 %   06/17/25 0543 (!) 165/76 -- -- 88 27 98 %   06/17/25 0430 (!) 157/84 -- -- 92 24 96 %   06/17/25 0347 -- -- -- 91 23 95 %   06/17/25 0346 -- -- -- 92 28 95 %   06/17/25 0345 (!) 163/78 -- -- 93 29 95 %   06/17/25 0317 -- -- -- 98 28 94 %   06/17/25 0316 -- -- -- 86 30 94 %   06/17/25 0315 (!) 185/91 -- -- 90 29 94 %   06/17/25 0249 (!) 185/93 -- -- 97 23 96 %   06/17/25 0229 -- -- -- 93 20 98 %   06/17/25 0200 (!) 177/92 -- -- 92 27 99 %   06/17/25 0136 -- -- -- (!) 110 26 100 %   06/17/25 0135 -- -- -- (!) 108 27 100 %   06/17/25 0126 (!) 206/99 -- -- -- -- --   06/17/25 0124 -- 98.8 °F (37.1 °C) Oral (!) 115 21 97 %      Visit Vitals  BP (!) 165/76   Pulse 83   Temp 98.8 °F (37.1 °C) (Oral)   Resp 26   SpO2 99%        LDAs:   Peripheral IV 06/17/25 Left;Proximal Forearm (Active)   Site Assessment Clean, dry & intact 06/17/25 0126   Line Status Blood return noted;Flushed 06/17/25 0126       Ambulatory

## 2025-06-17 NOTE — ED NOTES
Pt arrived to Ed via medic 19 for difficulty breathing  Pt has hx of COPD, lung cancer and breast cancer  Pt states she has used her tx at home with no relief  Pt given Combivent, 125 mg solumedrol and 2 g of mag PTA by EMS  Pt on CPAP on arrival  RT at bedside and pt placed on BiPAP  Per EMS O2 sat in 80's on their arrival  Pt denies having any chest pain at this time    at bedside with pt   Pt resting on stretcher, call light within reach.white board updated

## 2025-06-17 NOTE — ED PROVIDER NOTES
[facility] EMERGENCY DEPARTMENT  Emergency Department Encounter  Emergency Medicine Resident     Pt Name:Nohemi Durand  MRN: [id number]  Birthdate 1960  Date of evaluation: 6/17/25  PCP:  Kat Spence MD  Note Started: 1:27 AM EDT      CHIEF COMPLAINT       Chief Complaint   Patient presents with    Respiratory Distress       HISTORY OF PRESENT ILLNESS  (Location/Symptom, Timing/Onset, Context/Setting, Quality, Duration, Modifying Factors, Severity.)      Nohemi Durand is a 64 y.o. female history of CAD, breast cancer, COPD, lung cancer who presents with shortness of breath that began this morning.  Patient's shortness of breath gradually worsened throughout the day.  She does have a history of COPD and reports taking her breathing treatments and inhalers at home without relief of shortness of breath.  Denies associated chest pain, abdominal pain.  Denies recent illness.  Patient does have a history of cancer and states she is not actively being treated.  Patient received Solu-Medrol, magnesium and breathing treatment by EMS with some improvement of work of breathing.  She is on CPAP around her arrival to the ER.      PAST MEDICAL / SURGICAL / SOCIAL / FAMILY HISTORY      has a past medical history of Alcohol abuse, Breast cancer (HCC), Chronic diarrhea, COPD (chronic obstructive pulmonary disease) (HCC), Essential hypertension, Hx antineoplastic chemo, Lung cancer (HCC), Pleural effusion, and Tobacco abuse.       has a past surgical history that includes Appendectomy; Mastectomy (Right); Cholecystectomy; Multiple tooth extractions; and Colonoscopy (N/A, 3/31/2025).      Social History     Socioeconomic History    Marital status:      Spouse name: Not on file    Number of children: Not on file    Years of education: Not on file    Highest education level: Not on file   Occupational History    Not on file   Tobacco Use    Smoking status: Every Day     Current packs/day: 1.00     Average

## 2025-06-17 NOTE — ED PROVIDER NOTES
[facility]     Emergency Department     Faculty Attestation    I performed a history and physical examination of the patient and discussed management with the resident. I have reviewed and agree with the resident’s findings including all diagnostic interpretations, and treatment plans as written. Any areas of disagreement are noted on the chart. I was personally present for the key portions of any procedures. I have documented in the chart those procedures where I was not present during the key portions. I have reviewed the emergency nurses triage note. I agree with the chief complaint, past medical history, past surgical history, allergies, medications, social and family history as documented unless otherwise noted below. Documentation of the HPI, Physical Exam and Medical Decision Making performed by kimibmary is based on my personal performance of the HPI, PE and MDM. For Physician Assistant/ Nurse Practitioner cases/documentation I have personally evaluated this patient and have completed at least one if not all key elements of the E/M (history, physical exam, and MDM). Additional findings are as noted.    Note Started: 1:33 AM EDT     65 yo F acute sob, given neb / steroid / mag by ems, no cp, hx copd,   PE tachy, 206/99, bipap, markedly diminished bilaterally,   ---trop 7/7, d dimer -, cxr stable, plan admit  EKG Interpretation    Interpreted by me  Sinus tach, , no ST elevation, right axis, QTc 443,    CRITICAL CARE: There was a high probability of clinically significant/life threatening deterioration in this patient's condition which required my urgent intervention.  Total critical care time was 5 minutes.  This excludes any time for separately reportable procedures.       Tomas Cuello DO  06/17/25 0134       Tomas Nieves DO  06/17/25 0304       Tomas Nieves DO  06/17/25 0310       Tomas Nieves DO  06/17/25

## 2025-06-17 NOTE — ED NOTES
SW consulted to meet with patient regarding etoh use.  Patient being admitted.  Patient states she drinks about 4 beers every night when she and her  watch TV or play games.  She states she has been doing this for many years and does not consider it a problem.  Patient does not express a desire to quit, but did accept resources form  if she should change her mind.  Patient denies any drug use.  ALEJANDRO Prasad

## 2025-06-18 PROBLEM — B34.8 PARAINFLUENZA INFECTION: Status: ACTIVE | Noted: 2025-06-18

## 2025-06-18 LAB
ANION GAP SERPL CALCULATED.3IONS-SCNC: 12 MMOL/L (ref 9–16)
B PARAP IS1001 DNA NPH QL NAA+NON-PROBE: NOT DETECTED
B PERT DNA SPEC QL NAA+PROBE: NOT DETECTED
BASOPHILS # BLD: 0 K/UL (ref 0–0.2)
BASOPHILS NFR BLD: 0 % (ref 0–2)
BUN SERPL-MCNC: 8 MG/DL (ref 8–23)
C PNEUM DNA NPH QL NAA+NON-PROBE: NOT DETECTED
CALCIUM SERPL-MCNC: 10.7 MG/DL (ref 8.6–10.4)
CHLORIDE SERPL-SCNC: 90 MMOL/L (ref 98–107)
CO2 SERPL-SCNC: 27 MMOL/L (ref 20–31)
CREAT SERPL-MCNC: 0.5 MG/DL (ref 0.6–0.9)
EKG ATRIAL RATE: 107 BPM
EKG P AXIS: 76 DEGREES
EKG P-R INTERVAL: 182 MS
EKG Q-T INTERVAL: 332 MS
EKG QRS DURATION: 90 MS
EKG QTC CALCULATION (BAZETT): 443 MS
EKG R AXIS: 96 DEGREES
EKG T AXIS: 77 DEGREES
EKG VENTRICULAR RATE: 107 BPM
EOSINOPHIL # BLD: 0 K/UL (ref 0–0.44)
EOSINOPHILS RELATIVE PERCENT: 0 % (ref 1–4)
ERYTHROCYTE [DISTWIDTH] IN BLOOD BY AUTOMATED COUNT: 13 % (ref 11.8–14.4)
FLUAV RNA NPH QL NAA+NON-PROBE: NOT DETECTED
FLUBV RNA NPH QL NAA+NON-PROBE: NOT DETECTED
GFR, ESTIMATED: >90 ML/MIN/1.73M2
GLUCOSE SERPL-MCNC: 202 MG/DL (ref 74–99)
HADV DNA NPH QL NAA+NON-PROBE: NOT DETECTED
HCOV 229E RNA NPH QL NAA+NON-PROBE: NOT DETECTED
HCOV HKU1 RNA NPH QL NAA+NON-PROBE: NOT DETECTED
HCOV NL63 RNA NPH QL NAA+NON-PROBE: NOT DETECTED
HCOV OC43 RNA NPH QL NAA+NON-PROBE: NOT DETECTED
HCT VFR BLD AUTO: 39.4 % (ref 36.3–47.1)
HGB BLD-MCNC: 13.3 G/DL (ref 11.9–15.1)
HMPV RNA NPH QL NAA+NON-PROBE: NOT DETECTED
HPIV1 RNA NPH QL NAA+NON-PROBE: NOT DETECTED
HPIV2 RNA NPH QL NAA+NON-PROBE: NOT DETECTED
HPIV3 RNA NPH QL NAA+NON-PROBE: DETECTED
HPIV4 RNA NPH QL NAA+NON-PROBE: NOT DETECTED
IMM GRANULOCYTES # BLD AUTO: 0.09 K/UL (ref 0–0.3)
IMM GRANULOCYTES NFR BLD: 1 %
LYMPHOCYTES NFR BLD: 0.68 K/UL (ref 1.1–3.7)
LYMPHOCYTES RELATIVE PERCENT: 8 % (ref 24–43)
M PNEUMO DNA NPH QL NAA+NON-PROBE: NOT DETECTED
MCH RBC QN AUTO: 31.1 PG (ref 25.2–33.5)
MCHC RBC AUTO-ENTMCNC: 33.8 G/DL (ref 28.4–34.8)
MCV RBC AUTO: 92.1 FL (ref 82.6–102.9)
MONOCYTES NFR BLD: 0.34 K/UL (ref 0.1–1.2)
MONOCYTES NFR BLD: 4 % (ref 3–12)
MORPHOLOGY: NORMAL
NEUTROPHILS NFR BLD: 87 % (ref 36–65)
NEUTS SEG NFR BLD: 7.39 K/UL (ref 1.5–8.1)
NRBC BLD-RTO: 0 PER 100 WBC
PHOSPHATE SERPL-MCNC: 2.2 MG/DL (ref 2.5–4.5)
PLATELET # BLD AUTO: 269 K/UL (ref 138–453)
PMV BLD AUTO: 8.7 FL (ref 8.1–13.5)
POTASSIUM SERPL-SCNC: 4.6 MMOL/L (ref 3.7–5.3)
RBC # BLD AUTO: 4.28 M/UL (ref 3.95–5.11)
RSV RNA NPH QL NAA+NON-PROBE: NOT DETECTED
RV+EV RNA NPH QL NAA+NON-PROBE: NOT DETECTED
SARS-COV-2 RNA NPH QL NAA+NON-PROBE: NOT DETECTED
SODIUM SERPL-SCNC: 123 MMOL/L (ref 136–145)
SODIUM SERPL-SCNC: 129 MMOL/L (ref 136–145)
SPECIMEN DESCRIPTION: ABNORMAL
WBC OTHER # BLD: 8.5 K/UL (ref 3.5–11.3)

## 2025-06-18 PROCEDURE — 0202U NFCT DS 22 TRGT SARS-COV-2: CPT

## 2025-06-18 PROCEDURE — 2500000003 HC RX 250 WO HCPCS

## 2025-06-18 PROCEDURE — 97166 OT EVAL MOD COMPLEX 45 MIN: CPT

## 2025-06-18 PROCEDURE — 2700000000 HC OXYGEN THERAPY PER DAY

## 2025-06-18 PROCEDURE — 84100 ASSAY OF PHOSPHORUS: CPT

## 2025-06-18 PROCEDURE — 97535 SELF CARE MNGMENT TRAINING: CPT

## 2025-06-18 PROCEDURE — 6370000000 HC RX 637 (ALT 250 FOR IP)

## 2025-06-18 PROCEDURE — 2580000003 HC RX 258: Performed by: INTERNAL MEDICINE

## 2025-06-18 PROCEDURE — 99232 SBSQ HOSP IP/OBS MODERATE 35: CPT | Performed by: INTERNAL MEDICINE

## 2025-06-18 PROCEDURE — 80048 BASIC METABOLIC PNL TOTAL CA: CPT

## 2025-06-18 PROCEDURE — 6370000000 HC RX 637 (ALT 250 FOR IP): Performed by: NURSE PRACTITIONER

## 2025-06-18 PROCEDURE — 36415 COLL VENOUS BLD VENIPUNCTURE: CPT

## 2025-06-18 PROCEDURE — 94640 AIRWAY INHALATION TREATMENT: CPT

## 2025-06-18 PROCEDURE — 85025 COMPLETE CBC W/AUTO DIFF WBC: CPT

## 2025-06-18 PROCEDURE — 97530 THERAPEUTIC ACTIVITIES: CPT

## 2025-06-18 PROCEDURE — 97162 PT EVAL MOD COMPLEX 30 MIN: CPT

## 2025-06-18 PROCEDURE — 94761 N-INVAS EAR/PLS OXIMETRY MLT: CPT

## 2025-06-18 PROCEDURE — 6360000002 HC RX W HCPCS

## 2025-06-18 PROCEDURE — 2060000000 HC ICU INTERMEDIATE R&B

## 2025-06-18 PROCEDURE — 84295 ASSAY OF SERUM SODIUM: CPT

## 2025-06-18 PROCEDURE — 2500000003 HC RX 250 WO HCPCS: Performed by: INTERNAL MEDICINE

## 2025-06-18 RX ADMIN — LEVOTHYROXINE SODIUM 88 MCG: 0.09 TABLET ORAL at 09:30

## 2025-06-18 RX ADMIN — BENZONATATE 100 MG: 100 CAPSULE ORAL at 21:01

## 2025-06-18 RX ADMIN — THERA TABS 1 TABLET: TAB at 09:30

## 2025-06-18 RX ADMIN — IPRATROPIUM BROMIDE AND ALBUTEROL SULFATE 1 DOSE: .5; 2.5 SOLUTION RESPIRATORY (INHALATION) at 14:07

## 2025-06-18 RX ADMIN — METHYLPREDNISOLONE SODIUM SUCCINATE 40 MG: 40 INJECTION, POWDER, LYOPHILIZED, FOR SOLUTION INTRAMUSCULAR; INTRAVENOUS at 13:46

## 2025-06-18 RX ADMIN — AZITHROMYCIN DIHYDRATE 500 MG: 250 TABLET ORAL at 09:30

## 2025-06-18 RX ADMIN — THIAMINE HYDROCHLORIDE 500 MG: 100 INJECTION, SOLUTION INTRAMUSCULAR; INTRAVENOUS at 02:24

## 2025-06-18 RX ADMIN — SODIUM CHLORIDE, PRESERVATIVE FREE 10 ML: 5 INJECTION INTRAVENOUS at 09:31

## 2025-06-18 RX ADMIN — IPRATROPIUM BROMIDE AND ALBUTEROL SULFATE 1 DOSE: .5; 2.5 SOLUTION RESPIRATORY (INHALATION) at 21:21

## 2025-06-18 RX ADMIN — HYDROCODONE BITARTRATE AND ACETAMINOPHEN 1 TABLET: 5; 325 TABLET ORAL at 22:49

## 2025-06-18 RX ADMIN — ENOXAPARIN SODIUM 40 MG: 100 INJECTION SUBCUTANEOUS at 09:31

## 2025-06-18 RX ADMIN — METHYLPREDNISOLONE SODIUM SUCCINATE 40 MG: 40 INJECTION, POWDER, LYOPHILIZED, FOR SOLUTION INTRAMUSCULAR; INTRAVENOUS at 20:39

## 2025-06-18 RX ADMIN — AMLODIPINE BESYLATE 5 MG: 5 TABLET ORAL at 09:30

## 2025-06-18 RX ADMIN — IPRATROPIUM BROMIDE AND ALBUTEROL SULFATE 1 DOSE: .5; 2.5 SOLUTION RESPIRATORY (INHALATION) at 07:42

## 2025-06-18 RX ADMIN — THIAMINE HYDROCHLORIDE 500 MG: 100 INJECTION, SOLUTION INTRAMUSCULAR; INTRAVENOUS at 09:30

## 2025-06-18 RX ADMIN — Medication 12.5 MG: at 05:15

## 2025-06-18 RX ADMIN — SODIUM CHLORIDE, PRESERVATIVE FREE 10 ML: 5 INJECTION INTRAVENOUS at 20:42

## 2025-06-18 RX ADMIN — THIAMINE HYDROCHLORIDE 500 MG: 100 INJECTION, SOLUTION INTRAMUSCULAR; INTRAVENOUS at 20:39

## 2025-06-18 RX ADMIN — SODIUM PHOSPHATE, MONOBASIC, MONOHYDRATE AND SODIUM PHOSPHATE, DIBASIC, ANHYDROUS 20 MMOL: 276; 142 INJECTION, SOLUTION INTRAVENOUS at 13:51

## 2025-06-18 RX ADMIN — METHYLPREDNISOLONE SODIUM SUCCINATE 40 MG: 40 INJECTION, POWDER, LYOPHILIZED, FOR SOLUTION INTRAMUSCULAR; INTRAVENOUS at 02:23

## 2025-06-18 RX ADMIN — WATER 1000 MG: 1 INJECTION INTRAMUSCULAR; INTRAVENOUS; SUBCUTANEOUS at 02:23

## 2025-06-18 RX ADMIN — METHYLPREDNISOLONE SODIUM SUCCINATE 40 MG: 40 INJECTION, POWDER, LYOPHILIZED, FOR SOLUTION INTRAMUSCULAR; INTRAVENOUS at 09:31

## 2025-06-18 RX ADMIN — FOLIC ACID 1 MG: 1 TABLET ORAL at 09:30

## 2025-06-18 RX ADMIN — BENZONATATE 100 MG: 100 CAPSULE ORAL at 09:30

## 2025-06-18 RX ADMIN — HYDROCODONE BITARTRATE AND ACETAMINOPHEN 1 TABLET: 5; 325 TABLET ORAL at 05:15

## 2025-06-18 ASSESSMENT — PAIN SCALES - GENERAL
PAINLEVEL_OUTOF10: 9
PAINLEVEL_OUTOF10: 8
PAINLEVEL_OUTOF10: 0

## 2025-06-18 ASSESSMENT — PAIN SCALES - WONG BAKER: WONGBAKER_NUMERICALRESPONSE: HURTS A LITTLE BIT

## 2025-06-18 NOTE — PLAN OF CARE
Problem: Discharge Planning  Goal: Discharge to home or other facility with appropriate resources  Outcome: Progressing     Problem: Seizure Precautions  Goal: Remains free of injury related to seizures activity  Outcome: Progressing     Problem: Pain  Goal: Verbalizes/displays adequate comfort level or baseline comfort level  Outcome: Progressing     Problem: Safety - Adult  Goal: Free from fall injury  Outcome: Progressing

## 2025-06-18 NOTE — PROGRESS NOTES
[facility]  Office: [phone]  Osmel Osorio DO, Tristan Crane, DO, Mario Rivera DO, Gordon Garcia, DO, Luis Ryan MD, Mary Ann Sharif MD, Jodie Saunders MD, Rajani Davila MD,  Alan Lares MD, Azul Blount MD, Dennis Soria MD,  Oumar Webb DO, Ember Bello MD, Reginald Boudreaux MD, King Osorio DO, Aline Becerril MD,  Dmitriy Lo DO, Yolanda Peña MD, Nadine Cox MD, Radha Dowd MD,  Yonathan Cain MD, Leslie Patel MD, Amanda Chilel MD, Mumtaz Hoskins MD, Bucky Dolan MD, Alem Pierre MD, Marty Elliott, DO, Leslie Muse MD, Hank Romero DO, Philip Fofana MD, Oumar Márquez MD, Mohsin Reza, MD, Galo Ivy MD, Shirley Waterhouse, CNP,  Babita Barreto, CNP, Marty Caro, CNP,  Marine Johnson, DNP, Marva Milner, CNP, Brenda Villarreal, CNP, Luz Miguel, CNP, Rayna Gomez, CNP, Alejandra Odonnell, PA-C, Reshma Carrillo, CNP, Alisa Carey, CNP,  Stephy Mendiola, CNP, Sirena Snow, CNP, Ryan Romano, PA-C, Charissa Hartley, CNP,  Shellie Kapadia, CNS, Licha Crowley, CNP, Cristina Caballero, CNP,   Kristel Fonseca, CNP         [facility]   IN-PATIENT SERVICE   [facility]    Progress Note    6/18/2025    7:43 AM    Name:   Nohemi Durand  MRN:     [id number]     Acct:      [id number]   Room:   0427/0427-02  IP Day:  1  Admit Date:  6/17/2025  1:23 AM    PCP:   Kat Spence MD  Code Status:  Full Code    Subjective:     C/C:   Chief Complaint   Patient presents with    Respiratory Distress     Interval History Status: improved.     Pt still coughing, less SOB.  No fevers, + parainfluenza.  Still has QUESADA.      Brief History:     Per the H & P:  \"Nohemi Durand is a 64 y.o. Non- / non  female who presents with Respiratory Distress   and is admitted to the hospital for the management of COPD with acute exacerbation (HCC).     64-year-old female with past medical history of COPD on baseline 2 L of oxygen, hypertension,  splenomegaly  Extremities:  no edema, redness, tenderness in the calves  Skin:  no gross lesions, rashes, induration    Assessment:        Hospital Problems           Last Modified POA    * (Principal) COPD with acute exacerbation (HCC) 6/17/2025 Yes    Alcohol abuse (Chronic) 6/17/2025 Yes    Adenocarcinoma, lung, left (HCC) 1/9/2025 Yes    Tobacco abuse disorder (Chronic) 6/17/2025 Yes    Panlobular emphysema (HCC) 6/17/2025 Yes    Overview Signed 12/19/2019  4:20 AM by Hansa Perla APRN - CNP   multiloculated left-sided pleural effusion with compressive atelectasis         Essential hypertension (Chronic) 6/17/2025 Yes    History of right breast cancer 6/17/2025 Yes    Hyponatremia 6/17/2025 Yes    Coronary artery disease involving native coronary artery of native heart without angina pectoris 6/17/2025 Yes    Shortness of breath 6/17/2025 Yes    Anxiety 6/17/2025 Yes    Malignant neoplasm of lower lobe of right lung (HCC) 6/17/2025 Yes    Parainfluenza infection 6/18/2025 Yes       Plan:        Acute on chronic hypoxic respiratory failure due to COPD exacerbation/parainfluenza virus-continue IV Solu-Medrol 40 mg every 6 hours, then switch to p.o. prednisone, continue bronchodilators, Tessalon Perles as needed, IV Rocephin and Zithromax.  Patient should follow-up with pulmonology outpatient, will need home oxygen evaluation and patient may benefit from a portable oxygen tank  Hypertension-BP improving on Norvasc  Hyponatremia-likely due to beer Poto tammy, sodium-129 on fluid restriction  History of lung cancer-patient follows with oncology and has had negative CT scans recently, counseled on smoking cessation  History of breast cancer  Alcohol abuse- continue CIWA protocol, IV thiamine, folic acid  Hypothyroidism-continue levothyroxine  DVT prophylaxis    Possibly discharge home tomorrow depending on patient's respiratory status    Mary Ann Sharif MD  6/18/2025  7:43 AM

## 2025-06-18 NOTE — CARE COORDINATION
Case Management Assessment  Initial Evaluation    Date/Time of Evaluation: 6/18/2025 6:13 PM  Assessment Completed by: Ela Land RN    If patient is discharged prior to next notation, then this note serves as note for discharge by case management.    Patient Name: Nohemi Durand                   YOB: 1960  Diagnosis: Hyponatremia [E87.1]  COPD exacerbation (HCC) [J44.1]  COPD with acute exacerbation (HCC) [J44.1]                   Date / Time: 6/17/2025  1:23 AM    Patient Admission Status: Inpatient   Readmission Risk (Low < 19, Mod (19-27), High > 27): Readmission Risk Score: 10.1    Current PCP: Kat Spence MD  PCP verified by CM? Yes    Chart Reviewed: Yes      History Provided by: Patient  Patient Orientation: Alert and Oriented, Person, Place, Situation    Patient Cognition: Alert    Hospitalization in the last 30 days (Readmission):  No    If yes, Readmission Assessment in CM Navigator will be completed.    Advance Directives:      Code Status: Full Code   Patient's Primary Decision Maker is: Legal Next of Kin      Discharge Planning:    Patient lives with: Spouse/Significant Other Type of Home: House  Primary Care Giver: Self  Patient Support Systems include: Spouse/Significant Other, Children   Current Financial resources:    Current community resources:    Current services prior to admission: Durable Medical Equipment            Current DME: Home Aerosol            Type of Home Care services:  None    ADLS  Prior functional level: Independent in ADLs/IADLs  Current functional level: Independent in ADLs/IADLs    PT AM-PAC: 19 /24  OT AM-PAC: 20 /24    Family can provide assistance at DC: Yes  Would you like Case Management to discuss the discharge plan with any other family members/significant others, and if so, who? Yes (spouse)  Plans to Return to Present Housing: Yes  Other Identified Issues/Barriers to RETURNING to current housing: na  Potential Assistance needed at

## 2025-06-18 NOTE — PLAN OF CARE
Problem: Discharge Planning  Goal: Discharge to home or other facility with appropriate resources  6/18/2025 1220 by Mary Andrews, RN  Outcome: Progressing    Problem: Seizure Precautions  Goal: Remains free of injury related to seizures activity  6/18/2025 1220 by Mary Andrews RN  Outcome: Progressing    Problem: Pain  Goal: Verbalizes/displays adequate comfort level or baseline comfort level  6/18/2025 1220 by Mary Andrews, RN  Outcome: Progressing    Problem: Safety - Adult  Goal: Free from fall injury  6/18/2025 1220 by Mary Andrews RN  Outcome: Progressing     Problem: Respiratory - Adult  Goal: Achieves optimal ventilation and oxygenation  6/18/2025 1220 by Mary Andrews, RN  Outcome: Progressing

## 2025-06-18 NOTE — PROGRESS NOTES
Occupational Therapy Initial Evaluation  Facility/Department: [facility] 4B STEPDOWN   Patient Name: Nohemi Durand        MRN: [id number]    : 1960    Date of Service: 2025    Chief Complaint   Patient presents with    Respiratory Distress     Past Medical History:  has a past medical history of Alcohol abuse, Breast cancer (HCC), Chronic diarrhea, COPD (chronic obstructive pulmonary disease) (HCC), Essential hypertension, Hx antineoplastic chemo, Lung cancer (HCC), Pleural effusion, and Tobacco abuse.  Past Surgical History:  has a past surgical history that includes Appendectomy; Mastectomy (Right); Cholecystectomy; Multiple tooth extractions; and Colonoscopy (N/A, 3/31/2025).    Discharge Recommendations   No occupational therapy recommended at discharge.         Assessment  Performance deficits / Impairments: Decreased functional mobility ;Decreased ADL status;Decreased endurance;Decreased high-level IADLs;Decreased balance  Assessment: pt would benefit from continued acute Ot in order to address decreased endurance impacting occupational performance and functional independence.  Prognosis: Good  Decision Making: Medium Complexity  REQUIRES OT FOLLOW-UP: Yes  Activity Tolerance  Activity Tolerance: Patient Tolerated treatment well;Patient limited by fatigue  Safety Devices  Type of Devices: Call light within reach;Left in bed;Gait belt  Restraints  Restraints Initially in Place: No    AM-PAC  AM-PAC Daily Activity - Inpatient   How much help is needed for putting on and taking off regular lower body clothing?: A Little  How much help is needed for bathing (which includes washing, rinsing, drying)?: A Little  How much help is needed for toileting (which includes using toilet, bedpan, or urinal)?: A Little  How much help is needed for putting on and taking off regular upper body clothing?: A Little  How much help is needed for taking care of personal grooming?: None  How much help for eating meals?:

## 2025-06-18 NOTE — RT PROTOCOL NOTE
RT Inhaler-Nebulizer Bronchodilator Protocol Note    There is a bronchodilator order in the chart from a provider indicating to follow the RT Bronchodilator Protocol and there is an “Initiate RT Inhaler-Nebulizer Bronchodilator Protocol” order as well (see protocol at bottom of note).    CXR Findings:  XR CHEST PORTABLE  Result Date: 6/17/2025  1. No acute cardiopulmonary process. 2. Emphysema.       The findings from the last RT Protocol Assessment were as follows:   History Pulmonary Disease: Chronic pulmonary disease  Respiratory Pattern: Mild dyspnea at rest, irregular pattern, or RR 21-25 bpm  Breath Sounds: Intermittent or unilateral wheezes  Cough: Strong, spontaneous, non-productive  Indication for Bronchodilator Therapy: Wheezing associated with pulm disorder  Bronchodilator Assessment Score: 10    Aerosolized bronchodilator medication orders have been revised according to the RT Inhaler-Nebulizer Bronchodilator Protocol below.    Respiratory Therapist to perform RT Therapy Protocol Assessment initially then follow the protocol.  Repeat RT Therapy Protocol Assessment PRN for score 0-3 or on second treatment, BID, and PRN for scores above 3.    No Indications - adjust the frequency to every 6 hours PRN wheezing or bronchospasm, if no treatments needed after 48 hours then discontinue using Per Protocol order mode.     If indication present, adjust the RT bronchodilator orders based on the Bronchodilator Assessment Score as indicated below.  Use Inhaler orders unless patient has one or more of the following: on home nebulizer, not able to hold breath for 10 seconds, is not alert and oriented, cannot activate and use MDI correctly, or respiratory rate 25 breaths per minute or more, then use the equivalent nebulizer order(s) with same Frequency and PRN reasons based on the score.  If a patient is on this medication at home then do not decrease Frequency below that used at home.    0-3 - enter or revise RT

## 2025-06-18 NOTE — PROGRESS NOTES
Physical Therapy  Facility/Department: [address] STEPDOWN   Physical Therapy Initial Evaluation    Patient Name: Nohemi Durand        MRN: [id number]    : 1960    Date of Service: 2025    Chief Complaint   Patient presents with    Respiratory Distress     Nohemi Durand is a 64 y.o. Non- / non  female who presents with Respiratory Distress and is admitted to the hospital for the management of COPD with acute exacerbation (HCC).    Past Medical History:  has a past medical history of Alcohol abuse, Breast cancer (HCC), Chronic diarrhea, COPD (chronic obstructive pulmonary disease) (HCC), Essential hypertension, Hx antineoplastic chemo, Lung cancer (HCC), Pleural effusion, and Tobacco abuse.  Past Surgical History:  has a past surgical history that includes Appendectomy; Mastectomy (Right); Cholecystectomy; Multiple tooth extractions; and Colonoscopy (N/A, 3/31/2025).    Discharge Recommendations   Further therapy recommended at discharge.    PT Equipment Recommendations  Equipment Needed: Yes  Mobility Devices: Walker  Walker: Rolling    Assessment  Body Structures, Functions, Activity Limitations Requiring Skilled Therapeutic Intervention: Decreased functional mobility , Decreased ADL status, Decreased body mechanics, Decreased strength, Decreased high-level IADLs, Decreased balance, Decreased endurance, Decreased coordination, Decreased posture  Assessment: Pt ambulates 35 ft w/ RW and CGA. pt is a fall risk d/t decreased balance and endurance. At baseline, pt ambulates independently without AD. Pt would benefit from continued therapy to promote endurance, balance, and strengthening.  Therapy Prognosis: Good  Decision Making: Medium Complexity  Requires PT Follow-Up: Yes  Activity Tolerance  Activity Tolerance: Patient limited by endurance, Patient limited by fatigue  Safety Devices  Type of Devices: Call light within reach, Left in bed, Gait belt, All fall risk precautions in

## 2025-06-19 VITALS
DIASTOLIC BLOOD PRESSURE: 90 MMHG | RESPIRATION RATE: 15 BRPM | TEMPERATURE: 97.7 F | OXYGEN SATURATION: 100 % | SYSTOLIC BLOOD PRESSURE: 144 MMHG | HEART RATE: 75 BPM

## 2025-06-19 LAB
ANION GAP SERPL CALCULATED.3IONS-SCNC: 13 MMOL/L (ref 9–16)
BUN SERPL-MCNC: 8 MG/DL (ref 8–23)
CALCIUM SERPL-MCNC: 10 MG/DL (ref 8.6–10.4)
CHLORIDE SERPL-SCNC: 89 MMOL/L (ref 98–107)
CO2 SERPL-SCNC: 24 MMOL/L (ref 20–31)
CREAT SERPL-MCNC: 0.4 MG/DL (ref 0.6–0.9)
GFR, ESTIMATED: >90 ML/MIN/1.73M2
GLUCOSE SERPL-MCNC: 141 MG/DL (ref 74–99)
POTASSIUM SERPL-SCNC: 3.7 MMOL/L (ref 3.7–5.3)
SODIUM SERPL-SCNC: 126 MMOL/L (ref 136–145)

## 2025-06-19 PROCEDURE — 6370000000 HC RX 637 (ALT 250 FOR IP)

## 2025-06-19 PROCEDURE — 94761 N-INVAS EAR/PLS OXIMETRY MLT: CPT

## 2025-06-19 PROCEDURE — 2700000000 HC OXYGEN THERAPY PER DAY

## 2025-06-19 PROCEDURE — 99239 HOSP IP/OBS DSCHRG MGMT >30: CPT | Performed by: INTERNAL MEDICINE

## 2025-06-19 PROCEDURE — 80048 BASIC METABOLIC PNL TOTAL CA: CPT

## 2025-06-19 PROCEDURE — 94640 AIRWAY INHALATION TREATMENT: CPT

## 2025-06-19 PROCEDURE — 2500000003 HC RX 250 WO HCPCS

## 2025-06-19 PROCEDURE — 36415 COLL VENOUS BLD VENIPUNCTURE: CPT

## 2025-06-19 PROCEDURE — 6360000002 HC RX W HCPCS

## 2025-06-19 RX ORDER — CEFUROXIME AXETIL 250 MG/1
250 TABLET ORAL 2 TIMES DAILY
Qty: 4 TABLET | Refills: 0 | Status: SHIPPED | OUTPATIENT
Start: 2025-06-19 | End: 2025-06-21

## 2025-06-19 RX ORDER — LEVALBUTEROL TARTRATE 45 UG/1
2 AEROSOL, METERED ORAL EVERY 4 HOURS PRN
Qty: 30 G | Refills: 1 | Status: SHIPPED | OUTPATIENT
Start: 2025-06-19

## 2025-06-19 RX ORDER — IPRATROPIUM BROMIDE AND ALBUTEROL SULFATE 2.5; .5 MG/3ML; MG/3ML
1 SOLUTION RESPIRATORY (INHALATION) 4 TIMES DAILY
Qty: 360 ML | Refills: 1 | Status: SHIPPED | OUTPATIENT
Start: 2025-06-19

## 2025-06-19 RX ORDER — PREDNISONE 10 MG/1
TABLET ORAL
Qty: 17 TABLET | Refills: 0 | Status: SHIPPED | OUTPATIENT
Start: 2025-06-19

## 2025-06-19 RX ORDER — MULTIVITAMIN WITH IRON
1 TABLET ORAL DAILY
Qty: 30 TABLET | Refills: 1 | Status: SHIPPED | OUTPATIENT
Start: 2025-06-20

## 2025-06-19 RX ORDER — FLUTICASONE FUROATE, UMECLIDINIUM BROMIDE AND VILANTEROL TRIFENATATE 100; 62.5; 25 UG/1; UG/1; UG/1
1 POWDER RESPIRATORY (INHALATION) DAILY
Qty: 1 EACH | Refills: 1 | Status: SHIPPED | OUTPATIENT
Start: 2025-06-19

## 2025-06-19 RX ORDER — BENZONATATE 100 MG/1
100 CAPSULE ORAL 3 TIMES DAILY PRN
Qty: 21 CAPSULE | Refills: 0 | Status: SHIPPED | OUTPATIENT
Start: 2025-06-19 | End: 2025-06-26

## 2025-06-19 RX ORDER — LANOLIN ALCOHOL/MO/W.PET/CERES
100 CREAM (GRAM) TOPICAL DAILY
Qty: 30 TABLET | Refills: 1 | Status: SHIPPED | OUTPATIENT
Start: 2025-06-24

## 2025-06-19 RX ORDER — NICOTINE 21 MG/24HR
1 PATCH, TRANSDERMAL 24 HOURS TRANSDERMAL DAILY
Qty: 30 PATCH | Refills: 0 | Status: SHIPPED | OUTPATIENT
Start: 2025-06-20

## 2025-06-19 RX ADMIN — BENZONATATE 100 MG: 100 CAPSULE ORAL at 08:31

## 2025-06-19 RX ADMIN — THIAMINE HYDROCHLORIDE 250 MG: 100 INJECTION, SOLUTION INTRAMUSCULAR; INTRAVENOUS at 03:24

## 2025-06-19 RX ADMIN — AZITHROMYCIN DIHYDRATE 500 MG: 250 TABLET ORAL at 08:24

## 2025-06-19 RX ADMIN — Medication 12.5 MG: at 07:42

## 2025-06-19 RX ADMIN — IPRATROPIUM BROMIDE AND ALBUTEROL SULFATE 1 DOSE: .5; 2.5 SOLUTION RESPIRATORY (INHALATION) at 07:45

## 2025-06-19 RX ADMIN — AMLODIPINE BESYLATE 5 MG: 5 TABLET ORAL at 08:25

## 2025-06-19 RX ADMIN — PREDNISONE 40 MG: 20 TABLET ORAL at 08:24

## 2025-06-19 RX ADMIN — IPRATROPIUM BROMIDE AND ALBUTEROL SULFATE 1 DOSE: .5; 2.5 SOLUTION RESPIRATORY (INHALATION) at 14:21

## 2025-06-19 RX ADMIN — WATER 1000 MG: 1 INJECTION INTRAMUSCULAR; INTRAVENOUS; SUBCUTANEOUS at 03:22

## 2025-06-19 RX ADMIN — SODIUM CHLORIDE, PRESERVATIVE FREE 10 ML: 5 INJECTION INTRAVENOUS at 08:25

## 2025-06-19 RX ADMIN — FOLIC ACID 1 MG: 1 TABLET ORAL at 08:24

## 2025-06-19 RX ADMIN — LEVOTHYROXINE SODIUM 88 MCG: 0.09 TABLET ORAL at 07:43

## 2025-06-19 RX ADMIN — THERA TABS 1 TABLET: TAB at 08:24

## 2025-06-19 RX ADMIN — ENOXAPARIN SODIUM 40 MG: 100 INJECTION SUBCUTANEOUS at 08:25

## 2025-06-19 NOTE — PROGRESS NOTES
RT Evaluation for Home Oxygen    Resting (Room Air):  SpO2: 94      During Walk (Room Air):  SpO2: 92  Rate of Dyspnea: slight sob   Symptoms: slight sob       After Walk:  SpO2: 91  Symptoms: pt felt a little out of breath but recovered fast   Comments: pt says she only needs oxygen at night time   Does the Patient Qualify for Home O2: no      Additional Comments: pt does not qualify for home oxygen at this time, pt does state that she wears 2L at home only during night time. She also states she needs a new machine due to it getting old and not working properly.     Electronically signed by Kristen Tinsley RCP on 6/19/2025 at 8:38 AM

## 2025-06-19 NOTE — CARE COORDINATION
Discharge Report    [facility]  Clinical Case Management Department  Written by: Ela Land RN    Patient Name: Nohemi Durand  Attending Provider: Mary Ann Sharif MD  Admit Date: 2025  1:23 AM  MRN: [id number]  Account: [id number]                     : 1960  Discharge Date: 25      Disposition: home    Plan is return home independently with spouse. Home O2 eval completed and patient does not qualify for home o2 with activity or at rest. Patient's spouse to provide transportation home.     Ela Land RN

## 2025-06-19 NOTE — PLAN OF CARE
Problem: Respiratory - Adult  Goal: Achieves optimal ventilation and oxygenation  6/19/2025 0747 by Kristen Tinsley RCP  Outcome: Progressing  6/18/2025 2238 by Mamie Vasquez RN  Outcome: Progressing

## 2025-06-19 NOTE — PLAN OF CARE
Problem: Discharge Planning  Goal: Discharge to home or other facility with appropriate resources  6/18/2025 2238 by Mamie Vasquez RN  Outcome: Progressing  6/18/2025 1220 by Mary Andrews RN  Outcome: Progressing     Problem: Seizure Precautions  Goal: Remains free of injury related to seizures activity  6/18/2025 2238 by Mamie Vasquez RN  Outcome: Progressing  6/18/2025 1220 by Mary Andrews RN  Outcome: Progressing     Problem: Pain  Goal: Verbalizes/displays adequate comfort level or baseline comfort level  6/18/2025 2238 by Mamie Vasquez RN  Outcome: Progressing  6/18/2025 1220 by Mary Andrews RN  Outcome: Progressing     Problem: Safety - Adult  Goal: Free from fall injury  6/18/2025 2238 by Mamie Vasquez RN  Outcome: Progressing  6/18/2025 1220 by Mary Andrews RN  Outcome: Progressing     Problem: Respiratory - Adult  Goal: Achieves optimal ventilation and oxygenation  6/18/2025 2238 by Mamie Vasquez RN  Outcome: Progressing  6/18/2025 1220 by Mary Andrews RN  Outcome: Progressing

## 2025-06-19 NOTE — DISCHARGE SUMMARY
[facility]  Office: [phone]  Osmel Osorio DO, Tristan Crane, DO, Mario Rivera DO, Gordon Garcia DO, Luis Ryan MD, Mary Ann Sharif MD, Jodie Saunders MD, Rajani Davila MD,  Alan Lares MD, Azul Blount MD, Dennis Soria MD,  Oumar Webb DO, Ember Bello MD, Reginald Boudreaux MD, King Osorio DO, Aline Becerril MD,  Dmitriy Lo DO, Yolanda Peña MD, Nadine Cox MD, Radha Dowd MD,  Yonathan Cain MD, Leslie Patel MD, Amanda Chilel MD, Mumtaz Hoskins MD, Bucky Dolan MD, Alem Pierre MD, Marty Elliott, DO, Leslie Muse MD, Hank Romero DO, Philip Fofana MD, Oumar Márquez MD, Mohsin Reza, MD, Galo Ivy MD, Shirley Waterhouse, CNP,  Babita Barreto CNP, Marty Caro, CNP,  Marine Johnson, CHRISTI, Marva Milner, CNP, Brenda Villarreal, CNP, Luz Miguel, CNP, Rayna Gomez, CNP, Alejandra Odonnell, PA-C, Reshma Carrillo, CNP, Alisa Carey, CNP,  Stephy Mendiola, CNP, Sirena Snow, CNP, Ryan Romano, PA-C, Charissa Hartley, CNP,  Shellie Kapadia, CNS, Licha Crowley, CNP, Cristina Caballero, CNP,   Kristel Fonseca, CNP         [facility]   IN-PATIENT SERVICE   [facility]    Discharge Summary     Patient ID: Nohemi Durand  :  1960   MRN: [id number]     ACCOUNT:  [id number]   Patient's PCP: Kat Spence MD  Admit Date: 2025   Discharge Date: 2025     Length of Stay: 2  Code Status:  Full Code  Admitting Physician: No admitting provider for patient encounter.  Discharge Physician: Mary Ann Sharif MD     Active Discharge Diagnoses:     Hospital Problem Lists:  Principal Problem:    COPD with acute exacerbation (HCC)  Active Problems:    Alcohol abuse    Adenocarcinoma, lung, left (HCC)    Tobacco abuse disorder    Panlobular emphysema (HCC)    Essential hypertension    History of right breast cancer    Hyponatremia    Coronary artery disease involving native coronary artery of native heart without angina pectoris

## 2025-06-19 NOTE — PROGRESS NOTES
[facility]  Office: [phone]  Osmel Osorio, DO, Tristan Crane, DO, Mario Rivera DO, Gordon Garcia, DO, Luis Ryan MD, Mary Ann Sharif MD, Jodie Saunders MD, Rajani Davila MD,  Alan Lares MD, Azul Blount MD, Dennis Soria MD,  Oumar Webb DO, Ember Bello MD, Reginald Boudreaux MD, King Osorio DO, Aline Becerril MD,  Dmitriy Lo DO, Yolanda Peña MD, Nadine Cox MD, Radha Dowd MD,  Yonathan Cain MD, Leslie Patel MD, Amanda Chilel MD, Mumtaz Hoskins MD, Bucky Dolan MD, Alem Pierre MD, Marty Elliott, DO, Leslie Muse MD, Hank Romero DO, Philip Fofana MD, Oumar Márquez MD, Mohsin Reza, MD, Galo Ivy MD, Shirley Waterhouse, CNP,  Babita Barreto, CNP, Marty Caro, CNP,  Marine Johnson, DNP, Marva Milner, CNP, Brenda Villarreal, CNP, Luz Miguel, CNP, Rayna Gomez, CNP, Alejandra Odonnell, PA-C, Reshma Carrillo, CNP, Alisa Carey, CNP,  Stephy Mendiola, CNP, Sirena Snow, CNP, Ryan Romano, PA-C, Charissa Hartley, CNP,  Shellie Kapadia, CNS, Licha Crowley, CNP, Cristina Caballero, CNP,   Kristel Fonseca, CNP         [facility]   IN-PATIENT SERVICE   [facility]    Progress Note    6/19/2025    8:00 AM    Name:   Nohemi Durand  MRN:     [id number]     Acct:      [id number]   Room:   0427/0427-02  IP Day:  2  Admit Date:  6/17/2025  1:23 AM    PCP:   Kat Spence MD  Code Status:  Full Code    Subjective:     C/C:   Chief Complaint   Patient presents with    Respiratory Distress     Interval History Status: improved.     Pt is feeling much better, less cough and SOB.  No fevers, + parainfluenza.  She is weaned off supplemental oxygen.  Wants to go home.    Brief History:     Per the H & P:  \"Nohemi Durand is a 64 y.o. Non- / non  female who presents with Respiratory Distress   and is admitted to the hospital for the management of COPD with acute exacerbation (HCC).     64-year-old female with past medical

## 2025-06-20 ENCOUNTER — CARE COORDINATION (OUTPATIENT)
Dept: CARE COORDINATION | Age: 65
End: 2025-06-20

## 2025-06-20 ENCOUNTER — TELEPHONE (OUTPATIENT)
Dept: FAMILY MEDICINE CLINIC | Age: 65
End: 2025-06-20

## 2025-06-20 NOTE — CARE COORDINATION
Care Transitions Note    Initial Call - Call within 2 business days of discharge: Yes    Patient Current Location:  Home: [address] [place] [id number]    Care Transition Nurse contacted the patient, spouse/partner  by telephone to perform post hospital discharge assessment, verified name and  as identifiers.  Provided introduction to self, and explanation of the Care Transition Nurse role.    Patient: Nohemi Durand    Patient : 1960   MRN: [id number]    Reason for Admission: COPD exacerbation  Discharge Date: 25  RURS: Readmission Risk Score: 10.2      Last Discharge Facility       Date Complaint Diagnosis Description Type Department Provider    25 Respiratory Distress COPD exacerbation (HCC) ... ED to Hosp-Admission (Discharged) (ADMITTED) [facility] Mary Ann Sharif MD; Brenda Nieves...            Was this an external facility discharge? No    Additional needs identified to be addressed with provider   High priority: patient with home sp02 reading 86-87% at rest while using old [employer] that states not working properly. I have advised return to ED, she declines 911.               Method of communication with provider: chart routing and phone.    Patients top risk factors for readmission: medical condition-COPD    Interventions to address risk factors:   Education: when to call 911  Review of patient management of conditions/medications: symptoms, appts, medications    Care Summary Note: Patient's spouse reached for DISHA call. [facility] patient is not doing well since home discharge after COPD exacerbation admission. She is home with no services. [facility] patient has noted decrease pulse oximeter 88-89% with any activity. Advised on ED/ 911 for respiratory distress, SpO2 that does not recover with rest, advised to have patient conserve activity. He is at work, states will be home after 4 PM. Confirms she has phone on person and able to call for help, if needed. He is able to confirm patient has all

## 2025-06-23 ENCOUNTER — CARE COORDINATION (OUTPATIENT)
Dept: CARE COORDINATION | Age: 65
End: 2025-06-23

## 2025-06-23 NOTE — TELEPHONE ENCOUNTER
What did Isa need to discuss regarding this patient ? Asking so we can have a more efficient call

## 2025-06-23 NOTE — CARE COORDINATION
Care Transitions Note    Follow Up Call     Attempted to reach patient, spouse/partner  for transitions of care follow up.  Unable to reach patient, spouse/partner .      Outreach Attempts:   Multiple attempts to contact patient, spouse/partner  at phone numbers on file.   HIPAA compliant voicemail left for patient, spouse/partner .     Care Summary Note: first attempt follow up, left VM    Follow Up Appointment:   Future Appointments         Provider Specialty Dept Phone    8/12/2025 3:30 PM Kat Spence MD Family Medicine [phone]    9/8/2025 11:00 AM (Arrive by 10:45 AM) STA CT SCAN  1 Radiology [phone]    9/16/2025 2:00 PM Bj Mccormack MD; SCHEDULE, [facility] NURSE Radiation Oncology [phone]    11/6/2025 9:00 AM Cyril Peña MD Pulmonology [phone]            Plan for follow-up call in 2-5 days based on severity of symptoms and risk factors. Plan for next call: symptom management-review for ED/ admission, respiratory status. Offer RPM, follow up on PCP HFU     Isa Cintron RN

## 2025-06-23 NOTE — CARE COORDINATION
VM from [place] with [facility] FP, CTN attempted call back, line disconnects unable to leave VM    Call back spoke with Yolanda Singh CTN attempted update call 6/20, message as per high priority care coordination note 6/20, attempted outreach to patient today without success. [facility] office has attempted as well. No further needs identified, patient noted scheduled for acute care visit 6/24. Will continue to follow.

## 2025-06-24 ENCOUNTER — OFFICE VISIT (OUTPATIENT)
Dept: FAMILY MEDICINE CLINIC | Age: 65
End: 2025-06-24

## 2025-06-24 VITALS
OXYGEN SATURATION: 93 % | WEIGHT: 100.4 LBS | DIASTOLIC BLOOD PRESSURE: 80 MMHG | HEART RATE: 83 BPM | BODY MASS INDEX: 15.27 KG/M2 | SYSTOLIC BLOOD PRESSURE: 124 MMHG

## 2025-06-24 DIAGNOSIS — C34.31 MALIGNANT NEOPLASM OF LOWER LOBE OF RIGHT LUNG (HCC): ICD-10-CM

## 2025-06-24 DIAGNOSIS — E87.1 HYPONATREMIA: ICD-10-CM

## 2025-06-24 DIAGNOSIS — Z09 HOSPITAL DISCHARGE FOLLOW-UP: Primary | ICD-10-CM

## 2025-06-24 DIAGNOSIS — F10.10 ALCOHOL ABUSE: Chronic | ICD-10-CM

## 2025-06-24 DIAGNOSIS — I10 ESSENTIAL HYPERTENSION: Chronic | ICD-10-CM

## 2025-06-24 DIAGNOSIS — J43.1 PANLOBULAR EMPHYSEMA (HCC): ICD-10-CM

## 2025-06-24 DIAGNOSIS — R09.02 HYPOXIA: ICD-10-CM

## 2025-06-24 DIAGNOSIS — J44.1 COPD WITH ACUTE EXACERBATION (HCC): ICD-10-CM

## 2025-06-24 DIAGNOSIS — M67.864 TENDINOSIS OF LEFT KNEE: ICD-10-CM

## 2025-06-24 DIAGNOSIS — C34.92 ADENOCARCINOMA OF LUNG, STAGE 4, LEFT (HCC): ICD-10-CM

## 2025-06-24 DIAGNOSIS — B37.0 ORAL THRUSH: ICD-10-CM

## 2025-06-24 DIAGNOSIS — G47.9 SLEEPING DIFFICULTY: ICD-10-CM

## 2025-06-24 DIAGNOSIS — M87.88 KNEE PAIN WITH AVASCULAR NECROSIS DETERMINED BY X-RAY (HCC): ICD-10-CM

## 2025-06-24 DIAGNOSIS — C34.92 ADENOCARCINOMA, LUNG, LEFT (HCC): ICD-10-CM

## 2025-06-24 RX ORDER — AMLODIPINE BESYLATE 5 MG/1
5 TABLET ORAL DAILY
Qty: 30 TABLET | Refills: 5 | Status: SHIPPED | OUTPATIENT
Start: 2025-06-24

## 2025-06-24 RX ORDER — TRAZODONE HYDROCHLORIDE 50 MG/1
50 TABLET ORAL NIGHTLY
Qty: 30 TABLET | Refills: 0 | Status: SHIPPED | OUTPATIENT
Start: 2025-06-24

## 2025-06-24 RX ORDER — CELECOXIB 100 MG/1
100 CAPSULE ORAL DAILY
Qty: 90 CAPSULE | Refills: 1 | Status: SHIPPED | OUTPATIENT
Start: 2025-06-24

## 2025-06-24 RX ORDER — NYSTATIN 100000 [USP'U]/ML
500000 SUSPENSION ORAL 4 TIMES DAILY
Qty: 200 ML | Refills: 0 | Status: SHIPPED | OUTPATIENT
Start: 2025-06-24 | End: 2025-07-04

## 2025-06-24 RX ORDER — LEVOTHYROXINE SODIUM 88 UG/1
88 TABLET ORAL DAILY
Qty: 30 TABLET | Refills: 5 | Status: SHIPPED | OUTPATIENT
Start: 2025-06-24

## 2025-06-24 NOTE — PROGRESS NOTES
Post-Discharge Transitional Care Follow Up      Nohemi Durand   YOB: 1960    Date of Office Visit:  6/24/2025  Date of Hospital Admission: 6/17/25  Date of Hospital Discharge: 6/19/25  Readmission Risk Score (high >=14%. Medium >=10%):Readmission Risk Score: 10.2      Care management risk score Rising risk (score 2-5) and Complex Care (Scores >=6): No Risk Score On File     Non face to face  following discharge, date last encounter closed (first attempt may have been earlier): 06/20/2025     Call initiated 2 business days of discharge: Yes     Hospital discharge follow-up  -     FL DISCHARGE MEDS RECONCILED W/ CURRENT OUTPATIENT MED LIST  Essential hypertension  -     amLODIPine (NORVASC) 5 MG tablet; Take 1 tablet by mouth daily, Disp-30 tablet, R-5Normal  Knee pain with avascular necrosis determined by x-ray (HCC)  -     celecoxib (CELEBREX) 100 MG capsule; Take 1 capsule by mouth daily, Disp-90 capsule, R-1Normal  Tendinosis of left knee  -     celecoxib (CELEBREX) 100 MG capsule; Take 1 capsule by mouth daily, Disp-90 capsule, R-1Normal  Adenocarcinoma of lung, stage 4, left (HCC)  -     levothyroxine (SYNTHROID) 88 MCG tablet; Take 1 tablet by mouth daily, Disp-30 tablet, R-5Normal  Adenocarcinoma, lung, left (HCC)  -     6 Minute Walk Test; Future  -     Full PFT Study With Bronchodilator; Future  Panlobular emphysema (HCC)  -     6 Minute Walk Test; Future  -     Full PFT Study With Bronchodilator; Future  Hypoxia  -     6 Minute Walk Test; Future  -     Full PFT Study With Bronchodilator; Future  Oral thrush  -     nystatin (MYCOSTATIN) 617964 UNIT/ML suspension; Take 5 mLs by mouth 4 times daily for 10 days, Oral, 4 TIMES DAILY Starting Tue 6/24/2025, Until Fri 7/4/2025, For 10 days, Disp-200 mL, R-0, Normal  Sleeping difficulty  -     traZODone (DESYREL) 50 MG tablet; Take 1 tablet by mouth nightly, Disp-30 tablet, R-0Normal  Alcohol abuse  Malignant neoplasm of lower lobe of right lung

## 2025-06-24 NOTE — PROGRESS NOTES
Six minute walk test - 91% at rest on room air, dropped to 84% on room air at 2 minute(s) of walking, 95% with 2 LPM oxygen via nasal cannula, stable for remaining 4 minutes at >90%

## 2025-06-25 ENCOUNTER — CARE COORDINATION (OUTPATIENT)
Dept: CARE COORDINATION | Age: 65
End: 2025-06-25

## 2025-06-25 NOTE — CARE COORDINATION
Care Transitions Note    Follow Up Call     Attempted to reach patient for transitions of care follow up.  Unable to reach patient.      Outreach Attempts:   Multiple attempts to contact patient, spouse/partner  at phone numbers on file.   HIPAA compliant voicemail left for patient.     Care Summary Note: Second attempt follow up, left VM.     Follow Up Appointment:   Future Appointments         Provider Specialty Dept Phone    7/24/2025 3:30 PM Kat Spence MD Family Medicine [phone]    8/12/2025 3:30 PM Kat Spence MD Family Medicine [phone]    9/8/2025 11:00 AM (Arrive by 10:45 AM) STA CT SCAN  1 Radiology [phone]    9/16/2025 2:00 PM Bj Mccormack MD; SCHEDULE, [facility] ONC NURSE Radiation Oncology [phone]    11/6/2025 9:00 AM Cyril Peña MD Pulmonology [phone]            No further follow-up call indicated based on severity of symptoms and risk factors. Plan for next call: referral to ambulatory care manager-Sara Cintron RN

## 2025-06-26 ENCOUNTER — PATIENT MESSAGE (OUTPATIENT)
Dept: FAMILY MEDICINE CLINIC | Age: 65
End: 2025-06-26

## 2025-06-27 ENCOUNTER — TELEPHONE (OUTPATIENT)
Dept: FAMILY MEDICINE CLINIC | Age: 65
End: 2025-06-27

## 2025-06-27 ASSESSMENT — ENCOUNTER SYMPTOMS
NAUSEA: 0
ANAL BLEEDING: 0
DIARRHEA: 0
BLOOD IN STOOL: 0
ABDOMINAL PAIN: 0
CONSTIPATION: 0
CHEST TIGHTNESS: 0
SHORTNESS OF BREATH: 1
WHEEZING: 0
CHOKING: 0
VOMITING: 0
COUGH: 0

## 2025-06-27 ASSESSMENT — VISUAL ACUITY: OU: 1

## 2025-06-27 NOTE — TELEPHONE ENCOUNTER
Pts  called in to state that the pt has been waiting for a week for her oxygen. Pts  stated that he almost had to call 911 for her due to her not being able to breathe. I did state to the pts  that Dr LYN is not in today. Pts  is not ok with the pt being without oxygen over the weekend. I stated that I really don't know what else to do but send another message and see if someone could help.

## 2025-06-27 NOTE — TELEPHONE ENCOUNTER
Dr. LYN - please advise if home oxygen is being ordered for the pt, we will also need OV note signed in order to send with order.     Please advise

## 2025-06-27 NOTE — TELEPHONE ENCOUNTER
Spoke with Figueroa and Dr. Spence. Oxygen was ordered. Spoke with Sarina at [facility] and if they had office notes, order, insurance card and demographics faxed before 5pm they could deliver as soon as tonight.

## 2025-06-30 ENCOUNTER — CARE COORDINATION (OUTPATIENT)
Dept: CARE COORDINATION | Age: 65
End: 2025-06-30

## 2025-06-30 NOTE — CARE COORDINATION
Ambulatory Care Coordination Note     6/30/2025 2:47 PM     Patient outreach attempt by this AC today to offer care management services. [facility] was unable to reach the patient, spouse/partner  by telephone today;   left voice message requesting a return phone call to this AC.     ACM: Mishel Llanos RN     Care Summary Note: first attempt to reach patient for possible AC enrollment; left messages on both patient's and spouse's voicemail    PCP/Specialist follow up:   Future Appointments         Provider Specialty Dept Phone    7/15/2025  5:00 PM [facility] PULM FUNCTION  Pulmonary Function Testing [phone]    7/24/2025 3:30 PM Kat Spence MD Family Medicine [phone]    8/12/2025 3:30 PM Kat Spence MD Family Medicine [phone]    9/8/2025 11:00 AM (Arrive by 10:45 AM) STA CT SCAN  1 Radiology [phone]    9/16/2025 2:00 PM Bj Mccormack MD; SCHEDULE, [facility] NURSE Radiation Oncology [phone]    11/6/2025 9:00 AM Cyril Peña MD Pulmonology [phone]            Follow Up:   Plan for next AC outreach in approximately 1 week to complete:  - outreach attempt to offer care management services.      Patient's spouse responded back to [facility] via text message:  \"She is better now that she got oxygen in the home. Being [facility] said she didn't drop low enough. Our family doctor did the home assessment and she failed miserably\"    - introduced self and reason for call - he was agreeable to follow up call after the holiday.

## 2025-07-08 ENCOUNTER — CARE COORDINATION (OUTPATIENT)
Dept: CARE COORDINATION | Age: 65
End: 2025-07-08

## 2025-07-08 ASSESSMENT — ENCOUNTER SYMPTOMS: DYSPNEA ASSOCIATED WITH: EXERTION

## 2025-07-08 NOTE — CARE COORDINATION
Ambulatory Care Coordination Note     2025 9:35 AM     Patient Current Location:  Home: [address] [place] [address]     ACM contacted the spouse/partner by telephone. Verified name and  with spouse/partner as identifiers.         ACM: Mishel Llanos RN     Challenges to be reviewed by the provider   Additional needs identified to be addressed with provider No                 Method of communication with provider: none.    Utilization: Patient has not had any utilization since our last call.     Care Summary Note: Spoke to spouse, Jose. He states Nohemi is doing good. She is wearing the oxygen most of the time. She wears it at 2L when she is up moving around, and 1 L when sitting. He denies any cough, increased shortness of breath or other symptoms. Reviewed upcoming appts- ophthalmology tomorrow and PFT on 7/15. He denies any questions or concerns today.     Offered patient enrollment in the Remote Patient Monitoring (RPM) program for in-home monitoring: Deferred at this time because ACM; will discuss at next outreach.     Assessments Completed:   COPD Assessment    Does the patient understand envrionmental exposure?: Yes  Is the patient able to verbalize Rescue vs. Long Acting medications?: Yes  Does the patient have a nebulizer?: Yes  Does the patient use a space with inhaled medications?: No     No patient-reported symptoms         Symptoms:  None: Yes      Symptom course: stable  Breathlessness: exertion  Increase use of rapid acting/rescue inhaled medications?: No  Change in chronic cough?: No/At Baseline  Change in sputum?: No/At Baseline  Have you had a recent diagnosis of pneumonia either by PCP or at a hospital?: No      ,   General Assessment    Do you have any symptoms that are causing concern?: No          Medications Reviewed:   Not completed during this call: will complete with next call     Advance Care Planning:   Not reviewed during this call     Care Planning:    Goals

## 2025-07-10 DIAGNOSIS — M87.88 KNEE PAIN WITH AVASCULAR NECROSIS DETERMINED BY X-RAY (HCC): ICD-10-CM

## 2025-07-10 DIAGNOSIS — M67.864 TENDINOSIS OF LEFT KNEE: ICD-10-CM

## 2025-07-11 RX ORDER — TRIAMCINOLONE ACETONIDE 1 MG/G
CREAM TOPICAL DAILY
Qty: 80 G | Refills: 1 | OUTPATIENT
Start: 2025-07-11

## 2025-07-11 NOTE — TELEPHONE ENCOUNTER
Last visit: 6/24/25  Last Med refill: 6/13/26  Does patient have enough medication for 72 hours: No:     Next Visit Date:  Future Appointments   Date Time Provider Department Center   7/15/2025  5:00 PM [facility] PULM FUNCTION RM [facility]   7/24/2025  3:30 PM Kat Spence MD [facility] ECC DEP   8/12/2025  3:30 PM Kat Spence MD [facility] DEP   9/8/2025 11:00 AM STA CT SCAN RM 1 STAZ CT SCAN STA Radiolog   9/16/2025  2:00 PM SCHEDULE, [facility] NURSE [facility] [place]   11/6/2025  9:00 AM Cyril Peña MD Resp Spec [id number]       Health Maintenance   Topic Date Due    Cervical cancer screen  Never done    Shingles vaccine (1 of 2) 12/26/2025 (Originally 8/12/2011)    COVID-19 Vaccine (1 - 2024-25 season) 12/26/2025 (Originally 9/1/2024)    Respiratory Syncytial Virus (RSV) Pregnant or age 60 yrs+ (1 - Risk 60-74 years 1-dose series) 12/26/2025 (Originally 8/12/2021)    Pneumococcal 50+ years Vaccine (1 of 2 - PCV) 05/12/2026 (Originally 8/12/1980)    Flu vaccine (1) 08/01/2025    Depression Screen  01/07/2026    Breast cancer screen  01/23/2026    DTaP/Tdap/Td vaccine (2 - Td or Tdap) 05/03/2028    Lipids  02/27/2030    Colorectal Cancer Screen  03/31/2035    Hepatitis C screen  Completed    Hepatitis A vaccine  Aged Out    Hepatitis B vaccine  Aged Out    Hib vaccine  Aged Out    Polio vaccine  Aged Out    Meningococcal (ACWY) vaccine  Aged Out    Meningococcal B vaccine  Aged Out    HIV screen  Discontinued    Lung Cancer Screening &/or Counseling  Discontinued       Hemoglobin A1C (%)   Date Value   12/19/2019 5.6             ( goal A1C is < 7)   No components found for: \"LABMICR\"  No components found for: \"LDLCHOLESTEROL\", \"LDLCALC\"    (goal LDL is <100)   AST (U/L)   Date Value   06/17/2025 33     ALT (U/L)   Date Value   06/17/2025 17     BUN (mg/dL)   Date Value   06/19/2025 8     BP Readings from Last 3 Encounters:   06/24/25 124/80   06/19/25 (!) 144/90

## 2025-07-14 RX ORDER — HYDROCODONE BITARTRATE AND ACETAMINOPHEN 5; 325 MG/1; MG/1
1 TABLET ORAL DAILY PRN
Qty: 30 TABLET | Refills: 0 | Status: SHIPPED | OUTPATIENT
Start: 2025-07-14 | End: 2025-08-13

## 2025-07-18 ENCOUNTER — CARE COORDINATION (OUTPATIENT)
Dept: CARE COORDINATION | Age: 65
End: 2025-07-18

## 2025-07-18 NOTE — CARE COORDINATION
Ambulatory Care Coordination Note     7/18/2025 9:36 AM     Patient outreach attempt by this ACM today to perform care management follow up . ACM was unable to reach the patient by telephone today;   left voice message requesting a return phone call to this ACM.     ACM: Mishel Llanos RN     Care Summary Note: first attempt to reach for follow up    PCP/Specialist follow up:   Future Appointments         Provider Specialty Dept Phone    7/24/2025 3:30 PM Kat Spence MD Family Medicine [phone]    8/12/2025 3:30 PM Kat Spence MD Family Medicine [phone]    8/12/2025 5:00 PM ST[facility] PULM FUNCTION  Pulmonary Function Testing [phone]    9/8/2025 11:00 AM (Arrive by 10:45 AM) STA CT SCAN  1 Radiology [phone]    9/16/2025 2:00 PM Bj [facility]ccormack MD; [facility] Rad[facility] Oncology [phone]    11/6/2025 9:00 AM Cyril Peña MD Pulmonology [phone]            Follow Up:   Plan for next ACM outreach in approximately 1 week to complete:  - CC Protocol assessments  - disease specific assessments  - medication review  - goal progression.

## 2025-07-21 DIAGNOSIS — G47.9 SLEEPING DIFFICULTY: ICD-10-CM

## 2025-07-21 RX ORDER — TRAZODONE HYDROCHLORIDE 50 MG/1
TABLET ORAL
Qty: 30 TABLET | Refills: 0 | Status: SHIPPED | OUTPATIENT
Start: 2025-07-21

## 2025-07-21 NOTE — TELEPHONE ENCOUNTER
Last visit: 06/24/2025  Last Med refill: 06/24/2025  Does patient have enough medication for 72 hours: No:     Next Visit Date:  Future Appointments   Date Time Provider Department Center   8/12/2025  3:30 PM Kat Spence MD [facility] DEP   8/12/2025  5:00 PM STA PULM FUNCTION RM [facility]   8/19/2025  4:45 PM Kat Spence MD [facility] ECC DEP   9/8/2025 11:00 AM STA CT SCAN RM 1 STAZ CT SCAN STA Radiolog   9/16/2025  2:00 PM SCHEDULE, [facility] NURSE [facility] [place]   11/6/2025  9:00 AM Cyril Peña MD Resp Spec [id number]       Health Maintenance   Topic Date Due    Cervical cancer screen  Never done    Shingles vaccine (1 of 2) 12/26/2025 (Originally 8/12/2011)    COVID-19 Vaccine (1 - 2024-25 season) 12/26/2025 (Originally 9/1/2024)    Respiratory Syncytial Virus (RSV) Pregnant or age 60 yrs+ (1 - Risk 60-74 years 1-dose series) 12/26/2025 (Originally 8/12/2021)    Pneumococcal 50+ years Vaccine (1 of 2 - PCV) 05/12/2026 (Originally 8/12/1980)    Flu vaccine (1) 08/01/2025    Depression Screen  01/07/2026    Breast cancer screen  01/23/2026    DTaP/Tdap/Td vaccine (2 - Td or Tdap) 05/03/2028    Lipids  02/27/2030    Colorectal Cancer Screen  03/31/2035    Hepatitis C screen  Completed    Hepatitis A vaccine  Aged Out    Hepatitis B vaccine  Aged Out    Hib vaccine  Aged Out    Polio vaccine  Aged Out    Meningococcal (ACWY) vaccine  Aged Out    Meningococcal B vaccine  Aged Out    HIV screen  Discontinued    Lung Cancer Screening &/or Counseling  Discontinued       Hemoglobin A1C (%)   Date Value   12/19/2019 5.6             ( goal A1C is < 7)   No components found for: \"LABMICR\"  No components found for: \"LDLCHOLESTEROL\", \"LDLCALC\"    (goal LDL is <100)   AST (U/L)   Date Value   06/17/2025 33     ALT (U/L)   Date Value   06/17/2025 17     BUN (mg/dL)   Date Value   06/19/2025 8     BP Readings from Last 3 Encounters:   06/24/25 124/80   06/19/25 (!)

## 2025-07-23 ENCOUNTER — OFFICE VISIT (OUTPATIENT)
Dept: FAMILY MEDICINE CLINIC | Age: 65
End: 2025-07-23
Payer: COMMERCIAL

## 2025-07-23 VITALS
SYSTOLIC BLOOD PRESSURE: 128 MMHG | DIASTOLIC BLOOD PRESSURE: 80 MMHG | HEART RATE: 94 BPM | BODY MASS INDEX: 15.54 KG/M2 | WEIGHT: 102.2 LBS | OXYGEN SATURATION: 97 %

## 2025-07-23 DIAGNOSIS — J20.9 ACUTE BRONCHITIS, UNSPECIFIED ORGANISM: Primary | ICD-10-CM

## 2025-07-23 DIAGNOSIS — L30.9 DERMATITIS: Primary | ICD-10-CM

## 2025-07-23 PROCEDURE — 3079F DIAST BP 80-89 MM HG: CPT | Performed by: INTERNAL MEDICINE

## 2025-07-23 PROCEDURE — 3074F SYST BP LT 130 MM HG: CPT | Performed by: INTERNAL MEDICINE

## 2025-07-23 PROCEDURE — 99214 OFFICE O/P EST MOD 30 MIN: CPT | Performed by: INTERNAL MEDICINE

## 2025-07-23 RX ORDER — PREDNISONE 10 MG/1
TABLET ORAL
Qty: 30 TABLET | Refills: 0 | Status: SHIPPED | OUTPATIENT
Start: 2025-07-23

## 2025-07-23 RX ORDER — TRIAMCINOLONE ACETONIDE 1 MG/G
CREAM TOPICAL DAILY
Qty: 80 G | Refills: 1 | Status: SHIPPED | OUTPATIENT
Start: 2025-07-23

## 2025-07-23 RX ORDER — DOXYCYCLINE HYCLATE 100 MG
100 TABLET ORAL 2 TIMES DAILY
Qty: 20 TABLET | Refills: 0 | Status: SHIPPED | OUTPATIENT
Start: 2025-07-23 | End: 2025-08-02

## 2025-07-23 ASSESSMENT — ENCOUNTER SYMPTOMS
HEARTBURN: 0
SHORTNESS OF BREATH: 1
RHINORRHEA: 1
SORE THROAT: 1
VOMITING: 0
ABDOMINAL PAIN: 0
BOWEL INCONTINENCE: 0
VISUAL CHANGE: 0
HEMOPTYSIS: 0
WHEEZING: 1
NAUSEA: 0
COUGH: 1

## 2025-07-23 ASSESSMENT — VISUAL ACUITY: OU: 1

## 2025-07-23 NOTE — PROGRESS NOTES
[facility]  [facility]  [address] [place] [id number]  Dept: [phone]  Dept Fax: [phone]      Nohemi Durand is a 64 y.o. female who presents today for hermedical conditions/complaints as noted below.  Nohemi Durand is c/o of Congestion (Started about a week ago, sinus pressure ), Cough (Dry cough ), Fever (Come and goes, taking an OTC ), and Fall (Has a bruise on chest )        Assessment/Plan:     1. Acute bronchitis, unspecified organism  -     predniSONE (DELTASONE) 10 MG tablet; 4 tabs by mouth daily for 3 days, then 3 tabs daily for 3 days, then 2 tabs daily for 3 days, then 1 tab daily till gone., Disp-30 tablet, R-0Normal  -     doxycycline hyclate (VIBRA-TABS) 100 MG tablet; Take 1 tablet by mouth 2 times daily for 10 days, Disp-20 tablet, R-0Normal          No follow-ups on file.      HPI     Cough  This is a new problem. The current episode started in the past 7 days. The problem has been gradually worsening. The problem occurs constantly. The cough is Non-productive. Associated symptoms include a fever (tactile), nasal congestion, rhinorrhea, a sore throat, shortness of breath and wheezing. Pertinent negatives include no chest pain, chills, ear congestion, ear pain, headaches, heartburn, hemoptysis, myalgias, postnasal drip, rash, sweats or weight loss. The symptoms are aggravated by lying down. Treatments tried: tylenol, zicam, nebulized albuterol. The treatment provided no relief. Her past medical history is significant for bronchitis, COPD and emphysema.   Fall  Incident onset: couple weeks ago. The fall occurred while standing (while getting into bed). Impact surface: nightstand. Point of impact: chest. Pain location: chest. The pain is mild. The symptoms are aggravated by pressure on injury. Associated symptoms include a fever (tactile). Pertinent negatives include no abdominal pain, bowel incontinence, headaches, hearing loss, hematuria, loss of

## 2025-07-25 ENCOUNTER — CARE COORDINATION (OUTPATIENT)
Dept: CARE COORDINATION | Age: 65
End: 2025-07-25

## 2025-07-25 NOTE — CARE COORDINATION
Ambulatory Care Coordination Note     7/25/2025 11:42 AM     Patient outreach attempt by this ACM today to perform care management follow up . ACM was unable to reach the patient by telephone today;   left voice message requesting a return phone call to this ACM.     ACM: Mishel Llanos RN     Care Summary Note: second attempt to reach for follow up    PCP/Specialist follow up:   Future Appointments         Provider Specialty Dept Phone    8/12/2025 5:00 PM STA PULM FUNCTION  Pulmonary Function Testing [phone]    8/19/2025 4:45 PM Kat Spence MD Family Medicine [phone]    9/8/2025 11:00 AM (Arrive by 10:45 AM) STA CT SCAN  1 Radiology [phone]    9/16/2025 2:00 PM Bj Mccormack MD; SCHEDULE, [facility] NURSE Radiation Oncology [phone]    11/6/2025 9:00 AM Cyril Peña MD Pulmonology [phone]            Follow Up:   Plan for next ACM outreach in approximately 1 week to complete:  - disease specific assessments  - SDOH assessments  - advance care planning  - goal progression  - education .

## 2025-07-31 ENCOUNTER — CARE COORDINATION (OUTPATIENT)
Dept: CARE COORDINATION | Age: 65
End: 2025-07-31

## 2025-07-31 NOTE — CARE COORDINATION
Ambulatory Care Coordination Note     7/31/2025 8:48 AM     Patient outreach attempt by this ACM today to perform care management follow up . ACM was unable to reach the patient by telephone today;   left voice message requesting a return phone call to this ACM.     ACM: Mishel Llanos RN     Care Summary Note: third attempt to reach for follow up; recently prescribed doxycycline and prednisone for acute bronchitis.     PCP/Specialist follow up:   Future Appointments         Provider Specialty Dept Phone    8/12/2025 5:00 PM STA PULM FUNCTION  Pulmonary Function Testing [phone]    8/19/2025 4:45 PM Kat Spence MD Family Medicine [phone]    9/8/2025 11:00 AM (Arrive by 10:45 AM) STA CT SCAN  1 Radiology [phone]    9/16/2025 2:00 PM Bj Mccormack MD; SCHEDULE, [facility] RAD ONC NURSE Radiation Oncology [phone]    11/6/2025 9:00 AM Cyril Peña MD Pulmonology [phone]            Follow Up:   Plan for next ACM outreach in approximately 1 week to complete:  - disease specific assessments  - goal progression  - education .

## 2025-08-06 ENCOUNTER — CARE COORDINATION (OUTPATIENT)
Dept: CARE COORDINATION | Age: 65
End: 2025-08-06

## 2025-08-06 DIAGNOSIS — M87.88 KNEE PAIN WITH AVASCULAR NECROSIS DETERMINED BY X-RAY (HCC): ICD-10-CM

## 2025-08-06 DIAGNOSIS — M67.864 TENDINOSIS OF LEFT KNEE: ICD-10-CM

## 2025-08-07 RX ORDER — HYDROCODONE BITARTRATE AND ACETAMINOPHEN 5; 325 MG/1; MG/1
1 TABLET ORAL DAILY PRN
Qty: 30 TABLET | Refills: 0 | Status: SHIPPED | OUTPATIENT
Start: 2025-08-14 | End: 2025-09-13

## 2025-08-18 DIAGNOSIS — G47.9 SLEEPING DIFFICULTY: ICD-10-CM

## 2025-08-20 RX ORDER — TRAZODONE HYDROCHLORIDE 50 MG/1
TABLET ORAL
Qty: 30 TABLET | Refills: 0 | Status: SHIPPED | OUTPATIENT
Start: 2025-08-20

## (undated) DEVICE — FORCEPS BX 240CM 2.4MM L NDL RAD JAW 4 M00513334

## (undated) DEVICE — POLYP TRAP: Brand: TRAPEASE®

## (undated) DEVICE — DEFENDO AIR WATER SUCTION AND BIOPSY VALVE KIT FOR  OLYMPUS: Brand: DEFENDO AIR/WATER/SUCTION AND BIOPSY VALVE

## (undated) DEVICE — ENDO KIT W/SYRINGE: Brand: MEDLINE INDUSTRIES, INC.

## (undated) DEVICE — ERBE NESSY® OMEGA PLATE USA (85+23)CM² , WITH CABLE 3 M: Brand: ERBE

## (undated) DEVICE — SNARE ENDOSCP AD L240CM LOOP W10MM SHTH DIA2.4MM RND INSUL